# Patient Record
Sex: FEMALE | Race: BLACK OR AFRICAN AMERICAN | Employment: UNEMPLOYED | URBAN - METROPOLITAN AREA
[De-identification: names, ages, dates, MRNs, and addresses within clinical notes are randomized per-mention and may not be internally consistent; named-entity substitution may affect disease eponyms.]

---

## 2017-11-21 ENCOUNTER — HOSPITAL ENCOUNTER (OUTPATIENT)
Dept: LAB | Age: 37
Discharge: HOME | End: 2017-11-21
Payer: MEDICAID

## 2017-11-21 ENCOUNTER — HOSPITAL ENCOUNTER (OUTPATIENT)
Dept: PREADMISSION TESTING | Age: 37
Discharge: HOME | End: 2017-11-21
Payer: MEDICAID

## 2017-11-21 ENCOUNTER — HOSPITAL ENCOUNTER (OUTPATIENT)
Dept: OTHER | Age: 37
Discharge: HOME | End: 2017-11-21
Payer: MEDICAID

## 2017-11-21 DIAGNOSIS — Z01.818 PRE-OP TESTING: ICD-10-CM

## 2017-11-21 DIAGNOSIS — N28.89 RENAL MASS: ICD-10-CM

## 2017-11-21 LAB — SENTARA SPECIMEN COL,SENBCF: NORMAL

## 2017-11-21 PROCEDURE — 99001 SPECIMEN HANDLING PT-LAB: CPT | Performed by: UROLOGY

## 2017-11-21 PROCEDURE — 71020 XR CHEST PA LAT: CPT

## 2017-11-21 PROCEDURE — 93005 ELECTROCARDIOGRAM TRACING: CPT

## 2017-11-21 NOTE — PERIOP NOTES
PAT - SURGICAL PRE-ADMISSION INSTRUCTIONS    NAME:  Sherwin Mcgarry                                                          TODAY'S DATE:  11/21/2017    SURGERY DATE:  12/7/2017                                  SURGERY ARRIVAL TIME:   1100    1. Do NOT eat or drink anything, including candy or gum, after MIDNIGHT on 12/6/17 , unless you have specific instructions from your Surgeon or Anesthesia Provider to do so. 2. No smoking on the day of surgery. 3. No alcohol 24 hours prior to the day of surgery. 4. No recreational drugs for one week prior to the day of surgery. 5. Leave all valuables, including money/purse, at home. 6. Remove all jewelry, nail polish, makeup (including mascara); no lotions, powders, deodorant, or perfume/cologne/after shave. 7. Glasses/Contact lenses and Dentures may be worn to the hospital.  They will be removed prior to surgery. 8. Call your doctor if symptoms of a cold or illness develop within 24 ours prior to surgery. 9. AN ADULT MUST DRIVE YOU HOME AFTER OUTPATIENT SURGERY. 10. If you are having an OUTPATIENT procedure, please make arrangements for a responsible adult to be with you for 24 hours after your surgery. 11. If you are admitted to the hospital, you will be assigned to a bed after surgery is complete. Normally a family member will not be able to see you until you are in your assigned bed. 15. Family is encouraged to accompany you to the hospital.  We ask visitors in the treatment area to be limited to ONE person at a time to ensure patient privacy. EXCEPTIONS WILL BE MADE AS NEEDED. 15. Children under 12 are discouraged from entering the treatment area and need to be supervised by an adult when in the waiting room. Special Instructions: Take these medications the morning of surgery with a sip of water:  METOPROLOL, HOLD oral diabetic medication on the MORNING OF surgery. , HOLD metformin/glucophage dose starting the EVENING BEFORE the day of surgery. Patient Prep:    use CHG solution    These surgical instructions were reviewed with PATIENT during the PAT VISIT. A printed copy of the instructions was provided to PATIENT. Directions: On the morning of surgery, please go to the [facility]. Enter the building from the [facility] entrance, 1st floor (next to the Emergency Room entrance). Take the elevator to the 2nd floor. Sign in at the Registration Desk.     If you have any questions and/or concerns, please do not hesitate to call:  (Prior to the day of surgery)  [facility] unit:  [phone]  (Day of surgery)  [facility] unit:  [phone]

## 2017-11-22 LAB
ATRIAL RATE: 79 BPM
CALCULATED P AXIS, ECG09: 62 DEGREES
CALCULATED R AXIS, ECG10: 54 DEGREES
CALCULATED T AXIS, ECG11: 51 DEGREES
DIAGNOSIS, 93000: NORMAL
P-R INTERVAL, ECG05: 156 MS
Q-T INTERVAL, ECG07: 388 MS
QRS DURATION, ECG06: 90 MS
QTC CALCULATION (BEZET), ECG08: 444 MS
VENTRICULAR RATE, ECG03: 79 BPM

## 2017-11-28 ENCOUNTER — HOSPITAL ENCOUNTER (OUTPATIENT)
Dept: PREADMISSION TESTING | Age: 37
Discharge: HOME | End: 2017-11-28
Payer: MEDICAID

## 2017-11-28 PROCEDURE — 86900 BLOOD TYPING SEROLOGIC ABO: CPT | Performed by: UROLOGY

## 2017-11-28 PROCEDURE — 36415 COLL VENOUS BLD VENIPUNCTURE: CPT | Performed by: UROLOGY

## 2017-12-06 ENCOUNTER — ANESTHESIA EVENT (OUTPATIENT)
Dept: SURGERY | Age: 37
DRG: 658 | End: 2017-12-06
Payer: SELF-PAY

## 2017-12-06 NOTE — H&P
Kary Norton  1980               ICD-10-CM ICD-9-CM     1. Renal mass N28.89 593.9 AMB POC URINALYSIS DIP STICK AUTO W/O MICRO   2. Urinary tract infection without hematuria, site unspecified N39.0 599.0 CULTURE, URINE         Assessment and Plan:  UA today negative      1. Small 3.3 Left Anterior Upper Pole Renal Mass (Rule out Cystic RCC)     Reviewed CT scan 6/24/2017 and 10/2017suspicious for malignancy. Discussed treatment options of observation  vs. Nephron sparing Partial Nephrectomy. Patient elects Left Partial nephrectomy.               We discussed the risks and benefits of laparoscopic nephrectomy including, but not limited to, infection, bleeding, blood transfusion, possible conversion to open nephrectomy, possible injury to surrounding organs requiring immediate or delayed repair, possible benign path or positive surgical margins, chronic kidney disease with subsequent increased risk of cardiovascular morbidity and mortality, and global anesthesia risks including but not limited to CVA, MI, DVT, PE, pneumonia, and death. The patient expressed understanding and desire to proceed.                            Plan for DaVinci Lap Left Partial Nephrectomy, possible radical possible open, with Intraop US. Letter sent.                                Chief Complaint   Patient presents with    Renal Mass            HISTORY OF PRESENT ILLNESS:      Kary Norton is a 40 y.o. female who presents today in follow up for her know left renal mass. Patient presented to ED on 6/24/2017 c/o diffuse abdominal and LLQ pain x2-3 weeks. CT scan at that time revealed a 3cm left anterior upper pole renal mass with some apparent septation suggesting neoplasm possibly a small oncocytoma and a mildly enlarged spleen. CT scan 10/16/2017 revealed no interval change in right renal mass size. Last creatinine on 8/2/2017 was 0.9 ng/dL (wnl).  Over the interim, patient had a f/u with hematologist who noted normal blood work, no splenomegaly.       Today, the patient continues with left flank and LLQ pain and is now with Left LE pain. No f/c/n/v. No gross hematuria, dysuria. Asymptomatic for urinary infection. No h/o UTI's.       Patient denies any bothersome urinary symptoms at baseline. Denies gross hematuria, dysuria, frequency, urgency, incontinence, or straining to void.      Denies any known cardiac, pulmonary, blood, or coagulation issues. No anticoagulant or ASA use.      Denies FHX of renal cancer or complications.    Not a smoker.          Past Medical History:   Diagnosis Date    Hyperthyroidism      Polycystic ovary syndrome      Renal mass                 Past Surgical History:   Procedure Laterality Date    HX HERNIA REPAIR        HX OTHER SURGICAL Right       right lung collapsed as a child               Family History   Problem Relation Age of Onset    Hypertension Mother                 Allergies:  No Known Allergies     Social History            Social History    Marital status: SINGLE       Spouse name: N/A    Number of children: N/A    Years of education: N/A          Occupational History    Not on file.           Social History Main Topics    Smoking status: Never Smoker    Smokeless tobacco: Never Used    Alcohol use No    Drug use: No    Sexual activity: Not on file           Other Topics Concern    Not on file      Social History Narrative            Review of Systems  Constitutional: Fever: No  Skin: Rash: No  HEENT: Hearing difficulty: No  Eyes: Blurred vision: No  Cardiovascular: Chest pain: No  Respiratory: Shortness of breath: No  Gastrointestinal: Nausea/vomiting: No  Musculoskeletal: Back pain: Yes  Neurological: Weakness: No  Psychological: Memory loss: No  Comments/additional findings:         PHYSICAL EXAMINATION:           Visit Vitals    /70    Ht 5' 5.5\" (1.664 m)    Wt 221 lb (100.2 kg)    BMI 36.22 kg/m2    Constitutional: Well developed, well-nourished female in no acute distress. CV:  No peripheral swelling noted  Respiratory: No respiratory distress or difficulties  Abdomen:  Soft and nontender. No masses. No hepatosplenomegaly, Well healed hernia incision. General Left abdominal and CVA tenderness, no Suprapubic tenderness. Skin:  Normal color. No evidence of jaundice. Neuro/Psych:  Patient with appropriate affect. Alert and oriented. Lymphatic:   No enlargement of supraclavicular lymph nodes.        REVIEW OF LABS AND IMAGING:              Results for orders placed or performed in visit on 11/03/17   CULTURE, URINE   Result Value Ref Range     Urine Culture, Routine           Mixed urogenital kathleen  10,000-25,000 colony forming units per mL   AMB POC URINALYSIS DIP STICK AUTO W/O MICRO   Result Value Ref Range     Color (UA POC) Yellow       Clarity (UA POC) Clear       Glucose (UA POC) Negative Negative     Bilirubin (UA POC) Negative Negative     Ketones (UA POC) Negative Negative     Specific gravity (UA POC) 1.015 1.001 - 1.035     Blood (UA POC) Negative Negative     pH (UA POC) 7.5 4.6 - 8.0     Protein (UA POC) Negative Negative mg/dL     Urobilinogen (UA POC) 0.2 mg/dL 0.2 - 1     Nitrites (UA POC) Negative Negative     Leukocyte esterase (UA POC) Negative Negative      CT AP w wo Cont 10/16/2017:  KIDNEYS:  3.3 x 2.7 cm mass arising from upper pole left kidney with no interval change in size. This lesion measures fluid density precontrast, but internal enhancing septations are noted. Septations are somewhat thickened and irregular.           CT AP w Cont 6/24/2017  FINDINGS:  LOWER CHEST: Unremarkable. LIVER, BILIARY: Unremarkable liver. No biliary dilation. Gallbladder is unremarkable. PANCREAS: Normal.  SPLEEN: The spleen is mildly enlarged measuring up to 16 cm. ADRENALS: Normal.  KIDNEYS: There is a 3 cm left upper pole low density left renal mass with apparent septation.   LYMPH NODES: No enlarged lymph nodes. GASTROINTESTINAL TRACT: No bowel dilation or wall thickening. The appendix is visualized and is within normal limits. PELVIC ORGANS: Unremarkable. VASCULATURE: Unremarkable. BONES: No fracture or suspicious bone lesion.    OTHER: None. IMPRESSION:  3 cm left upper pole renal mass with some apparent septation suggesting neoplasm possibly a small oncocytoma. Renal cell carcinoma not excluded. Consider nonurgent MRI at assessment.   No acute intra-abdominal process.     A copy of today's office visit with all pertinent imaging results and labs were sent to the referring Ivanna Chiu MD

## 2017-12-07 ENCOUNTER — HOSPITAL ENCOUNTER (INPATIENT)
Age: 37
LOS: 3 days | Discharge: HOME | DRG: 658 | End: 2017-12-10
Attending: UROLOGY | Admitting: UROLOGY
Payer: SELF-PAY

## 2017-12-07 ENCOUNTER — ANESTHESIA (OUTPATIENT)
Dept: SURGERY | Age: 37
DRG: 658 | End: 2017-12-07
Payer: SELF-PAY

## 2017-12-07 DIAGNOSIS — N28.89 RENAL MASS: ICD-10-CM

## 2017-12-07 DIAGNOSIS — N28.89 RENAL MASS, LEFT: Primary | ICD-10-CM

## 2017-12-07 LAB
ABO + RH BLD: NORMAL
ABO + RH BLD: NORMAL
ANION GAP SERPL CALC-SCNC: 7 MMOL/L (ref 3–18)
BLOOD BANK CMNT PATIENT-IMP: NORMAL
BLOOD GROUP ANTIBODIES SERPL: NORMAL
BLOOD GROUP ANTIBODIES SERPL: NORMAL
BUN SERPL-MCNC: 17 MG/DL (ref 7–18)
BUN/CREAT SERPL: 15 (ref 12–20)
CALCIUM SERPL-MCNC: 8.4 MG/DL (ref 8.5–10.1)
CHLORIDE SERPL-SCNC: 109 MMOL/L (ref 100–108)
CO2 SERPL-SCNC: 24 MMOL/L (ref 21–32)
CREAT SERPL-MCNC: 1.16 MG/DL (ref 0.6–1.3)
ERYTHROCYTE [DISTWIDTH] IN BLOOD BY AUTOMATED COUNT: 15.1 % (ref 11.6–14.5)
EST. AVERAGE GLUCOSE BLD GHB EST-MCNC: ABNORMAL MG/DL
GLUCOSE BLD STRIP.AUTO-MCNC: 120 MG/DL (ref 70–110)
GLUCOSE SERPL-MCNC: 138 MG/DL (ref 74–99)
HBA1C MFR BLD: <3.5 % (ref 4.2–5.6)
HCG UR QL: NEGATIVE
HCT VFR BLD AUTO: 27 % (ref 35–45)
HGB BLD-MCNC: 8.8 G/DL (ref 12–16)
MCH RBC QN AUTO: 21.9 PG (ref 24–34)
MCHC RBC AUTO-ENTMCNC: 32.6 G/DL (ref 31–37)
MCV RBC AUTO: 67.2 FL (ref 74–97)
PLATELET # BLD AUTO: 221 K/UL (ref 135–420)
PMV BLD AUTO: 10.1 FL (ref 9.2–11.8)
POTASSIUM SERPL-SCNC: 4.3 MMOL/L (ref 3.5–5.5)
RBC # BLD AUTO: 4.02 M/UL (ref 4.2–5.3)
SODIUM SERPL-SCNC: 140 MMOL/L (ref 136–145)
SPECIMEN EXP DATE BLD: NORMAL
SPECIMEN EXP DATE BLD: NORMAL
WBC # BLD AUTO: 14.1 K/UL (ref 4.6–13.2)

## 2017-12-07 PROCEDURE — 86900 BLOOD TYPING SEROLOGIC ABO: CPT | Performed by: UROLOGY

## 2017-12-07 PROCEDURE — 77030010517 HC APPL SEAL FLOSEL BAXT -B: Performed by: UROLOGY

## 2017-12-07 PROCEDURE — 76210000020 HC REC RM PH II FIRST 0.5 HR: Performed by: UROLOGY

## 2017-12-07 PROCEDURE — 74011250636 HC RX REV CODE- 250/636

## 2017-12-07 PROCEDURE — 77030008603 HC TRCR ENDOSC EPATH J&J -C: Performed by: UROLOGY

## 2017-12-07 PROCEDURE — 74011000250 HC RX REV CODE- 250

## 2017-12-07 PROCEDURE — 77030020703 HC SEAL CANN DISP INTU -B: Performed by: UROLOGY

## 2017-12-07 PROCEDURE — 77030035277 HC OBTRTR BLDELSS DISP INTU -B: Performed by: UROLOGY

## 2017-12-07 PROCEDURE — 77030032490 HC SLV COMPR SCD KNE COVD -B: Performed by: UROLOGY

## 2017-12-07 PROCEDURE — 77030008477 HC STYL SATN SLP COVD -A: Performed by: ANESTHESIOLOGY

## 2017-12-07 PROCEDURE — 88307 TISSUE EXAM BY PATHOLOGIST: CPT | Performed by: UROLOGY

## 2017-12-07 PROCEDURE — 74011250636 HC RX REV CODE- 250/636: Performed by: NURSE ANESTHETIST, CERTIFIED REGISTERED

## 2017-12-07 PROCEDURE — 77030018390 HC SPNG HEMSTAT2 J&J -B: Performed by: UROLOGY

## 2017-12-07 PROCEDURE — 74011250636 HC RX REV CODE- 250/636: Performed by: UROLOGY

## 2017-12-07 PROCEDURE — 88342 IMHCHEM/IMCYTCHM 1ST ANTB: CPT | Performed by: UROLOGY

## 2017-12-07 PROCEDURE — 8E0W4CZ ROBOTIC ASSISTED PROCEDURE OF TRUNK REGION, PERCUTANEOUS ENDOSCOPIC APPROACH: ICD-10-PCS | Performed by: UROLOGY

## 2017-12-07 PROCEDURE — 77030016151 HC PROTCTR LNS DFOG COVD -B: Performed by: UROLOGY

## 2017-12-07 PROCEDURE — 36415 COLL VENOUS BLD VENIPUNCTURE: CPT | Performed by: UROLOGY

## 2017-12-07 PROCEDURE — 77030008771 HC TU NG SALEM SUMP -A: Performed by: ANESTHESIOLOGY

## 2017-12-07 PROCEDURE — 77030027138 HC INCENT SPIROMETER -A

## 2017-12-07 PROCEDURE — 77030008683 HC TU ET CUF COVD -A: Performed by: ANESTHESIOLOGY

## 2017-12-07 PROCEDURE — 74011000250 HC RX REV CODE- 250: Performed by: UROLOGY

## 2017-12-07 PROCEDURE — 77030013079 HC BLNKT BAIR HGGR 3M -A: Performed by: ANESTHESIOLOGY

## 2017-12-07 PROCEDURE — 77030014647 HC SEAL FBRN TISSL BAXT -D: Performed by: UROLOGY

## 2017-12-07 PROCEDURE — 77030031139 HC SUT VCRL2 J&J -A: Performed by: UROLOGY

## 2017-12-07 PROCEDURE — 76060000038 HC ANESTHESIA 3.5 TO 4 HR: Performed by: UROLOGY

## 2017-12-07 PROCEDURE — 77030010939 HC CLP LIG TELE -B: Performed by: UROLOGY

## 2017-12-07 PROCEDURE — 74011000272 HC RX REV CODE- 272: Performed by: UROLOGY

## 2017-12-07 PROCEDURE — 77030013532 HC SEAL FBRN TISSL RDYTUSE 4ML BAXT -C: Performed by: UROLOGY

## 2017-12-07 PROCEDURE — 74011250637 HC RX REV CODE- 250/637: Performed by: NURSE ANESTHETIST, CERTIFIED REGISTERED

## 2017-12-07 PROCEDURE — 85027 COMPLETE CBC AUTOMATED: CPT | Performed by: UROLOGY

## 2017-12-07 PROCEDURE — 74011000258 HC RX REV CODE- 258

## 2017-12-07 PROCEDURE — 65270000029 HC RM PRIVATE

## 2017-12-07 PROCEDURE — 77030013567 HC DRN WND RESERV BARD -A: Performed by: UROLOGY

## 2017-12-07 PROCEDURE — 80048 BASIC METABOLIC PNL TOTAL CA: CPT | Performed by: UROLOGY

## 2017-12-07 PROCEDURE — 77030010935 HC CLP LIG ABSRB TELE -B: Performed by: UROLOGY

## 2017-12-07 PROCEDURE — 77030009852 HC PCH RTVR ENDOSC COVD -B: Performed by: UROLOGY

## 2017-12-07 PROCEDURE — 77030002896 HC SUT CLP ABSRB J&J -B: Performed by: UROLOGY

## 2017-12-07 PROCEDURE — 88341 IMHCHEM/IMCYTCHM EA ADD ANTB: CPT | Performed by: UROLOGY

## 2017-12-07 PROCEDURE — 77030035045 HC TRCR ENDOSC VRSPRT BLDLSS COVD -B: Performed by: UROLOGY

## 2017-12-07 PROCEDURE — 77030009848 HC PASSR SUT SET COOP -C: Performed by: UROLOGY

## 2017-12-07 PROCEDURE — 82962 GLUCOSE BLOOD TEST: CPT

## 2017-12-07 PROCEDURE — 77030027491 HC SHR ENDOSC COVD -B: Performed by: UROLOGY

## 2017-12-07 PROCEDURE — 77030035684 HC CAP REDUC TRCR ENDOSC 1SEAL J&J -B: Performed by: UROLOGY

## 2017-12-07 PROCEDURE — 0TB14ZZ EXCISION OF LEFT KIDNEY, PERCUTANEOUS ENDOSCOPIC APPROACH: ICD-10-PCS | Performed by: UROLOGY

## 2017-12-07 PROCEDURE — 74011250637 HC RX REV CODE- 250/637: Performed by: UROLOGY

## 2017-12-07 PROCEDURE — 77030002933 HC SUT MCRYL J&J -A: Performed by: UROLOGY

## 2017-12-07 PROCEDURE — 77030012407 HC DRN WND BARD -B: Performed by: UROLOGY

## 2017-12-07 PROCEDURE — 77030010507 HC ADH SKN DERMBND J&J -B: Performed by: UROLOGY

## 2017-12-07 PROCEDURE — 76010000879 HC OR TIME 3.5 TO 4HR INTENSV - TIER 2: Performed by: UROLOGY

## 2017-12-07 PROCEDURE — 77030011640 HC PAD GRND REM COVD -A: Performed by: UROLOGY

## 2017-12-07 PROCEDURE — 76210000006 HC OR PH I REC 0.5 TO 1 HR: Performed by: UROLOGY

## 2017-12-07 PROCEDURE — 77030034850: Performed by: UROLOGY

## 2017-12-07 PROCEDURE — 81025 URINE PREGNANCY TEST: CPT

## 2017-12-07 PROCEDURE — 83036 HEMOGLOBIN GLYCOSYLATED A1C: CPT | Performed by: UROLOGY

## 2017-12-07 RX ORDER — CEFAZOLIN SODIUM 2 G/50ML
2 SOLUTION INTRAVENOUS
Status: COMPLETED | OUTPATIENT
Start: 2017-12-07 | End: 2017-12-07

## 2017-12-07 RX ORDER — MANNITOL 250 MG/ML
12.5 INJECTION, SOLUTION INTRAVENOUS ONCE
Status: COMPLETED | OUTPATIENT
Start: 2017-12-07 | End: 2017-12-07

## 2017-12-07 RX ORDER — CEFAZOLIN SODIUM 2 G/50ML
2 SOLUTION INTRAVENOUS EVERY 8 HOURS
Status: COMPLETED | OUTPATIENT
Start: 2017-12-07 | End: 2017-12-08

## 2017-12-07 RX ORDER — ONDANSETRON 2 MG/ML
4 INJECTION INTRAMUSCULAR; INTRAVENOUS
Status: DISCONTINUED | OUTPATIENT
Start: 2017-12-07 | End: 2017-12-10 | Stop reason: HOSPADM

## 2017-12-07 RX ORDER — NALOXONE HYDROCHLORIDE 0.4 MG/ML
0.4 INJECTION, SOLUTION INTRAMUSCULAR; INTRAVENOUS; SUBCUTANEOUS AS NEEDED
Status: DISCONTINUED | OUTPATIENT
Start: 2017-12-07 | End: 2017-12-10 | Stop reason: HOSPADM

## 2017-12-07 RX ORDER — INSULIN LISPRO 100 [IU]/ML
INJECTION, SOLUTION INTRAVENOUS; SUBCUTANEOUS
Status: DISCONTINUED | OUTPATIENT
Start: 2017-12-07 | End: 2017-12-09

## 2017-12-07 RX ORDER — HYDROMORPHONE HYDROCHLORIDE 1 MG/ML
INJECTION, SOLUTION INTRAMUSCULAR; INTRAVENOUS; SUBCUTANEOUS AS NEEDED
Status: DISCONTINUED | OUTPATIENT
Start: 2017-12-07 | End: 2017-12-07 | Stop reason: HOSPADM

## 2017-12-07 RX ORDER — GLYCOPYRROLATE 0.2 MG/ML
INJECTION INTRAMUSCULAR; INTRAVENOUS AS NEEDED
Status: DISCONTINUED | OUTPATIENT
Start: 2017-12-07 | End: 2017-12-07 | Stop reason: HOSPADM

## 2017-12-07 RX ORDER — METFORMIN HYDROCHLORIDE 750 MG/1
750 TABLET, EXTENDED RELEASE ORAL 2 TIMES DAILY
Status: DISCONTINUED | OUTPATIENT
Start: 2017-12-07 | End: 2017-12-08

## 2017-12-07 RX ORDER — SENNOSIDES 8.6 MG/1
2 TABLET ORAL
Status: DISCONTINUED | OUTPATIENT
Start: 2017-12-07 | End: 2017-12-10 | Stop reason: HOSPADM

## 2017-12-07 RX ORDER — DIPHENHYDRAMINE HYDROCHLORIDE 50 MG/ML
12.5 INJECTION, SOLUTION INTRAMUSCULAR; INTRAVENOUS
Status: DISCONTINUED | OUTPATIENT
Start: 2017-12-07 | End: 2017-12-08

## 2017-12-07 RX ORDER — LIDOCAINE HYDROCHLORIDE 20 MG/ML
INJECTION, SOLUTION EPIDURAL; INFILTRATION; INTRACAUDAL; PERINEURAL AS NEEDED
Status: DISCONTINUED | OUTPATIENT
Start: 2017-12-07 | End: 2017-12-07 | Stop reason: HOSPADM

## 2017-12-07 RX ORDER — DOCUSATE SODIUM 100 MG/1
100 CAPSULE, LIQUID FILLED ORAL 2 TIMES DAILY
Status: DISCONTINUED | OUTPATIENT
Start: 2017-12-07 | End: 2017-12-10 | Stop reason: HOSPADM

## 2017-12-07 RX ORDER — SODIUM CHLORIDE, SODIUM LACTATE, POTASSIUM CHLORIDE, CALCIUM CHLORIDE 600; 310; 30; 20 MG/100ML; MG/100ML; MG/100ML; MG/100ML
75 INJECTION, SOLUTION INTRAVENOUS CONTINUOUS
Status: DISCONTINUED | OUTPATIENT
Start: 2017-12-07 | End: 2017-12-07 | Stop reason: HOSPADM

## 2017-12-07 RX ORDER — MAGNESIUM SULFATE 100 %
4 CRYSTALS MISCELLANEOUS AS NEEDED
Status: DISCONTINUED | OUTPATIENT
Start: 2017-12-07 | End: 2017-12-10 | Stop reason: HOSPADM

## 2017-12-07 RX ORDER — OXYCODONE AND ACETAMINOPHEN 5; 325 MG/1; MG/1
1-2 TABLET ORAL
Qty: 30 TAB | Refills: 0 | Status: SHIPPED | OUTPATIENT
Start: 2017-12-07 | End: 2019-10-17

## 2017-12-07 RX ORDER — FAMOTIDINE 20 MG/1
20 TABLET, FILM COATED ORAL ONCE
Status: COMPLETED | OUTPATIENT
Start: 2017-12-07 | End: 2017-12-07

## 2017-12-07 RX ORDER — HYDROMORPHONE HCL IN 0.9% NACL 50 MG/50ML
1 PLASTIC BAG, INJECTION (ML) INJECTION
Status: DISCONTINUED | OUTPATIENT
Start: 2017-12-07 | End: 2017-12-10 | Stop reason: HOSPADM

## 2017-12-07 RX ORDER — HYDROMORPHONE HCL IN 0.9% NACL 50 MG/50ML
0.5 PLASTIC BAG, INJECTION (ML) INJECTION
Status: DISCONTINUED | OUTPATIENT
Start: 2017-12-07 | End: 2017-12-07 | Stop reason: HOSPADM

## 2017-12-07 RX ORDER — SODIUM CHLORIDE, SODIUM LACTATE, POTASSIUM CHLORIDE, CALCIUM CHLORIDE 600; 310; 30; 20 MG/100ML; MG/100ML; MG/100ML; MG/100ML
100 INJECTION, SOLUTION INTRAVENOUS CONTINUOUS
Status: DISCONTINUED | OUTPATIENT
Start: 2017-12-07 | End: 2017-12-10 | Stop reason: HOSPADM

## 2017-12-07 RX ORDER — FENTANYL CITRATE 50 UG/ML
INJECTION, SOLUTION INTRAMUSCULAR; INTRAVENOUS AS NEEDED
Status: DISCONTINUED | OUTPATIENT
Start: 2017-12-07 | End: 2017-12-07 | Stop reason: HOSPADM

## 2017-12-07 RX ORDER — MIDAZOLAM HYDROCHLORIDE 1 MG/ML
INJECTION, SOLUTION INTRAMUSCULAR; INTRAVENOUS AS NEEDED
Status: DISCONTINUED | OUTPATIENT
Start: 2017-12-07 | End: 2017-12-07 | Stop reason: HOSPADM

## 2017-12-07 RX ORDER — OXYCODONE AND ACETAMINOPHEN 5; 325 MG/1; MG/1
1-2 TABLET ORAL
Status: DISCONTINUED | OUTPATIENT
Start: 2017-12-07 | End: 2017-12-10 | Stop reason: HOSPADM

## 2017-12-07 RX ORDER — SODIUM CHLORIDE 0.9 % (FLUSH) 0.9 %
5-10 SYRINGE (ML) INJECTION AS NEEDED
Status: DISCONTINUED | OUTPATIENT
Start: 2017-12-07 | End: 2017-12-10 | Stop reason: HOSPADM

## 2017-12-07 RX ORDER — VECURONIUM BROMIDE FOR INJECTION 1 MG/ML
INJECTION, POWDER, LYOPHILIZED, FOR SOLUTION INTRAVENOUS AS NEEDED
Status: DISCONTINUED | OUTPATIENT
Start: 2017-12-07 | End: 2017-12-07 | Stop reason: HOSPADM

## 2017-12-07 RX ORDER — ONDANSETRON 2 MG/ML
4 INJECTION INTRAMUSCULAR; INTRAVENOUS ONCE
Status: DISCONTINUED | OUTPATIENT
Start: 2017-12-07 | End: 2017-12-07 | Stop reason: HOSPADM

## 2017-12-07 RX ORDER — PROPOFOL 10 MG/ML
INJECTION, EMULSION INTRAVENOUS AS NEEDED
Status: DISCONTINUED | OUTPATIENT
Start: 2017-12-07 | End: 2017-12-07 | Stop reason: HOSPADM

## 2017-12-07 RX ORDER — SODIUM CHLORIDE, SODIUM LACTATE, POTASSIUM CHLORIDE, CALCIUM CHLORIDE 600; 310; 30; 20 MG/100ML; MG/100ML; MG/100ML; MG/100ML
100 INJECTION, SOLUTION INTRAVENOUS CONTINUOUS
Status: DISCONTINUED | OUTPATIENT
Start: 2017-12-07 | End: 2017-12-07 | Stop reason: HOSPADM

## 2017-12-07 RX ORDER — METOPROLOL TARTRATE 50 MG/1
100 TABLET ORAL EVERY 12 HOURS
Status: DISCONTINUED | OUTPATIENT
Start: 2017-12-07 | End: 2017-12-10 | Stop reason: HOSPADM

## 2017-12-07 RX ORDER — SODIUM CHLORIDE 0.9 % (FLUSH) 0.9 %
5-10 SYRINGE (ML) INJECTION EVERY 8 HOURS
Status: DISCONTINUED | OUTPATIENT
Start: 2017-12-07 | End: 2017-12-10 | Stop reason: HOSPADM

## 2017-12-07 RX ORDER — DOCUSATE SODIUM 100 MG/1
100 CAPSULE, LIQUID FILLED ORAL 2 TIMES DAILY
Qty: 60 CAP | Refills: 0 | Status: SHIPPED | OUTPATIENT
Start: 2017-12-07 | End: 2018-01-06

## 2017-12-07 RX ORDER — SODIUM CHLORIDE, SODIUM LACTATE, POTASSIUM CHLORIDE, CALCIUM CHLORIDE 600; 310; 30; 20 MG/100ML; MG/100ML; MG/100ML; MG/100ML
INJECTION, SOLUTION INTRAVENOUS
Status: DISCONTINUED | OUTPATIENT
Start: 2017-12-07 | End: 2017-12-07 | Stop reason: HOSPADM

## 2017-12-07 RX ORDER — DEXTROSE 50 % IN WATER (D50W) INTRAVENOUS SYRINGE
25-50 AS NEEDED
Status: DISCONTINUED | OUTPATIENT
Start: 2017-12-07 | End: 2017-12-07 | Stop reason: RX

## 2017-12-07 RX ORDER — ONDANSETRON 2 MG/ML
INJECTION INTRAMUSCULAR; INTRAVENOUS AS NEEDED
Status: DISCONTINUED | OUTPATIENT
Start: 2017-12-07 | End: 2017-12-07 | Stop reason: HOSPADM

## 2017-12-07 RX ORDER — FENTANYL CITRATE 50 UG/ML
50 INJECTION, SOLUTION INTRAMUSCULAR; INTRAVENOUS AS NEEDED
Status: DISCONTINUED | OUTPATIENT
Start: 2017-12-07 | End: 2017-12-07 | Stop reason: HOSPADM

## 2017-12-07 RX ORDER — NEOSTIGMINE METHYLSULFATE 5 MG/5 ML
SYRINGE (ML) INTRAVENOUS AS NEEDED
Status: DISCONTINUED | OUTPATIENT
Start: 2017-12-07 | End: 2017-12-07 | Stop reason: HOSPADM

## 2017-12-07 RX ORDER — SUCCINYLCHOLINE CHLORIDE 20 MG/ML
INJECTION INTRAMUSCULAR; INTRAVENOUS AS NEEDED
Status: DISCONTINUED | OUTPATIENT
Start: 2017-12-07 | End: 2017-12-07 | Stop reason: HOSPADM

## 2017-12-07 RX ADMIN — ONDANSETRON 4 MG: 2 INJECTION INTRAMUSCULAR; INTRAVENOUS at 16:40

## 2017-12-07 RX ADMIN — METFORMIN HYDROCHLORIDE 750 MG: 750 TABLET, EXTENDED RELEASE ORAL at 23:17

## 2017-12-07 RX ADMIN — MIDAZOLAM HYDROCHLORIDE 2 MG: 1 INJECTION, SOLUTION INTRAMUSCULAR; INTRAVENOUS at 13:27

## 2017-12-07 RX ADMIN — ONDANSETRON 4 MG: 2 INJECTION INTRAMUSCULAR; INTRAVENOUS at 23:28

## 2017-12-07 RX ADMIN — MANNITOL 12.5 G: 12.5 INJECTION, SOLUTION INTRAVENOUS at 15:54

## 2017-12-07 RX ADMIN — HYDROMORPHONE HYDROCHLORIDE 0.2 MG: 1 INJECTION, SOLUTION INTRAMUSCULAR; INTRAVENOUS; SUBCUTANEOUS at 16:56

## 2017-12-07 RX ADMIN — FENTANYL CITRATE 50 MCG: 50 INJECTION, SOLUTION INTRAMUSCULAR; INTRAVENOUS at 16:27

## 2017-12-07 RX ADMIN — SODIUM CHLORIDE, SODIUM LACTATE, POTASSIUM CHLORIDE, CALCIUM CHLORIDE: 600; 310; 30; 20 INJECTION, SOLUTION INTRAVENOUS at 15:48

## 2017-12-07 RX ADMIN — VECURONIUM BROMIDE FOR INJECTION 4 MG: 1 INJECTION, POWDER, LYOPHILIZED, FOR SOLUTION INTRAVENOUS at 13:43

## 2017-12-07 RX ADMIN — SODIUM CHLORIDE, SODIUM LACTATE, POTASSIUM CHLORIDE, AND CALCIUM CHLORIDE 125 ML/HR: 600; 310; 30; 20 INJECTION, SOLUTION INTRAVENOUS at 20:41

## 2017-12-07 RX ADMIN — VECURONIUM BROMIDE FOR INJECTION 3 MG: 1 INJECTION, POWDER, LYOPHILIZED, FOR SOLUTION INTRAVENOUS at 14:18

## 2017-12-07 RX ADMIN — SUCCINYLCHOLINE CHLORIDE 100 MG: 20 INJECTION INTRAMUSCULAR; INTRAVENOUS at 13:33

## 2017-12-07 RX ADMIN — FENTANYL CITRATE 100 MCG: 50 INJECTION, SOLUTION INTRAMUSCULAR; INTRAVENOUS at 13:33

## 2017-12-07 RX ADMIN — CEFAZOLIN SODIUM 2 G: 2 SOLUTION INTRAVENOUS at 20:34

## 2017-12-07 RX ADMIN — SODIUM CHLORIDE, SODIUM LACTATE, POTASSIUM CHLORIDE, AND CALCIUM CHLORIDE: 600; 310; 30; 20 INJECTION, SOLUTION INTRAVENOUS at 14:44

## 2017-12-07 RX ADMIN — Medication 10 ML: at 23:11

## 2017-12-07 RX ADMIN — SODIUM CHLORIDE, SODIUM LACTATE, POTASSIUM CHLORIDE, AND CALCIUM CHLORIDE 100 ML/HR: 600; 310; 30; 20 INJECTION, SOLUTION INTRAVENOUS at 11:48

## 2017-12-07 RX ADMIN — GLYCOPYRROLATE 0.7 MG: 0.2 INJECTION INTRAMUSCULAR; INTRAVENOUS at 16:51

## 2017-12-07 RX ADMIN — DOCUSATE SODIUM 100 MG: 100 CAPSULE, LIQUID FILLED ORAL at 20:33

## 2017-12-07 RX ADMIN — CEFAZOLIN SODIUM 2 G: 2 SOLUTION INTRAVENOUS at 13:43

## 2017-12-07 RX ADMIN — SENNOSIDES 17.2 MG: 8.6 TABLET, FILM COATED ORAL at 23:17

## 2017-12-07 RX ADMIN — Medication 5 MG: at 16:51

## 2017-12-07 RX ADMIN — LIDOCAINE HYDROCHLORIDE 100 MG: 20 INJECTION, SOLUTION EPIDURAL; INFILTRATION; INTRACAUDAL; PERINEURAL at 13:33

## 2017-12-07 RX ADMIN — SODIUM CHLORIDE, SODIUM LACTATE, POTASSIUM CHLORIDE, CALCIUM CHLORIDE: 600; 310; 30; 20 INJECTION, SOLUTION INTRAVENOUS at 13:47

## 2017-12-07 RX ADMIN — GLYCOPYRROLATE 0.1 MG: 0.2 INJECTION INTRAMUSCULAR; INTRAVENOUS at 14:01

## 2017-12-07 RX ADMIN — VECURONIUM BROMIDE FOR INJECTION 1 MG: 1 INJECTION, POWDER, LYOPHILIZED, FOR SOLUTION INTRAVENOUS at 15:32

## 2017-12-07 RX ADMIN — VECURONIUM BROMIDE FOR INJECTION 1 MG: 1 INJECTION, POWDER, LYOPHILIZED, FOR SOLUTION INTRAVENOUS at 16:10

## 2017-12-07 RX ADMIN — OXYCODONE HYDROCHLORIDE AND ACETAMINOPHEN 1 TABLET: 5; 325 TABLET ORAL at 20:31

## 2017-12-07 RX ADMIN — PROPOFOL 180 MG: 10 INJECTION, EMULSION INTRAVENOUS at 13:33

## 2017-12-07 RX ADMIN — VECURONIUM BROMIDE FOR INJECTION 1 MG: 1 INJECTION, POWDER, LYOPHILIZED, FOR SOLUTION INTRAVENOUS at 16:32

## 2017-12-07 RX ADMIN — FENTANYL CITRATE 50 MCG: 50 INJECTION, SOLUTION INTRAMUSCULAR; INTRAVENOUS at 15:45

## 2017-12-07 RX ADMIN — VECURONIUM BROMIDE FOR INJECTION 3 MG: 1 INJECTION, POWDER, LYOPHILIZED, FOR SOLUTION INTRAVENOUS at 14:58

## 2017-12-07 RX ADMIN — HYDROMORPHONE HYDROCHLORIDE 1 MG: 1 INJECTION, SOLUTION INTRAMUSCULAR; INTRAVENOUS; SUBCUTANEOUS at 14:09

## 2017-12-07 RX ADMIN — FAMOTIDINE 20 MG: 20 TABLET ORAL at 11:52

## 2017-12-07 NOTE — PROGRESS NOTES
TRANSFER - IN REPORT:    Verbal report received from April Walker on Shaina Oseguera  being received from PACU for routine post - op      Report consisted of patients Situation, Background, Assessment and   Recommendations(SBAR). Information from the following report(s) SBAR, OR Summary and Intake/Output was reviewed with the receiving nurse. Opportunity for questions and clarification was provided. Assessment completed upon patients arrival to unit and care assumed. Received pt via bed awake but drowsy. Lap sites d/i to abdomen, dressing to BE drain with some ooze. Barrios draining clear yellow urine. Oriented to call bell, phone and IS with pt giving return demonstration. Family at [facility].

## 2017-12-07 NOTE — INTERVAL H&P NOTE
H&P Update:  Belita Phalen was seen and examined. History and physical has been reviewed. The patient has been examined.  There have been no significant clinical changes since the completion of the originally dated History and Physical.    Signed By: Nicholas Cardona MD     December 7, 2017 12:31 PM

## 2017-12-07 NOTE — BRIEF OP NOTE
BRIEF OPERATIVE NOTE    Date of Procedure: 12/7/2017   Preoperative Diagnosis: LEFT RENAL MASS N28.89  Postoperative Diagnosis: LEFT RENAL MASS N28.89    Procedure(s):  ROBOTIC ASSISTED LEFT PARTIAL NEPHRECTOMY, WITH INTRAOP US GUIDANCE  Surgeon(s) and Role:     * Sebastian Lizama MD - Resident - Assisting     * Kim Rutledge MD - Primary         Assistant Staff:       Surgical Staff:  Circ-1: Mellissa Lawrence RN  Circ-2: Darlene Watson RN  Circ-Relief: Ryan Gruber  Scrub Tech-1: Yady Basilio  Scrub Tech-2: Jackelin Kwong  Surg Asst-1: Myriam Farmer  Surg Asst-Relief: [facility]  Event Time In   Incision Start  2:13 PM   Incision Close      Anesthesia: General   Estimated Blood Loss: 250cc  Specimens:   ID Type Source Tests Collected by Time Destination   1 : left renal mass Preservative   Kim Rutledge MD 12/7/2017  4:47 PM Pathology      Findings: Rt ant Upper pole Mass 3.3cm    WIT 12VQVB     Complications: none    Implants: * No implants in log *   Drain: 15Fr Round BE drain  16Fr boggs catheter.

## 2017-12-07 NOTE — IP AVS SNAPSHOT
Iliana Viramontes 
 
 
 [address] [place] [address] Patient: Shayy Little MRN: [id number] About your hospitalization You were admitted on:  2017 You last received care in the:  [address] [place] [address],[address] You were discharged on:  December 10, 2017 Why you were hospitalized Your primary diagnosis was:  Not on File Your diagnoses also included:  Renal Mass, Left Things You Need To Do (next 8 weeks) Follow up with Derek Padgett MD  
  
Where:  Patient can only remember the practice name and not the physician  Any with Cheyanne Leslie MD at 11:30 AM  
Where:  [facility] ([facility]) Discharge Orders Procedure Order Date Status Priority Quantity Spec Type Associated Dx ACTIVITY AFTER DISCHARGE Patient should: Restrict lifting 12/10/17 1026 Normal Routine 1  Renal mass, left [[id number]] Schedule Instructions:  No strenuous activity No driving if taking narcotic pain medication Questions: Patient should:  Restrict lifting DIET REGULAR No added salt 12/10/17 102 Normal Routine 1  Renal mass, left [[id number]] Questions: Additional options:  No added salt A check marianna indicates which time of day the medication should be taken. My Medications STOP taking these medications   
 lisinopril 2.5 mg tablet Commonly known as:  PRINIVIL, ZESTRIL  
   
  
  
TAKE these medications as instructed Instructions Each Dose to Equal  
 Morning Noon Evening Bedtime  
 docusate sodium 100 mg capsule Commonly known as:  Armromy Obriener Your last dose was: Your next dose is: Take 1 Cap by mouth two (2) times a day for 30 days. 100 mg  
    
   
   
   
  
 metFORMIN  mg tablet Commonly known as:  GLUCOPHAGE XR Your last dose was: Your next dose is: Take 750 mg by mouth two (2) times a day. 750 mg  
    
   
   
   
  
 metoprolol tartrate 100 mg IR tablet Commonly known as:  LOPRESSOR Your last dose was: Your next dose is: Take  by mouth two (2) times a day. oxyCODONE-acetaminophen 5-325 mg per tablet Commonly known as:  PERCOCET Your last dose was: Your next dose is: Take 1-2 Tabs by mouth every four (4) hours as needed for Pain. Max Daily Amount: 12 Tabs. 1-2 Tab  
    
   
   
   
  
 trimethoprim-sulfamethoxazole 160-800 mg per tablet Commonly known as:  BACTRIM DS Your last dose was: Your next dose is: Take 1 Tab by mouth two (2) times a day. 1 Tab VITAMIN D3 1,000 unit Cap Generic drug:  cholecalciferol Your last dose was: Your next dose is: Take  by mouth daily. Where to Get Your Medications Information on where to get these meds will be given to you by the nurse or doctor. ! Ask your nurse or doctor about these medications  
  docusate sodium 100 mg capsule  
 oxyCODONE-acetaminophen 5-325 mg per tablet Discharge Instructions - Follow up in 2 weeks for pathology and wound check 
- Call office for fevers, chills or any other concerns - Showers okay, no bathing/swimming - Take prescriptions as written, no driving while on narcotics 
- Hold ace-inhibitor until follow-up (lisinopril) - No heavy lifting (< 5 lbs) DISCHARGE SUMMARY from Nurse PATIENT INSTRUCTIONS: 
 
 
F-face looks uneven A-arms unable to move or move unevenly S-speech slurred or non-existent T-time-call 911 as soon as signs and symptoms begin-DO NOT go Back to bed or wait to see if you get better-TIME IS BRAIN. Warning Signs of HEART ATTACK Call 911 if you have these symptoms: 
? Chest discomfort. Most heart attacks involve discomfort in the center of the chest that lasts more than a few minutes, or that goes away and comes back. It can feel like uncomfortable pressure, squeezing, fullness, or pain. ? Discomfort in other areas of the upper body. Symptoms can include pain or discomfort in one or both arms, the back, neck, jaw, or stomach. ? Shortness of breath with or without chest discomfort. ? Other signs may include breaking out in a cold sweat, nausea, or lightheadedness. Don't wait more than five minutes to call [address]! Fast action can save your life. Calling 911 is almost always the fastest way to get lifesaving treatment. Emergency Medical Services staff can begin treatment when they arrive  up to an hour sooner than if someone gets to the hospital by car. The discharge information has been reviewed with the patient. The patient verbalized understanding. Discharge medications reviewed with the patient and appropriate educational materials and side effects teaching were provided. ______________________________________________________________________________________________________________________ Patient armband removed and shredded. [place] Announcement We are excited to announce that we are making your provider's discharge notes available to you in [place]. You will see these notes when they are completed and signed by the physician that discharged you from your recent hospital stay.   If you have any questions or concerns about any information you see in [facility], please call the [facility] where you were seen or reach out to your Primary Care Provider for more information about your plan of care. Introducing [facility]! Aleisha Pelayo introduces [employer] patient portal. Now you can access parts of your medical record, email your doctor's office, and request medication refills online. 1. In your internet browser, go to [email or web address] 2. Click on the First Time User? Click Here link in the Sign In box. You will see the New Member Sign Up page. 3. Enter your [employer] Access Code exactly as it appears below. You will not need to use this code after youve completed the sign-up process. If you do not sign up before the expiration date, you must request a new code. · [employer] Access Code: [email or web address] Expires: 2/1/2018  3:25 PM 
 
4. Enter the last four digits of your Social Security Number (xxxx) and Date of Birth (mm/dd/yyyy) as indicated and click Submit. You will be taken to the next sign-up page. 5. Create a [employer] ID. This will be your [employer] login ID and cannot be changed, so think of one that is secure and easy to remember. 6. Create a [employer] password. You can change your password at any time. 7. Enter your Password Reset Question and Answer. This can be used at a later time if you forget your password. 8. Enter your e-mail address. You will receive e-mail notification when new information is available in [address] 9. Click Sign Up. You can now view and download portions of your medical record. 10. Click the Download Summary menu link to download a portable copy of your medical information. If you have questions, please visit the Frequently Asked Questions section of the [employer] website. Remember, [employer] is NOT to be used for urgent needs. For medical emergencies, dial 911. Now available from your iPhone and Android! Unresulted Labs-Please follow up with your PCP about these lab tests Order Current Status CULTURE, BLOOD Preliminary result CULTURE, BLOOD Preliminary result CULTURE, URINE Preliminary result Providers Seen During Your Hospitalization Provider Specialty Primary office phone Lul Soto MD Urology [phone] Your Primary Care Physician (PCP) Primary Care Physician Office Phone Office Fax OTHER, PHYS ** None ** ** None ** You are allergic to the following No active allergies Recent Documentation Height Weight BMI OB Status Smoking Status 1.664 m 101.6 kg 36.71 kg/m2 Having regular periods Never Smoker Emergency Contacts Name Discharge Info Relation Home Work Mobile Christina Berrios DISCHARGE CAREGIVER [3] Mother [14] [phone] Patient Belongings The following personal items are in your possession at time of discharge: 
  Dental Appliances: None  Visual Aid: None      Home Medications: None   Jewelry: None  Clothing: Pants, Undergarments, Shirt, Footwear, Socks, Jacket/Coat    Other Valuables: None Discharge Instructions Attachments/References NEPHRECTOMY: LAPAROSCOPIC: POST-OP (ENGLISH) Patient Handouts Laparoscopic Nephrectomy: What to Expect at [facility] Your Recovery A nephrectomy is surgery to take out part or all of the kidney. One or both kidneys may be taken out. Sometimes other tissue near the kidney is taken out at the same time. Your belly will feel sore after the surgery. This usually lasts about 1 to 2 weeks. Your doctor will give you pain medicine for this. You may also have other symptoms such as nausea, diarrhea, constipation, gas, or a headache. At first, you may have low energy and get tired quickly. It may take 3 to 6 months for your energy to fully return. Your body can work fine with one healthy kidney. If both kidneys are removed or your remaining kidney is not healthy, your doctor will talk to you about the kind of treatment you will need after surgery. This care sheet gives you a general idea about how long it will take for you to recover. But each person recovers at a different pace. Follow the steps below to get better as quickly as possible. How can you care for yourself at home? Activity ? · Rest when you feel tired. Getting enough sleep will help you recover. ? · Try to walk each day. Start by walking a little more than you did the day before. Bit by bit, increase the amount you walk. Walking boosts blood flow and helps prevent pneumonia and constipation. ? · Avoid exercises that use your belly muscles and strenuous activities such as bicycle riding, jogging, weight lifting, or aerobic exercise until your doctor says it is okay. ? · For at least 4 weeks, avoid lifting anything that would make you strain. This may include a child, heavy grocery bags and milk containers, a heavy briefcase or backpack, cat litter or dog food bags, or a vacuum . ? · Hold a pillow over the cuts the doctor made (incisions) when you cough or take deep breaths. This will support your belly and decrease your pain. ? · Do breathing exercises at home as instructed by your doctor. This will help prevent pneumonia. ? · Ask your doctor when you can drive again. ? · You will probably need to take 4 to 6 weeks off from work. It depends on the type of work you do and how you feel. ? · You may be able to take showers (unless you have a drainage tube near your incisions). If you have a drainage tube, follow your doctor's instructions to empty and care for it. Do not take a bath for the first 2 weeks, or until your doctor tells you it is okay. ? · Ask your doctor when it is okay for you to have sex. Diet ? · You can eat your normal diet. If you were on a special diet for your kidneys before surgery, follow that diet until your doctor tells you to stop. ? · If your stomach is upset, try bland, low-fat foods like plain rice, broiled chicken, toast, and yogurt. ? · Drink plenty of fluids (unless your doctor tells you not to). ? · You may notice that your bowel movements are not regular right after your surgery. This is common. Try to avoid constipation and straining with bowel movements. You may want to take a fiber supplement every day. If you have not had a bowel movement after a couple of days, ask your doctor about taking a mild laxative. Medicines ? · Your doctor will tell you if and when you can restart your medicines. He or she will also give you instructions about taking any new medicines. ? · If you take blood thinners, such as warfarin (Coumadin), clopidogrel (Plavix), or aspirin, be sure to talk to your doctor. He or she will tell you if and when to start taking those medicines again. Make sure that you understand exactly what your doctor wants you to do. ? · Take pain medicines exactly as directed. ¨ If the doctor gave you a prescription medicine for pain, take it as prescribed. ¨ If you are not taking a prescription pain medicine, take an over-the-counter medicine that your doctor recommends. Read and follow all instructions on the label. ¨ Do not take aspirin, ibuprofen (Advil, Motrin), or naproxen (Aleve), or other nonsteroidal anti-inflammatory drugs (NSAIDs) unless your doctor says it is okay. ? · If you think your pain medicine is making you sick to your stomach: 
¨ Take your medicine after meals (unless your doctor has told you not to). ¨ Ask your doctor for a different pain medicine. ? · If your doctor prescribed antibiotics, take them as directed. Do not stop taking them just because you feel better. You need to take the full course of antibiotics. Incision care ? · If you have strips of tape on the incisions, leave the tape on for a week or until it falls off.  
? · Wash the area around the incisions daily with warm, soapy water and pat it dry.  Don't use hydrogen peroxide or alcohol, which can slow healing. You may cover the incisions with gauze bandages if they weep or rub against clothing. Change the bandages every day. ? · Keep the area around the incisions clean and dry. Follow-up care is a key part of your treatment and safety. Be sure to make and go to all appointments, and call your doctor if you are having problems. It's also a good idea to know your test results and keep a list of the medicines you take. When should you call for help? Call 911 anytime you think you may need emergency care. For example, call if: 
? · You passed out (lost consciousness). ? · You have chest pain, are short of breath, or cough up blood. ?Call your doctor now or seek immediate medical care if: 
? · You have pain that does not get better after you take your pain medicine. ? · You have symptoms of a urinary tract infection. These may include: 
¨ Pain or burning when you urinate. ¨ A frequent need to urinate without being able to pass much urine. ¨ Pain in the flank, which is just below the rib cage and above the waist on either side of the back. ¨ Blood in the urine. ¨ A fever. ? · You have signs of infection, such as: 
¨ Increased pain, swelling, warmth, or redness. ¨ Red streaks leading from the incisions. ¨ Pus draining from the incisions. ¨ A fever. ? · You have loose stitches, or your incisions come open. ? · You are bleeding from the incisions. ? · You cannot urinate. ? · You are sick to your stomach or cannot drink fluids. ? · You have signs of a blood clot in your leg (called a deep vein thrombosis), such as: 
¨ Pain in the calf, back of the knee, thigh, or groin. ¨ Redness and swelling in your leg. ? Watch closely for changes in your health, and be sure to contact your doctor if you are having any problems. Where can you learn more? Go to http://larissa-steven.info/.  
Enter [phone] in the search box to learn more about \"Laparoscopic Nephrectomy: What to Expect at Home. \" Current as of: May 12, 2017 Content Version: 11.4 © 2006-2017 [employer]wise[employer]. Care instructions adapted under license by [employer] (which disclaims liability or warranty for this information). If you have questions about a medical condition or this instruction, always ask your healthcare professional. [address] any warranty or liability for your use of this information. Please provide this summary of care documentation to your next provider. Signatures-by signing, you are acknowledging that this After Visit Summary has been reviewed with you and you have received a copy. Patient Signature:  ____________________________________________________________ Date:  ____________________________________________________________  
  
Renny Bone Provider Signature:  ____________________________________________________________ Date:  ____________________________________________________________

## 2017-12-07 NOTE — ANESTHESIA PREPROCEDURE EVALUATION
Anesthetic History   No history of anesthetic complications            Review of Systems / Medical History  Patient summary reviewed and pertinent labs reviewed    Pulmonary            Asthma : well controlled       Neuro/Psych   Within defined limits           Cardiovascular  Within defined limits                     GI/Hepatic/Renal  Within defined limits              Endo/Other      Hypothyroidism: well controlled  Obesity     Other Findings   Comments:   Risk Factors for Postoperative nausea/vomiting:       History of postoperative nausea/vomiting? NO       Female? YES       Motion sickness? NO       Intended opioid administration for postoperative analgesia? YES      Smoking Abstinence  Current Smoker? NO  Elective Surgery? YES  Seen preoperatively by anesthesiologist or proxy prior to day of surgery? YES  Pt abstained from smoking 24 hours prior to anesthesia?  N/A           Physical Exam    Airway  Mallampati: II  TM Distance: 4 - 6 cm  Neck ROM: normal range of motion   Mouth opening: Normal     Cardiovascular  Regular rate and rhythm,  S1 and S2 normal,  no murmur, click, rub, or gallop             Dental  No notable dental hx       Pulmonary                 Abdominal  Abdominal exam normal       Other Findings            Anesthetic Plan    ASA: 3

## 2017-12-07 NOTE — PERIOP NOTES
[phone]:  Patient arrived to PACU Aretha Cross RN assigned as Nurse, Report received from Anesthesia & OR Nurse, (Procedure, I &O, Pain Meds & Vitals)  Pt connected to monitor, Post Op pain & nursing assessment complete, will continue to monitor. 5 trocar sites with dermabond,  BE with sanginous drainage, Barrios catheter CYU      1730:  Called waiting room no family to update      [phone]:  TRANSFER - OUT REPORT:    Verbal report given to Mendy KNOX [facility] (name) on Janean Arrow  being transferred to [address] [place] [address] @ [facility](unit) for routine post - op       Report consisted of patients Situation, Background, Assessment and   Recommendations(SBAR). Information from the following report(s) SBAR, OR Summary, Procedure Summary, Intake/Output, MAR and Cardiac Rhythm SR was reviewed with the receiving nurse. Lines:   Peripheral IV 12/07/17 Right Hand (Active)   Site Assessment Clean, dry, & intact 12/7/2017  5:06 PM   Phlebitis Assessment 0 12/7/2017  5:06 PM   Infiltration Assessment 0 12/7/2017  5:06 PM   Dressing Status Clean, dry, & intact 12/7/2017  5:06 PM   Dressing Type Tape;Transparent 12/7/2017  5:06 PM   Hub Color/Line Status Pink; Infusing 12/7/2017  5:06 PM       Peripheral IV 12/07/17 Left Hand (Active)   Site Assessment Clean, dry, & intact 12/7/2017  5:06 PM   Phlebitis Assessment 0 12/7/2017  5:06 PM   Infiltration Assessment 0 12/7/2017  5:06 PM   Dressing Status Clean, dry, & intact 12/7/2017  5:06 PM   Dressing Type Tape;Transparent 12/7/2017  5:06 PM   Hub Color/Line Status Green 12/7/2017  5:06 PM   Action Taken Open ports on tubing capped 12/7/2017  5:06 PM   Alcohol Cap Used Yes 12/7/2017  5:06 PM        Opportunity for questions and clarification was provided.       Patient transported with:   Tech     Visit Vitals    /52    Pulse 91    Temp 97.2 °F (36.2 °C)    Resp 15    Ht 5' 5.5\" (1.664 m)    Wt 100.2 kg (221 lb)    SpO2 93%    BMI 36.22 kg/m2     BE 30  Barrios 150

## 2017-12-07 NOTE — ANESTHESIA POSTPROCEDURE EVALUATION
Post-Anesthesia Evaluation and Assessment    Patient: Bharat Ramirez MRN: [id number]  SSN: [id number]    YOB: 1980  Age: 40 y.o. Sex: female      Data from PACU flowsheet    Cardiovascular Function/Vital Signs  Visit Vitals    /54    Pulse 81    Temp 36.2 °C (97.2 °F)    Resp 14    Ht 5' 5.5\" (1.664 m)    Wt 100.2 kg (221 lb)    SpO2 100%    BMI 36.22 kg/m2       Patient is status post general anesthesia for Procedure(s):  ROBOTIC ASSISTED LEFT PARTIAL NEPHRECTOMY, POSSIBLE RADICAL POSSIBLE OPEN. Nausea/Vomiting: controlled    Postoperative hydration reviewed and adequate. Pain:  Pain Scale 1: Numeric (0 - 10) (12/07/17 1721)  Pain Intensity 1: 0 (12/07/17 1721)   Managed      Mental Status and Level of Consciousness: Alert and oriented     Pulmonary Status:   O2 Device: Oxygen mask (12/07/17 1707)   Adequate oxygenation and airway patent    Complications related to anesthesia: None    Post-anesthesia assessment completed.  No concerns    Signed By: Taina Lu MD     December 7, 2017

## 2017-12-07 NOTE — IP AVS SNAPSHOT
Summary of Care Report The Summary of Care report has been created to help improve care coordination. Users with access to [employer] or [address] (Web-based application) may access additional patient information including the Discharge Summary. If you are not currently a [address] user and need more information, please call the number listed below in the [place] [address] section and ask to be connected with Medical Records. Facility Information Name Address Phone [address] [facility] [address] [facility] [address] [address] [phone] Patient Information Patient Name Sex NICOLA Riley ([id number]) Female 1980 Discharge Information Admitting Provider Service Area Unit Claudette Arbour, MD / [facility] [address] / [phone] Discharge Provider Discharge Date/Time Discharge Disposition Destination (none) 12/10/2017 Midday (Pending) AHR (none) Patient Language Language ENGLISH [13] Hospital Problems as of 12/10/2017  Reviewed: 2017 12:59 PM by Sarahy Nguyen MD  
  
  
  
 Class Noted - Resolved Last Modified POA Active Problems Renal mass, left  2017 - Present 2017 by Claudette Arbour, MD Unknown Entered by Claudette Arbour, MD  
  
Non-Hospital Problems as of 12/10/2017  Reviewed: 2017 12:59 PM by Sarahy Nguyen MD  
  
  
  
 Class Noted - Resolved Last Modified Active Problems Renal mass  Unknown - Present 2017 by Seamus Dillon Entered by Seamus Dillon Hyperthyroidism  Unknown - Present 2017 by Seamus Dillon Entered by Seamus Dillon Polycystic ovary syndrome  Unknown - Present 2017 by Seamus Dillon Entered by Seamus Dillon You are allergic to the following No active allergies Current Discharge Medication List  
  
START taking these medications Dose & Instructions Dispensing Information Comments  
 docusate sodium 100 mg capsule Commonly known as:  Bentley Waterman Dose:  100 mg Take 1 Cap by mouth two (2) times a day for 30 days. Quantity:  60 Cap Refills:  0  
   
 oxyCODONE-acetaminophen 5-325 mg per tablet Commonly known as:  PERCOCET Dose:  1-2 Tab Take 1-2 Tabs by mouth every four (4) hours as needed for Pain. Max Daily Amount: 12 Tabs. Quantity:  30 Tab Refills:  0 CONTINUE these medications which have CHANGED Dose & Instructions Dispensing Information Comments  
 trimethoprim-sulfamethoxazole 160-800 mg per tablet Commonly known as:  BACTRIM DS What changed:  Another medication with the same name was removed. Continue taking this medication, and follow the directions you see here. Dose:  1 Tab Take 1 Tab by mouth two (2) times a day. Quantity:  1 Tab Refills:  0 CONTINUE these medications which have NOT CHANGED Dose & Instructions Dispensing Information Comments  
 metFORMIN  mg tablet Commonly known as:  GLUCOPHAGE XR Dose:  750 mg Take 750 mg by mouth two (2) times a day. Refills:  0  
   
 metoprolol tartrate 100 mg IR tablet Commonly known as:  LOPRESSOR Take  by mouth two (2) times a day. Refills:  0  
   
 VITAMIN D3 1,000 unit Cap Generic drug:  cholecalciferol Take  by mouth daily. Refills:  0 STOP taking these medications Comments  
 lisinopril 2.5 mg tablet Commonly known as:  Ck Palacios Current Immunizations Name Date Influenza Vaccine 10/31/2017 Surgery Information ID Date/Time Status Primary Surgeon All Procedures Location [id number] 12/7/2017 1300 Unposted Dylan Pimentel MD ROBOTIC ASSISTED LEFT PARTIAL NEPHRECTOMY, POSSIBLE RADICAL POSSIBLE OPEN [facility] MAIN OR Follow-up Information Follow up With Details Comments Contact Info Derek Padgett MD   Patient can only remember the practice name and not the physician Discharge Instructions - Follow up in 2 weeks for pathology and wound check 
- Call office for fevers, chills or any other concerns - Showers okay, no bathing/swimming - Take prescriptions as written, no driving while on narcotics 
- Hold ace-inhibitor until follow-up (lisinopril) - No heavy lifting (< 5 lbs) DISCHARGE SUMMARY from Nurse PATIENT INSTRUCTIONS: 
 
 
F-face looks uneven A-arms unable to move or move unevenly S-speech slurred or non-existent T-time-call 911 as soon as signs and symptoms begin-DO NOT go Back to bed or wait to see if you get better-TIME IS BRAIN. Warning Signs of HEART ATTACK Call 911 if you have these symptoms: 
? Chest discomfort. Most heart attacks involve discomfort in the center of the chest that lasts more than a few minutes, or that goes away and comes back. It can feel like uncomfortable pressure, squeezing, fullness, or pain. ? Discomfort in other areas of the upper body. Symptoms can include pain or discomfort in one or both arms, the back, neck, jaw, or stomach. ? Shortness of breath with or without chest discomfort. ? Other signs may include breaking out in a cold sweat, nausea, or lightheadedness. Don't wait more than five minutes to call [address]! Fast action can save your life. Calling 911 is almost always the fastest way to get lifesaving treatment. Emergency Medical Services staff can begin treatment when they arrive  up to an hour sooner than if someone gets to the hospital by car. The discharge information has been reviewed with the patient. The patient verbalized understanding. Discharge medications reviewed with the patient and appropriate educational materials and side effects teaching were provided. ______________________________________________________________________________________________________________________ Patient armband removed and shredded. Chart Review Routing History Recipient Method Report Sent By Johnathon Case Fax: [phone] Fax Notes Report Scott Fallon [[id number]] 7/7/2017 12:50 PM 6/30/2017 Dr. Park Case Fax: [phone] Fax [facility] IP AMB RESULT REPORT IMAGING Scott Fallon [[id number]] 7/7/2017 12:50 PM 6/24/2017

## 2017-12-08 ENCOUNTER — APPOINTMENT (OUTPATIENT)
Dept: GENERAL RADIOLOGY | Age: 37
DRG: 658 | End: 2017-12-08
Attending: UROLOGY
Payer: SELF-PAY

## 2017-12-08 LAB
ANION GAP SERPL CALC-SCNC: 6 MMOL/L (ref 3–18)
ANION GAP SERPL CALC-SCNC: 9 MMOL/L (ref 3–18)
BASOPHILS # BLD: 0 K/UL (ref 0–0.06)
BASOPHILS NFR BLD: 0 % (ref 0–2)
BUN SERPL-MCNC: 11 MG/DL (ref 7–18)
BUN SERPL-MCNC: 15 MG/DL (ref 7–18)
BUN/CREAT SERPL: 12 (ref 12–20)
BUN/CREAT SERPL: 9 (ref 12–20)
CALCIUM SERPL-MCNC: 7.9 MG/DL (ref 8.5–10.1)
CALCIUM SERPL-MCNC: 8.2 MG/DL (ref 8.5–10.1)
CHLORIDE SERPL-SCNC: 105 MMOL/L (ref 100–108)
CHLORIDE SERPL-SCNC: 107 MMOL/L (ref 100–108)
CO2 SERPL-SCNC: 24 MMOL/L (ref 21–32)
CO2 SERPL-SCNC: 26 MMOL/L (ref 21–32)
CREAT SERPL-MCNC: 1.17 MG/DL (ref 0.6–1.3)
CREAT SERPL-MCNC: 1.26 MG/DL (ref 0.6–1.3)
DIFFERENTIAL METHOD BLD: ABNORMAL
EOSINOPHIL # BLD: 0.2 K/UL (ref 0–0.4)
EOSINOPHIL NFR BLD: 1 % (ref 0–5)
ERYTHROCYTE [DISTWIDTH] IN BLOOD BY AUTOMATED COUNT: 15.2 % (ref 11.6–14.5)
ERYTHROCYTE [DISTWIDTH] IN BLOOD BY AUTOMATED COUNT: 15.4 % (ref 11.6–14.5)
GLUCOSE BLD STRIP.AUTO-MCNC: 104 MG/DL (ref 70–110)
GLUCOSE BLD STRIP.AUTO-MCNC: 81 MG/DL (ref 70–110)
GLUCOSE BLD STRIP.AUTO-MCNC: 94 MG/DL (ref 70–110)
GLUCOSE BLD STRIP.AUTO-MCNC: 98 MG/DL (ref 70–110)
GLUCOSE SERPL-MCNC: 102 MG/DL (ref 74–99)
GLUCOSE SERPL-MCNC: 95 MG/DL (ref 74–99)
HCT VFR BLD AUTO: 25.2 % (ref 35–45)
HCT VFR BLD AUTO: 26.4 % (ref 35–45)
HGB BLD-MCNC: 8.5 G/DL (ref 12–16)
HGB BLD-MCNC: 8.8 G/DL (ref 12–16)
LYMPHOCYTES # BLD: 0.9 K/UL (ref 0.9–3.6)
LYMPHOCYTES NFR BLD: 8 % (ref 21–52)
MCH RBC QN AUTO: 22.4 PG (ref 24–34)
MCH RBC QN AUTO: 22.6 PG (ref 24–34)
MCHC RBC AUTO-ENTMCNC: 33.3 G/DL (ref 31–37)
MCHC RBC AUTO-ENTMCNC: 33.7 G/DL (ref 31–37)
MCV RBC AUTO: 67 FL (ref 74–97)
MCV RBC AUTO: 67.2 FL (ref 74–97)
MONOCYTES # BLD: 0.8 K/UL (ref 0.05–1.2)
MONOCYTES NFR BLD: 7 % (ref 3–10)
NEUTS SEG # BLD: 9.1 K/UL (ref 1.8–8)
NEUTS SEG NFR BLD: 84 % (ref 40–73)
PLATELET # BLD AUTO: 194 K/UL (ref 135–420)
PLATELET # BLD AUTO: 228 K/UL (ref 135–420)
PMV BLD AUTO: 10 FL (ref 9.2–11.8)
PMV BLD AUTO: 10.2 FL (ref 9.2–11.8)
POTASSIUM SERPL-SCNC: 3.7 MMOL/L (ref 3.5–5.5)
POTASSIUM SERPL-SCNC: 4.3 MMOL/L (ref 3.5–5.5)
RBC # BLD AUTO: 3.76 M/UL (ref 4.2–5.3)
RBC # BLD AUTO: 3.93 M/UL (ref 4.2–5.3)
SODIUM SERPL-SCNC: 137 MMOL/L (ref 136–145)
SODIUM SERPL-SCNC: 140 MMOL/L (ref 136–145)
WBC # BLD AUTO: 10.9 K/UL (ref 4.6–13.2)
WBC # BLD AUTO: 12.5 K/UL (ref 4.6–13.2)

## 2017-12-08 PROCEDURE — 65270000029 HC RM PRIVATE

## 2017-12-08 PROCEDURE — 85027 COMPLETE CBC AUTOMATED: CPT | Performed by: UROLOGY

## 2017-12-08 PROCEDURE — 74011250637 HC RX REV CODE- 250/637: Performed by: UROLOGY

## 2017-12-08 PROCEDURE — 80048 BASIC METABOLIC PNL TOTAL CA: CPT | Performed by: UROLOGY

## 2017-12-08 PROCEDURE — 74011250636 HC RX REV CODE- 250/636: Performed by: UROLOGY

## 2017-12-08 PROCEDURE — 77030020255 HC SOL INJ LR 1000ML BG

## 2017-12-08 PROCEDURE — 82962 GLUCOSE BLOOD TEST: CPT

## 2017-12-08 PROCEDURE — 85025 COMPLETE CBC W/AUTO DIFF WBC: CPT | Performed by: UROLOGY

## 2017-12-08 PROCEDURE — 36415 COLL VENOUS BLD VENIPUNCTURE: CPT | Performed by: UROLOGY

## 2017-12-08 PROCEDURE — 71010 XR CHEST PORT: CPT

## 2017-12-08 RX ORDER — DIPHENHYDRAMINE HCL 25 MG
25 CAPSULE ORAL
Status: DISCONTINUED | OUTPATIENT
Start: 2017-12-08 | End: 2017-12-10 | Stop reason: HOSPADM

## 2017-12-08 RX ORDER — ACETAMINOPHEN 325 MG/1
650 TABLET ORAL
Status: DISCONTINUED | OUTPATIENT
Start: 2017-12-08 | End: 2017-12-10 | Stop reason: HOSPADM

## 2017-12-08 RX ADMIN — DOCUSATE SODIUM 100 MG: 100 CAPSULE, LIQUID FILLED ORAL at 17:40

## 2017-12-08 RX ADMIN — SENNOSIDES 17.2 MG: 8.6 TABLET, FILM COATED ORAL at 23:36

## 2017-12-08 RX ADMIN — DOCUSATE SODIUM 100 MG: 100 CAPSULE, LIQUID FILLED ORAL at 08:50

## 2017-12-08 RX ADMIN — OXYCODONE HYDROCHLORIDE AND ACETAMINOPHEN 2 TABLET: 5; 325 TABLET ORAL at 00:58

## 2017-12-08 RX ADMIN — CEFAZOLIN SODIUM 2 G: 2 SOLUTION INTRAVENOUS at 13:30

## 2017-12-08 RX ADMIN — SODIUM CHLORIDE, SODIUM LACTATE, POTASSIUM CHLORIDE, AND CALCIUM CHLORIDE 125 ML/HR: 600; 310; 30; 20 INJECTION, SOLUTION INTRAVENOUS at 01:05

## 2017-12-08 RX ADMIN — OXYCODONE HYDROCHLORIDE AND ACETAMINOPHEN 1 TABLET: 5; 325 TABLET ORAL at 14:10

## 2017-12-08 RX ADMIN — DIPHENHYDRAMINE HYDROCHLORIDE 25 MG: 25 CAPSULE ORAL at 23:36

## 2017-12-08 RX ADMIN — CEFAZOLIN SODIUM 2 G: 2 SOLUTION INTRAVENOUS at 04:42

## 2017-12-08 RX ADMIN — Medication 10 ML: at 07:43

## 2017-12-08 RX ADMIN — Medication 10 ML: at 13:56

## 2017-12-08 RX ADMIN — SODIUM CHLORIDE, SODIUM LACTATE, POTASSIUM CHLORIDE, AND CALCIUM CHLORIDE 125 ML/HR: 600; 310; 30; 20 INJECTION, SOLUTION INTRAVENOUS at 01:00

## 2017-12-08 RX ADMIN — ONDANSETRON 4 MG: 2 INJECTION INTRAMUSCULAR; INTRAVENOUS at 22:40

## 2017-12-08 RX ADMIN — OXYCODONE HYDROCHLORIDE AND ACETAMINOPHEN 2 TABLET: 5; 325 TABLET ORAL at 17:40

## 2017-12-08 RX ADMIN — OXYCODONE HYDROCHLORIDE AND ACETAMINOPHEN 1 TABLET: 5; 325 TABLET ORAL at 04:50

## 2017-12-08 RX ADMIN — ONDANSETRON 4 MG: 2 INJECTION INTRAMUSCULAR; INTRAVENOUS at 14:09

## 2017-12-08 RX ADMIN — ACETAMINOPHEN 650 MG: 325 TABLET, FILM COATED ORAL at 23:36

## 2017-12-08 RX ADMIN — OXYCODONE HYDROCHLORIDE AND ACETAMINOPHEN 1 TABLET: 5; 325 TABLET ORAL at 08:59

## 2017-12-08 NOTE — PROGRESS NOTES
Problem: Falls - Risk of  Goal: *Absence of Falls  Document Patti Fall Risk and appropriate interventions in the flowsheet.    Outcome: Progressing Towards Goal  Fall Risk Interventions:            Medication Interventions: Patient to call before getting OOB

## 2017-12-08 NOTE — PROGRESS NOTES
1933 Received report from [address] [place] [address]. Patient is alert and oriented x 4.   2200 Patient refused ambulation, will suggest later. 2211 After administration PER MAR of Glucophage Patient had 1 episode of emesis in which Patient brought up all liquid she had been drinking and the Glucophage tablet, Noted 400mL Cloudy orange emesis noted. 2300 Patient noted to be in pain, suggested Log roll position change. Patient able to sleep.  0300 patient sleeping comfortably. 0500 When suggested another ambulation, Patient refused. 4777 Bedside and Verbal shift change report given to [address] [place] [address] (oncoming nurse) by Rolando Casanova RN (offgoing nurse). Report included the following information SBAR, Kardex, Procedure Summary, Intake/Output, MAR and Recent Results.

## 2017-12-08 NOTE — PROGRESS NOTES
[facility]   Discharge Planning/ Assessment    Reasons for Intervention:  Initial discharge planning interview. Face sheet data reviewed with pt. All information confirmed as correct except: pt states she has Medicaid. She states it was discontinued as of 12/1 due to missing verification, but she brought the verification to them before her surgery and is awaiting for it to go back into effect. Pt states her PCP is through [facility]- name Buck Jonathan (female) pt not positive on MD's name. Pt takes care of 2 children at home. Pt was independent with ambulation and ADL's prior to hospitalization. She states only DME is a nebulizer. Pt states she has family who will provide transportation home when she is discharged. Anticipated discharge plan is home.     High Risk Criteria  [] Yes  [x]No   Physician Referral  [] Yes  []No        Date    Nursing Referral  [] Yes  []No        Date    Patient/Family Request  [] Yes  []No        Date       Resources:    Medicare  [] Yes  []No   Medicaid  [x] Yes  []No   No Resources  [] Yes  []No   Private Insurance  [] Yes  []No    Name/Phone Number    Other  [] Yes  []No        (i.e. Workman's Comp)         Prior Services:    Prior Services  [] Yes  [x]No   Home Health  [] Yes  []No   [address]  [] Yes  []No        Number of [address]  [] Yes  []No       Meals on Wheels  [] Yes  []No   Office on Aging  [] Yes  []No   Transportation Services  [] Yes  []No   Nursing Home  [] Yes  []No        Nursing Home Name    [address]  [] Yes  []No        [address] Name    Other       Information Source:      Information obtained from  [x] Patient  [] Parent   [] [address]  [] Child  [] Spouse   [] Significant Other/Partner   [] Friend      [] EMS    [] Nursing Home Chart          [] Other:   Chart Review  [x] Yes  []No     Family/Support System:    Patient lives with  [] Alone    [] Spouse   [] Significant Other  [x] Children  [] Caretaker   [] Parent  [] Sibling     [] Other       Other Support System:    Is the patient responsible for care of others  [x] Yes  []No   Information of person caring for patient on  discharge    Managers financial affairs independently  [x] Yes  []No   If no, explain:      Status Prior to Admission:    Mental Status  [x] Awake  [x] Alert  [x] Oriented  [] Quiet/Calm [] Lethargic/Sedated   [] Disoriented  [] Restless/Anxious  [] Combative   Personal Care  [] Dependent  [x] [address]  [] Requires Assistance   Meal Preparation Ability  [x] Independent   [] Standby Assistance   [] Minimal Assistance   [] Moderate Assistance  [] Maximum Assistance     [] Total Assistance   Chores  [x] Independent with Chores   [] [address] Resident   [] Requires Assistance   Bowel/Bladder  [] Continent  [] Catheter  [] Incontinent  [] Ostomy Self-Care    [] Urine Diversion Self-Care  [] Maximum Assistance     [] Total Assistance   Number of Persons needed for assistance    DME at home  [] Roman Resendez  [] Julien Resendez   [] Commode    [] Bathroom/Grab Bars  [] Hospital Bed  [] Nebulizer  [] Oxygen           [] Raised Toilet Seat  [] Shower Chair  [] Side Rails for Bed   [] Tub Transfer Bench   [] Monica Hassan  [] Connee Simmering, Standard      [] Other:   Vendor      Treatment Presently Receiving:    Current Treatments  [] Chemotherapy  [] Dialysis  [] Insulin  [] IVAB [] IVF   [] O2  [] PCA   [] PT   [] RT   [] Tube Feedings   [] Wound Care     Psychosocial Evaluation:    Verbalized Knowledge of Disease Process  [] Patient  []Family   Coping with Disease Process  [] Patient  []Family   Requires Further Counseling Coping with Disease Process  [] Patient  []Family     Identified Projected Needs:    Home Health Aid  [] Yes  []No   Transportation  [] Yes  []No   Education  [] Yes  []No        Specific Education     Financial Counseling  [] Yes  []No   Inability to Care for Self/Will Require 24 hour care  [] Yes  []No   Pain Management  [] Yes  []No   Home Infusion Therapy  [] Yes  []No   Oxygen Therapy  [] Yes  []No   DME  [] Yes  []No   Long Term Care Placement  [] Yes  []No   Rehab  [] Yes  []No   Physical Therapy  [] Yes  []No   Needs Anticipated At This Time  [] Yes  [x]No     Intra-Hospital Referral:    [facility]  [] Yes  []No     [] Yes  []No   Patient Representative  [] Yes  []No   Staff for Teaching Needs  [] Yes  []No   Specialty Teaching Needs     Diabetic Educator  [] Yes  []No   Referral for Diabetic Educator Needed  [] Yes  []No  If Yes, place order for Nutritionist or Diabetic Consult     Tentative Discharge Plan:    Home with No Services  [x] Yes  []No   Home with Home Health Follow-up  [] Yes  []No        If Yes, specify type    Home Care Program  [] Yes  []No        If Yes, specify type    Meals on Wheels  [] Yes  []No   Office of Aging  [] Yes  []No   NHP  [] Yes  []No   Return to the [employer]  [] Yes  []No   Rehab Therapy  [] Yes  []No   Acute Rehab  [] Yes  []No   Subacute Rehab  [] Yes  []No   Private Care  [] Yes  []No   Substance Abuse Referral  [] Yes  []No   Transportation  [] Yes  []No   Chore Service  [] Yes  []No   Inpatient Hospice  [] Yes  []No   OP RT  [] Yes  [] No   OP Hemo  [] Yes  [] No   OP PT  [] Yes  []No   Support Group  [] Yes  []No   Reach to Recovery  [] Yes  []No   OP Oncology Clinic  [] Yes  []No   Clinic Appointment  [] Yes  []No   DME  [] Yes  []No   Comments    Name of D/C Planner or  Given to Patient or Family LISETTE Duran [address]  Ph: [phone]  Pager: [phone]   Phone Number         Extension    Date 12/8/17   Time    If you are discharged home, whom do you designate to participate in your discharge plan and receive any information needed?      Enter name of [address] [place] [address]  Address:  Phone number: [phone]        Phone # of designee         Address of [facility] Patient refused to designate any           individual

## 2017-12-08 NOTE — PROGRESS NOTES
Urology Progress Note        Assessment/Plan:     Patient Active Problem List   Diagnosis Code    Renal mass N28.89    Hyperthyroidism E05.90    Polycystic ovary syndrome E28.2    Renal mass, left N28.89       ASSESSMENT: Alon Zhong is a 40 y.o. female:   1. Left renal mass   2. POD #1 s/p Roshan Lap Partial Nx  3. Chronic Anemia      Plan:    1. Clears, IVF  2. D/c Barrios  3. Maintain BE drain  4. OOB, [address]  5. Labs stable. Dung Benson MD  Urologist / Female Urologist  [facility] Location:  [address], [address]  Office: [phone] / [phone]  Pager: [phone]  Sec: [phone]    2017      Daily Progress Note: 2017 7:32 AM  Admit Date: 2017   POD #: 1  HD #: 2  Subjective:     No acute  changes / events. One episode of mild emesis after nurse gave crackers. Alon Zhong is doing good. She reports pain is well controlled. . She is tolerating clear liquids and    Indwelling catheter is draining well. Objective:     Visit Vitals    /62 (BP 1 Location: Right arm, BP Patient Position: At rest)    Pulse 87    Temp 98.2 °F (36.8 °C)    Resp 17    Ht 5' 5.5\" (1.664 m)    Wt 221 lb (100.2 kg)    LMP 11/15/2017 (Exact Date)    SpO2 95%    BMI 36.22 kg/m2        Temp (24hrs), Av.3 °F (36.8 °C), Min:97.2 °F (36.2 °C), Max:99.7 °F (37.6 °C)      Intake and Output:   1901 -  0700  In: 2200 [I.V.:2200]  Out: 2645 [Urine:2350; Drains:45]       PHYSICAL EXAMINATION:   Visit Vitals    /62 (BP 1 Location: Right arm, BP Patient Position: At rest)    Pulse 87    Temp 98.2 °F (36.8 °C)    Resp 17    Ht 5' 5.5\" (1.664 m)    Wt 221 lb (100.2 kg)    LMP 11/15/2017 (Exact Date)    SpO2 95%    BMI 36.22 kg/m2     Constitutional: Well developed, well nourished. No acute distress.     HEENT: Normocephalic, Atraumatic,   CV:  RRR  Respiratory: No respiratory distress or difficulties breathing   Abdomen:  Soft, mild distension, inc c/d/i with min tenderness. BE sanguinous.  Female:  CVA tenderness: none                        Barrios: Clear, draining well. Skin: No evidence of jaundice. Normal color  Neuro/Psych:  Alert and oriented. Affect appropriate. Lymphatic:   No enlarged inguinal lymph nodes. Incision: clean, dry    Lab/Data Review:  Lab results reviewed. For significant abnormal values and values requiring intervention, see assessment and plan. Labs:     Labs: Results:   Chemistry  Recent Labs      12/08/17   0406  12/07/17   1855   GLU  95  138*   NA  140  140   K  4.3  4.3   CL  107  109*   CO2  24  24   BUN  15  17   CREA  1.26  1.16   CA  8.2*  8.4*   AGAP  9  7   BUCR  12  15      CBC w/Diff Recent Labs      12/08/17   0406  12/07/17   1855   WBC  12.5  14.1*   RBC  3.93*  4.02*   HGB  8.8*  8.8*   HCT  26.4*  27.0*   PLT  228  221      Cultures No results for input(s): CULT in the last 72 hours. All Micro Results     None            Urinalysis Potassium   Date Value Ref Range Status   12/08/2017 4.3 3.5 - 5.5 mmol/L Final     Creatinine   Date Value Ref Range Status   12/08/2017 1.26 0.6 - 1.3 MG/DL Final     BUN   Date Value Ref Range Status   12/08/2017 15 7.0 - 18 MG/DL Final      PSA No results for input(s): PSA in the last 72 hours. Coagulation Lab Results   Component Value Date/Time    Prothrombin time 9.9 11/21/2017    INR POC 0.9 11/21/2017           Micro  No results for input(s): SDES, CULT in the last 72 hours. No results for input(s): CULT in the last 72 hours. US Results: (Most Recent) No results found for this or any previous visit.      CT (Most Recent)   Results from Abstract encounter on 11/01/17   CTA ABD PELV W WO CONT   Impression   Auth Prov: Otelia Beam  CC Prov:           [address] Munira Hanson 1471 [phone]      Imaging Result  Name: Nadeen Kilgore ([id number])  Sex: Female :  1980  40year old   Patient Class: Outpatient Private Diagnosis:  Left renal mass [N28.89 (ICD-10-CM)]            Procedures Performed: Exam Date/Time: Reason for Exam:  CT ABDOMEN W/WO CONTRAST  [id number] 10/16/2017 11:10 AM  None Specified           EXAM: CT of the abdomen     INDICATION: Indeterminate left renal mass seen on prior imaging.     COMPARISON: 17     TECHNIQUE: CT of the abdomen was performed with and without IV contrast, using a protocol tailored for evaluation of renal lesions. Coronal and sagittal reformats were generated and reviewed. One or more dose reduction techniques were used on this CT: automated exposure control, adjustment of the mAs and/or kVp according to patient size, and iterative reconstruction techniques. The specific techniques used on this CT exam have been documented in the patient's electronic medical record.     _______________     FINDINGS:     INFERIOR THORAX:  Normal.     KIDNEYS:  3.3 x 2.7 cm mass arising from upper pole left kidney with no interval change in size. This lesion measures fluid density precontrast, but internal enhancing septations are noted. Septations are somewhat thickened and irregular.     LIVER AND BILIARY:  No suspicious liver lesions. No biliary dilation. Gallbladder unremarkable.     SPLEEN:  Mild splenomegaly unchanged.     PANCREAS:  Normal.     ADRENALS:  Normal.     LYMPH NODES:  No enlarged lymph nodes.     GI TRACT:  No bowel wall thickening or dilation.        VASCULATURE:  Unremarkable.       OTHER:  No aggressive osseous lesions.       _______________  IMPRESSION  IMPRESSION:     Unchanged lesion arising from upper pole left kidney, predominantly cystic but with irregular enhancing septations. 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   Dictated by: Mark White on Mon Oct 16, 2017  2:50:00 PM EDT      Signed By:Jannet Menon, MD  10/16/2017  2:55 PM                                 Current Facility-Administered Medications   Medication Dose Route Frequency    metFORMIN ER (GLUCOPHAGE XR) tablet 750 mg  750 mg Oral BID    metoprolol tartrate (LOPRESSOR) tablet 100 mg  100 mg Oral Q12H    sodium chloride (NS) flush 5-10 mL  5-10 mL IntraVENous Q8H    sodium chloride (NS) flush 5-10 mL  5-10 mL IntraVENous PRN    ceFAZolin (ANCEF) 2g IVPB in 50 mL D5W  2 g IntraVENous Q8H    oxyCODONE-acetaminophen (PERCOCET) 5-325 mg per tablet 1-2 Tab  1-2 Tab Oral Q4H PRN    HYDROmorphone in NS (DILAUDID) injection 1 mg  1 mg IntraVENous Q3H PRN    naloxone (NARCAN) injection 0.4 mg  0.4 mg IntraVENous PRN    ondansetron (ZOFRAN) injection 4 mg  4 mg IntraVENous Q4H PRN    diphenhydrAMINE (BENADRYL) injection 12.5 mg  12.5 mg IntraVENous Q4H PRN    docusate sodium (COLACE) capsule 100 mg  100 mg Oral BID    senna (SENOKOT) tablet 17.2 mg  2 Tab Oral QHS    insulin lispro (HUMALOG) injection   SubCUTAneous AC&HS    glucose chewable tablet 16 g  4 Tab Oral PRN    glucagon (GLUCAGEN) injection 1 mg  1 mg IntraMUSCular PRN    lactated Ringers infusion  125 mL/hr IntraVENous CONTINUOUS

## 2017-12-08 NOTE — PROGRESS NOTES
attempted to conduct an initial consultation and spiritual assessment for Braden Pardo, who is a 40 y. o.female. However, patient was unavailable. Her room was dark, and she appeared to be asleep. Patients primary language is: [place]. According to the patients EMR, her [employer] affiliation is: [facility].     The  provided the following interventions:  Offered silent prayer on patient's behalf. Reviewed chart. Plan:  Chaplains will continue our attempts to connect with patient and then provide pastoral care on an as-needed/requested basis.     [facility]  [facility] [address] [facility],[address] [place] [address]    [phone]

## 2017-12-08 NOTE — PROGRESS NOTES
5119 Bedside and Verbal shift change report given to Juan José Christianson (oncoming nurse) by Carmita Dalal RN (offgoing nurse). Report included the following information SBAR, Kardex and MAR.     0198 Due meds administered and tolerated. Shift assessment completed. 1253 Patient assisted to the bathroom and ambulates with steady gait voided and complained no pain or discomfort up on urination. 1938 Bedside and Verbal shift change report given to Carlos Isbell RN (oncoming nurse) by Mariusz Beck RN (offgoing nurse). Report included the following information SBAR, Kardex and MAR.

## 2017-12-08 NOTE — PROGRESS NOTES
[address] assume primary care of the patient      1700 teaching given multiple time during shift in regards to ambulating in hallway,  Patient continue to refuse to ambulate in the hallway

## 2017-12-08 NOTE — ROUTINE PROCESS
Plan:  To provide an enjoyable diversion. Implementation:  Provided live bedside harp music, Korin selections per patient request.  Evaluation: Patient was sipping on clear liquid lunch during music time, cheerful and talkative.

## 2017-12-08 NOTE — PROGRESS NOTES
Patient has designated __mother______________________ to participate in his/her discharge plan and to receive any needed information.      Name: Don Mccracken  Address:  Phone number: [phone]

## 2017-12-08 NOTE — OP NOTES
LOCATION: [address] [place] [address]    DATE OF SURGERY:  12/7/2017    PREOPERATIVE DIAGNOSIS:  Left renal mass. POSTOPERATIVE DIAGNOSIS:  Left renal mass. OPERATION PERFORMED:  1.  Robotic assisted laparoscopic left partial nephrectomy. 2.  Intraoperative ultrasound with interpretation. SURGEON:  Shaggy Alcala MD.    ASSISTANT SURGEON:  Yang Navarrete MD (Fellow)    FINDINGS:  Hilar anatomy: 1 artery. 1 vein. 3.3 cm renal mass. Warm ischemia time: 22mins. SPECIMENS:  1. Renal mass    BLOOD LOSS:  250ml    ANESTHESIA:  General.    COMPLICATIONS:  None. DRAINS:  16-Portuguese Barrios catheter. 15 Round BE    INDICATIONS FOR OPERATION:  Left renal mass, 3.3 cm, suspicious for malignancy. DESCRIPTION OF OPERATION:  Informed consent was obtained.  The patient was marked on the left side and then taken to the operating room, placed supine on the operating table.  General anesthesia was provided. SCDs and 5000 units subcutaneous heparin were provided for DVT prophylaxis and IV antibiotics for bacterial prophylaxis.  Barrios catheter was placed to drain the bladder.  OG tube was placed by anesthesia. The patient was then positioned in right lateral decubitus position with the left flank elevated about 70 degrees.  All pressure points were carefully padded.  The table was flexed slightly. The left arm was placed in a padded support. The patient was secured to the table with soft chest, hip, and lower extremity straps.  The patient was prepped and draped in usual sterile fashion. We had a time-out confirming the patient identification, the planned procedure, the surgical site, and all present were in agreement.      A Veress needle was placed just lateral to the left rectus belly at the level of the umbilicus. Aspiration, irrigation, and saline drop test confirmed good position. Opening pressure was less than 9 mmHg.  The abdomen was insufflated to 15 mmHg.  Following this, trocar sites were mapped out with two robotic trocars triangulating the expected location of the tumor, a 12 mm trocar between, at about the midclavicular line, for the camera, a 12 mm midline trocar for the assistant, and an 8mm trocar in the left lower quadrant for the 4th arm. All trocar sites were infiltrated with liposomal marcaine. I used a visual obturator to place a 5 mm trocar at one of the robotic port sites, and the peritoneal cavity was surveyed with no abnormalities or injuries.  Remaining trocars were placed under laparoscopic visualization.  I used a joblocal device to place 0 Vicryl sutures at the 12 mm trocar sites for closure at the end of the case. The robot was docked.  I placed monopolar karyn in the right arm, bipolar grasper in the left, and a long articulating grasper in the 4th arm.      The white line of Toldt was incised and the left colon was reflected.  The tail of Gerota's fascia was lifted up and the ureter and gonadal vein were identified.  The gonadal vein was left medial and the ureter was lifted up.  The psoas muscle was identified under the ureter and I followed this plane towards the renal hilum.  The hilar vessels were identified and exposed circumferentially. There was a venotomy made on the renal vein while dissecting out the hilum that was recognized and seen. This was repaired using 4-0 prolene on a pledget. Hemostasis was obtained with the repair and the renal vein was wide and patent with no sign of constriction or narrowing. The pneumoperitoneum was reduced to 7mmHg and there was great hemostasis. At this point, I incised Gerota's fascia and defatted the kidney in the region of the tumor.  Once the tumor was exposed, intraoperative ultrasound was performed. The mass was imaged and measured, and then excision markings were made. 12.5 gm of mannitol were given, and then bulldog clamp was placed on the renal artery. Vein was left unclamped.   Warm ischemia time was clocked.  The renal mass was sharply excised. Margin was grossly clear.  The deep layer of the renorrhaphy was closed with running V-Loc suture, anchored at each end with a Lapra-Ty.  The bulldog clamp was removed. Warm ischemia time was 22 minutes. Additional 12.5 gm of mannitol was given. A sliding clip renorrhaphy was performed to close the outer layer, using interrupted 1 vicryl suture, Weck clips, and Lapra-Ty's. The capsule edges were nicely approximated. Tisseel was applied, followed by Floseal and a piece of surgicel. All needles, bulldogs, and vessel loops were extracted. The specimen was placed in an Endocatch bag. Floseal was applied to the hilum. I reduced the pneumoperitoneal pressure to 7 mmHg and confirmed hemostasis throughout the surgical field. The kidney was well perfused and the renorrhaphy was hemostatic. Gerota's fascia was approximated with HemoLoc.      A 15 round BE drain was placed lateral to the kidney. Trocars were removed under direct vision.  The tumor was extracted and sent for pathology. At the 12 mm trocar sites, the 0 Vicryl sutures that had been placed at the start of the case were tied down securely.  The extraction site was closed in layers with 0 vicryl sutures on fascia.  All the wounds were copiously irrigated.  The skin edges were re approximated with 4-0 Monocryl. Dermabond was applied.      All sponge, needle, and instrument counts were reported correct.  The patient was awakened from anesthesia and transferred to recovery in stable condition.  There were no complications and the patient tolerated the procedure well.   Operative events were discussed with the patient's family.      Cris Ames MD

## 2017-12-09 ENCOUNTER — APPOINTMENT (OUTPATIENT)
Dept: GENERAL RADIOLOGY | Age: 37
DRG: 658 | End: 2017-12-09
Attending: UROLOGY
Payer: SELF-PAY

## 2017-12-09 LAB
ALBUMIN SERPL-MCNC: 2.9 G/DL (ref 3.4–5)
ALBUMIN/GLOB SERPL: 0.9 {RATIO} (ref 0.8–1.7)
ALP SERPL-CCNC: 51 U/L (ref 45–117)
ALT SERPL-CCNC: 40 U/L (ref 13–56)
ANION GAP SERPL CALC-SCNC: 8 MMOL/L (ref 3–18)
APPEARANCE UR: CLEAR
AST SERPL-CCNC: 43 U/L (ref 15–37)
BACTERIA URNS QL MICRO: ABNORMAL /HPF
BASOPHILS # BLD: 0 K/UL (ref 0–0.06)
BASOPHILS NFR BLD: 0 % (ref 0–2)
BILIRUB SERPL-MCNC: 0.9 MG/DL (ref 0.2–1)
BILIRUB UR QL: NEGATIVE
BUN SERPL-MCNC: 8 MG/DL (ref 7–18)
BUN/CREAT SERPL: 8 (ref 12–20)
CALCIUM SERPL-MCNC: 8.1 MG/DL (ref 8.5–10.1)
CHLORIDE SERPL-SCNC: 103 MMOL/L (ref 100–108)
CO2 SERPL-SCNC: 27 MMOL/L (ref 21–32)
COLOR UR: YELLOW
CREAT SERPL-MCNC: 1.03 MG/DL (ref 0.6–1.3)
DIFFERENTIAL METHOD BLD: ABNORMAL
EOSINOPHIL # BLD: 0.1 K/UL (ref 0–0.4)
EOSINOPHIL NFR BLD: 0 % (ref 0–5)
EPITH CASTS URNS QL MICRO: ABNORMAL /LPF (ref 0–5)
ERYTHROCYTE [DISTWIDTH] IN BLOOD BY AUTOMATED COUNT: 15 % (ref 11.6–14.5)
GLOBULIN SER CALC-MCNC: 3.2 G/DL (ref 2–4)
GLUCOSE BLD STRIP.AUTO-MCNC: 108 MG/DL (ref 70–110)
GLUCOSE BLD STRIP.AUTO-MCNC: 118 MG/DL (ref 70–110)
GLUCOSE SERPL-MCNC: 103 MG/DL (ref 74–99)
GLUCOSE UR STRIP.AUTO-MCNC: NEGATIVE MG/DL
HCT VFR BLD AUTO: 24 % (ref 35–45)
HGB BLD-MCNC: 7.9 G/DL (ref 12–16)
HGB UR QL STRIP: ABNORMAL
KETONES UR QL STRIP.AUTO: NEGATIVE MG/DL
LACTATE SERPL-SCNC: 1 MMOL/L (ref 0.4–2)
LEUKOCYTE ESTERASE UR QL STRIP.AUTO: ABNORMAL
LYMPHOCYTES # BLD: 0.7 K/UL (ref 0.9–3.6)
LYMPHOCYTES NFR BLD: 6 % (ref 21–52)
MCH RBC QN AUTO: 22.1 PG (ref 24–34)
MCHC RBC AUTO-ENTMCNC: 32.9 G/DL (ref 31–37)
MCV RBC AUTO: 67.2 FL (ref 74–97)
MONOCYTES # BLD: 1 K/UL (ref 0.05–1.2)
MONOCYTES NFR BLD: 8 % (ref 3–10)
NEUTS SEG # BLD: 10.7 K/UL (ref 1.8–8)
NEUTS SEG NFR BLD: 86 % (ref 40–73)
NITRITE UR QL STRIP.AUTO: NEGATIVE
PH UR STRIP: 7 [PH] (ref 5–8)
PLATELET # BLD AUTO: 206 K/UL (ref 135–420)
PMV BLD AUTO: 10.7 FL (ref 9.2–11.8)
POTASSIUM SERPL-SCNC: 3.6 MMOL/L (ref 3.5–5.5)
PROT SERPL-MCNC: 6.1 G/DL (ref 6.4–8.2)
PROT UR STRIP-MCNC: ABNORMAL MG/DL
RBC # BLD AUTO: 3.57 M/UL (ref 4.2–5.3)
RBC #/AREA URNS HPF: ABNORMAL /HPF (ref 0–5)
SODIUM SERPL-SCNC: 138 MMOL/L (ref 136–145)
SP GR UR REFRACTOMETRY: 1.01 (ref 1–1.03)
UROBILINOGEN UR QL STRIP.AUTO: 1 EU/DL (ref 0.2–1)
WBC # BLD AUTO: 12.5 K/UL (ref 4.6–13.2)
WBC URNS QL MICRO: ABNORMAL /HPF (ref 0–4)

## 2017-12-09 PROCEDURE — 80053 COMPREHEN METABOLIC PANEL: CPT | Performed by: UROLOGY

## 2017-12-09 PROCEDURE — 65270000029 HC RM PRIVATE

## 2017-12-09 PROCEDURE — 36415 COLL VENOUS BLD VENIPUNCTURE: CPT | Performed by: UROLOGY

## 2017-12-09 PROCEDURE — 94640 AIRWAY INHALATION TREATMENT: CPT

## 2017-12-09 PROCEDURE — 74011250636 HC RX REV CODE- 250/636: Performed by: UROLOGY

## 2017-12-09 PROCEDURE — 74011000250 HC RX REV CODE- 250: Performed by: UROLOGY

## 2017-12-09 PROCEDURE — 83605 ASSAY OF LACTIC ACID: CPT | Performed by: UROLOGY

## 2017-12-09 PROCEDURE — 71010 XR CHEST PORT: CPT

## 2017-12-09 PROCEDURE — 77030020255 HC SOL INJ LR 1000ML BG

## 2017-12-09 PROCEDURE — 74011250637 HC RX REV CODE- 250/637: Performed by: UROLOGY

## 2017-12-09 PROCEDURE — 85025 COMPLETE CBC W/AUTO DIFF WBC: CPT | Performed by: UROLOGY

## 2017-12-09 PROCEDURE — 87086 URINE CULTURE/COLONY COUNT: CPT | Performed by: UROLOGY

## 2017-12-09 PROCEDURE — 87040 BLOOD CULTURE FOR BACTERIA: CPT | Performed by: UROLOGY

## 2017-12-09 PROCEDURE — 81001 URINALYSIS AUTO W/SCOPE: CPT | Performed by: UROLOGY

## 2017-12-09 PROCEDURE — 82962 GLUCOSE BLOOD TEST: CPT

## 2017-12-09 RX ORDER — ALBUTEROL SULFATE 0.83 MG/ML
2.5 SOLUTION RESPIRATORY (INHALATION)
Status: DISCONTINUED | OUTPATIENT
Start: 2017-12-09 | End: 2017-12-10 | Stop reason: HOSPADM

## 2017-12-09 RX ORDER — ALBUTEROL SULFATE 0.83 MG/ML
2.5 SOLUTION RESPIRATORY (INHALATION)
Status: DISCONTINUED | OUTPATIENT
Start: 2017-12-09 | End: 2017-12-09

## 2017-12-09 RX ADMIN — ONDANSETRON 4 MG: 2 INJECTION INTRAMUSCULAR; INTRAVENOUS at 03:15

## 2017-12-09 RX ADMIN — ACETAMINOPHEN 650 MG: 325 TABLET, FILM COATED ORAL at 18:30

## 2017-12-09 RX ADMIN — OXYCODONE HYDROCHLORIDE AND ACETAMINOPHEN 2 TABLET: 5; 325 TABLET ORAL at 20:21

## 2017-12-09 RX ADMIN — METOPROLOL TARTRATE 100 MG: 50 TABLET ORAL at 20:15

## 2017-12-09 RX ADMIN — OXYCODONE HYDROCHLORIDE AND ACETAMINOPHEN 2 TABLET: 5; 325 TABLET ORAL at 03:15

## 2017-12-09 RX ADMIN — METOPROLOL TARTRATE 100 MG: 50 TABLET ORAL at 08:24

## 2017-12-09 RX ADMIN — SENNOSIDES 17.2 MG: 8.6 TABLET, FILM COATED ORAL at 21:43

## 2017-12-09 RX ADMIN — DOCUSATE SODIUM 100 MG: 100 CAPSULE, LIQUID FILLED ORAL at 08:25

## 2017-12-09 RX ADMIN — SODIUM CHLORIDE, SODIUM LACTATE, POTASSIUM CHLORIDE, AND CALCIUM CHLORIDE 125 ML/HR: 600; 310; 30; 20 INJECTION, SOLUTION INTRAVENOUS at 02:35

## 2017-12-09 RX ADMIN — ALBUTEROL SULFATE 2.5 MG: 2.5 SOLUTION RESPIRATORY (INHALATION) at 14:35

## 2017-12-09 RX ADMIN — OXYCODONE HYDROCHLORIDE AND ACETAMINOPHEN 2 TABLET: 5; 325 TABLET ORAL at 08:32

## 2017-12-09 RX ADMIN — DOCUSATE SODIUM 100 MG: 100 CAPSULE, LIQUID FILLED ORAL at 18:30

## 2017-12-09 RX ADMIN — OXYCODONE HYDROCHLORIDE AND ACETAMINOPHEN 2 TABLET: 5; 325 TABLET ORAL at 14:24

## 2017-12-09 RX ADMIN — SODIUM CHLORIDE, SODIUM LACTATE, POTASSIUM CHLORIDE, AND CALCIUM CHLORIDE 100 ML/HR: 600; 310; 30; 20 INJECTION, SOLUTION INTRAVENOUS at 14:25

## 2017-12-09 NOTE — PROGRESS NOTES
[facility]    Progress Note    POD - 2    Subjective:   Patient is doing well  Some difficulty ambulating halls overnight  Now feels better  Would like a soft diet    Current Facility-Administered Medications   Medication Dose Route Frequency Provider Last Rate Last Dose    diphenhydrAMINE (BENADRYL) capsule 25 mg  25 mg Oral Q8H PRN Kwabena Chiu MD   25 mg at 12/08/17 2336    acetaminophen (TYLENOL) tablet 650 mg  650 mg Oral Q4H PRN Trina Worley MD   650 mg at 12/08/17 2336    metoprolol tartrate (LOPRESSOR) tablet 100 mg  100 mg Oral Q12H Kwabena Chui MD   100 mg at 12/09/17 0824    sodium chloride (NS) flush 5-10 mL  5-10 mL IntraVENous Q8H Kwabena Chiu MD   Stopped at 12/08/17 2200    sodium chloride (NS) flush 5-10 mL  5-10 mL IntraVENous PRN Gm Corbin MD        oxyCODONE-acetaminophen (PERCOCET) 5-325 mg per tablet 1-2 Tab  1-2 Tab Oral Q4H PRN Gm Corbin MD   2 Tab at 12/09/17 0832    HYDROmorphone in NS (DILAUDID) injection 1 mg  1 mg IntraVENous Q3H PRN Kwabena Chiu MD        naloxone (NARCAN) injection 0.4 mg  0.4 mg IntraVENous PRN Kwabena Chiu MD        ondansetron (ZOFRAN) injection 4 mg  4 mg IntraVENous Q4H PRN Kwabena Chiu MD   4 mg at 12/09/17 0315    docusate sodium (COLACE) capsule 100 mg  100 mg Oral BID Kwabena Chiu MD   100 mg at 12/09/17 0825    senna (SENOKOT) tablet 17.2 mg  2 Tab Oral QHS Kwabena Chiu MD   17.2 mg at 12/08/17 2336    insulin lispro (HUMALOG) injection   SubCUTAneous AC&HS Kwabena Chiu MD   Stopped at 12/07/17 2200    glucose chewable tablet 16 g  4 Tab Oral PRN Gm Corbin MD        glucagon (GLUCAGEN) injection 1 mg  1 mg IntraMUSCular PRN Kwabena Chiu MD        lactated Ringers infusion  125 mL/hr IntraVENous CONTINUOUS Kwabena Chiu  mL/hr at 12/09/17 0235 125 mL/hr at 12/09/17 0235 Objective:     Admit weight: Weight: 221 lb (100.2 kg)  Last recorded weight: Weight: 221 lb (100.2 kg)    Temp (24hrs), Av.6 °F (37.6 °C), Min:98.8 °F (37.1 °C), Max:101.2 °F (38.4 °C)    Patient Vitals for the past 12 hrs:   Temp Pulse Resp BP SpO2   17 0730 99.1 °F (37.3 °C) 88 16 101/65 96 %   17 0532 98.8 °F (37.1 °C) 98 16 95/59 92 %   17 0052 99.2 °F (37.3 °C) 93 16 97/60 93 %   17 2208 (!) 101.2 °F (38.4 °C) 89 16 111/66 92 %     Drains:   BE scant ss    Physical Exam:    GEN: NAD, A&O x3  CV: no edema  PULM: nonlabored  ABD: Soft, ND, inc c/d/i  : voiding  EXT: no C/C/E    Diagnostics:     BMP:   Lab Results   Component Value Date/Time     2017 11:00 PM    K 3.7 2017 11:00 PM     2017 11:00 PM    CO2 26 2017 11:00 PM    AGAP 6 2017 11:00 PM     (H) 2017 11:00 PM    BUN 11 2017 11:00 PM    CREA 1.17 2017 11:00 PM    GFRAA >60 2017 11:00 PM    GFRNA 52 (L) 2017 11:00 PM          CBC WITH AUTOMATED DIFF    Collection Time: 17 11:00 PM   Result Value Ref Range    WBC 10.9 4.6 - 13.2 K/uL    RBC 3.76 (L) 4.20 - 5.30 M/uL    HGB 8.5 (L) 12.0 - 16.0 g/dL    HCT 25.2 (L) 35.0 - 45.0 %    MCV 67.0 (L) 74.0 - 97.0 FL    MCH 22.6 (L) 24.0 - 34.0 PG    MCHC 33.7 31.0 - 37.0 g/dL    RDW 15.2 (H) 11.6 - 14.5 %    PLATELET 435 651 - 031 K/uL    MPV 10.0 9.2 - 11.8 FL    NEUTROPHILS 84 (H) 40 - 73 %    LYMPHOCYTES 8 (L) 21 - 52 %    MONOCYTES 7 3 - 10 %    EOSINOPHILS 1 0 - 5 %    BASOPHILS 0 0 - 2 %    ABS. NEUTROPHILS 9.1 (H) 1.8 - 8.0 K/UL    ABS. LYMPHOCYTES 0.9 0.9 - 3.6 K/UL    ABS. MONOCYTES 0.8 0.05 - 1.2 K/UL    ABS. EOSINOPHILS 0.2 0.0 - 0.4 K/UL    ABS. BASOPHILS 0.0 0.0 - 0.06 K/UL    DF AUTOMATED         Labs reviewed, and normal, except as noted. Radiology:  CXR overnight   IMPRESSION:  1.  Progressively underexpanded lungs with associated basilar predominant  vascular crowding/atelectasis. No superimposed acute radiographic abnormality.   Assessment:     Bubba Jack is a 40y.o. year old female with:  Patient Active Problem List   Diagnosis Code    Renal mass N28.89    Hyperthyroidism E05.90    Polycystic ovary syndrome E28.2    Renal mass, left N28.89     POD 2    PLAN:     - ambulate today  - ICS  - soft diet  - decrease IVF  - plan for DC BE and DC home tomorrow am if doing well    Jone Lockhart MD  [facility]  [facility] [place], [place] [id number] [facility], [address] [facility]  Pager [phone]

## 2017-12-09 NOTE — PROGRESS NOTES
Patient attempted to ambulate in hallway, after walking a few doors down patient vomited bilious contents. Returned to bed. Continues to use incentive spirometry as instructed. Call bell within reach.

## 2017-12-09 NOTE — PROGRESS NOTES
2318 Received bedside report from [facility], [address] [facility], updated white board, call bell is within reach. 0830 Patient is resting in the bed at this time. She was assisted to the bathroom and back to bed. Encouraged the patient she needs to get up and walk more throughout the day. Patient explained to me that the drain was causing her lot of pain and will walk later in the day. Call bell is placed within reach. 1030 Patient is resting comfortable in the bed at this time. Assisted her to the bathroom and back to bed. Encouraged the patient to drink more fluids and get up to walk more. Call bell and phone are within reach. 1130 Patient is sitting up on the edge of the bed eating lunch at this time. Call bell is within reach. 1200 Patient is lying in the bed visiting with her kids. Call bell is within reach. [address] with Dr. Jerri Goldberg about the patient wheezing and her blood sugar checks. Verbal order was received to d/c POC Glucose and insulin cover as the patient is not a diabetic. Received an order for Albuterol treatments as needed. 1430 Took the patient's vitals at this time as she is asking for pain medication. Patient currently has a fever and low blood pressure. Paged Dr. Jerri Goldberg about this. [address] with Dr. Jerri Goldberg and received verbal orders for lab work and chest X-ray. 1450 Bedside and Verbal shift change report given to Wayne RN (oncoming nurse) by Pollo Floyd RN (offgoing nurse). Report included the following information SBAR, Kardex and MAR.

## 2017-12-09 NOTE — PROGRESS NOTES
Problem: Falls - Risk of  Goal: *Absence of Falls  Document Patti Fall Risk and appropriate interventions in the flowsheet.    Outcome: Progressing Towards Goal  Fall Risk Interventions:  Mobility Interventions: Communicate number of staff needed for ambulation/transfer, Patient to call before getting OOB         Medication Interventions: Evaluate medications/consider consulting pharmacy, Patient to call before getting OOB    Elimination Interventions: Elevated toilet seat, Patient to call for help with toileting needs

## 2017-12-09 NOTE — ROUTINE PROCESS
Bedside and Verbal shift change report given to Aidee Barbour (oncoming nurse) by Cheyanne Stover RN   (offgoing nurse). Report included the following information SBAR, Kardex, MAR and Recent Results.

## 2017-12-09 NOTE — PROGRESS NOTES
1456: Received bedside report from Nehemias Lim RN, included SBAR, OR summary, MAR, recent results, and Kardex. White board updated, ICS encouraged. Clean catch explained. 1511: SPA team in with patient, patient ambulated to bathroom with assistance, gait steady, voided 100ml clear yellow urine without difficulty. Patient returned safely to bed. ICS encouraged with patient. 1604: Patient sitting up in bed, NAD noted, reports pain 6/10, decreased from prior pain score. Patient using ICS. 1629: Temperature decreased to 100.1, patient using ICS. 1658: Paged Dr. Dakota Shaikh to update on lab results, CBC, CMP, chest XRAY, and UA, called back at 1702 received telephone orders with read back for CMP and CBC in the AM, and recommends continuation of ICS and Tylenol. 1823: Reassessed patient's vitals, 102/51, HR 99, spO2 97%, temperaure 99.3, respirations 18. Patient passing gas without difficulty, ambulated to bathroom, up to chair, ICS demonstrated properly. 1931: Bedside shift change report given to Lydia Weber RN (oncoming nurse) by Alfonzo Hussein   (offgoing nurse). Report included the following information SBAR, Kardex, OR Summary, Intake/Output, MAR and Recent Results.

## 2017-12-09 NOTE — PROGRESS NOTES
Patient attempted to ambulate in hallway, after walking a few doors down patient vomited bilious contents. Returned to bed. Continues to use incentive spirometry as instructed. Call bell within reach. 6:54 AM  No further emesis, low grade temperature, now within normal limits.   Patient Vitals for the past 12 hrs:   Temp Pulse Resp BP SpO2   12/09/17 0532 98.8 °F (37.1 °C) 98 16 95/59 92 %   12/09/17 0052 99.2 °F (37.3 °C) 93 16 97/60 93 %   12/08/17 2208 (!) 101.2 °F (38.4 °C) 89 16 111/66 92 %   12/08/17 1939 100.3 °F (37.9 °C) (!) 101 17 (!) 87/51 91 %

## 2017-12-10 VITALS
RESPIRATION RATE: 16 BRPM | HEIGHT: 66 IN | OXYGEN SATURATION: 95 % | SYSTOLIC BLOOD PRESSURE: 99 MMHG | TEMPERATURE: 98.2 F | BODY MASS INDEX: 36 KG/M2 | WEIGHT: 224 LBS | HEART RATE: 88 BPM | DIASTOLIC BLOOD PRESSURE: 63 MMHG

## 2017-12-10 LAB
ALBUMIN SERPL-MCNC: 2.6 G/DL (ref 3.4–5)
ALBUMIN/GLOB SERPL: 0.8 {RATIO} (ref 0.8–1.7)
ALP SERPL-CCNC: 53 U/L (ref 45–117)
ALT SERPL-CCNC: 31 U/L (ref 13–56)
ANION GAP SERPL CALC-SCNC: 6 MMOL/L (ref 3–18)
AST SERPL-CCNC: 29 U/L (ref 15–37)
BILIRUB SERPL-MCNC: 1 MG/DL (ref 0.2–1)
BUN SERPL-MCNC: 7 MG/DL (ref 7–18)
BUN/CREAT SERPL: 8 (ref 12–20)
CALCIUM SERPL-MCNC: 8 MG/DL (ref 8.5–10.1)
CHLORIDE SERPL-SCNC: 105 MMOL/L (ref 100–108)
CO2 SERPL-SCNC: 28 MMOL/L (ref 21–32)
CREAT FLD-MCNC: 0.85 MG/DL
CREAT SERPL-MCNC: 0.88 MG/DL (ref 0.6–1.3)
ERYTHROCYTE [DISTWIDTH] IN BLOOD BY AUTOMATED COUNT: 15.2 % (ref 11.6–14.5)
GLOBULIN SER CALC-MCNC: 3.2 G/DL (ref 2–4)
GLUCOSE SERPL-MCNC: 94 MG/DL (ref 74–99)
HCT VFR BLD AUTO: 23.6 % (ref 35–45)
HGB BLD-MCNC: 7.7 G/DL (ref 12–16)
MCH RBC QN AUTO: 22 PG (ref 24–34)
MCHC RBC AUTO-ENTMCNC: 32.6 G/DL (ref 31–37)
MCV RBC AUTO: 67.4 FL (ref 74–97)
PLATELET # BLD AUTO: 194 K/UL (ref 135–420)
PMV BLD AUTO: 10.1 FL (ref 9.2–11.8)
POTASSIUM SERPL-SCNC: 3.8 MMOL/L (ref 3.5–5.5)
PROT SERPL-MCNC: 5.8 G/DL (ref 6.4–8.2)
RBC # BLD AUTO: 3.5 M/UL (ref 4.2–5.3)
SODIUM SERPL-SCNC: 139 MMOL/L (ref 136–145)
SPECIMEN SOURCE FLD: NORMAL
WBC # BLD AUTO: 12 K/UL (ref 4.6–13.2)

## 2017-12-10 PROCEDURE — 85027 COMPLETE CBC AUTOMATED: CPT | Performed by: UROLOGY

## 2017-12-10 PROCEDURE — 80053 COMPREHEN METABOLIC PANEL: CPT | Performed by: UROLOGY

## 2017-12-10 PROCEDURE — 74011250636 HC RX REV CODE- 250/636: Performed by: UROLOGY

## 2017-12-10 PROCEDURE — 82570 ASSAY OF URINE CREATININE: CPT | Performed by: UROLOGY

## 2017-12-10 PROCEDURE — 74011250637 HC RX REV CODE- 250/637: Performed by: UROLOGY

## 2017-12-10 PROCEDURE — 36415 COLL VENOUS BLD VENIPUNCTURE: CPT | Performed by: UROLOGY

## 2017-12-10 PROCEDURE — 77030020255 HC SOL INJ LR 1000ML BG

## 2017-12-10 RX ADMIN — SODIUM CHLORIDE, SODIUM LACTATE, POTASSIUM CHLORIDE, AND CALCIUM CHLORIDE 100 ML/HR: 600; 310; 30; 20 INJECTION, SOLUTION INTRAVENOUS at 02:30

## 2017-12-10 RX ADMIN — OXYCODONE HYDROCHLORIDE AND ACETAMINOPHEN 2 TABLET: 5; 325 TABLET ORAL at 07:08

## 2017-12-10 RX ADMIN — Medication 10 ML: at 07:06

## 2017-12-10 RX ADMIN — DOCUSATE SODIUM 100 MG: 100 CAPSULE, LIQUID FILLED ORAL at 08:18

## 2017-12-10 RX ADMIN — Medication 10 ML: at 02:34

## 2017-12-10 RX ADMIN — OXYCODONE HYDROCHLORIDE AND ACETAMINOPHEN 2 TABLET: 5; 325 TABLET ORAL at 02:34

## 2017-12-10 NOTE — PROGRESS NOTES
1931 Received patient from [place] [address]. Patient is alert and oriented x 4.  2100 Patient requested to get OOB and visit the Latrine, Carefully ambulated, Steady gate noted. 2115 Patient further ambulated 700' for 6 minutes, then returned to bed. Tolerated well. 0200 Patient Again requested to get out of bed to latrine,  mL clear yellow urine. 0600 Patient repositioned for comfort, Call bell in reach. 5440 Bedside and Verbal shift change report given to Jennifer Nick RN (oncoming nurse) by Nat Gallardo RN (offgoing nurse). Report included the following information SBAR, Kardex, Procedure Summary, Intake/Output, MAR and Recent Results.

## 2017-12-10 NOTE — PROGRESS NOTES
[facility]    Progress Note    POD - 4    Subjective:   Patient is doing better  Walking more easily  Low grade temp yesterday    Current Facility-Administered Medications   Medication Dose Route Frequency Provider Last Rate Last Dose    albuterol (PROVENTIL VENTOLIN) nebulizer solution 2.5 mg  2.5 mg Nebulization Q4H PRN Germán Onofre MD        diphenhydrAMINE (BENADRYL) capsule 25 mg  25 mg Oral Q8H PRN Kwabena Chiu MD   25 mg at 12/08/17 2336    acetaminophen (TYLENOL) tablet 650 mg  650 mg Oral Q4H PRN Germán Onofre MD   650 mg at 12/09/17 1830    metoprolol tartrate (LOPRESSOR) tablet 100 mg  100 mg Oral Q12H Kwabena Chiu MD   100 mg at 12/09/17 2015    sodium chloride (NS) flush 5-10 mL  5-10 mL IntraVENous Q8H Kwabena Chiu MD   10 mL at 12/10/17 0706    sodium chloride (NS) flush 5-10 mL  5-10 mL IntraVENous PRN Madhav Ferguson MD        oxyCODONE-acetaminophen (PERCOCET) 5-325 mg per tablet 1-2 Tab  1-2 Tab Oral Q4H PRN Madhav Ferguson MD   2 Tab at 12/10/17 0708    HYDROmorphone in NS (DILAUDID) injection 1 mg  1 mg IntraVENous Q3H PRN Kwabena Chiu MD        naloxone (NARCAN) injection 0.4 mg  0.4 mg IntraVENous PRN Kwabena Chiu MD        ondansetron (ZOFRAN) injection 4 mg  4 mg IntraVENous Q4H PRN Kwabena Chiu MD   4 mg at 12/09/17 0315    docusate sodium (COLACE) capsule 100 mg  100 mg Oral BID Kwabena Chiu MD   100 mg at 12/09/17 1830    senna (SENOKOT) tablet 17.2 mg  2 Tab Oral QHS Kwabena Chiu MD   17.2 mg at 12/09/17 2143    glucose chewable tablet 16 g  4 Tab Oral PRN Madhav Ferguson MD        glucagon (GLUCAGEN) injection 1 mg  1 mg IntraMUSCular PRN Kwabena Delcidns, MD        lactated Ringers infusion  100 mL/hr IntraVENous CONTINUOUS Kwabena Chiu  mL/hr at 12/10/17 0230 100 mL/hr at 12/10/17 0230       Objective:     Admit weight: Weight: 221 lb (100.2 kg)  Last recorded weight: Weight: 224 lb (101.6 kg)    Temp (24hrs), Av.6 °F (37.6 °C), Min:97.5 °F (36.4 °C), Max:101.2 °F (38.4 °C)    Patient Vitals for the past 12 hrs:   Temp Pulse Resp BP SpO2   12/10/17 0543 97.5 °F (36.4 °C) 95 16 99/64 95 %   12/10/17 0049 98.6 °F (37 °C) 83 18 100/64 95 %   17 2028 99.1 °F (37.3 °C) 96 18 102/66 96 %     Drains:    BE ss approx 250 over last 24 hours    Physical Exam:    GEN: NAD, A&O x3  CV: RRR  PULM: nonlabored  ABD: Soft, ND, ATTP. Inc c/d/i BE ss  EXT: no C/C/E. Calves nontender bilaterally    Diagnostics:     BMP: Lab Results   Component Value Date/Time     12/10/2017 04:05 AM    K 3.8 12/10/2017 04:05 AM     12/10/2017 04:05 AM    CO2 28 12/10/2017 04:05 AM    AGAP 6 12/10/2017 04:05 AM    GLU 94 12/10/2017 04:05 AM    BUN 7 12/10/2017 04:05 AM    CREA 0.88 12/10/2017 04:05 AM    GFRAA >60 12/10/2017 04:05 AM    GFRNA >60 12/10/2017 04:05 AM            CBC W/O DIFF    Collection Time: 12/10/17  4:05 AM   Result Value Ref Range    WBC 12.0 4.6 - 13.2 K/uL    RBC 3.50 (L) 4.20 - 5.30 M/uL    HGB 7.7 (L) 12.0 - 16.0 g/dL    HCT 23.6 (L) 35.0 - 45.0 %    MCV 67.4 (L) 74.0 - 97.0 FL    MCH 22.0 (L) 24.0 - 34.0 PG    MCHC 32.6 31.0 - 37.0 g/dL    RDW 15.2 (H) 11.6 - 14.5 %    PLATELET 652 144 - 216 K/uL    MPV 10.1 9.2 - 11.8 FL       Labs reviewed, and normal, except as noted. Micro: BCx and UCx negative to date    Radiology: CXR atelectasis    Assessment:     Jeff Gill is a 40y.o. year old female with:  Patient Active Problem List   Diagnosis Code    Renal mass N28.89    Hyperthyroidism E05.90    Polycystic ovary syndrome E28.2    Renal mass, left N28.89     Postop fever    PLAN:     - fever work up negative thus far and afebrile today. O2 sats normal on RA. Suspect atelectasis.  Calves soft and ambulating, SCDs in place  - ICS  - send BE fluid for creatinine; if negative will remove and plan for DC around lunch time today if she continues to do well clinically  - f/u with Dr Brock Fleming as scheduled    Radhames العلي MD  [facility]  [facility] [place], [facility]  [address] [facility], [address] [facility]  Pager [phone]    ADDENDUM:  BE cr c/w serum and removed  Will discharge home  10:22 AM

## 2017-12-10 NOTE — PROGRESS NOTES
conducted an initial consultation and Spiritual Assessment for Delfino Murguia, who is a 40 y.o.,female. Patients Primary Language is: [place]. According to the patients EMR Sabianist Affiliation is: [facility].     The reason the Patient came to the hospital is:   Patient Active Problem List    Diagnosis Date Noted    Renal mass, left 12/07/2017    Renal mass     Hyperthyroidism     Polycystic ovary syndrome         The  provided the following Interventions:  Initiated a relationship of care and support. Explored issues of nydia, belief, spirituality and Nondenominational/ritual needs while hospitalized. Listened empathically. Provided information about Spiritual Care Services. Offered prayer and assurance of continued prayers on patient's behalf. Chart reviewed. The following outcomes were achieved:  Patient shared limited information about both their medical narrative and spiritual journey/beliefs. Patient processed feeling about current hospitalization. Patient expressed gratitude for 's visit. Assessment:  Patient does not have any Nondenominational/cultural needs that will affect patients preferences in health care. There are no further spiritual or Nondenominational issues which require intervention at this time. Plan:  Chaplains will continue to follow and will provide pastoral care on an as needed/requested basis.  recommends bedside caregivers page  on duty if patient shows signs of acute spiritual or emotional distress. Edgard Townsend M.Div.   [facility] [address]  [phone]

## 2017-12-10 NOTE — DISCHARGE INSTRUCTIONS
- Follow up in 2 weeks for pathology and wound check  - Call office for fevers, chills or any other concerns  - Showers okay, no bathing/swimming  - Take prescriptions as written, no driving while on narcotics  - Hold ace-inhibitor until follow-up (lisinopril)  - No heavy lifting (< 5 lbs)    DISCHARGE SUMMARY from Nurse    PATIENT INSTRUCTIONS:    After general anesthesia or intravenous sedation, for 24 hours or while taking prescription Narcotics:  · Limit your activities  · Do not drive and operate hazardous machinery  · Do not make important personal or business decisions  · Do  not drink alcoholic beverages  · If you have not urinated within 8 hours after discharge, please contact your surgeon on call. Report the following to your surgeon:  · Excessive pain, swelling, redness or odor of or around the surgical area  · Temperature over 100.5  · Nausea and vomiting lasting longer than 4 hours or if unable to take medications  · Any signs of decreased circulation or nerve impairment to extremity: change in color, persistent  numbness, tingling, coldness or increase pain  · Any questions    What to do at Home:  Recommended activity: Activity as tolerated and no driving for today. If you experience any of the following symptoms increase in abdominal pain or fever, please follow up with Dr. Fernanda Kincaid. *  Please give a list of your current medications to your Primary Care Provider. *  Please update this list whenever your medications are discontinued, doses are      changed, or new medications (including over-the-counter products) are added. *  Please carry medication information at all times in case of emergency situations. These are general instructions for a healthy lifestyle:    No smoking/ No tobacco products/ Avoid exposure to second hand smoke  Surgeon General's Warning:  Quitting smoking now greatly reduces serious risk to your health.     Obesity, smoking, and sedentary lifestyle greatly increases your risk for illness    A healthy diet, regular physical exercise & weight monitoring are important for maintaining a healthy lifestyle    You may be retaining fluid if you have a history of heart failure or if you experience any of the following symptoms:  Weight gain of 3 pounds or more overnight or 5 pounds in a week, increased swelling in our hands or feet or shortness of breath while lying flat in bed. Please call your doctor as soon as you notice any of these symptoms; do not wait until your next office visit. Recognize signs and symptoms of STROKE:    F-face looks uneven    A-arms unable to move or move unevenly    S-speech slurred or non-existent    T-time-call 911 as soon as signs and symptoms begin-DO NOT go       Back to bed or wait to see if you get better-TIME IS BRAIN. Warning Signs of HEART ATTACK     Call 911 if you have these symptoms:   Chest discomfort. Most heart attacks involve discomfort in the center of the chest that lasts more than a few minutes, or that goes away and comes back. It can feel like uncomfortable pressure, squeezing, fullness, or pain.  Discomfort in other areas of the upper body. Symptoms can include pain or discomfort in one or both arms, the back, neck, jaw, or stomach.  Shortness of breath with or without chest discomfort.  Other signs may include breaking out in a cold sweat, nausea, or lightheadedness. Don't wait more than five minutes to call 911 - MINUTES MATTER! Fast action can save your life. Calling 911 is almost always the fastest way to get lifesaving treatment. Emergency Medical Services staff can begin treatment when they arrive -- up to an hour sooner than if someone gets to the hospital by car. The discharge information has been reviewed with the patient. The patient verbalized understanding.   Discharge medications reviewed with the patient and appropriate educational materials and side effects teaching were provided. ______________________________________________________________________________________________________________________    Patient armband removed and shredded.

## 2017-12-10 NOTE — PROGRESS NOTES
0725 Received bedside report from Yue Machado RN, updated white board, call bell is within reach. 0830 Patient up to the bathroom and wants to sit in the chair to have breakfast. Call bell and phone are placed within reach. 1030 Patient sitting up in the chair at this time. She is doing better and using the incentive spirometry. Call bell is within reach. 1145 Patient is now back in the bed at this time. She states she isn't feeling well at this time. She thinks she needs to have a bowel movement. Call bell and phone are within reach. 1230 Patient provided discharge instructions at this time. IV removed. Patient will notified me when her ride is here. Call bell and phone are within reach. 26 Patient's family has arrived to pick her up at this time. All personal items are sent home with the patient. Patient taken downstairs via wheelchair at this time.

## 2017-12-11 LAB
BACTERIA SPEC CULT: NORMAL
SERVICE CMNT-IMP: NORMAL

## 2017-12-15 LAB
BACTERIA SPEC CULT: NORMAL
BACTERIA SPEC CULT: NORMAL
SERVICE CMNT-IMP: NORMAL
SERVICE CMNT-IMP: NORMAL

## 2020-04-08 ENCOUNTER — TELEPHONE (OUTPATIENT)
Dept: FAMILY MEDICINE CLINIC | Age: 40
End: 2020-04-08

## 2020-04-09 ENCOUNTER — TELEPHONE (OUTPATIENT)
Dept: FAMILY MEDICINE CLINIC | Age: 40
End: 2020-04-09

## 2020-04-09 ENCOUNTER — VIRTUAL VISIT (OUTPATIENT)
Dept: FAMILY MEDICINE CLINIC | Age: 40
End: 2020-04-09

## 2020-04-09 DIAGNOSIS — Z13.29 SCREENING FOR ENDOCRINE, METABOLIC AND IMMUNITY DISORDER: ICD-10-CM

## 2020-04-09 DIAGNOSIS — E66.9 OBESITY (BMI 35.0-39.9 WITHOUT COMORBIDITY): ICD-10-CM

## 2020-04-09 DIAGNOSIS — Z86.2 HISTORY OF IRON DEFICIENCY ANEMIA: ICD-10-CM

## 2020-04-09 DIAGNOSIS — E28.2 POLYCYSTIC OVARY SYNDROME: ICD-10-CM

## 2020-04-09 DIAGNOSIS — K58.9 IRRITABLE BOWEL SYNDROME, UNSPECIFIED TYPE: ICD-10-CM

## 2020-04-09 DIAGNOSIS — Z13.220 SCREENING, LIPID: ICD-10-CM

## 2020-04-09 DIAGNOSIS — E05.90 HYPERTHYROIDISM: ICD-10-CM

## 2020-04-09 DIAGNOSIS — Z13.0 SCREENING FOR ENDOCRINE, METABOLIC AND IMMUNITY DISORDER: ICD-10-CM

## 2020-04-09 DIAGNOSIS — Z13.228 SCREENING FOR ENDOCRINE, METABOLIC AND IMMUNITY DISORDER: ICD-10-CM

## 2020-04-09 DIAGNOSIS — R19.00 ABDOMINAL WALL BULGE: ICD-10-CM

## 2020-04-09 DIAGNOSIS — B86 SCABIES: Primary | ICD-10-CM

## 2020-04-09 PROBLEM — J45.40 MODERATE PERSISTENT ASTHMA WITHOUT COMPLICATION: Status: ACTIVE | Noted: 2020-04-09

## 2020-04-09 RX ORDER — BUTALBITAL, ACETAMINOPHEN AND CAFFEINE 50; 325; 40 MG/1; MG/1; MG/1
1 TABLET ORAL
COMMUNITY
Start: 2020-01-03 | End: 2021-06-19

## 2020-04-09 RX ORDER — LIRAGLUTIDE 6 MG/ML
INJECTION SUBCUTANEOUS
COMMUNITY
Start: 2020-03-24 | End: 2020-10-16

## 2020-04-09 RX ORDER — FAMOTIDINE 20 MG/1
TABLET, FILM COATED ORAL
COMMUNITY
Start: 2020-03-24 | End: 2021-06-19

## 2020-04-09 RX ORDER — DIPHENHYDRAMINE HCL 25 MG
25 CAPSULE ORAL
Qty: 30 CAP | Refills: 0 | Status: SHIPPED | OUTPATIENT
Start: 2020-04-09 | End: 2020-04-19

## 2020-04-09 RX ORDER — FLUTICASONE PROPIONATE AND SALMETEROL 100; 50 UG/1; UG/1
1 POWDER RESPIRATORY (INHALATION) EVERY 12 HOURS
COMMUNITY
End: 2020-07-10

## 2020-04-09 RX ORDER — ALBUTEROL SULFATE 90 UG/1
AEROSOL, METERED RESPIRATORY (INHALATION)
COMMUNITY
Start: 2020-01-27 | End: 2020-07-10 | Stop reason: SDUPTHER

## 2020-04-09 RX ORDER — IPRATROPIUM BROMIDE AND ALBUTEROL SULFATE 2.5; .5 MG/3ML; MG/3ML
SOLUTION RESPIRATORY (INHALATION)
COMMUNITY
Start: 2020-02-17 | End: 2020-07-10 | Stop reason: SDUPTHER

## 2020-04-09 RX ORDER — PERMETHRIN 50 MG/G
CREAM TOPICAL
Qty: 60 G | Refills: 0 | Status: SHIPPED | OUTPATIENT
Start: 2020-04-09 | End: 2020-05-18 | Stop reason: ALTCHOICE

## 2020-04-09 RX ORDER — ALBUTEROL SULFATE 0.83 MG/ML
SOLUTION RESPIRATORY (INHALATION)
COMMUNITY
Start: 2020-01-30 | End: 2022-03-17 | Stop reason: SDUPTHER

## 2020-04-09 NOTE — PROGRESS NOTES
Telemedicine Virtual Visit SOAP note    Trent Sequeira is a 46 y.o. female who was seen by synchronous (real-time) audio-video technology on 4/9/2020. Consent:  She and/or her healthcare decision maker is aware that this patient-initiated Telehealth encounter is a billable service, with coverage as determined by her insurance carrier. She is aware that she may receive a bill and has provided verbal consent to proceed: Yes    This visit was conducted via [place] was seen for [address]    She is a new patient, previously seen with PCP up in [facility] as she had no insurance and was seen at [facility] for renal cancer. Since the beginning of this year, she was in and out of local ER due to illness so they helped her get insurance and find a PCP. Presently with complaints of severe itching and skin rash-   Severe itching at night, someone she is in close relation with has been diagnosed with scabies. He previously spent the night at her house multiple times a couple weeks ago- he is isolating now. She has seen bumps under right breast, and bumps between webbing of her fingers. Itching has intensified and worse at night. No one else in her house (her children) with similar symptoms. Review of chronic conditions-  Asthma- patient reports present since childhood and still persisting. Patient describes as stable currently. Has rescue inhaler, maybe using once or twice a month. Also using advair daily. No records in [facility] of [address] [place] [address]. Hyperthyroidism- patient reports as chronic, noted some years ago and used to see endocrinology. Presently, not feeling symptomatic. Denies chest pain, palpitations, thirst, hunger, weight loss, sweating, etc.   She states endocrinology previously seen was in [place] or [address] [place] [address]. Will request records once patient in to sign release.     PCOS- also describes history of PCOS, her endocrinologist previously placed on metformin and she is continuing this. Denies problems related to. Kidney cancer- patient reports found in 2017 after presenting to ER with abdominal pain on left side. CT scan found tumor. Was seen by urology and oncology. She reports resulting scans showed growth of tumer- and had surgery dec 2017 to remove. Removed tumor and no further treatment has been needed. Following with urology Dr. Sebastian Escamilla, last seen Oct 2019. From chart review of last note with urologist-  1. Left Renal Cell Carcinoma - S/p robotic assisted laparoscopic left partial nephrectomy 12/7/2017                Pathology: [id number] Papillary Renal Cell Carcinoma Type II, Left, Hyacinth Grade 3. + Margin, - LVI              Reviewed CT abd pelv w wo cont 9/25/19: s/p left partial nephrectomy. No locally recurrent or metastatic disease within the abdomen or pelvis              Reviewed last creatinine 10/7/19: 0.8ng/dL               Plan for ROLANDO in 2 years. Ordered today.      RTO in 2 years to review ROLANDO    Arthritis- patient reports she has been seen in ER/urgent care for flare ups dating back at least 5 years. Previously has been given steroid shot which helped. She also reports seeing orhtopedist in [facility], who told her she has bone spurs and arthritis to her knees. Previously managed with mobic, but no issues reported right now. She states if she stays active/walking it prevents issues. Of note, on review of labs in care everywhere, patient NAHOMI positive seen 8/2/2017- unsure if she is aware of this right now, or if she's been seen by rheumatology. IBS & possible hernia- patient follows with GI doctor, Dr. Sheng Polk in [place]. Managed on linzess for IBS. No complaints related to today. She states she thinks she may have a hernia. She reports abdominal wall bulge to center of abdomen above umbilicus with straining. Presently not causing issues. She is seeing GI today to discuss.     Iron deficiency anemia- patient reports history of this, previously took iron. Now just with multivitamin with iron. Last CBC I see is from 1/3/2020 on care everywhere and H&H normal, but MCV, MCH low. Fluid retention- patient describes fluid retention so she takes HCTZ daily. She states BP has historically been well controlled, no issues related to. Obesity- patient reports previous PCP prescribe victoza which she is still doing. She has noted some weight loss. She continues to stay active and is trying to diet. HM-  Pap smear- Last done at [facility], last May. Report in care everywhere, NIL. PPSV23- due with indication for asthma    Current Medications-    Current Outpatient Medications:     butalbital-acetaminophen-caffeine (FIORICET, ESGIC) -40 mg per tablet, Take 1 Tab by mouth every four (4) hours as needed. , Disp: , Rfl:     permethrin (ACTICIN) 5 % topical cream, Massage cream into skin thoroughly from neck to the soles of the feet, including between toes and fingers at night before bed. Sleep with cream applied. After 8-14 hours, remove by washing or bathing in the morning., Disp: 60 g, Rfl: 0    diphenhydrAMINE (BenadryL) 25 mg capsule, Take 1 Cap by mouth every six (6) hours as needed for Itching for up to 10 days. , Disp: 30 Cap, Rfl: 0    fluticasone propion-salmeteroL (ADVAIR/WIXELA) 100-50 mcg/dose diskus inhaler, Take 1 Puff by inhalation every twelve (12) hours. , Disp: , Rfl:     albuterol (PROVENTIL VENTOLIN) 2.5 mg /3 mL (0.083 %) nebu, , Disp: , Rfl:     ProAir HFA 90 mcg/actuation inhaler, , Disp: , Rfl:     famotidine (PEPCID) 20 mg tablet, , Disp: , Rfl:     albuterol-ipratropium (DUO-NEB) 2.5 mg-0.5 mg/3 ml nebu, , Disp: , Rfl:     Victoza 3-Ashutosh 0.6 mg/0.1 mL (18 mg/3 mL) pnij, , Disp: , Rfl:     hydroCHLOROthiazide (HYDRODIURIL) 12.5 mg tablet, , Disp: , Rfl:     LINZESS 290 mcg cap capsule, , Disp: , Rfl:     metFORMIN ER (GLUCOPHAGE XR) 750 mg tablet, Take 750 mg by mouth two (2) times a day., Disp: , Rfl:     cholecalciferol (VITAMIN D3) 1,000 unit cap, Take  by mouth daily. , Disp: , Rfl:     Allergies-  Allergies   Allergen Reactions    Iodinated Contrast Media Other (comments)     NAUSEA, ANXIETY-- RECEIVED MULTIHANCE (GADOBENATE DIMEGLUTAMINE) 20ML 2/13/18 FOR MRI       Review of Systems-  Review of Systems   Constitutional: Negative for chills, fatigue and fever. Respiratory: Negative for shortness of breath and wheezing. Cardiovascular: Negative for chest pain and palpitations. Gastrointestinal: Positive for abdominal distention. Negative for abdominal pain, constipation, diarrhea and vomiting. Endocrine: Negative for cold intolerance and heat intolerance. Musculoskeletal: Negative for arthralgias and gait problem. Skin: Positive for rash. Negative for color change, pallor and wound. Neurological: Negative for facial asymmetry, speech difficulty, weakness and headaches. Psychiatric/Behavioral: Negative for dysphoric mood. The patient is not nervous/anxious. Patient Care Team-  Patient Care Team:  Kolton Corbin NP as PCP - General (Nurse Practitioner)      Objective-    PHYSICAL EXAMINATION:    Vital Signs: (As obtained by patient/caregiver at home)  There were no vitals taken for this visit.    Patient reports no access to BP monitor, HR monitor, thermometer at the moment    Constitutional: [x] Appears well-developed and well-nourished [x] No apparent distress      [] Abnormal - moderately obese    Mental status: [x] Alert and awake  [x] Oriented to person/place/time [x] Able to follow commands    [] Abnormal -     Eyes:   EOM    [x]  Normal    [] Abnormal -   Sclera  [x]  Normal    [] Abnormal -          Discharge [x]  None visible       HENT: [x] Normocephalic, atraumatic    [x] Mouth/Throat: Mucous membranes are moist    External Ears [x] Normal  [] Abnormal -    Neck: [x] No visualized mass [] Abnormal -     Pulmonary/Chest: [x] Respiratory effort normal   [x] No visualized signs of difficulty breathing or respiratory distress          Musculoskeletal:   [x] Normal gait with no signs of ataxia         [x] Normal range of motion of neck      Neurological:        [x] No Facial Asymmetry (Cranial nerve 7 motor function) (limited exam due to video visit)          [x] No gaze palsy         Skin:        [x] No significant exanthematous lesions or discoloration noted on facial skin         [x] Abnormal - DERMATITIS NOTED: on left hand, in between first and middle fingers, and under right breast, at the bra line. Excoriation noted to middle of belly where she has been scratching. Psychiatric:       [x] Normal Affect      Assessment  & Plan    1. Scabies  Patient with known exposure to a person who has slept at her house and is positive for scabies. Based on symptoms of severe puritis at night, as well as cutaneous findings of small cutaneous papules in webbing of fingers and below breasts, will treat. Educated patient on permethrin use as well as decontamination of house- will send My Chart message with education to support. - permethrin (ACTICIN) 5 % topical cream; Massage cream into skin thoroughly from neck to the soles of the feet, including between toes and fingers at night before bed. Sleep with cream applied. After 8-14 hours, remove by washing or bathing in the morning. Dispense: 60 g; Refill: 0  - diphenhydrAMINE (BenadryL) 25 mg capsule; Take 1 Cap by mouth every six (6) hours as needed for Itching for up to 10 days. Dispense: 30 Cap; Refill: 0    2. Obesity (BMI 35.0-39.9 without comorbidity)  Patient was being managed by previous PCP at [email or web address] with victoza injections, she is tolerating well, has noted some modest weight loss. 3. Irritable bowel syndrome, unspecified type/possible hernia  Patient follows with GI and has an upcoming virtual appointment with them today. She is managed on linzess for IBS.  She describes her abdominal wall bulge as intermittent, seen with straining. It was previously worse but she has been managing with trying to eat less. She is addressing this with specialist tonight. 4. History of iron deficiency anemia  Patient reports past history of MICHELLE, previously on iron tablets but now with just taking multivitamin with iron    5. Hyperthyroidism  Patient reports past history of hyperthyroidism and PCOS, was being managed by endocrinology. She is taking metformin for PCOS and tolerating well. She denies symptoms of hyperthyroidism presently. Will get labs and possible referral to endocrine with in-office visit. *Ordered labs to be completed before next visit*  CBC w diff, iron profile, ferritin  CMP  TSH  Lipid panel    We discussed the expected course, resolution and complications of the diagnosis(es) in detail. Medication risks, benefits, costs, interactions, and alternatives were discussed as indicated. I advised her to contact the office if her condition worsens, changes or fails to improve as anticipated. She expressed understanding with the diagnosis(es) and plan. Ryan Shaver is a 46 y.o. female being evaluated by a video visit encounter for concerns as above. A caregiver was present when appropriate. Due to this being a TeleHealth encounter (During WXQ-18 public health emergency), evaluation of the following organ systems was limited: Vitals/Constitutional/EENT/Resp/CV/GI//MS/Neuro/Skin/Heme-Lymph-Imm. Pursuant to the emergency declaration under the [facility], 1135 waiver authority and the [employer] and [employer] Act, this Virtual  Visit was conducted, with patient's (and/or legal guardian's) consent, to reduce the patient's risk of exposure to COVID-19 and provide necessary medical care. I was in the office while conducting this encounter.  Patient was located at home, seated comfortably in bathroom. Pursuant to the emergency declaration under the [facility], [facility] waiver authority and the [employer] and [employer] Act, this Virtual  Visit was conducted, with patient's consent, to reduce the patient's risk of exposure to COVID-19 and provide continuity of care for an established patient. Services were provided through a video synchronous discussion virtually to substitute for in-person clinic visit. I affirm this is a Patient Initiated Episode with an Established Patient who has not had a related appointment within my department in the past 7 days or scheduled within the next 24 hours.     Total Time: minutes: 21-30 minutes    Note: not billable if this call serves to triage the patient into an appointment for the relevant concern      I spent at least 30 minutes with this new patient, and >50% of the time was spent counseling and/or coordinating care regarding establish care, review chronic conditions and scabies    Nery Skelton NP  04/09/20

## 2020-04-09 NOTE — TELEPHONE ENCOUNTER
Called patient to scheduled 2 appointments-one for fasting labs and the other about 5 days after to review/chronic care.   Patient verbalized understanding

## 2020-04-09 NOTE — Clinical Note
Dev Alvarado, please help patient get scheduled in for 2 upcoming appointments when we open back up- fasting lab appointment (labs ordered today) followed by visit with me a few days later to review lab work, review chronic conditions, etc.

## 2020-04-10 DIAGNOSIS — Z86.2 HISTORY OF IRON DEFICIENCY ANEMIA: ICD-10-CM

## 2020-04-10 DIAGNOSIS — E05.90 HYPERTHYROIDISM: ICD-10-CM

## 2020-04-10 DIAGNOSIS — E66.9 OBESITY (BMI 35.0-39.9 WITHOUT COMORBIDITY): ICD-10-CM

## 2020-04-10 DIAGNOSIS — Z13.0 SCREENING FOR ENDOCRINE, METABOLIC AND IMMUNITY DISORDER: ICD-10-CM

## 2020-04-10 DIAGNOSIS — Z13.228 SCREENING FOR ENDOCRINE, METABOLIC AND IMMUNITY DISORDER: ICD-10-CM

## 2020-04-10 DIAGNOSIS — Z13.29 SCREENING FOR ENDOCRINE, METABOLIC AND IMMUNITY DISORDER: ICD-10-CM

## 2020-05-18 ENCOUNTER — VIRTUAL VISIT (OUTPATIENT)
Dept: FAMILY MEDICINE CLINIC | Age: 40
End: 2020-05-18

## 2020-05-18 DIAGNOSIS — R21 SKIN RASH: Primary | ICD-10-CM

## 2020-05-18 RX ORDER — BETAMETHASONE DIPROPIONATE 0.5 MG/G
LOTION TOPICAL
Qty: 60 ML | Refills: 0 | Status: SHIPPED | OUTPATIENT
Start: 2020-05-18 | End: 2021-01-17

## 2020-05-18 NOTE — Clinical Note
No need to call this patient- she has 6/16 appointment and we discussed keeping this. You can cancel 6/11 lab appointment. We discussed and she will get this drawn outpatient at [facility] [place].

## 2020-05-18 NOTE — PROGRESS NOTES
Jae Kauffman is a 46 y.o. female who was seen by synchronous (real-time) audio-video technology on 5/18/2020. Consent: Jae Kauffman, who was seen by synchronous (real-time) audio-video technology, and/or her healthcare decision maker, is aware that this patient-initiated, Telehealth encounter on 5/18/2020 is a billable service, with coverage as determined by her insurance carrier. She is aware that she may receive a bill and has provided verbal consent to proceed: Yes. Assessment & Plan:   Diagnoses and all orders for this visit:    1. Skin rash  Does not appear fungal/intertrigo on presentation. Advised to stop nystatin cream and to start steroid lotion. Also advised on skin protection, daily cleansing/drying of skin, application of drying powders, weight loss, etc.   -     betamethasone dipropionate (DIPROLENE) 0.05 % topical lotion; Use thin layer to affected area twice daily for one week; then once daily for one week; then every-other-day for one week. Also discussed diagnoses of abdominal wall hernia, not incarcerated, identified by patient GI doctor. Educated on s/s of incarceration/bowel strangulation, when to seek emergency care. Advised on weight loss, abdominal wall strengthening exercises. Will send "[employer] message with further education. Asked patient to have lab work scheduled 6/11 outpatient. Will keep in office visit 6/16. She agreed to plan. I spent at least 16 minutes on this visit with this established patient. [phone]  Subjective:   Jae Kauffman is a 46 y.o. female who was seen for No chief complaint on file. Patient was seen last month via virtual visit for rash and itching, worse at night, and close relation with someone diagnosed with scabies. She was treated for scabies as well with promethazine. Painful itchy rash- today with complaints of painful/itching rash under both breasts. This started about 1 week ago.  She is able to differentiate the rash from the pervious episode of scabies. This has happened before, last summer when it was hot. She was prescribed nystatin/triamcinolone cream then which worked right away. She has been using for about the last week with no relief. Denies redness, warmth. Rash itself is not moist.  Has used triamcinolone nystatin cream with no relief. Has used cornstarch powder without effect. Worse with sweating, heat, moisture. On video darker discoloration of skin seen with papular rash to undersides of both breasts. Prior to Admission medications    Medication Sig Start Date End Date Taking? Authorizing Provider   betamethasone dipropionate (DIPROLENE) 0.05 % topical lotion Use thin layer to affected area twice daily for one week; then once daily for one week; then every-other-day for one week. 5/18/20  Yes Grady Best NP   albuterol (PROVENTIL VENTOLIN) 2.5 mg /3 mL (0.083 %) nebu  1/30/20   Provider, Historical   ProAir HFA 90 mcg/actuation inhaler  1/27/20   Provider, Historical   butalbital-acetaminophen-caffeine (FIORICET, ESGIC) -40 mg per tablet Take 1 Tab by mouth every four (4) hours as needed. 1/3/20   Provider, Historical   famotidine (PEPCID) 20 mg tablet  3/24/20   Provider, Historical   albuterol-ipratropium (DUO-NEB) 2.5 mg-0.5 mg/3 ml nebu  2/17/20   Provider, Historical   Victoza 3-Ashutohs 0.6 mg/0.1 mL (18 mg/3 mL) pnij  3/24/20   Provider, Historical   fluticasone propion-salmeteroL (ADVAIR/WIXELA) 100-50 mcg/dose diskus inhaler Take 1 Puff by inhalation every twelve (12) hours. Provider, Historical   permethrin (ACTICIN) 5 % topical cream Massage cream into skin thoroughly from neck to the soles of the feet, including between toes and fingers at night before bed. Sleep with cream applied. After 8-14 hours, remove by washing or bathing in the morning.  4/9/20 5/18/20  Grady Best NP   hydroCHLOROthiazide (HYDRODIURIL) 12.5 mg tablet  10/3/19   Provider, Historical   LINZESS 290 mcg cap capsule  9/4/19   Provider, Historical   metFORMIN ER (GLUCOPHAGE XR) 750 mg tablet Take 750 mg by mouth two (2) times a day. Provider, Historical   cholecalciferol (VITAMIN D3) 1,000 unit cap Take  by mouth daily. Provider, Historical     Allergies   Allergen Reactions    Iodinated Contrast Media Other (comments)     NAUSEA, ANXIETY-- RECEIVED MULTIHANCE (GADOBENATE DIMEGLUTAMINE) 20ML 2/13/18 FOR MRI       Patient Active Problem List   Diagnosis Code    Renal mass N28.89    Hyperthyroidism E05.90    Polycystic ovary syndrome E28.2    Renal mass, left N28.89    Obesity (BMI 35.0-39.9 without comorbidity) E66.9    Moderate persistent asthma without complication W59.50     Past Medical History:   Diagnosis Date    Asthma     Hyperthyroidism     Polycystic ovary syndrome     Renal mass      Past Surgical History:   Procedure Laterality Date    HX HERNIA REPAIR      HX OTHER SURGICAL Right     right lung collapsed as a child     Family History   Problem Relation Age of Onset    Hypertension Mother      Social History     Tobacco Use    Smoking status: Never Smoker    Smokeless tobacco: Never Used   Substance Use Topics    Alcohol use: No       Review of Systems   Constitutional: Negative for chills, diaphoresis, fever, malaise/fatigue and weight loss. Skin: Positive for itching and rash. Objective: There were no vitals taken for this visit. General: alert, cooperative, no distress   Mental  status: normal mood, behavior, speech, dress, motor activity, and thought processes, able to follow commands   HENT: NCAT   Neck: no visualized mass   Resp: no respiratory distress   Neuro: no gross deficits   Skin: See below-   Psychiatric: normal affect, consistent with stated mood, no evidence of hallucinations     Additional exam findings:   Both breasts lifted and skin observed.  Dark patches and papular rash seen below each breast, not necessarily in skin fold. No redness or raw skin seen. We discussed the expected course, resolution and complications of the diagnosis(es) in detail. Medication risks, benefits, costs, interactions, and alternatives were discussed as indicated. I advised her to contact the office if her condition worsens, changes or fails to improve as anticipated. She expressed understanding with the diagnosis(es) and plan. Milagros Hampton is a 46 y.o. female who was evaluated by a video visit encounter for concerns as above. Patient identification was verified prior to start of the visit. A caregiver was present when appropriate. Due to this being a TeleHealth encounter (During AXATC-71 public health emergency), evaluation of the following organ systems was limited: Vitals/Constitutional/EENT/Resp/CV/GI//MS/Neuro/Skin/Heme-Lymph-Imm. Pursuant to the emergency declaration under the [facility], [facility] waiver authority and the [employer] and [employer] Act, this Virtual  Visit was conducted, with patient's (and/or legal guardian's) consent, to reduce the patient's risk of exposure to COVID-19 and provide necessary medical care. Services were provided through a video synchronous discussion virtually to substitute for in-person clinic visit. Patient and provider were located at their individual homes.       Mariana Diez NP

## 2020-07-10 ENCOUNTER — OFFICE VISIT (OUTPATIENT)
Dept: FAMILY MEDICINE CLINIC | Age: 40
End: 2020-07-10

## 2020-07-10 VITALS
BODY MASS INDEX: 39.32 KG/M2 | DIASTOLIC BLOOD PRESSURE: 76 MMHG | WEIGHT: 236 LBS | HEART RATE: 85 BPM | OXYGEN SATURATION: 100 % | SYSTOLIC BLOOD PRESSURE: 124 MMHG | RESPIRATION RATE: 20 BRPM | HEIGHT: 65 IN

## 2020-07-10 DIAGNOSIS — J45.41 MODERATE PERSISTENT ASTHMA WITH ACUTE EXACERBATION: Primary | ICD-10-CM

## 2020-07-10 DIAGNOSIS — E66.01 SEVERE OBESITY (HCC): ICD-10-CM

## 2020-07-10 DIAGNOSIS — E87.70 HYPERVOLEMIA, UNSPECIFIED HYPERVOLEMIA TYPE: ICD-10-CM

## 2020-07-10 RX ORDER — AZITHROMYCIN 250 MG/1
TABLET, FILM COATED ORAL
Qty: 6 TAB | Refills: 0 | Status: SHIPPED | OUTPATIENT
Start: 2020-07-10 | End: 2020-07-15

## 2020-07-10 RX ORDER — FLUTICASONE PROPIONATE 50 MCG
2 SPRAY, SUSPENSION (ML) NASAL DAILY
COMMUNITY
End: 2022-06-15 | Stop reason: SDUPTHER

## 2020-07-10 RX ORDER — BUDESONIDE AND FORMOTEROL FUMARATE DIHYDRATE 80; 4.5 UG/1; UG/1
2 AEROSOL RESPIRATORY (INHALATION) 2 TIMES DAILY
Qty: 2 INHALER | Refills: 1 | Status: SHIPPED | OUTPATIENT
Start: 2020-07-10 | End: 2020-07-31 | Stop reason: SDUPTHER

## 2020-07-10 RX ORDER — HYDROCHLOROTHIAZIDE 12.5 MG/1
12.5 TABLET ORAL DAILY
Qty: 90 TAB | Refills: 1 | Status: SHIPPED | OUTPATIENT
Start: 2020-07-10 | End: 2020-11-13 | Stop reason: SDUPTHER

## 2020-07-10 RX ORDER — IPRATROPIUM BROMIDE AND ALBUTEROL SULFATE 2.5; .5 MG/3ML; MG/3ML
3 SOLUTION RESPIRATORY (INHALATION)
Qty: 30 NEBULE | Refills: 2 | Status: SHIPPED | OUTPATIENT
Start: 2020-07-10 | End: 2020-07-14 | Stop reason: SDUPTHER

## 2020-07-10 RX ORDER — ALBUTEROL SULFATE 90 UG/1
1 AEROSOL, METERED RESPIRATORY (INHALATION)
Qty: 1 INHALER | Refills: 5 | Status: SHIPPED | OUTPATIENT
Start: 2020-07-10 | End: 2022-04-05 | Stop reason: SDUPTHER

## 2020-07-10 NOTE — PROGRESS NOTES
Rosa Maria Junior presents today for   Chief Complaint   Patient presents with    Asthma     x 5 days       Is someone accompanying this pt? no    Is the patient using any DME equipment during OV? no    Depression Screening:  3 most recent PHQ Screens 7/10/2020   Little interest or pleasure in doing things Not at all   Feeling down, depressed, irritable, or hopeless Not at all   Total Score PHQ 2 0       Learning Assessment:  No flowsheet data found. Abuse Screening:  No flowsheet data found. Fall Risk  No flowsheet data found. Health Maintenance reviewed and discussed and ordered per Provider. Health Maintenance Due   Topic Date Due    Pneumococcal 0-64 years (1 of 1 - PPSV23) [birth year]1987   . Coordination of Care:  1. Have you been to the ER, urgent care clinic since your last visit? Hospitalized since your last visit? no    2. Have you seen or consulted any other health care providers outside of the [address] [place] [address] since your last visit? Include any pap smears or colon screening.  no

## 2020-07-10 NOTE — PROGRESS NOTES
OFFICE NOTE (SOAP)      7/10/2020  5:30 PM    Chief Complaint   Patient presents with    Asthma     x 5 days       SUBJECTIVE:    HPI:   Emmy Nguyễn is a 45 y.o. female presenting today for office visit. Patient previously seen as new patient virtually, here today for in office follow up. Was due to have fasting labs drawn first however states she did not find time to go. Wanted to be seen in office today due to asthma flair    Asthma- per patient chronic condition, previously was using advair for maintenance until insurance stopped paying for it. For the past few months was okay with just albuterol PRN however this past holiday weekend she was grilling and inhaled a lot of smoke. This triggered asthma attack, and has had persistent wheezing, shortness of breath. She has been using her rescue inhaler multiple times per day. She now has a cough that isnt getting any better. She denies fever, chest pain. Chronic conditions-   Patient with history of PCOS- taking metformin daily. Obesity- taking victoza at 0.6mg dose daily (not saxenda brand), and is continuing to diet and stay active. Fluid retention- taking HCTZ daily- BP well managed in office today. MICHELLE- no bleeding or complaints related to anemia today. Hyperthyroid- no concerns related to today. *need lab work to follow up on the above. Review of Systems   Constitutional: Negative for chills, fatigue and fever. HENT: Negative for congestion, rhinorrhea, sinus pressure, sinus pain and sore throat. Respiratory: Positive for cough, shortness of breath and wheezing. Negative for choking and chest tightness. Cardiovascular: Negative for chest pain and palpitations. Endocrine: Negative for polydipsia, polyphagia and polyuria. Skin: Negative for color change and rash. Neurological: Negative for headaches. Psychiatric/Behavioral: Negative for dysphoric mood. The patient is not nervous/anxious.           PHQ Screening   3 most recent PHQ Screens 7/10/2020   Little interest or pleasure in doing things Not at all   Feeling down, depressed, irritable, or hopeless Not at all   Total Score PHQ 2 0         History  Past Medical History:   Diagnosis Date    Asthma     Hyperthyroidism     Polycystic ovary syndrome     Renal mass        Past Surgical History:   Procedure Laterality Date    HX HERNIA REPAIR      HX OTHER SURGICAL Right     right lung collapsed as a child       Social History     Socioeconomic History    Marital status: SINGLE     Spouse name: Not on file    Number of children: Not on file    Years of education: Not on file    Highest education level: Not on file   Occupational History    Not on file   Social Needs    Financial resource strain: Not on file    Food insecurity     Worry: Not on file     Inability: Not on file    Transportation needs     Medical: Not on file     Non-medical: Not on file   Tobacco Use    Smoking status: Never Smoker    Smokeless tobacco: Never Used   Substance and Sexual Activity    Alcohol use: No    Drug use: No    Sexual activity: Not on file   Lifestyle    Physical activity     Days per week: Not on file     Minutes per session: Not on file    Stress: Not on file   Relationships    Social connections     Talks on phone: Not on file     Gets together: Not on file     Attends Zoroastrian service: Not on file     Active member of club or organization: Not on file     Attends meetings of clubs or organizations: Not on file     Relationship status: Not on file    Intimate partner violence     Fear of current or ex partner: Not on file     Emotionally abused: Not on file     Physically abused: Not on file     Forced sexual activity: Not on file   Other Topics Concern    Not on file   Social History Narrative    Not on file       Family History   Problem Relation Age of Onset    Hypertension Mother        Allergies   Allergen Reactions    Iodinated Contrast Media Other (comments)     NAUSEA, ANXIETY-- RECEIVED MULTIHANCE (GADOBENATE DIMEGLUTAMINE) 20ML 2/13/18 FOR MRI       Current Outpatient Medications   Medication Sig Dispense Refill    hydroCHLOROthiazide (HYDRODIURIL) 12.5 mg tablet Take 1 Tab by mouth daily. 90 Tab 1    fluticasone propionate (FLONASE) 50 mcg/actuation nasal spray 2 Sprays by Both Nostrils route daily.  budesonide-formoteroL (SYMBICORT) 80-4.5 mcg/actuation HFAA Take 2 Puffs by inhalation two (2) times a day. 2 Inhaler 1    ProAir HFA 90 mcg/actuation inhaler Take 1 Puff by inhalation every four (4) hours as needed for Wheezing or Shortness of Breath. 1 Inhaler 5    albuterol-ipratropium (DUO-NEB) 2.5 mg-0.5 mg/3 ml nebu 3 mL by Nebulization route every six (6) hours as needed for Wheezing or Shortness of Breath. 30 Nebule 2    azithromycin (ZITHROMAX) 250 mg tablet Take 2 tablets today, then take 1 tablet daily 6 Tab 0    betamethasone dipropionate (DIPROLENE) 0.05 % topical lotion Use thin layer to affected area twice daily for one week; then once daily for one week; then every-other-day for one week. 60 mL 0    famotidine (PEPCID) 20 mg tablet       Victoza 3-Ashutosh 0.6 mg/0.1 mL (18 mg/3 mL) pnij       LINZESS 290 mcg cap capsule       metFORMIN ER (GLUCOPHAGE XR) 750 mg tablet Take 750 mg by mouth two (2) times a day.  cholecalciferol (VITAMIN D3) 1,000 unit cap Take  by mouth daily.  albuterol (PROVENTIL VENTOLIN) 2.5 mg /3 mL (0.083 %) nebu       butalbital-acetaminophen-caffeine (FIORICET, ESGIC) -40 mg per tablet Take 1 Tab by mouth every four (4) hours as needed. Patient Care Team:  Patient Care Team:  Ronald Rangel NP as PCP - General (Nurse Practitioner)  Ronald Rangel NP as PCP - [facility] Empaneled Provider      LABS:  None new to review  *encouraged to get done ASAP    RADIOLOGY:  None new to review    OBJECTIVE:      Physical Exam  Constitutional:       Appearance: She is well-developed.    HENT:      Head: Normocephalic and atraumatic. Neck:      Musculoskeletal: Normal range of motion. Cardiovascular:      Rate and Rhythm: Normal rate and regular rhythm. Pulmonary:      Effort: Pulmonary effort is normal. Tachypnea present. Breath sounds: Examination of the right-middle field reveals wheezing. Examination of the left-middle field reveals wheezing. Examination of the right-lower field reveals wheezing. Examination of the left-lower field reveals wheezing. Decreased breath sounds and wheezing present. No rhonchi. Musculoskeletal: Normal range of motion. Skin:     General: Skin is warm and dry. Coloration: Skin is not pale. Findings: No erythema or rash. Neurological:      Mental Status: She is alert and oriented to person, place, and time. Psychiatric:         Mood and Affect: Mood normal.         Behavior: Behavior normal.           Vitals:    07/10/20 1722   BP: 124/76   Pulse: 85   Resp: 20   SpO2: 100%   Weight: 236 lb (107 kg)   Height: 5' 5\" (1.651 m)   PainSc:   0 - No pain   LMP: 06/28/2020         Assessment & Plan:    1. Moderate persistent asthma with acute exacerbation  Treat excerbation presently; will step up therapy with ICS + LABA   Educated on avoiding triggers. - budesonide-formoteroL (SYMBICORT) 80-4.5 mcg/actuation HFAA; Take 2 Puffs by inhalation two (2) times a day. Dispense: 2 Inhaler; Refill: 1  - ProAir HFA 90 mcg/actuation inhaler; Take 1 Puff by inhalation every four (4) hours as needed for Wheezing or Shortness of Breath. Dispense: 1 Inhaler; Refill: 5  - albuterol-ipratropium (DUO-NEB) 2.5 mg-0.5 mg/3 ml nebu; 3 mL by Nebulization route every six (6) hours as needed for Wheezing or Shortness of Breath. Dispense: 30 Nebule; Refill: 2  - azithromycin (ZITHROMAX) 250 mg tablet; Take 2 tablets today, then take 1 tablet daily  Dispense: 6 Tab; Refill: 0    2.  Hypervolemia, unspecified hypervolemia type  Refilled per patient request  - hydroCHLOROthiazide (HYDRODIURIL) 12.5 mg tablet; Take 1 Tab by mouth daily. Dispense: 90 Tab; Refill: 1    3. Severe obesity ([address]  Will add A1c to upcoming lab work  - HEMOGLOBIN A1C WITH EAG; Future        Orders Placed This Encounter    HEMOGLOBIN A1C WITH EAG     Standing Status:   Future     Standing Expiration Date:   10/9/2020    hydroCHLOROthiazide (HYDRODIURIL) 12.5 mg tablet     Sig: Take 1 Tab by mouth daily. Dispense:  90 Tab     Refill:  1    fluticasone propionate (FLONASE) 50 mcg/actuation nasal spray     Si Sprays by Both Nostrils route daily.  budesonide-formoteroL (SYMBICORT) 80-4.5 mcg/actuation HFAA     Sig: Take 2 Puffs by inhalation two (2) times a day. Dispense:  2 Inhaler     Refill:  1    ProAir HFA 90 mcg/actuation inhaler     Sig: Take 1 Puff by inhalation every four (4) hours as needed for Wheezing or Shortness of Breath. Dispense:  1 Inhaler     Refill:  5    albuterol-ipratropium (DUO-NEB) 2.5 mg-0.5 mg/3 ml nebu     Sig: 3 mL by Nebulization route every six (6) hours as needed for Wheezing or Shortness of Breath. Dispense:  30 Nebule     Refill:  2    azithromycin (ZITHROMAX) 250 mg tablet     Sig: Take 2 tablets today, then take 1 tablet daily     Dispense:  6 Tab     Refill:  0     Follow-up and Dispositions    · Return in about 3 weeks (around 2020), or if symptoms worsen or fail to improve, for virtual asthma follow up- try to get fasting labs first at [place]! .               Plan of care reviewed with patient. Patient in agreement with plan and expresses understanding. I have discussed when to anticipate results and how results will be communicated, if applicable. Anticipatory guidance given and questions answered, patient encouraged to call or RTO if further questions or concerns.     Nilo Márquez NP  07/10/20

## 2020-07-10 NOTE — PATIENT INSTRUCTIONS
Controlling Your Asthma: Care Instructions Your Care Instructions Asthma is a long-term condition that affects your breathing. It causes the airways that lead to the lungs to swell. During an asthma attack, the airways swell and narrow. This makes it hard to breathe. You may wheeze or cough. If you have a bad attack, you may need emergency care. There are two things to do to treat asthma. · Control asthma over the long term. · Treat attacks when they occur. You and your doctor can make an asthma action plan. It tells you what medicines you need to take every day to control asthma symptoms and what to do if you have an asthma attack. Your asthma action plan can help prevent and treat attacks. When you keep your asthma under control, you can prevent severe attacks and lasting damage to your airways. You need to treat your asthma even when you are not having symptoms. Although asthma is a lifelong disease, treatment can help control it and help you stay healthy. Follow-up care is a key part of your treatment and safety. Be sure to make and go to all appointments, and call your doctor if you are having problems. It's also a good idea to know your test results and keep a list of the medicines you take. How can you care for yourself at home? To control asthma over the long term Medicines Controller medicines reduce swelling in your lungs. They also prevent asthma attacks. Take your controller medicine exactly as prescribed. Talk to your doctor if you have any problems with your medicine. · Inhaled corticosteroid is a common and effective controller medicine. Using it the right way can prevent or reduce most side effects. · Take your controller medicine every day, not just when you have symptoms. It helps prevent problems before they occur. · Your doctor may prescribe another medicine that you use along with the corticosteroid. This is often a long-acting bronchodilator.  Do not take this medicine by itself. Using a long-acting bronchodilator by itself can increase your risk of a severe or fatal asthma attack. · Do not take inhaled corticosteroids or long-acting bronchodilators to stop an asthma attack that has already started. They don't work fast enough to help. · Talk to your doctor before you use other medicines. Some medicines, such as aspirin, can cause asthma attacks in some people. Education · Learn what triggers an asthma attack. Avoid these triggers when you can. Common triggers include colds, smoke, air pollution, dust, pollen, mold, pets, cockroaches, stress, and cold air. · Check yourself for asthma symptoms to know which step to follow in your action plan. Watch for things like being short of breath, having chest tightness, coughing, and wheezing. Also notice if symptoms wake you up at night or if you get tired quickly when you exercise. · If you have a peak flow meter, use it to check how well you are breathing. It can help you know when an asthma attack is going to occur. Then you can take medicine to prevent the asthma attack or make it less severe. · Do not smoke or allow others to smoke around you. Avoid smoky places. Smoking makes asthma worse. If you need help quitting, talk to your doctor about stop-smoking programs and medicines. These can increase your chances of quitting for good. · Avoid colds and the flu. Get a pneumococcal vaccine shot. If you have had one before, ask your doctor whether you need a second dose. Get a flu vaccine every year, as soon as it's available. If you must be around people with colds or the flu, wash your hands often. To treat attacks when they occur Use your asthma action plan when you have an attack. Your quick-relief medicine will stop an asthma attack. It relaxes the muscles that get tight around the airways.  
If your doctor prescribed corticosteroid pills to use during an attack, take them as directed. They may take hours to work, but they may shorten the attack and help you breathe better. · Albuterol is an effective quick-relief inhaler. · Take your quick-relief medicine exactly as prescribed. · Always bring your asthma medicine with you when you travel. · You may need to use quick-relief medicine before you exercise. · Call your doctor if you think you are having a problem with your medicine. When should you call for help? [id number] anytime you think you may need emergency care. For example, call if: 
· You are having severe trouble breathing. Call your doctor now or seek immediate medical care if: 
· Your symptoms do not get better after you have followed your asthma action plan. · You cough up yellow, dark brown, or bloody mucus (sputum). Watch closely for changes in your health, and be sure to contact your doctor if: 
· Your coughing and wheezing get worse. · You need to use your quick-relief medicine on more than 2 days a week (unless it is just for exercise). · You need help figuring out what is triggering your asthma attacks. Where can you learn more? Go to http://larissa-steven.info/ Enter C146 in the search box to learn more about \"Controlling Your Asthma: Care Instructions. \" Current as of: February 24, 2020               Content Version: 12.5 © 2993-4647 [employer]. Care instructions adapted under license by [employer] (which disclaims liability or warranty for this information). If you have questions about a medical condition or this instruction, always ask your healthcare professional. [address] any warranty or liability for your use of this information.

## 2020-07-11 DIAGNOSIS — E66.01 SEVERE OBESITY (HCC): ICD-10-CM

## 2020-07-14 DIAGNOSIS — J45.41 MODERATE PERSISTENT ASTHMA WITH ACUTE EXACERBATION: ICD-10-CM

## 2020-07-14 RX ORDER — IPRATROPIUM BROMIDE AND ALBUTEROL SULFATE 2.5; .5 MG/3ML; MG/3ML
3 SOLUTION RESPIRATORY (INHALATION)
Qty: 120 NEBULE | Refills: 1 | Status: SHIPPED | OUTPATIENT
Start: 2020-07-14 | End: 2020-07-14

## 2020-07-14 RX ORDER — IPRATROPIUM BROMIDE AND ALBUTEROL SULFATE 2.5; .5 MG/3ML; MG/3ML
SOLUTION RESPIRATORY (INHALATION)
Qty: 120 NEBULE | Refills: 0 | Status: SHIPPED | OUTPATIENT
Start: 2020-07-14 | End: 2020-07-14

## 2020-07-22 VITALS
DIASTOLIC BLOOD PRESSURE: 73 MMHG | SYSTOLIC BLOOD PRESSURE: 108 MMHG | HEART RATE: 92 BPM | WEIGHT: 239 LBS | BODY MASS INDEX: 39.82 KG/M2 | HEIGHT: 65 IN

## 2020-07-22 PROBLEM — Z90.5 HISTORY OF PARTIAL NEPHRECTOMY: Status: ACTIVE | Noted: 2018-10-24

## 2020-07-22 PROBLEM — C64.9 MALIGNANT TUMOR OF KIDNEY (HCC): Status: ACTIVE | Noted: 2018-10-24

## 2020-07-22 RX ORDER — PERMETHRIN 50 MG/G
CREAM TOPICAL
COMMUNITY
End: 2020-07-31

## 2020-07-22 RX ORDER — ONDANSETRON 4 MG/1
4 TABLET, ORALLY DISINTEGRATING ORAL
COMMUNITY
End: 2020-07-31

## 2020-07-22 RX ORDER — CEFDINIR 300 MG/1
300 CAPSULE ORAL 2 TIMES DAILY
COMMUNITY
End: 2020-07-31

## 2020-07-22 RX ORDER — BUTALBITAL, ASPIRIN AND CAFFEINE 50; 325; 40 MG/1; MG/1; MG/1
1 TABLET ORAL
COMMUNITY
End: 2020-07-31

## 2020-07-22 RX ORDER — PREDNISONE 10 MG/1
TABLET ORAL
COMMUNITY
End: 2020-07-31

## 2020-07-22 RX ORDER — CYCLOBENZAPRINE HCL 10 MG
TABLET ORAL
COMMUNITY
End: 2020-07-31

## 2020-07-22 RX ORDER — AZITHROMYCIN 250 MG/1
250 TABLET, FILM COATED ORAL DAILY
COMMUNITY
End: 2020-07-31

## 2020-07-22 RX ORDER — DICYCLOMINE HYDROCHLORIDE 20 MG/1
20 TABLET ORAL EVERY 6 HOURS
COMMUNITY
End: 2020-07-31

## 2020-07-22 RX ORDER — IPRATROPIUM BROMIDE 0.5 MG/2.5ML
0.5 SOLUTION RESPIRATORY (INHALATION)
COMMUNITY
End: 2020-07-31

## 2020-07-22 RX ORDER — METHYLPREDNISOLONE 4 MG/1
TABLET ORAL
COMMUNITY
End: 2020-07-31

## 2020-07-22 RX ORDER — MELOXICAM 7.5 MG/1
TABLET ORAL DAILY
COMMUNITY
End: 2020-07-31

## 2020-07-22 RX ORDER — ACYCLOVIR 800 MG/1
800 TABLET ORAL 2 TIMES DAILY
COMMUNITY
End: 2020-07-31

## 2020-07-22 RX ORDER — NAPROXEN 500 MG/1
500 TABLET ORAL 2 TIMES DAILY WITH MEALS
COMMUNITY
End: 2020-10-16

## 2020-07-22 RX ORDER — AMOXICILLIN AND CLAVULANATE POTASSIUM 875; 125 MG/1; MG/1
TABLET, FILM COATED ORAL EVERY 12 HOURS
COMMUNITY
End: 2020-07-31

## 2020-07-22 RX ORDER — METOPROLOL TARTRATE 25 MG/1
TABLET, FILM COATED ORAL 2 TIMES DAILY
COMMUNITY
End: 2020-07-31

## 2020-07-22 RX ORDER — BENZONATATE 200 MG/1
200 CAPSULE ORAL
COMMUNITY
End: 2020-07-31

## 2020-07-22 RX ORDER — HYDROCODONE BITARTRATE AND ACETAMINOPHEN 5; 325 MG/1; MG/1
TABLET ORAL
COMMUNITY
End: 2020-10-16

## 2020-07-22 RX ORDER — METRONIDAZOLE 500 MG/1
TABLET ORAL 3 TIMES DAILY
COMMUNITY
End: 2020-07-31

## 2020-07-22 RX ORDER — LANOLIN ALCOHOL/MO/W.PET/CERES
CREAM (GRAM) TOPICAL
COMMUNITY
End: 2020-10-16

## 2020-07-22 RX ORDER — NYSTATIN AND TRIAMCINOLONE ACETONIDE 100000; 1 [USP'U]/G; MG/G
CREAM TOPICAL 2 TIMES DAILY
COMMUNITY
End: 2020-07-31

## 2020-07-22 RX ORDER — FLUCONAZOLE 150 MG/1
150 TABLET ORAL DAILY
COMMUNITY
End: 2020-07-31

## 2020-07-31 ENCOUNTER — VIRTUAL VISIT (OUTPATIENT)
Dept: FAMILY MEDICINE CLINIC | Age: 40
End: 2020-07-31

## 2020-07-31 DIAGNOSIS — J45.41 MODERATE PERSISTENT ASTHMA WITH ACUTE EXACERBATION: Primary | ICD-10-CM

## 2020-07-31 RX ORDER — BUDESONIDE AND FORMOTEROL FUMARATE DIHYDRATE 80; 4.5 UG/1; UG/1
2 AEROSOL RESPIRATORY (INHALATION) 2 TIMES DAILY
Qty: 2 INHALER | Refills: 3 | Status: SHIPPED | OUTPATIENT
Start: 2020-07-31 | End: 2021-06-19

## 2020-07-31 NOTE — PROGRESS NOTES
Emmy Nguyễn is a 45 y.o. female, evaluated via audio-only technology on 7/31/2020 for Follow-up      Assessment & Plan:   Diagnoses and all orders for this visit:    1. Moderate persistent asthma with acute exacerbation  -     budesonide-formoteroL (SYMBICORT) 80-4.5 mcg/actuation HFAA; Take 2 Puffs by inhalation two (2) times a day. Still has not had fasting lab work as previously ordered- will try to get this weekend. Follow-up and Dispositions    · Return if symptoms worsen or fail to improve, for fasting labs ASAP- follow up with virtual visit after. 12  Subjective:     Here today for follow up on asthma. Last visit with acute exacerbation and treated with steroids and antibiotics, stepped up to inhaled ICS+LABA. Reports doing well with Symbicort, no complaints. No further wheezing, shortness of breath. She did go to ER this week for side/lower abdominal pain and was thought to have cervicitis- treated with antibiotics and feels better now. Patient Active Problem List   Diagnosis Code    Renal mass N28.89    Hyperthyroidism E05.90    Polycystic ovary syndrome E28.2    Renal mass, left N28.89    Obesity (BMI 35.0-39.9 without comorbidity) E66.9    Moderate persistent asthma without complication M84.67    Severe obesity (HCC) E66.01    History of partial nephrectomy Z90.5    Malignant tumor of kidney (HCC) C64.9     Current Outpatient Medications   Medication Sig Dispense Refill    ferrous sulfate 325 mg (65 mg iron) tablet Take  by mouth Daily (before breakfast).  HYDROcodone-acetaminophen (NORCO) 5-325 mg per tablet Take  by mouth.  MULTIVITAMIN PO Take  by mouth.  naproxen (NAPROSYN) 500 mg tablet Take 500 mg by mouth two (2) times daily (with meals).       albuterol-ipratropium (DUO-NEB) 2.5 mg-0.5 mg/3 ml nebu USE 3 ML VIA NEBULIZER EVERY 6 HOURS AS NEEDED FOR WHEEZING OR SHORTNESS OF BREATH 1080 mL 0    hydroCHLOROthiazide (HYDRODIURIL) 12.5 mg tablet Take 1 Tab by mouth daily. 90 Tab 1    fluticasone propionate (FLONASE) 50 mcg/actuation nasal spray 2 Sprays by Both Nostrils route daily.  budesonide-formoteroL (SYMBICORT) 80-4.5 mcg/actuation HFAA Take 2 Puffs by inhalation two (2) times a day. 2 Inhaler 1    ProAir HFA 90 mcg/actuation inhaler Take 1 Puff by inhalation every four (4) hours as needed for Wheezing or Shortness of Breath. 1 Inhaler 5    betamethasone dipropionate (DIPROLENE) 0.05 % topical lotion Use thin layer to affected area twice daily for one week; then once daily for one week; then every-other-day for one week. 60 mL 0    albuterol (PROVENTIL VENTOLIN) 2.5 mg /3 mL (0.083 %) nebu       butalbital-acetaminophen-caffeine (FIORICET, ESGIC) -40 mg per tablet Take 1 Tab by mouth every four (4) hours as needed.  famotidine (PEPCID) 20 mg tablet       Victoza 3-Ashutosh 0.6 mg/0.1 mL (18 mg/3 mL) pnij       LINZESS 290 mcg cap capsule       metFORMIN ER (GLUCOPHAGE XR) 750 mg tablet Take 750 mg by mouth two (2) times a day.  cholecalciferol (VITAMIN D3) 1,000 unit cap Take  by mouth daily. Allergies   Allergen Reactions    Iodinated Contrast Media Other (comments)     NAUSEA, ANXIETY-- RECEIVED MULTIHANCE (GADOBENATE DIMEGLUTAMINE) 20ML 2/13/18 FOR MRI     Past Medical History:   Diagnosis Date    Asthma     History of partial nephrectomy 10/24/2018    Hyperthyroidism     Malignant tumor of kidney ([facility] [address] 10/24/2018    Polycystic ovary syndrome     Renal mass      Past Surgical History:   Procedure Laterality Date    HX HERNIA REPAIR      HX OTHER SURGICAL Right     right lung collapsed as a child     Family History   Problem Relation Age of Onset    Hypertension Mother      Social History     Tobacco Use    Smoking status: Never Smoker    Smokeless tobacco: Never Used   Substance Use Topics    Alcohol use: No       Review of Systems   Constitutional: Negative for chills and fever.    Respiratory: Negative for cough, sputum production, shortness of breath and wheezing. Cardiovascular: Negative for chest pain and orthopnea. Neurological: Negative for headaches. Psychiatric/Behavioral: Negative for depression. No flowsheet data found. Rex Grewal, who was evaluated through a patient-initiated, synchronous (real-time) audio only encounter, and/or her healthcare decision maker, is aware that it is a billable service, with coverage as determined by her insurance carrier. She provided verbal consent to proceed: Yes. She has not had a related appointment within my department in the past 7 days or scheduled within the next 24 hours.       Total Time: minutes: 5-10 minutes    Carleen Qiu NP

## 2020-10-16 ENCOUNTER — HOSPITAL ENCOUNTER (EMERGENCY)
Age: 40
Discharge: HOME | End: 2020-10-16
Attending: EMERGENCY MEDICINE
Payer: MEDICAID

## 2020-10-16 VITALS
RESPIRATION RATE: 16 BRPM | HEIGHT: 66 IN | WEIGHT: 237 LBS | HEART RATE: 80 BPM | DIASTOLIC BLOOD PRESSURE: 69 MMHG | OXYGEN SATURATION: 100 % | BODY MASS INDEX: 38.09 KG/M2 | SYSTOLIC BLOOD PRESSURE: 128 MMHG | TEMPERATURE: 98.5 F

## 2020-10-16 DIAGNOSIS — B96.89 BV (BACTERIAL VAGINOSIS): ICD-10-CM

## 2020-10-16 DIAGNOSIS — B37.31 CANDIDA VAGINITIS: Primary | ICD-10-CM

## 2020-10-16 DIAGNOSIS — R10.2 PELVIC PAIN: ICD-10-CM

## 2020-10-16 DIAGNOSIS — N76.0 BV (BACTERIAL VAGINOSIS): ICD-10-CM

## 2020-10-16 LAB
APPEARANCE UR: ABNORMAL
BASOPHILS # BLD: 0 K/UL (ref 0–0.1)
BASOPHILS NFR BLD: 0 % (ref 0–2)
BILIRUB UR QL: NEGATIVE
CLUE CELLS VAG QL WET PREP: NORMAL
COLOR UR: ABNORMAL
EOSINOPHIL # BLD: 0.2 K/UL (ref 0–0.4)
EOSINOPHIL NFR BLD: 2 % (ref 0–5)
ERYTHROCYTE [DISTWIDTH] IN BLOOD BY AUTOMATED COUNT: 16.7 % (ref 11.6–14.5)
GLUCOSE UR STRIP.AUTO-MCNC: NEGATIVE MG/DL
HCG UR QL: NEGATIVE
HCT VFR BLD AUTO: 34.5 % (ref 35–45)
HGB BLD-MCNC: 11.3 G/DL (ref 12–16)
HGB UR QL STRIP: NEGATIVE
IMM GRANULOCYTES # BLD AUTO: 0 K/UL
IMM GRANULOCYTES NFR BLD AUTO: 0 %
KETONES UR QL STRIP.AUTO: NEGATIVE MG/DL
LEUKOCYTE ESTERASE UR QL STRIP.AUTO: NEGATIVE
LYMPHOCYTES # BLD: 1.6 K/UL (ref 0.9–3.6)
LYMPHOCYTES NFR BLD: 18 % (ref 21–52)
MCH RBC QN AUTO: 21.6 PG (ref 24–34)
MCHC RBC AUTO-ENTMCNC: 32.8 G/DL (ref 31–37)
MCV RBC AUTO: 65.8 FL (ref 74–97)
MONOCYTES # BLD: 0.4 K/UL (ref 0.05–1.2)
MONOCYTES NFR BLD: 5 % (ref 3–10)
NEUTS SEG # BLD: 6.6 K/UL (ref 1.8–8)
NEUTS SEG NFR BLD: 75 % (ref 40–73)
NITRITE UR QL STRIP.AUTO: NEGATIVE
PH UR STRIP: 6 [PH] (ref 5–9)
PLATELET # BLD AUTO: 323 K/UL (ref 135–420)
PMV BLD AUTO: 10.4 FL (ref 9.2–11.8)
PROT UR STRIP-MCNC: NEGATIVE MG/DL
RBC # BLD AUTO: 5.24 M/UL (ref 4.2–5.3)
SP GR UR REFRACTOMETRY: 1.02 (ref 1–1.03)
T VAGINALIS VAG QL WET PREP: NEGATIVE
UROBILINOGEN UR QL STRIP.AUTO: 0.2 EU/DL (ref 0.2–1)
WBC # BLD AUTO: 8.8 K/UL (ref 4.6–13.2)

## 2020-10-16 PROCEDURE — 81003 URINALYSIS AUTO W/O SCOPE: CPT

## 2020-10-16 PROCEDURE — 74011250637 HC RX REV CODE- 250/637: Performed by: EMERGENCY MEDICINE

## 2020-10-16 PROCEDURE — 81025 URINE PREGNANCY TEST: CPT

## 2020-10-16 PROCEDURE — 99284 EMERGENCY DEPT VISIT MOD MDM: CPT

## 2020-10-16 PROCEDURE — 74011250636 HC RX REV CODE- 250/636: Performed by: EMERGENCY MEDICINE

## 2020-10-16 PROCEDURE — 85025 COMPLETE CBC W/AUTO DIFF WBC: CPT

## 2020-10-16 PROCEDURE — 87210 SMEAR WET MOUNT SALINE/INK: CPT

## 2020-10-16 PROCEDURE — 96374 THER/PROPH/DIAG INJ IV PUSH: CPT

## 2020-10-16 RX ORDER — METRONIDAZOLE 500 MG/1
500 TABLET ORAL 2 TIMES DAILY
Qty: 14 TAB | Refills: 0 | Status: SHIPPED | OUTPATIENT
Start: 2020-10-16 | End: 2020-10-23

## 2020-10-16 RX ORDER — KETOROLAC TROMETHAMINE 30 MG/ML
30 INJECTION, SOLUTION INTRAMUSCULAR; INTRAVENOUS
Status: COMPLETED | OUTPATIENT
Start: 2020-10-16 | End: 2020-10-16

## 2020-10-16 RX ORDER — NYSTATIN AND TRIAMCINOLONE ACETONIDE 100000; 1 [USP'U]/G; MG/G
OINTMENT TOPICAL 2 TIMES DAILY
Qty: 30 G | Refills: 0 | Status: SHIPPED | OUTPATIENT
Start: 2020-10-16 | End: 2021-01-17

## 2020-10-16 RX ORDER — KETOROLAC TROMETHAMINE 10 MG/1
10 TABLET, FILM COATED ORAL
Qty: 15 TAB | Refills: 0 | Status: SHIPPED | OUTPATIENT
Start: 2020-10-16 | End: 2021-01-17

## 2020-10-16 RX ORDER — FLUCONAZOLE 100 MG/1
200 TABLET ORAL
Status: COMPLETED | OUTPATIENT
Start: 2020-10-16 | End: 2020-10-16

## 2020-10-16 RX ADMIN — KETOROLAC TROMETHAMINE 30 MG: 30 INJECTION, SOLUTION INTRAMUSCULAR at 20:29

## 2020-10-16 RX ADMIN — FLUCONAZOLE 200 MG: 100 TABLET ORAL at 20:30

## 2020-10-16 NOTE — ED PROVIDER NOTES
EMERGENCY DEPARTMENT HISTORY AND PHYSICAL EXAM      Date: 10/16/2020  Patient Name: Pacheco Garsia    History of Presenting Illness     Chief Complaint   Patient presents with    Urinary Pain    Abdominal Pain       History Provided By: Patient    HPI: Pacheco Garsia, 45 y.o. female presents to the ED with complaints of burning with urination. Pt reports onset of vaginal discharge several days ago. She attributed this to a yeast infection and started taking monistat, but states sx have since worsened. She reports associated burning w/urination, urinary frequency and left lower abdominal pain, rated 6/10 in severity at this time. She denies fever, chills, radiated pain or any other sx. LMP within past two weeks. There are no other complaints, changes, or physical findings at this time. PCP: Nisha Maria NP    No current facility-administered medications on file prior to encounter. Current Outpatient Medications on File Prior to Encounter   Medication Sig Dispense Refill    budesonide-formoteroL (SYMBICORT) 80-4.5 mcg/actuation HFAA Take 2 Puffs by inhalation two (2) times a day. 2 Inhaler 3    MULTIVITAMIN PO Take  by mouth.  [DISCONTINUED] ferrous sulfate 325 mg (65 mg iron) tablet Take  by mouth Daily (before breakfast).  [DISCONTINUED] HYDROcodone-acetaminophen (NORCO) 5-325 mg per tablet Take  by mouth.  [DISCONTINUED] naproxen (NAPROSYN) 500 mg tablet Take 500 mg by mouth two (2) times daily (with meals).  albuterol-ipratropium (DUO-NEB) 2.5 mg-0.5 mg/3 ml nebu USE 3 ML VIA NEBULIZER EVERY 6 HOURS AS NEEDED FOR WHEEZING OR SHORTNESS OF BREATH 1080 mL 0    hydroCHLOROthiazide (HYDRODIURIL) 12.5 mg tablet Take 1 Tab by mouth daily. 90 Tab 1    fluticasone propionate (FLONASE) 50 mcg/actuation nasal spray 2 Sprays by Both Nostrils route daily.       ProAir HFA 90 mcg/actuation inhaler Take 1 Puff by inhalation every four (4) hours as needed for Wheezing or Shortness of Breath. 1 Inhaler 5    betamethasone dipropionate (DIPROLENE) 0.05 % topical lotion Use thin layer to affected area twice daily for one week; then once daily for one week; then every-other-day for one week. 60 mL 0    albuterol (PROVENTIL VENTOLIN) 2.5 mg /3 mL (0.083 %) nebu       butalbital-acetaminophen-caffeine (FIORICET, ESGIC) -40 mg per tablet Take 1 Tab by mouth every four (4) hours as needed.  famotidine (PEPCID) 20 mg tablet       [DISCONTINUED] Victoza 3-Ashutosh 0.6 mg/0.1 mL (18 mg/3 mL) pnij       LINZESS 290 mcg cap capsule       metFORMIN ER (GLUCOPHAGE XR) 750 mg tablet Take 750 mg by mouth two (2) times a day.  [DISCONTINUED] cholecalciferol (VITAMIN D3) 1,000 unit cap Take  by mouth daily. Past History     Past Medical History:  Past Medical History:   Diagnosis Date    Asthma     History of partial nephrectomy 10/24/2018    Hyperthyroidism     [facility]ignant [facility] kidney [facility] [address] 10/24/2018    Polycystic ovary syndrome     Renal mass        Past Surgical History:  Past Surgical History:   Procedure Laterality Date    HX HERNIA REPAIR      HX OTHER SURGICAL Right     right lung collapsed as a child       Family History:  Family History   Problem Relation Age of Onset    Hypertension Mother        Social History:  Social History     Tobacco Use    Smoking status: Never Smoker    Smokeless tobacco: Never Used   Substance Use Topics    Alcohol use: No    Drug use: No       Allergies: Allergies   Allergen Reactions    Iodinated Contrast Media Other (comments)     NAUSEA, ANXIETY-- RECEIVED MULTIHANCE (GADOBENATE DIMEGLUTAMINE) 20ML 2/13/18 FOR MRI         Review of Systems   Review of Systems   Constitutional: Negative for chills, fatigue and fever. HENT: Negative for congestion, ear pain, rhinorrhea and sore throat. Eyes: Negative for pain, redness and visual disturbance. Respiratory: Negative for cough, shortness of breath and wheezing. Cardiovascular: Negative for chest pain and palpitations. Gastrointestinal: Positive for abdominal pain (LLQ). Negative for diarrhea, nausea and vomiting. Genitourinary: Positive for dysuria, frequency and vaginal discharge. Negative for hematuria, pelvic pain and vaginal bleeding. Musculoskeletal: Negative for arthralgias, back pain, myalgias, neck pain and neck stiffness. Skin: Negative for color change, rash and wound. Neurological: Negative for dizziness, weakness, light-headedness, numbness and headaches. Psychiatric/Behavioral: Negative for confusion. Physical Exam   Physical Exam  Vitals signs and nursing note reviewed. Constitutional:       General: She is awake. She is not in acute distress. Appearance: Normal appearance. She is well-developed. She is obese. She is not ill-appearing, toxic-appearing or diaphoretic. Interventions: Face mask in place. HENT:      Head: Normocephalic and atraumatic. Eyes:      Extraocular Movements: Extraocular movements intact. Pupils: Pupils are equal, round, and reactive to light. Neck:      Musculoskeletal: Normal range of motion and neck supple. Cardiovascular:      Rate and Rhythm: Normal rate and regular rhythm. Pulses: Normal pulses. Heart sounds: Normal heart sounds. Pulmonary:      Effort: Pulmonary effort is normal.      Breath sounds: Normal breath sounds. Abdominal:      General: Abdomen is flat. Palpations: Abdomen is soft. Tenderness: There is abdominal tenderness in the left lower quadrant. Genitourinary:     Vagina: Vaginal discharge and erythema present. Cervix: No cervical motion tenderness. Adnexa:         Left: Tenderness (mild) present. Comments: Thick whitish discharge in vault. Mucosa of vaginal wall distally appeared erythematous with no swelling. Unable to palpate the uterus due to body habitus. Skin:     General: Skin is warm and dry.    Neurological:      Mental Status: She is alert and oriented to person, place, and time. GCS: GCS eye subscore is 4. GCS verbal subscore is 5. GCS motor subscore is 6. Psychiatric:         Mood and Affect: Mood and affect normal.         Behavior: Behavior normal. Behavior is cooperative. Thought Content: Thought content normal.         Diagnostic Study Results     Labs -     Recent Results (from the past 12 hour(s))   URINALYSIS W/ RFLX MICROSCOPIC    Collection Time: 10/16/20  8:00 PM   Result Value Ref Range    Color Yellow/Straw      Appearance Hazy (A) Clear      Specific gravity 1.020 1.003 - 1.035      pH (UA) 6.0 5.0 - 9.0      Protein Negative Negative mg/dL    Glucose Negative Negative mg/dL    Ketone Negative Negative mg/dL    Bilirubin Negative Negative      Blood Negative Negative      Urobilinogen 0.2 0.2 - 1.0 EU/dL    Nitrites Negative Negative      Leukocyte Esterase Negative Negative     HCG URINE, QL    Collection Time: 10/16/20  8:00 PM   Result Value Ref Range    HCG urine, QL Negative Negative     CBC WITH AUTOMATED DIFF    Collection Time: 10/16/20  8:00 PM   Result Value Ref Range    WBC 8.8 4.6 - 13.2 K/uL    RBC 5.24 4.20 - 5.30 M/uL    HGB 11.3 (L) 12.0 - 16.0 g/dL    HCT 34.5 (L) 35.0 - 45.0 %    MCV 65.8 (L) 74.0 - 97.0 FL    MCH 21.6 (L) 24.0 - 34.0 PG    MCHC 32.8 31.0 - 37.0 g/dL    RDW 16.7 (H) 11.6 - 14.5 %    PLATELET 023 859 - 995 K/uL    MPV 10.4 9.2 - 11.8 FL    NEUTROPHILS 75 (H) 40 - 73 %    LYMPHOCYTES 18 (L) 21 - 52 %    MONOCYTES 5 3 - 10 %    EOSINOPHILS 2 0 - 5 %    BASOPHILS 0 0 - 2 %    IMMATURE GRANULOCYTES 0 %    ABS. NEUTROPHILS 6.6 1.8 - 8.0 K/UL    ABS. LYMPHOCYTES 1.6 0.9 - 3.6 K/UL    ABS. MONOCYTES 0.4 0.05 - 1.2 K/UL    ABS. EOSINOPHILS 0.2 0.0 - 0.4 K/UL    ABS. BASOPHILS 0.0 0.0 - 0.1 K/UL    ABS. IMM.  GRANS. 0.0 K/UL   WET PREP    Collection Time: 10/16/20  8:00 PM    Specimen: Vagina   Result Value Ref Range    Clue cells Moderate      Wet prep Negative Radiologic Studies -   No orders to display     CT Results  (Last 48 hours)    None        CXR Results  (Last 48 hours)    None            Medical Decision Making   I am the first provider for this patient. I reviewed the vital signs, available nursing notes, past medical history, past surgical history, family history and social history. Vital Signs-Reviewed the patient's vital signs. Patient Vitals for the past 12 hrs:   Temp Pulse Resp BP SpO2   10/16/20 2056  82 16 124/61 99 %   10/16/20 1948 98.3 °F (36.8 °C) 88 18 129/74 100 %       Records Reviewed: Nursing Notes and Old Medical Records    The patient presents with burning with urination and abdominal pain with a differential diagnosis of UTI, ectopic pregnancy, ovarian disease, PCOS. ED Course:   Initial assessment performed. The patients presenting problems have been discussed, and they are in agreement with the care plan formulated and outlined with them. I have encouraged them to ask questions as they arise throughout their visit. Provider Notes (Medical Decision Making):   Patient presents with mostly dysuria and frequency. Also vaginal discharge for the last 2 days, applied Monistat with increased irritation and of the vaginal area and dysuria. Also has left lower quadrant pain and has a history of PCO S.  Exam reveals a irritated distal vaginal wall and also the vaginal is. Suspect most likely allergy to the Monistat or vaginitis from Candida. Exam reveals tenderness in the left adnexa but patient has no cervical motion tenderness so doubt PID. Suspect most likely small cysts from her hip POS. Pain improved with Toradol. She was given 1 dose Diflucan in the ED. And discharged on Mycolog. She also has bacterial vaginosis and was discharged on Flagyl for that. She does have a history of renal cell carcinoma with partial nephrectomy. Last CT scan was unremarkable a few months ago. PLAN:  1.    Current Discharge Medication List      START taking these medications    Details   nystatin-triamcinolone (MYCOLOG) 100,000-0.1 unit/gram-% ointment Apply  to affected area two (2) times a day. Qty: 30 g, Refills: 0      metroNIDAZOLE (FlagyL) 500 mg tablet Take 1 Tab by mouth two (2) times a day for 7 days. Qty: 14 Tab, Refills: 0      ketorolac (TORADOL) 10 mg tablet Take 1 Tab by mouth three (3) times daily as needed for Pain. Qty: 15 Tab, Refills: 0           2. Follow-up Information    None       Return to ED if worse     Diagnosis     Clinical Impression:   1. Candida vaginitis    2. BV (bacterial vaginosis)    3. Pelvic pain        By signing my name below, Sam Meneses, attest that this documentation has been prepared under the direction and in presence of Dr. Maxwell Watson on 10/16/20.  Electronically signed: Feliciano Louis, 10/16/20, 7:50 PM

## 2020-10-16 NOTE — ED TRIAGE NOTES
Patient states she started having vaginal discharge a couple days ago and used monistat for a couple days but now has burning when she voids and left lower abdominal pain.

## 2020-10-17 NOTE — ED NOTES
I have reviewed discharge instructions with the patient. The patient verbalized understanding. Patient escorted to waiting room with steady gait.

## 2020-10-30 DIAGNOSIS — E87.70 HYPERVOLEMIA, UNSPECIFIED HYPERVOLEMIA TYPE: ICD-10-CM

## 2020-10-30 NOTE — TELEPHONE ENCOUNTER
Requested Prescriptions     Pending Prescriptions Disp Refills    metFORMIN ER (GLUCOPHAGE XR) 750 mg tablet        Sig: Take 1 Tab by mouth two (2) times a day.  hydroCHLOROthiazide (HYDRODIURIL) 12.5 mg tablet 90 Tab 1     Sig: Take 1 Tab by mouth daily.

## 2020-11-04 NOTE — TELEPHONE ENCOUNTER
Please see refill request-    Patient was last seen (virtual)  7-    Last filled Hydrochlorothiazide on 7-   #90 X 1    Not seeing where we have filled the Metformin before    Thank you

## 2020-11-09 DIAGNOSIS — E87.70 HYPERVOLEMIA, UNSPECIFIED HYPERVOLEMIA TYPE: ICD-10-CM

## 2020-11-09 RX ORDER — HYDROCHLOROTHIAZIDE 12.5 MG/1
12.5 TABLET ORAL DAILY
Qty: 90 TAB | Refills: 1 | OUTPATIENT
Start: 2020-11-09

## 2020-11-09 RX ORDER — METFORMIN HYDROCHLORIDE 750 MG/1
750 TABLET, EXTENDED RELEASE ORAL 2 TIMES DAILY
OUTPATIENT
Start: 2020-11-09

## 2020-11-09 NOTE — TELEPHONE ENCOUNTER
Requested Prescriptions     Pending Prescriptions Disp Refills    hydroCHLOROthiazide (HYDRODIURIL) 12.5 mg tablet 90 Tab 1     Sig: Take 1 Tab by mouth daily.      METFORMIN - requested 10/30/20

## 2020-11-11 RX ORDER — HYDROCHLOROTHIAZIDE 12.5 MG/1
12.5 TABLET ORAL DAILY
Qty: 90 TAB | Refills: 1 | OUTPATIENT
Start: 2020-11-11

## 2020-11-12 ENCOUNTER — TELEPHONE (OUTPATIENT)
Dept: FAMILY MEDICINE CLINIC | Age: 40
End: 2020-11-12

## 2020-11-12 NOTE — TELEPHONE ENCOUNTER
Pt stated she called in a refill request 11/09 but hasn't heard anything about it yet and she's going out of town.  Please follow up with this request and inform pt if this medication will be sent to the pharmacy when available

## 2020-11-13 DIAGNOSIS — E87.70 HYPERVOLEMIA, UNSPECIFIED HYPERVOLEMIA TYPE: ICD-10-CM

## 2020-11-13 DIAGNOSIS — E28.2 POLYCYSTIC OVARY SYNDROME: Primary | ICD-10-CM

## 2020-11-13 RX ORDER — HYDROCHLOROTHIAZIDE 12.5 MG/1
12.5 TABLET ORAL DAILY
Qty: 90 TAB | Refills: 1 | Status: SHIPPED | OUTPATIENT
Start: 2020-11-13 | End: 2022-03-07 | Stop reason: SDUPTHER

## 2020-11-13 RX ORDER — METFORMIN HYDROCHLORIDE 750 MG/1
750 TABLET, EXTENDED RELEASE ORAL 2 TIMES DAILY
Qty: 60 TAB | Refills: 0 | Status: SHIPPED | OUTPATIENT
Start: 2020-11-13 | End: 2022-04-14 | Stop reason: SDUPTHER

## 2020-11-13 NOTE — PROGRESS NOTES
Refilled metformin and HCTZ. Patient is using metformin for PCOS and previously have been getting it written by another provider. Patient called states she was out of medication and going out of town. I did write metformin for 30 days and advised she follow up with the provider that have been treating her PCOS. Patient agreed to the plan.

## 2021-01-17 ENCOUNTER — APPOINTMENT (OUTPATIENT)
Dept: GENERAL RADIOLOGY | Age: 41
End: 2021-01-17
Attending: EMERGENCY MEDICINE
Payer: MEDICAID

## 2021-01-17 ENCOUNTER — HOSPITAL ENCOUNTER (EMERGENCY)
Age: 41
Discharge: HOME | End: 2021-01-17
Attending: EMERGENCY MEDICINE
Payer: MEDICAID

## 2021-01-17 VITALS
WEIGHT: 235 LBS | BODY MASS INDEX: 39.15 KG/M2 | OXYGEN SATURATION: 98 % | DIASTOLIC BLOOD PRESSURE: 79 MMHG | HEIGHT: 65 IN | HEART RATE: 83 BPM | RESPIRATION RATE: 19 BRPM | TEMPERATURE: 98.1 F | SYSTOLIC BLOOD PRESSURE: 130 MMHG

## 2021-01-17 DIAGNOSIS — J45.21 MILD INTERMITTENT ASTHMA WITH ACUTE EXACERBATION: ICD-10-CM

## 2021-01-17 DIAGNOSIS — R05.9 COUGH: Primary | ICD-10-CM

## 2021-01-17 LAB — SARS-COV-2, COV2: NORMAL

## 2021-01-17 PROCEDURE — U0003 INFECTIOUS AGENT DETECTION BY NUCLEIC ACID (DNA OR RNA); SEVERE ACUTE RESPIRATORY SYNDROME CORONAVIRUS 2 (SARS-COV-2) (CORONAVIRUS DISEASE [COVID-19]), AMPLIFIED PROBE TECHNIQUE, MAKING USE OF HIGH THROUGHPUT TECHNOLOGIES AS DESCRIBED BY CMS-2020-01-R: HCPCS

## 2021-01-17 PROCEDURE — 71045 X-RAY EXAM CHEST 1 VIEW: CPT

## 2021-01-17 PROCEDURE — 74011250637 HC RX REV CODE- 250/637: Performed by: EMERGENCY MEDICINE

## 2021-01-17 PROCEDURE — 99283 EMERGENCY DEPT VISIT LOW MDM: CPT

## 2021-01-17 RX ORDER — METHYLPREDNISOLONE 4 MG/1
TABLET ORAL
Qty: 1 DOSE PACK | Refills: 0 | Status: SHIPPED | OUTPATIENT
Start: 2021-01-17 | End: 2021-06-19

## 2021-01-17 RX ORDER — BENZONATATE 100 MG/1
200 CAPSULE ORAL
Qty: 20 CAP | Refills: 0 | Status: SHIPPED | OUTPATIENT
Start: 2021-01-17 | End: 2021-01-20

## 2021-01-17 RX ORDER — BENZONATATE 100 MG/1
200 CAPSULE ORAL
Status: DISCONTINUED | OUTPATIENT
Start: 2021-01-17 | End: 2021-01-17 | Stop reason: HOSPADM

## 2021-01-17 RX ADMIN — BENZONATATE 200 MG: 100 CAPSULE ORAL at 09:13

## 2021-01-17 NOTE — ED PROVIDER NOTES
EMERGENCY DEPARTMENT HISTORY AND PHYSICAL EXAM      Date: 1/17/2021  Patient Name: Miguel Harvey    History of Presenting Illness     Chief Complaint   Patient presents with    Cough       History Provided By: Patient    HPI: Miguel Harvey, 45 y.o. female with PMHx of asthma presents to the ED with complaints of cough. Pt states she has had cold-like sxs of cough, congestion, rhinorrhea, and mild wheezing x 2 days. Pt notes she used her nebulizer yesterday that alleviated her wheezing but did not alleviate her cough, prompting her visit to the ED. Pt in the ED is requesting an x-ray and antibiotics. 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 There are no other complaints, changes, or physical findings at this time. PCP: Juan Baeza NP    Current Facility-Administered Medications   Medication Dose Route Frequency Provider Last Rate Last Admin    benzonatate (TESSALON) capsule 200 mg  200 mg Oral TID PRN Florencio Clark MD   200 mg at 01/17/21 0913     Current Outpatient Medications   Medication Sig Dispense Refill    methylPREDNISolone (Medrol, Ashutosh,) 4 mg tablet Use as directed on pack 1 Dose Pack 0    benzonatate (Tessalon Perles) 100 mg capsule Take 2 Caps by mouth three (3) times daily as needed for Cough for up to 3 days. 20 Cap 0    metFORMIN ER (GLUCOPHAGE XR) 750 mg tablet Take 1 Tab by mouth two (2) times a day. 60 Tab 0    hydroCHLOROthiazide (HYDRODIURIL) 12.5 mg tablet Take 1 Tab by mouth daily. 90 Tab 1    budesonide-formoteroL (SYMBICORT) 80-4.5 mcg/actuation HFAA Take 2 Puffs by inhalation two (2) times a day. 2 Inhaler 3    MULTIVITAMIN PO Take  by mouth.  albuterol-ipratropium (DUO-NEB) 2.5 mg-0.5 mg/3 ml nebu USE 3 ML VIA NEBULIZER EVERY 6 HOURS AS NEEDED FOR WHEEZING OR SHORTNESS OF BREATH 1080 mL 0    fluticasone propionate (FLONASE) 50 mcg/actuation nasal spray 2 Sprays by Both Nostrils route daily.       ProAir HFA 90 mcg/actuation inhaler Take 1 Puff by inhalation every four (4) hours as needed for Wheezing or Shortness of Breath. 1 Inhaler 5    albuterol (PROVENTIL VENTOLIN) 2.5 mg /3 mL (0.083 %) nebu       butalbital-acetaminophen-caffeine (FIORICET, ESGIC) -40 mg per tablet Take 1 Tab by mouth every four (4) hours as needed.  famotidine (PEPCID) 20 mg tablet       LINZESS 290 mcg cap capsule          Past History     Past Medical History:  Past Medical History:   Diagnosis Date    Asthma     History of partial nephrectomy 10/24/2018    Hyperthyroidism     Malignant tumor of kidney ([facility] [address] 10/24/2018    Polycystic ovary syndrome     Renal mass        Past Surgical History:  Past Surgical History:   Procedure Laterality Date    HX HERNIA REPAIR      HX OTHER SURGICAL Right     right lung collapsed as a child       Family History:  Family History   Problem Relation Age of Onset    Hypertension Mother        Social History:  Social History     Tobacco Use    Smoking status: Never Smoker    Smokeless tobacco: Never Used   Substance Use Topics    Alcohol use: No    Drug use: No       Allergies: Allergies   Allergen Reactions    Iodinated Contrast Media Other (comments)     NAUSEA, ANXIETY-- RECEIVED MULTIHANCE (GADOBENATE DIMEGLUTAMINE) 20ML 2/13/18 FOR MRI         Review of Systems   Review of Systems   HENT: Positive for congestion and rhinorrhea. Respiratory: Positive for cough and wheezing. Physical Exam   Physical Exam  Vitals signs and nursing note reviewed. Constitutional:       General: She is awake. Appearance: Normal appearance. She is well-developed and well-groomed. She is obese. HENT:      Head: Normocephalic and atraumatic. Eyes:      Extraocular Movements: Extraocular movements intact. Pupils: Pupils are equal, round, and reactive to light. Neck:      Musculoskeletal: Full passive range of motion without pain, normal range of motion and neck supple.    Cardiovascular:      Rate and Rhythm: Normal rate and regular rhythm. Heart sounds: Normal heart sounds. Pulmonary:      Effort: Pulmonary effort is normal.      Breath sounds: Normal breath sounds and air entry. Comments: Pt's SP O2 is 100% on RA. Abdominal:      Palpations: Abdomen is soft. Tenderness: There is no abdominal tenderness. Comments: Obese abdomen. Skin:     General: Skin is warm and dry. Neurological:      General: No focal deficit present. Mental Status: She is alert and oriented to person, place, and time. Psychiatric:         Attention and Perception: Attention and perception normal.         Mood and Affect: Mood and affect normal.         Speech: Speech normal.         Behavior: Behavior normal. Behavior is cooperative. Thought Content: Thought content normal.         Cognition and Memory: Cognition and memory normal.         Judgment: Judgment normal.         Diagnostic Study Results     Labs -   No results found for this or any previous visit (from the past 12 hour(s)). Radiologic Studies -   XR CHEST PORT   Final Result   IMPRESSION:      No active cardiopulmonary disease. CT Results  (Last 48 hours)    None        CXR Results  (Last 48 hours)               01/17/21 0859  XR CHEST PORT Final result    Impression:  IMPRESSION:       No active cardiopulmonary disease. Narrative:  EXAM: CHEST RADIOGRAPH       CLINICAL INDICATION/HISTORY: Cough     > Additional: None       COMPARISON: 12/9/2017. TECHNIQUE: Portable frontal view of the chest       _______________       FINDINGS:       SUPPORT DEVICES: None. HEART AND MEDIASTINUM: No appreciable cardiomegaly. Remaining mediastinal   contours within normal limits. LUNGS AND PLEURAL SPACES: Clear. No consolidation, mass or effusion. BONY THORAX AND SOFT TISSUES: Unremarkable.       _______________                 Medical Decision Making and ED Course   I am the first provider for this patient.     I reviewed the vital signs, available nursing notes, past medical history, past surgical history, family history and social history. Vital Signs-Reviewed the patient's vital signs. Patient Vitals for the past 12 hrs:   Temp Pulse Resp BP SpO2   01/17/21 0817 98.1 °F (36.7 °C) 83 19 130/79 98 %         Records Reviewed: Nursing Notes      ED Course:   Initial assessment performed. The patients presenting problems have been discussed, and they are in agreement with the care plan formulated and outlined with them. I have encouraged them to ask questions as they arise throughout their visit. Provider Notes (Medical Decision Making):   44 y/o female with PMHx of asthma presents to the ED with cold-like symptoms x 2 days. Pt had used her nebulizer treatment yesterday that alleviated her wheezing, but continued to have a cough. I prescribed her Deryl Harness in the ED for her cough and performed a chest x-ray. Pt requested antibiotics in the ED but her symptoms and diagnostic results did not indicate need for antibiotics. I discharged patient home, gave her COVID-19 work note and precautions, prescribed Medrol dose pack for her asthma, and instructed her to follow up with her PCP for any worsening of sxs. Disposition     Discharged    DISCHARGE PLAN:  1. Current Discharge Medication List      START taking these medications    Details   methylPREDNISolone (Medrol, Ashutosh,) 4 mg tablet Use as directed on pack  Qty: 1 Dose Pack, Refills: 0      benzonatate (Tessalon Perles) 100 mg capsule Take 2 Caps by mouth three (3) times daily as needed for Cough for up to 3 days. Qty: 20 Cap, Refills: 0         CONTINUE these medications which have NOT CHANGED    Details   metFORMIN ER (GLUCOPHAGE XR) 750 mg tablet Take 1 Tab by mouth two (2) times a day. Qty: 60 Tab, Refills: 0    Associated Diagnoses: Polycystic ovary syndrome      hydroCHLOROthiazide (HYDRODIURIL) 12.5 mg tablet Take 1 Tab by mouth daily.   Qty: 90 Tab, Refills: 1 Associated Diagnoses: Hypervolemia, unspecified hypervolemia type      budesonide-formoteroL (SYMBICORT) 80-4.5 mcg/actuation HFAA Take 2 Puffs by inhalation two (2) times a day. Qty: 2 Inhaler, Refills: 3    Associated Diagnoses: Moderate persistent asthma with acute exacerbation      MULTIVITAMIN PO Take  by mouth. albuterol-ipratropium (DUO-NEB) 2.5 mg-0.5 mg/3 ml nebu USE 3 ML VIA NEBULIZER EVERY 6 HOURS AS NEEDED FOR WHEEZING OR SHORTNESS OF BREATH  Qty: 1080 mL, Refills: 0    Comments: **Patient requests 90 days supply**  Associated Diagnoses: Moderate persistent asthma with acute exacerbation      fluticasone propionate (FLONASE) 50 mcg/actuation nasal spray 2 Sprays by Both Nostrils route daily. ProAir HFA 90 mcg/actuation inhaler Take 1 Puff by inhalation every four (4) hours as needed for Wheezing or Shortness of Breath. Qty: 1 Inhaler, Refills: 5    Associated Diagnoses: Moderate persistent asthma with acute exacerbation      albuterol (PROVENTIL VENTOLIN) 2.5 mg /3 mL (0.083 %) nebu       butalbital-acetaminophen-caffeine (FIORICET, ESGIC) -40 mg per tablet Take 1 Tab by mouth every four (4) hours as needed. famotidine (PEPCID) 20 mg tablet       LINZESS 290 mcg cap capsule            2.   Follow-up Information     Follow up With Specialties Details Why Contact Info    Debbi Godinez NP Nurse Practitioner In 1 day  [id number] [phone]          3. Return to ED if worse     Diagnosis     Clinical Impression:   1. Cough    2. Mild intermittent asthma with acute exacerbation        Attestations:    By signing my name below, I, Gillian Viveros, attest that this documentation has been prepared under the direction and in presence of Dr. Alfonso Elam on 01/17/21. Electronically signed: Gillian Viveros, 01/17/21, 9:57 AM        Please note that this dictation was completed with [employer]ero, the [employer] [facility] voice recognition software.   Quite often unanticipated grammatical, syntax, homophones, and other interpretive errors are inadvertently transcribed by the computer software. Please disregard these errors. Please excuse any errors that have escaped final proofreading. Thank you.

## 2021-01-17 NOTE — Clinical Note
[facility] EMERGENCY DEPT 
[facility] [address] 
[phone] Work/School Note Date: 1/17/2021 To Whom It May concern: 
 
Mendy Shaikh was evaulated by the following provider(s): 
Attending Provider: Savanna Ann [address] [place] [address] virus is suspected. Per the CDC guidelines we recommend home isolation until the following conditions are all met: 1. At least 10 days have passed since symptoms first appeared and 2. At least 24 hours have passed since last fever without the use of fever-reducing medications and 
3. Symptoms (e.g., cough, shortness of breath) have improved Sincerely, Gabi Dias MD

## 2021-01-17 NOTE — DISCHARGE INSTRUCTIONS

## 2021-01-18 LAB — SARS-COV-2, COV2NT: NOT DETECTED

## 2021-01-20 NOTE — CALL BACK NOTE
1/20/21 @ 8941 called pt on cell ([phone]) to notify pt of Negative COVID results. Spoke with pt.

## 2021-02-14 NOTE — ED NOTES
3 attempts to reach patient unsuccessful  Phone automatically startes to ring busy as soon as number dialed letter sent for negative COVID results

## 2021-06-19 ENCOUNTER — HOSPITAL ENCOUNTER (EMERGENCY)
Age: 41
Discharge: HOME | End: 2021-06-19
Attending: EMERGENCY MEDICINE
Payer: MEDICAID

## 2021-06-19 VITALS
HEIGHT: 65 IN | TEMPERATURE: 98.5 F | BODY MASS INDEX: 37.82 KG/M2 | SYSTOLIC BLOOD PRESSURE: 118 MMHG | WEIGHT: 227 LBS | DIASTOLIC BLOOD PRESSURE: 75 MMHG | OXYGEN SATURATION: 99 % | HEART RATE: 88 BPM | RESPIRATION RATE: 18 BRPM

## 2021-06-19 DIAGNOSIS — B37.31 YEAST VAGINITIS: Primary | ICD-10-CM

## 2021-06-19 LAB
ANION GAP SERPL CALC-SCNC: 7 MMOL/L
APPEARANCE UR: CLEAR
BACTERIA URNS QL MICRO: ABNORMAL /HPF
BASOPHILS # BLD: 0 K/UL (ref 0–0.1)
BASOPHILS NFR BLD: 0 % (ref 0–2)
BILIRUB UR QL: NEGATIVE
BUN SERPL-MCNC: 10 MG/DL (ref 9–21)
BUN/CREAT SERPL: 13
CA-I BLD-MCNC: 8.9 MG/DL (ref 8.5–10.5)
CHLORIDE SERPL-SCNC: 102 MMOL/L (ref 94–111)
CLUE CELLS VAG QL WET PREP: NORMAL
CO2 SERPL-SCNC: 27 MMOL/L (ref 21–33)
COLOR UR: ABNORMAL
CREAT SERPL-MCNC: 0.8 MG/DL (ref 0.7–1.2)
EOSINOPHIL # BLD: 0.3 K/UL (ref 0–0.4)
EOSINOPHIL NFR BLD: 3 % (ref 0–5)
EPITH CASTS URNS QL MICRO: ABNORMAL /LPF (ref 0–20)
ERYTHROCYTE [DISTWIDTH] IN BLOOD BY AUTOMATED COUNT: 18.5 % (ref 11.6–14.5)
GLUCOSE SERPL-MCNC: 88 MG/DL (ref 70–110)
GLUCOSE UR STRIP.AUTO-MCNC: NEGATIVE MG/DL
HCT VFR BLD AUTO: 38.8 % (ref 35–45)
HGB BLD-MCNC: 12.6 G/DL (ref 12–16)
HGB UR QL STRIP: NEGATIVE
IMM GRANULOCYTES # BLD AUTO: 0.1 K/UL
IMM GRANULOCYTES NFR BLD AUTO: 1 %
KETONES UR QL STRIP.AUTO: NEGATIVE MG/DL
LEUKOCYTE ESTERASE UR QL STRIP.AUTO: ABNORMAL
LYMPHOCYTES # BLD: 1.9 K/UL (ref 0.9–3.6)
LYMPHOCYTES NFR BLD: 19 % (ref 21–52)
MCH RBC QN AUTO: 21.8 PG (ref 24–34)
MCHC RBC AUTO-ENTMCNC: 32.5 G/DL (ref 31–37)
MCV RBC AUTO: 67 FL (ref 74–97)
MONOCYTES # BLD: 0.5 K/UL (ref 0.05–1.2)
MONOCYTES NFR BLD: 5 % (ref 3–10)
NEUTS SEG # BLD: 7.4 K/UL (ref 1.8–8)
NEUTS SEG NFR BLD: 72 % (ref 40–73)
NITRITE UR QL STRIP.AUTO: NEGATIVE
PH UR STRIP: 7 [PH] (ref 5–9)
PLATELET # BLD AUTO: 430 K/UL (ref 135–420)
PMV BLD AUTO: 10.4 FL (ref 9.2–11.8)
POTASSIUM SERPL-SCNC: 3.5 MMOL/L (ref 3.2–5.1)
PROT UR STRIP-MCNC: NEGATIVE MG/DL
RBC # BLD AUTO: 5.79 M/UL (ref 4.2–5.3)
RBC #/AREA URNS HPF: ABNORMAL /HPF (ref 0–2)
RBC MORPH BLD: ABNORMAL
RBC MORPH BLD: ABNORMAL
SODIUM SERPL-SCNC: 136 MMOL/L (ref 135–145)
SP GR UR REFRACTOMETRY: 1.01 (ref 1–1.03)
T VAGINALIS VAG QL WET PREP: NORMAL
UROBILINOGEN UR QL STRIP.AUTO: 0.2 EU/DL (ref 0.2–1)
WBC # BLD AUTO: 10.2 K/UL (ref 4.6–13.2)
WBC URNS QL MICRO: ABNORMAL /HPF (ref 0–4)

## 2021-06-19 PROCEDURE — 80048 BASIC METABOLIC PNL TOTAL CA: CPT

## 2021-06-19 PROCEDURE — 99284 EMERGENCY DEPT VISIT MOD MDM: CPT

## 2021-06-19 PROCEDURE — 85025 COMPLETE CBC W/AUTO DIFF WBC: CPT

## 2021-06-19 PROCEDURE — 87210 SMEAR WET MOUNT SALINE/INK: CPT

## 2021-06-19 PROCEDURE — 81001 URINALYSIS AUTO W/SCOPE: CPT

## 2021-06-19 PROCEDURE — 87255 GENET VIRUS ISOLATE HSV: CPT

## 2021-06-19 PROCEDURE — 87491 CHLMYD TRACH DNA AMP PROBE: CPT

## 2021-06-19 RX ORDER — LIRAGLUTIDE 6 MG/ML
INJECTION SUBCUTANEOUS
COMMUNITY
Start: 2021-05-27 | End: 2022-04-05 | Stop reason: SDUPTHER

## 2021-06-19 RX ORDER — ASPIRIN 325 MG
1 TABLET, DELAYED RELEASE (ENTERIC COATED) ORAL
Qty: 20 G | Refills: 0 | Status: SHIPPED | OUTPATIENT
Start: 2021-06-19 | End: 2021-07-26

## 2021-06-19 RX ORDER — HYDROCODONE BITARTRATE AND ACETAMINOPHEN 5; 325 MG/1; MG/1
TABLET ORAL
COMMUNITY
Start: 2021-06-11 | End: 2021-07-26

## 2021-06-19 RX ORDER — FAMOTIDINE 40 MG/1
TABLET, FILM COATED ORAL
COMMUNITY
Start: 2021-06-12 | End: 2022-02-24

## 2021-06-19 RX ORDER — KETOROLAC TROMETHAMINE 10 MG/1
10 TABLET, FILM COATED ORAL
Qty: 12 TABLET | Refills: 0 | Status: SHIPPED | OUTPATIENT
Start: 2021-06-19 | End: 2021-07-26

## 2021-06-19 RX ORDER — FLUCONAZOLE 150 MG/1
150 TABLET ORAL
Qty: 1 TABLET | Refills: 0 | Status: SHIPPED | OUTPATIENT
Start: 2021-06-19 | End: 2021-06-19

## 2021-06-19 RX ORDER — CEPHALEXIN 250 MG/1
CAPSULE ORAL
COMMUNITY
Start: 2021-06-16 | End: 2022-09-17

## 2021-06-22 LAB
C TRACH RRNA SPEC QL NAA+PROBE: NEGATIVE
HSV SPEC CULT: NEGATIVE
N GONORRHOEA RRNA SPEC QL NAA+PROBE: NEGATIVE
PLEASE NOTE:, 188601: NORMAL
SPECIMEN SOURCE: NORMAL
SPECIMEN SOURCE: NORMAL

## 2021-06-22 NOTE — ED PROVIDER NOTES
EMERGENCY DEPARTMENT HISTORY AND PHYSICAL EXAM      Date: 6/19/2021  Patient Name: Macho Burciaga    History of Presenting Illness     Chief Complaint   Patient presents with    Urinary Pain    Vaginal Itching       History Provided By: Patient    HPI: Macho Burciaga, 37 y.o. female with a past medical history significant Asthma, hypothyroidism, PCOS, cancerous tumor of left kidney for which patient had partial nephrectomy at [facility] presents to the ED with cc of dysuria and vaginal itching. Patient states that she was seen at [facility] about a week ago with some left flank pain and diagnosed with UTI and discharged on antibiotics. She states she developed vaginal yeast infection and was called in some Diflucan. She is here because with she thinks Diflucan has not worked and she still has the left flank pain. She states that prior to that she had been on antibiotics for some bronchitis or sinus infection. She is requesting pelvic exam to find out what is going on in her vaginal area. She states however she is followed at [facility] for her urological problems. She however has not been there in quite some time. There are no other complaints, changes, or physical findings at this time. PCP: Ronald Rangel NP    No current facility-administered medications on file prior to encounter. Current Outpatient Medications on File Prior to Encounter   Medication Sig Dispense Refill    Advair Diskus 100-50 mcg/dose diskus inhaler       HYDROcodone-acetaminophen (NORCO) 5-325 mg per tablet       Victoza 2-Ashutosh 0.6 mg/0.1 mL (18 mg/3 mL) pnij       famotidine (PEPCID) 40 mg tablet       metFORMIN ER (GLUCOPHAGE XR) 750 mg tablet Take 1 Tab by mouth two (2) times a day. 60 Tab 0    hydroCHLOROthiazide (HYDRODIURIL) 12.5 mg tablet Take 1 Tab by mouth daily. 90 Tab 1    MULTIVITAMIN PO Take  by mouth.  fluticasone propionate (FLONASE) 50 mcg/actuation nasal spray 2 Sprays by Both Nostrils route daily.  ProAir HFA 90 mcg/actuation inhaler Take 1 Puff by inhalation every four (4) hours as needed for Wheezing or Shortness of Breath. 1 Inhaler 5    albuterol (PROVENTIL VENTOLIN) 2.5 mg /3 mL (0.083 %) nebu          Past History     Past Medical History:  Past Medical History:   Diagnosis Date    Asthma     History of partial nephrectomy 10/24/2018    Hyperthyroidism     Malignant tumor of kidney ([facility] [address] 10/24/2018    Polycystic ovary syndrome     Renal mass        Past Surgical History:  Past Surgical History:   Procedure Laterality Date    HX HERNIA REPAIR      HX OTHER SURGICAL Right     right lung collapsed as a child       Family History:  Family History   Problem Relation Age of Onset    Hypertension Mother        Social History:  Social History     Tobacco Use    Smoking status: Never Smoker    Smokeless tobacco: Never Used   Vaping Use    Vaping Use: Never used   Substance Use Topics    Alcohol use: No    Drug use: No       Allergies: Allergies   Allergen Reactions    Iodinated Contrast Media Other (comments)     NAUSEA, ANXIETY-- RECEIVED MULTIHANCE (GADOBENATE DIMEGLUTAMINE) 20ML 2/13/18 FOR MRI         Review of Systems     Review of Systems   Constitutional: Negative for chills and fever. HENT: Negative for congestion and sore throat. Respiratory: Negative for cough and shortness of breath. Cardiovascular: Negative for chest pain. Gastrointestinal: Negative for abdominal distention, nausea and vomiting. Genitourinary: Positive for dysuria and flank pain. Negative for difficulty urinating, frequency, hematuria, urgency, vaginal bleeding and vaginal discharge. Musculoskeletal: Negative for arthralgias and joint swelling. Skin: Negative for rash and wound. Neurological: Negative for dizziness, weakness, light-headedness and headaches. Hematological: Negative for adenopathy. Physical Exam     Physical Exam  Vitals and nursing note reviewed.    Constitutional: General: She is not in acute distress. Appearance: She is well-developed. She is not diaphoretic. HENT:      Head: Normocephalic and atraumatic. Jaw: No trismus. Right Ear: External ear normal. No swelling or tenderness. Tympanic membrane is not perforated, erythematous or bulging. Left Ear: External ear normal. No swelling or tenderness. Tympanic membrane is not perforated, erythematous or bulging. Nose: Nose normal. No mucosal edema or rhinorrhea. Right Sinus: No maxillary sinus tenderness or frontal sinus tenderness. Left Sinus: No maxillary sinus tenderness or frontal sinus tenderness. Mouth/Throat:      Mouth: No oral lesions. Dentition: No dental abscesses. Pharynx: Uvula midline. No oropharyngeal exudate, posterior oropharyngeal erythema or uvula swelling. Tonsils: No tonsillar abscesses. Eyes:      General: No scleral icterus. Right eye: No discharge. Left eye: No discharge. Conjunctiva/sclera: Conjunctivae normal.   Cardiovascular:      Rate and Rhythm: Normal rate and regular rhythm. Heart sounds: Normal heart sounds. No murmur heard. No friction rub. No gallop. Pulmonary:      Effort: Pulmonary effort is normal. No tachypnea, accessory muscle usage or respiratory distress. Breath sounds: Normal breath sounds. No decreased breath sounds, wheezing, rhonchi or rales. Abdominal:      Palpations: Abdomen is soft. Tenderness: There is abdominal tenderness. Comments: There is some tenderness to palpation in the left flank area and towards the left CVA. There is no guarding and there is no rebound. Musculoskeletal:         General: No tenderness. Normal range of motion. Cervical back: Normal range of motion and neck supple. Lymphadenopathy:      Cervical: No cervical adenopathy. Skin:     General: Skin is warm and dry. Findings: No erythema or rash.    Neurological:      Mental Status: She is alert and oriented to person, place, and time. Psychiatric:         Judgment: Judgment normal.         Lab and Diagnostic Study Results     Labs -   No results found for this or any previous visit (from the past 12 hour(s)). Radiologic Studies -   @lastxrresult@  CT Results  (Last 48 hours)    None        CXR Results  (Last 48 hours)    None            Medical Decision Making   - I am the first provider for this patient. - I reviewed the vital signs, available nursing notes, past medical history, past surgical history, family history and social history. - Initial assessment performed. The patients presenting problems have been discussed, and they are in agreement with the care plan formulated and outlined with them. I have encouraged them to ask questions as they arise throughout their visit. Vital Signs-Reviewed the patient's vital signs. No data found. Records Reviewed: Nursing Notes, Old Medical Records, Previous Radiology Studies and Previous Laboratory Studies    The patient presents with vaginal itching and left flank pain with a differential diagnosis of yeast vaginitis, PID, kidney stone, urethritis, UTI, ectopic, pyelo-, malignancy      ED Course:   Somewhat anxious female, with persistent left flank pain. She states that the pain is similar to the pain when she had partial nephrectomy at [address] [place] [address] for a cancerous tumor. I reviewed her labs from obesity and CT scan, most of which were reassuring. Pelvic exam reveals hyperemic vagina with some mild cervical motion tenderness and also some left flank pain. Cultures were obtained including herpes. Suspect symptoms most likely yeast vaginitis. Patient reluctant repeating Diflucan so gave prescription for topical antifungal.  I also gave her some pain medications. Suggested patient follows up with her urologist as she seems to be suspecting problems with the left kidney. CT however shows only scarring and nothing cancerous.       Provider Notes (Medical Decision Making):           Procedures   Medical Decision Makingedical Decision Making  Performed by: Enrico Friend MD  PROCEDURES:  Pelvic Exam    Date/Time: 6/19/2021 12:55 PM  Exam assisted by:  RN. Type of exam performed: bimanual and speculum. External genitalia appearance: normal.    Vaginal exam:  inflammation and discharge (Vaginal mucosa is hyperemic slightly edematous, irritated with some light whitish discharge). Cervical exam:  abnormal, discharge from cervix, moderate cervical motion tenderness and os closed (The cervix is also quite hyperemic and edematous, it is closed, there is some easy bruising of the cervix, also some motion cervical motion tenderness present. ). Specimen(s) collected:  chlamydia and GC (Herpes). Bimanual exam:  left adenexal tenderness. Patient tolerance: patient tolerated the procedure well with no immediate complications             Disposition   Disposition: Condition stable    Diagnosis:   1. Yeast vaginitis          Disposition:     Follow-up Information    None         Discharge Medication List as of 6/19/2021  1:36 PM      START taking these medications    Details   clotrimazole (MYCELEX) 1 % vaginal cream Insert 1 Applicator into vagina nightly., Normal, Disp-20 g, R-0      fluconazole (Diflucan) 150 mg tablet Take 1 Tablet by mouth now for 1 dose. FDA advises cautious prescribing of oral fluconazole in pregnancy. , Normal, Disp-1 Tablet, R-0      ketorolac (TORADOL) 10 mg tablet Take 1 Tablet by mouth every eight (8) hours as needed for Pain., Normal, Disp-12 Tablet, R-0         CONTINUE these medications which have NOT CHANGED    Details   Advair Diskus 100-50 mcg/dose diskus inhaler Historical Med, THOM      HYDROcodone-acetaminophen (NORCO) 5-325 mg per tablet Historical Med      Victoza 2-Ashutosh 0.6 mg/0.1 mL (18 mg/3 mL) pnij Historical Med, THOM      famotidine (PEPCID) 40 mg tablet Historical Med      metFORMIN ER (GLUCOPHAGE XR) 750 mg tablet Take 1 Tab by mouth two (2) times a day., Normal, Disp-60 Tab,R-0      hydroCHLOROthiazide (HYDRODIURIL) 12.5 mg tablet Take 1 Tab by mouth daily. , Normal, Disp-90 Tab,R-1      MULTIVITAMIN PO Take  by mouth., Historical Med      fluticasone propionate (FLONASE) 50 mcg/actuation nasal spray 2 Sprays by Both Nostrils route daily. , Historical Med      ProAir HFA 90 mcg/actuation inhaler Take 1 Puff by inhalation every four (4) hours as needed for Wheezing or Shortness of Breath., Normal, Disp-1 Inhaler,R-5,THOM      albuterol (PROVENTIL VENTOLIN) 2.5 mg /3 mL (0.083 %) nebu Historical Med             DISCHARGE PLAN:  1. Cannot display discharge medications since this patient is not currently admitted. 2.   Follow-up Information    None       3. Return to ED if worse   4. Discharge Medication List as of 6/19/2021  1:36 PM      START taking these medications    Details   clotrimazole (MYCELEX) 1 % vaginal cream Insert 1 Applicator into vagina nightly., Normal, Disp-20 g, R-0      fluconazole (Diflucan) 150 mg tablet Take 1 Tablet by mouth now for 1 dose. FDA advises cautious prescribing of oral fluconazole in pregnancy. , Normal, Disp-1 Tablet, R-0      ketorolac (TORADOL) 10 mg tablet Take 1 Tablet by mouth every eight (8) hours as needed for Pain., Normal, Disp-12 Tablet, R-0         CONTINUE these medications which have NOT CHANGED    Details   Advair Diskus 100-50 mcg/dose diskus inhaler Historical Med, THOM      HYDROcodone-acetaminophen (NORCO) 5-325 mg per tablet Historical Med      Victoza 2-Ashutosh 0.6 mg/0.1 mL (18 mg/3 mL) pnij Historical Med, THOM      famotidine (PEPCID) 40 mg tablet Historical Med      metFORMIN ER (GLUCOPHAGE XR) 750 mg tablet Take 1 Tab by mouth two (2) times a day., Normal, Disp-60 Tab,R-0      hydroCHLOROthiazide (HYDRODIURIL) 12.5 mg tablet Take 1 Tab by mouth daily. , Normal, Disp-90 Tab,R-1      MULTIVITAMIN PO Take  by mouth., Historical Med      fluticasone propionate (FLONASE) 50 mcg/actuation nasal spray 2 Sprays by Both Nostrils route daily. , Historical Med      ProAir HFA 90 mcg/actuation inhaler Take 1 Puff by inhalation every four (4) hours as needed for Wheezing or Shortness of Breath., Normal, Disp-1 Inhaler,R-5,THOM      albuterol (PROVENTIL VENTOLIN) 2.5 mg /3 mL (0.083 %) nebu Historical Med               Diagnosis     Clinical Impression:   1. Yeast vaginitis        Attestations:    Mechelle Stanton MD    Please note that this dictation was completed with [employer], the [employer] voice recognition software. Quite often unanticipated grammatical, syntax, homophones, and other interpretive errors are inadvertently transcribed by the computer software. Please disregard these errors. Please excuse any errors that have escaped final proofreading. Thank you.

## 2021-06-28 RX ORDER — LINACLOTIDE 290 UG/1
CAPSULE, GELATIN COATED ORAL
Qty: 90 CAPSULE | Refills: 3 | Status: SHIPPED | OUTPATIENT
Start: 2021-06-28

## 2021-06-28 RX ORDER — LINACLOTIDE 290 UG/1
CAPSULE, GELATIN COATED ORAL
Qty: 90 CAPSULE | Refills: 3 | Status: SHIPPED | OUTPATIENT
Start: 2021-06-28 | End: 2021-07-26

## 2021-07-26 ENCOUNTER — HOSPITAL ENCOUNTER (EMERGENCY)
Age: 41
Discharge: HOME | End: 2021-07-26
Attending: EMERGENCY MEDICINE
Payer: MEDICAID

## 2021-07-26 DIAGNOSIS — M77.8 TENDINITIS OF LEFT WRIST: Primary | ICD-10-CM

## 2021-07-26 PROCEDURE — 99283 EMERGENCY DEPT VISIT LOW MDM: CPT

## 2021-07-26 PROCEDURE — 74011250637 HC RX REV CODE- 250/637: Performed by: EMERGENCY MEDICINE

## 2021-07-26 RX ORDER — ACETAMINOPHEN 500 MG
1000 TABLET ORAL
Status: COMPLETED | OUTPATIENT
Start: 2021-07-26 | End: 2021-07-26

## 2021-07-26 RX ORDER — BUDESONIDE AND FORMOTEROL FUMARATE DIHYDRATE 80; 4.5 UG/1; UG/1
1 AEROSOL RESPIRATORY (INHALATION) DAILY
COMMUNITY
End: 2022-09-17

## 2021-07-26 RX ORDER — ACETAMINOPHEN 500 MG
500 TABLET ORAL
Status: DISCONTINUED | OUTPATIENT
Start: 2021-07-26 | End: 2021-07-26 | Stop reason: CLARIF

## 2021-07-26 RX ADMIN — ACETAMINOPHEN 1000 MG: 500 TABLET ORAL at 21:26

## 2021-07-26 NOTE — LETTER
[facility] EMERGENCY DEPT  [address] [address]  [phone]    Work Note    Date: 7/26/2021    To Whom It May concern:    Verónica Palma was seen and treated today in the emergency room by the following on 7/26/2021. Verónica Palma may return to work on 7/28/2021.     Sincerely,          Dr. Brock Hamper

## 2021-07-27 VITALS
WEIGHT: 234 LBS | HEART RATE: 97 BPM | RESPIRATION RATE: 20 BRPM | BODY MASS INDEX: 38.99 KG/M2 | DIASTOLIC BLOOD PRESSURE: 79 MMHG | HEIGHT: 65 IN | SYSTOLIC BLOOD PRESSURE: 118 MMHG | TEMPERATURE: 98.8 F | OXYGEN SATURATION: 100 %

## 2021-07-27 NOTE — ED TRIAGE NOTES
Patient comes with complaints of left hand, wrist and forearm pain since yesterday feels she has carpal tunnel from computer use. Principal Discharge DX:	Shortness of breath

## 2021-07-27 NOTE — ED PROVIDER NOTES
EMERGENCY DEPARTMENT HISTORY AND PHYSICAL EXAM      Date: 7/26/2021  Patient Name: Monika Lemon    History of Presenting Illness     Chief Complaint   Patient presents with    Wrist Pain       History Provided By: Patient    HPI: Monika Lemon, 37 y.o. female with a past medical history significant Kidney ca presents to the ED with cc of Lef wrist pain. Worsened by repetitive use. No knoan trauma. Started yesterday. There are no other complaints, changes, or physical findings at this time. PCP: Bennie Barillas NP    No current facility-administered medications on file prior to encounter. Current Outpatient Medications on File Prior to Encounter   Medication Sig Dispense Refill    budesonide-formoteroL (SYMBICORT) 80-4.5 mcg/actuation HFAA Take 1 Puff by inhalation daily.  Linzess 290 mcg cap capsule TAKE 1 CAPSULE BY MOUTH EVERY DAY 90 Capsule 3    [DISCONTINUED] Linzess 290 mcg cap capsule TAKE ONE CAPSULE BY MOUTH EVERY DAY 90 Capsule 3    Advair Diskus 100-50 mcg/dose diskus inhaler       Victoza 2-Ashutosh 0.6 mg/0.1 mL (18 mg/3 mL) pnij       famotidine (PEPCID) 40 mg tablet       [DISCONTINUED] HYDROcodone-acetaminophen (NORCO) 5-325 mg per tablet       [DISCONTINUED] clotrimazole (MYCELEX) 1 % vaginal cream Insert 1 Applicator into vagina nightly. 20 g 0    [DISCONTINUED] ketorolac (TORADOL) 10 mg tablet Take 1 Tablet by mouth every eight (8) hours as needed for Pain. 12 Tablet 0    metFORMIN ER (GLUCOPHAGE XR) 750 mg tablet Take 1 Tab by mouth two (2) times a day. 60 Tab 0    hydroCHLOROthiazide (HYDRODIURIL) 12.5 mg tablet Take 1 Tab by mouth daily. 90 Tab 1    MULTIVITAMIN PO Take  by mouth.  fluticasone propionate (FLONASE) 50 mcg/actuation nasal spray 2 Sprays by Both Nostrils route daily.  ProAir HFA 90 mcg/actuation inhaler Take 1 Puff by inhalation every four (4) hours as needed for Wheezing or Shortness of Breath.  1 Inhaler 5    albuterol (PROVENTIL VENTOLIN) 2.5 mg /3 mL (0.083 %) nebu          Past History     Past Medical History:  Past Medical History:   Diagnosis Date    Asthma     History of partial nephrectomy 10/24/2018    Hyperthyroidism     Malignant tumor of kidney ([address] 10/24/2018    Polycystic ovary syndrome     Renal mass        Past Surgical History:  Past Surgical History:   Procedure Laterality Date    HX HERNIA REPAIR      HX OTHER SURGICAL Right     right lung collapsed as a child       Family History:  Family History   Problem Relation Age of Onset    Hypertension Mother        Social History:  Social History     Tobacco Use    Smoking status: Never Smoker    Smokeless tobacco: Never Used   Vaping Use    Vaping Use: Never used   Substance Use Topics    Alcohol use: No    Drug use: No       Allergies: Allergies   Allergen Reactions    Iodinated Contrast Media Other (comments)     NAUSEA, ANXIETY-- RECEIVED MULTIHANCE (GADOBENATE DIMEGLUTAMINE) 20ML 2/13/18 FOR MRI         Review of Systems     Review of Systems   Constitutional: Negative. HENT: Negative. Respiratory: Negative. Cardiovascular: Negative. Gastrointestinal: Negative. Genitourinary: Negative. Musculoskeletal:        Left wrist pain   Neurological: Negative. All other systems reviewed and are negative. Physical Exam     Physical Exam  Vitals and nursing note reviewed. Constitutional:       Appearance: Normal appearance. She is obese. HENT:      Head: Normocephalic and atraumatic. Eyes:      Extraocular Movements: Extraocular movements intact. Pupils: Pupils are equal, round, and reactive to light. Cardiovascular:      Rate and Rhythm: Normal rate and regular rhythm. Pulses: Normal pulses. Heart sounds: Normal heart sounds. Pulmonary:      Effort: Pulmonary effort is normal.      Breath sounds: Normal breath sounds. Musculoskeletal:         General: Normal range of motion.       Cervical back: Normal range of motion. Comments: Tender left wrist carpal surface with  Painful ROM. NV intact. Neurological:      General: No focal deficit present. Mental Status: She is alert and oriented to person, place, and time. Lab and Diagnostic Study Results     Labs -   No results found for this or any previous visit (from the past 12 hour(s)). Radiologic Studies -   @lastxrresult@  CT Results  (Last 48 hours)    None        CXR Results  (Last 48 hours)    None            Medical Decision Making   - I am the first provider for this patient. - I reviewed the vital signs, available nursing notes, past medical history, past surgical history, family history and social history. - Initial assessment performed. The patients presenting problems have been discussed, and they are in agreement with the care plan formulated and outlined with them. I have encouraged them to ask questions as they arise throughout their visit. Vital Signs-Reviewed the patient's vital signs. Patient Vitals for the past 12 hrs:   Temp Pulse Resp BP SpO2   07/26/21 2020 -- -- -- -- 100 %   07/26/21 2014 98.8 °F (37.1 °C) 95 20 (!) 109/41 100 %       Records Reviewed: Nursing Notes    The patient presents with       ED Course:          Provider Notes (Medical Decision Making): MDM       Procedures   Medical Decision Makingedical Decision Making  Performed by: Domitila Rodriguez MD  PROCEDURES:  Procedures       Disposition   Disposition: DC- Adult Discharges: All of the diagnostic tests were reviewed and questions answered. Diagnosis, care plan and treatment options were discussed. The patient understands the instructions and will follow up as directed. The patients results have been reviewed with them. They have been counseled regarding their diagnosis.   The patient verbally convey understanding and agreement of the signs, symptoms, diagnosis, treatment and prognosis and additionally agrees to follow up as recommended with their PCP in 24 - 48 hours. They also agree with the care-plan and convey that all of their questions have been answered. I have also put together some discharge instructions for them that include: 1) educational information regarding their diagnosis, 2) how to care for their diagnosis at home, as well a 3) list of reasons why they would want to return to the ED prior to their follow-up appointment, should their condition change. Discharged    DISCHARGE PLAN:  1. Current Discharge Medication List      CONTINUE these medications which have NOT CHANGED    Details   budesonide-formoteroL (SYMBICORT) 80-4.5 mcg/actuation HFAA Take 1 Puff by inhalation daily. Linzess 290 mcg cap capsule TAKE 1 CAPSULE BY MOUTH EVERY DAY  Qty: 90 Capsule, Refills: 3      Advair Diskus 100-50 mcg/dose diskus inhaler       Victoza 2-Ashutosh 0.6 mg/0.1 mL (18 mg/3 mL) pnij       famotidine (PEPCID) 40 mg tablet       metFORMIN ER (GLUCOPHAGE XR) 750 mg tablet Take 1 Tab by mouth two (2) times a day. Qty: 60 Tab, Refills: 0    Associated Diagnoses: Polycystic ovary syndrome      hydroCHLOROthiazide (HYDRODIURIL) 12.5 mg tablet Take 1 Tab by mouth daily. Qty: 90 Tab, Refills: 1    Associated Diagnoses: Hypervolemia, unspecified hypervolemia type      MULTIVITAMIN PO Take  by mouth. fluticasone propionate (FLONASE) 50 mcg/actuation nasal spray 2 Sprays by Both Nostrils route daily. ProAir HFA 90 mcg/actuation inhaler Take 1 Puff by inhalation every four (4) hours as needed for Wheezing or Shortness of Breath. Qty: 1 Inhaler, Refills: 5    Associated Diagnoses: Moderate persistent asthma with acute exacerbation      albuterol (PROVENTIL VENTOLIN) 2.5 mg /3 mL (0.083 %) nebu            2.   Follow-up Information     Follow up With Specialties Details Why Contact Info    Archana Varela NP Nurse Practitioner In 1 week  [address]  [phone]          3. Return to ED if worse   4.    Current Discharge Medication List            Diagnosis     Clinical Impression:   1. Tendinitis of left wrist        Attestations:    Shine Markham MD    Please note that this dictation was completed with [employer], the computer voice recognition software. Quite often unanticipated grammatical, syntax, homophones, and other interpretive errors are inadvertently transcribed by the computer software. Please disregard these errors. Please excuse any errors that have escaped final proofreading. Thank you.

## 2021-08-13 ENCOUNTER — OFFICE VISIT (OUTPATIENT)
Dept: ORTHOPEDIC SURGERY | Age: 41
End: 2021-08-13
Payer: MEDICAID

## 2021-08-13 VITALS — TEMPERATURE: 97 F

## 2021-08-13 DIAGNOSIS — M25.532 BILATERAL WRIST PAIN: ICD-10-CM

## 2021-08-13 DIAGNOSIS — M25.531 BILATERAL WRIST PAIN: ICD-10-CM

## 2021-08-13 DIAGNOSIS — G56.03 BILATERAL CARPAL TUNNEL SYNDROME: Primary | ICD-10-CM

## 2021-08-13 PROCEDURE — 99203 OFFICE O/P NEW LOW 30 MIN: CPT | Performed by: ORTHOPAEDIC SURGERY

## 2021-08-13 PROCEDURE — 73110 X-RAY EXAM OF WRIST: CPT | Performed by: ORTHOPAEDIC SURGERY

## 2021-08-13 PROCEDURE — 20526 THER INJECTION CARP TUNNEL: CPT | Performed by: ORTHOPAEDIC SURGERY

## 2021-08-13 NOTE — PROGRESS NOTES
Ok Gardner is a 37 y.o. female right handed unspecified employment. Worker's Compensation and legal considerations: none filed. Vitals:    08/13/21 0846   Temp: 97 °F (36.1 °C)   PainSc:   6   PainLoc: Hand           Chief Complaint   Patient presents with    Wrist Pain     bilat    Hand Pain     bilat         HPI: Patient presents today with complaints of bilateral wrist and hand pain. She reports to be worse at night. Date of onset: Early August 2021    Injury: No    Prior Treatment:  Yes: Comment: left brace    Numbness/ Tingling: No      ROS: Review of Systems - General ROS: negative  Psychological ROS: negative  ENT ROS: negative  Allergy and Immunology ROS: negative  Hematological and Lymphatic ROS: negative  Respiratory ROS: no cough, shortness of breath, or wheezing  Cardiovascular ROS: no chest pain or dyspnea on exertion  Gastrointestinal ROS: no abdominal pain, change in bowel habits, or black or bloody stools  Musculoskeletal ROS: positive for - pain in hand - bilateral  Neurological ROS: positive for - numbness/tingling  Dermatological ROS: negative    Past Medical History:   Diagnosis Date    Asthma     History of partial nephrectomy 10/24/2018    Hyperthyroidism     Malignant tumor of kidney [address] [address] 10/24/2018    Polycystic ovary syndrome     Renal mass        Past Surgical History:   Procedure Laterality Date    HX HERNIA REPAIR      HX OTHER SURGICAL Right     right lung collapsed as a child       Current Outpatient Medications   Medication Sig Dispense Refill    budesonide-formoteroL (SYMBICORT) 80-4.5 mcg/actuation HFAA Take 1 Puff by inhalation daily.  Linzess 290 mcg cap capsule TAKE 1 CAPSULE BY MOUTH EVERY DAY 90 Capsule 3    Advair Diskus 100-50 mcg/dose diskus inhaler       Victoza 2-Ashutosh 0.6 mg/0.1 mL (18 mg/3 mL) pnij       famotidine (PEPCID) 40 mg tablet       metFORMIN ER (GLUCOPHAGE XR) 750 mg tablet Take 1 Tab by mouth two (2) times a day.  60 Tab 0    hydroCHLOROthiazide (HYDRODIURIL) 12.5 mg tablet Take 1 Tab by mouth daily. 90 Tab 1    MULTIVITAMIN PO Take  by mouth.  fluticasone propionate (FLONASE) 50 mcg/actuation nasal spray 2 Sprays by Both Nostrils route daily.  ProAir HFA 90 mcg/actuation inhaler Take 1 Puff by inhalation every four (4) hours as needed for Wheezing or Shortness of Breath. 1 Inhaler 5    albuterol (PROVENTIL VENTOLIN) 2.5 mg /3 mL (0.083 %) nebu        Current Facility-Administered Medications   Medication Dose Route Frequency Provider Last Rate Last Admin    triamcinolone acetonide (KENALOG) 10 mg/mL injection 10 mg  10 mg Intra artICUlar ONCE Orville Loyd, DO           Allergies   Allergen Reactions    Iodinated Contrast Media Other (comments)     NAUSEA, ANXIETY-- RECEIVED MULTIHANCE (GADOBENATE DIMEGLUTAMINE) 20ML 2/13/18 FOR MRI           PE:     Physical Exam  Vitals and nursing note reviewed. Constitutional:       General: She is not in acute distress. Appearance: Normal appearance. She is not ill-appearing. Cardiovascular:      Pulses: Normal pulses. Pulmonary:      Effort: Pulmonary effort is normal. No respiratory distress. Musculoskeletal:         General: No swelling, tenderness, deformity or signs of injury. Normal range of motion. Cervical back: Normal range of motion and neck supple. Right lower leg: No edema. Left lower leg: No edema. Skin:     General: Skin is warm and dry. Capillary Refill: Capillary refill takes less than 2 seconds. Findings: No bruising or erythema. Neurological:      General: No focal deficit present. Mental Status: She is alert and oriented to person, place, and time. Psychiatric:         Mood and Affect: Mood normal.         Behavior: Behavior normal.            NEUROVASCULAR    Examination L R Examination L R   Carpal Comp. + + Pronator Comp. - -   Carpal Tinel + + Pronator Tinel - -   Phalen's - - Pronator Stress - -   Cubital Comp. - - Guyon Comp. - -   Cubital Tinel - - Guyon Tinel - -   Elbow Hyperflexion - - Adson's - -   Spurling's - - SC Comp. - -   PCB Median abn - - SC Tinel - -   Radial Tinel - - IC Comp. - -   Digital Tinel - - IC Tinel - -   Radial 2-Pt WNL WNL Ulnar 2-Pt WNL WNL     Radial Pulse: 2+  Capillary Refill: < 2 sec  Ramiro: Not Performed  Digital Ramiro: Not Performed        Imagin-08-13 3 views of bilateral wrist does not show any moderate or severe degenerative changes. There is no evidence of acute injury. ICD-10-CM ICD-9-CM    1. Bilateral carpal tunnel syndrome  G56.03 354.0 INJECT CARPAL TUNNEL      triamcinolone acetonide (KENALOG) 10 mg/mL injection 10 mg      AMB SUPPLY ORDER      EMG TWO EXTREMITIES UPPER      NCV/LAT SENSORY PER NERVE UP/LT      NCV/LAT SENSORY PER NERVE UP/RT   2. Bilateral wrist pain  M25.531 719.43 AMB POC XRAY, WRIST; COMPLETE, 3+ VIE    M25.532  AMB POC XRAY, WRIST; COMPLETE, 3+ VIE         Plan:     Bilateral carpal tunnel injections, right carpal tunnel brace to be worn with the left. Lateral upper extremity EMGs. Follow-up and Dispositions    · Return for EMG review.           Plan was reviewed with patient, who verbalized agreement and understanding of the plan    [address] [place] [address] NOTE        Chart reviewed for the following:   IOrville DO, have reviewed the History, Physical and updated the Allergic reactions for [address] performed immediately prior to start of procedure:   Orville MICHELLE DO, have performed the following reviews on Flori Ghotra prior to the start of the procedure:            * Patient was identified by name and date of birth   * Agreement on procedure being performed was verified  * Risks and Benefits explained to the patient  * Procedure site verified and marked as necessary  * Patient was positioned for comfort  * Consent was signed and verified     Time: 09:22 AM      Date of procedure: 8/13/2021    Procedure performed by: Julianna Mendez DO    Provider assisted by: Hailee Reich LPN    Patient assisted by: self    How tolerated by patient: tolerated the procedure well with no complications    Post Procedural Pain Scale: 0 - No Hurt    Comments: none    Procedure:  After consent was obtained, using sterile technique the bilateral carpal tunnels was prepped. Local anesthetic used: 1% lidocaine. Kenalog 5 mg X2 and was then injected and the needle withdrawn. The procedure was well tolerated. The patient is asked to continue to rest the area for a few more days before resuming regular activities. It may be more painful for the first 1-2 days. Watch for fever, or increased swelling or persistent pain in the joint. Call or return to clinic prn if such symptoms occur or there is failure to improve as anticipated.

## 2021-08-18 ENCOUNTER — HOSPITAL ENCOUNTER (EMERGENCY)
Age: 41
Discharge: HOME | End: 2021-08-18
Attending: EMERGENCY MEDICINE
Payer: MEDICAID

## 2021-08-18 ENCOUNTER — APPOINTMENT (OUTPATIENT)
Dept: GENERAL RADIOLOGY | Age: 41
End: 2021-08-18
Attending: EMERGENCY MEDICINE
Payer: MEDICAID

## 2021-08-18 VITALS
BODY MASS INDEX: 39.15 KG/M2 | TEMPERATURE: 98.6 F | OXYGEN SATURATION: 100 % | RESPIRATION RATE: 18 BRPM | HEART RATE: 84 BPM | DIASTOLIC BLOOD PRESSURE: 70 MMHG | SYSTOLIC BLOOD PRESSURE: 111 MMHG | WEIGHT: 235 LBS | HEIGHT: 65 IN

## 2021-08-18 DIAGNOSIS — S46.912A STRAIN OF LEFT SHOULDER, INITIAL ENCOUNTER: Primary | ICD-10-CM

## 2021-08-18 PROCEDURE — 99283 EMERGENCY DEPT VISIT LOW MDM: CPT

## 2021-08-18 PROCEDURE — 73030 X-RAY EXAM OF SHOULDER: CPT

## 2021-08-18 PROCEDURE — 74011250637 HC RX REV CODE- 250/637: Performed by: EMERGENCY MEDICINE

## 2021-08-18 RX ORDER — CYCLOBENZAPRINE HCL 10 MG
10 TABLET ORAL
Qty: 14 TABLET | Refills: 0 | Status: SHIPPED | OUTPATIENT
Start: 2021-08-18 | End: 2022-02-04

## 2021-08-18 RX ORDER — IBUPROFEN 800 MG/1
800 TABLET ORAL
Qty: 20 TABLET | Refills: 0 | Status: SHIPPED | OUTPATIENT
Start: 2021-08-18 | End: 2021-08-25

## 2021-08-18 RX ORDER — IBUPROFEN 400 MG/1
800 TABLET ORAL
Status: COMPLETED | OUTPATIENT
Start: 2021-08-18 | End: 2021-08-18

## 2021-08-18 RX ADMIN — IBUPROFEN 800 MG: 400 TABLET, FILM COATED ORAL at 01:46

## 2021-08-18 NOTE — DISCHARGE INSTRUCTIONS
Return for any new or worsening pain, fever not resolving with motrin or tylenol, shortness of breath, vomiting, decreased fluid intake, weakness, numbness, dizziness, or any change or concerns.

## 2021-08-18 NOTE — Clinical Note
[facility] EMERGENCY DEPT  [address] [address]  [phone]    Work/School Note    Date: 8/18/2021    To Whom It May concern:    Zena Amador was seen and treated today in the emergency room by the following provider(s):  Attending Provider: Prerna iSerra MD.      Zena Amador is excused from work/school on 8/18/2021 through 8/21/2021. She is medically clear to return to work/school on 8/22/2021.         Sincerely,          Mona Blevins MD

## 2021-08-18 NOTE — ED TRIAGE NOTES
Patient reports left shoulder pain that she woke up with Tuesday morning. Patient states she put a lidocaine patch on and was using Norco every 4 hours, but the pain got increasingly worse while she was at work. Patient denies any known injury.

## 2021-08-18 NOTE — ED PROVIDER NOTES
Pt c/o left shoulder pain, woke up w it yest.  Mild pain at rest, severe w movement left shoulder. No weakness or numbness. No fever. No cp or back/neck pain. No iinjury. No rash or swelling. No leg pain. H/o b/l wrist pain x one month, finally resolved last few days, no curr wrist ptain. Took advil and norco she had from wrist pains, last doses 12 hours pta. Better but pain returned. Past Medical History:   Diagnosis Date    Asthma     History of partial nephrectomy 10/24/2018    Hyperthyroidism     Malignant tumor of kidney ([facility] [address] 10/24/2018    Polycystic ovary syndrome     Renal mass        Past Surgical History:   Procedure Laterality Date    HX HERNIA REPAIR      HX OTHER SURGICAL Right     right lung collapsed as a child         Family History:   Problem Relation Age of Onset    Hypertension Mother        Social History     Socioeconomic History    Marital status: SINGLE     Spouse name: Not on file    Number of children: Not on file    Years of education: Not on file    Highest education level: Not on file   Occupational History    Not on file   Tobacco Use    Smoking status: Never Smoker    Smokeless tobacco: Never Used   Vaping Use    Vaping Use: Never used   Substance and Sexual Activity    Alcohol use: No    Drug use: No    Sexual activity: Yes     Birth control/protection: None   Other Topics Concern    Not on file   Social History Narrative    Not on file     Social Determinants of Health     Financial Resource Strain:     Difficulty of Paying Living Expenses:    Food Insecurity:     Worried About Running Out of Food in the Last Year:     Ran Out of Food in the Last Year:    Transportation Needs:     Lack of Transportation (Medical):      Lack of Transportation (Non-Medical):    Physical Activity:     Days of Exercise per Week:     Minutes of Exercise per Session:    Stress:     Feeling of Stress :    Social Connections:     Frequency of Communication with Friends and Family:     Frequency of Social Gatherings with Friends and Family:     Attends [employer] Services:     Active Member of Clubs or Organizations:     Attends Club or Organization Meetings:     Marital Status:    Intimate Partner Violence:     Fear of Current or Ex-Partner:     Emotionally Abused:     Physically Abused:     Sexually Abused: ALLERGIES: Iodinated contrast media    Review of Systems   Constitutional: Negative for fever. HENT: Negative for congestion. Respiratory: Negative for cough and shortness of breath. Cardiovascular: Negative for chest pain. Gastrointestinal: Negative for abdominal pain and vomiting. Musculoskeletal: Positive for arthralgias. Negative for back pain. Skin: Negative for rash. Neurological: Negative for light-headedness. All other systems reviewed and are negative. Vitals:    08/18/21 0117   BP: 121/78   Pulse: 98   Resp: 17   Temp: 98.6 °F (37 °C)   SpO2: 99%   Weight: 106.6 kg (235 lb)   Height: 5' 5\" (1.651 m)            Physical Exam  Vitals and nursing note reviewed. Constitutional:       Appearance: She is well-developed. She is not diaphoretic. HENT:      Head: Normocephalic and atraumatic. Eyes:      Pupils: Pupils are equal, round, and reactive to light. Cardiovascular:      Rate and Rhythm: Normal rate and regular rhythm. Heart sounds: No murmur heard. Pulmonary:      Effort: Pulmonary effort is normal.      Breath sounds: No wheezing. Abdominal:      Palpations: Abdomen is soft. Tenderness: There is no abdominal tenderness. Musculoskeletal:         General: Tenderness (+ left shoulder diffuse ttp, no swelilng. pain w abduction. no other arm ttp. no neck or back ttp. ) present. Cervical back: Normal range of motion. Skin:     General: Skin is dry. Capillary Refill: Capillary refill takes less than 2 seconds. Findings: No rash.    Neurological:      Mental Status: She is alert and oriented to person, place, and time. MDM       Procedures    Vitals:  Patient Vitals for the past 12 hrs:   Temp Pulse Resp BP SpO2   08/18/21 0117 98.6 °F (37 °C) 98 17 121/78 99 %         Medications ordered:   Medications   ibuprofen (MOTRIN) tablet 800 mg (800 mg Oral Given 8/18/21 0146)         Lab findings:  No results found for this or any previous visit (from the past 12 hour(s)). X-Ray, CT or other radiology findings or impressions:  XR SHOULDER LT AP/LAT MIN 2 V    (Results Pending)       Progress notes, Consult notes or additional Procedure notes:   2:28 AM pt nvi. Feels much better after motrin. No emc. Stable for dc and close f/u. Not c/w dvt/soft tissue infectinon/cad/pesepsis/fx. Pt agrees w dc plan and verb und of detailed ret inst given. Diagnosis:   1. Strain of left shoulder, initial encounter        Disposition: home    Follow-up Information     Follow up With Specialties Details Why Contact [facility] EMERGENCY DEPT Emergency Medicine Go to  As needed, If symptoms worsen [address] Paulette Matthews MD Orthopedic Surgery Schedule an appointment as soon as possible for a visit in 1 week  [address] [place] [address] [address] [facility]      Sharon Jose NP Nurse Practitioner, Nurse Practitioner   [address] [place] [id number] [phone]             Patient's Medications   Start Taking    CYCLOBENZAPRINE (FLEXERIL) 10 MG TABLET    Take 1 Tablet by mouth three (3) times daily as needed for Muscle Spasm(s). IBUPROFEN (MOTRIN) 800 MG TABLET    Take 1 Tablet by mouth every eight (8) hours as needed for Pain for up to 7 days. Continue Taking    ADVAIR DISKUS 100-50 MCG/DOSE DISKUS INHALER        ALBUTEROL (PROVENTIL VENTOLIN) 2.5 MG /3 ML (0.083 %) NEBU        BUDESONIDE-FORMOTEROL (SYMBICORT) 80-4.5 MCG/ACTUATION HFAA    Take 1 Puff by inhalation daily.     FAMOTIDINE (PEPCID) 40 MG TABLET FLUTICASONE PROPIONATE (FLONASE) 50 MCG/ACTUATION NASAL SPRAY    2 Sprays by Both Nostrils route daily. HYDROCHLOROTHIAZIDE (HYDRODIURIL) 12.5 MG TABLET    Take 1 Tab by mouth daily. LINZESS 290 MCG CAP CAPSULE    TAKE 1 CAPSULE BY MOUTH EVERY DAY    METFORMIN ER (GLUCOPHAGE XR) 750 MG TABLET    Take 1 Tab by mouth two (2) times a day. MULTIVITAMIN PO    Take  by mouth. PROAIR HFA 90 MCG/ACTUATION INHALER    Take 1 Puff by inhalation every four (4) hours as needed for Wheezing or Shortness of Breath.     VICTOZA 2-JONA 0.6 MG/0.1 ML (18 MG/3 ML) PNIJ       These Medications have changed    No medications on file   Stop Taking    No medications on file

## 2021-08-27 DIAGNOSIS — G56.03 BILATERAL CARPAL TUNNEL SYNDROME: ICD-10-CM

## 2021-12-03 ENCOUNTER — OFFICE VISIT (OUTPATIENT)
Dept: ORTHOPEDIC SURGERY | Age: 41
End: 2021-12-03
Payer: MEDICAID

## 2021-12-03 VITALS
HEART RATE: 85 BPM | SYSTOLIC BLOOD PRESSURE: 114 MMHG | HEIGHT: 66 IN | WEIGHT: 223 LBS | DIASTOLIC BLOOD PRESSURE: 75 MMHG | TEMPERATURE: 96.8 F | BODY MASS INDEX: 35.84 KG/M2 | OXYGEN SATURATION: 100 %

## 2021-12-03 DIAGNOSIS — R20.2 NUMBNESS AND TINGLING IN BOTH HANDS: ICD-10-CM

## 2021-12-03 DIAGNOSIS — R20.0 NUMBNESS AND TINGLING IN BOTH HANDS: Primary | ICD-10-CM

## 2021-12-03 DIAGNOSIS — R20.2 NUMBNESS AND TINGLING IN BOTH HANDS: Primary | ICD-10-CM

## 2021-12-03 DIAGNOSIS — R20.0 NUMBNESS AND TINGLING IN BOTH HANDS: ICD-10-CM

## 2021-12-03 PROCEDURE — 95912 NRV CNDJ TEST 11-12 STUDIES: CPT | Performed by: PHYSICAL MEDICINE & REHABILITATION

## 2021-12-03 PROCEDURE — 95886 MUSC TEST DONE W/N TEST COMP: CPT | Performed by: PHYSICAL MEDICINE & REHABILITATION

## 2021-12-03 NOTE — PROGRESS NOTES
[facility] [address].  [address], [address]  [address]  Phone: [phone]  Fax: [phone]        Saqib Rowan  : 1980  PCP: Katina Meigs, NP  12/3/2021    ELECTROMYOGRAPHY AND NERVE CONDUCTION STUDIES    Yandy Alfred was referred by Dr. Kaylah Juarez for electrodiagnostic evaluation of bilateral hand numbness and tingling. NCV & EMG Findings:  All nerve conduction studies (as indicated in the following tables) were within normal limits. All left vs. right side differences were within normal limits. All examined muscles (as indicated in the following table) showed no evidence of electrical instability. INTERPRETATION    This was a normal nerve conduction and EMG study showing there to be no signs of neuropathy, myopathy, or radiculopathy in the nerves and muscles tested. CLINICAL INTERPRETATION    Her electrodiagnostic findings do not appear to explain her bilateral hand symptoms. HISTORY OF PRESENT ILLNESS  Yandy Alfred is a 37 y.o. female. Pt presents today for BUE EMG evaluation of bilateral hand numbness and tingling. She notes that moving her neck does not reproduce her symptoms. She tends to sleep on her right arm and hand at night, and she often wakes up with numbness in the hand. Her left hand is not very symptomatic compared to the right. She is RHD. She also has intermittent right shoulder pain. She has been using a topical roll-on lidocaine. She works as an , and she is often typing. She has h/o kidney cancer that did not require chemotherapy treatment.     PAST MEDICAL HISTORY   Past Medical History:   Diagnosis Date    Asthma     History of partial nephrectomy 10/24/2018    Hyperthyroidism     [facility]    Polycystic ovary syndrome     Renal mass        Past Surgical History:   Procedure Laterality Date    HX HERNIA REPAIR      HX OTHER SURGICAL Right     right lung collapsed as a child   . MEDICATIONS    Current Outpatient Medications   Medication Sig Dispense Refill    cyclobenzaprine (FLEXERIL) 10 mg tablet Take 1 Tablet by mouth three (3) times daily as needed for Muscle Spasm(s). (Patient not taking: Reported on 11/5/2021) 14 Tablet 0    budesonide-formoteroL (SYMBICORT) 80-4.5 mcg/actuation HFAA Take 1 Puff by inhalation daily.  Linzess 290 mcg cap capsule TAKE 1 CAPSULE BY MOUTH EVERY DAY 90 Capsule 3    Advair Diskus 100-50 mcg/dose diskus inhaler       Victoza 2-Ashutosh 0.6 mg/0.1 mL (18 mg/3 mL) pnij       famotidine (PEPCID) 40 mg tablet       metFORMIN ER (GLUCOPHAGE XR) 750 mg tablet Take 1 Tab by mouth two (2) times a day. 60 Tab 0    hydroCHLOROthiazide (HYDRODIURIL) 12.5 mg tablet Take 1 Tab by mouth daily. 90 Tab 1    MULTIVITAMIN PO Take  by mouth.  fluticasone propionate (FLONASE) 50 mcg/actuation nasal spray 2 Sprays by Both Nostrils route daily.  ProAir HFA 90 mcg/actuation inhaler Take 1 Puff by inhalation every four (4) hours as needed for Wheezing or Shortness of Breath.  1 Inhaler 5    albuterol (PROVENTIL VENTOLIN) 2.5 mg /3 mL (0.083 %) nebu           ALLERGIES  Allergies   Allergen Reactions    Iodinated Contrast Media Other (comments)     NAUSEA, ANXIETY-- RECEIVED MULTIHANCE (GADOBENATE DIMEGLUTAMINE) 20ML 2/13/18 FOR MRI          SOCIAL HISTORY    Social History     Socioeconomic History    Marital status: SINGLE   Tobacco Use    Smoking status: Never Smoker    Smokeless tobacco: Never Used   Vaping Use    Vaping Use: Never used   Substance and Sexual Activity    Alcohol use: No    Drug use: No    Sexual activity: Yes     Birth control/protection: None       FAMILY HISTORY  Family History   Problem Relation Age of Onset    Hypertension Mother          PHYSICAL EXAMINATION  Visit Vitals  /75 (BP 1 Location: Left upper arm, BP Patient Position: Sitting, BP Cuff Size: Adult) Pulse 85   Temp 96.8 °F (36 °C) (Temporal)   Ht 5' 5.5\" (1.664 m)   Wt 223 lb (101.2 kg)   SpO2 100% Comment: RA   BMI 36.54 kg/m²       Pain Assessment  12/3/2021   Location of Pain Hand   Location Modifiers Right;Left   Severity of Pain 5   Quality of Pain Other (Comment);Dull; Sharp   Quality of Pain Comment n/t to right hand   Duration of Pain Persistent   Frequency of Pain Constant   Aggravating Factors Other (Comment)   Aggravating Factors Comment typing at work, over using the hands   Limiting Behavior Yes   Relieving Factors Other (Comment);NSAID   Relieving Factors Comment topicals-icy hot and aspercreme   Result of Injury No           Constitutional:  Well developed, well nourished, in no acute distress. Psychiatric: Affect and mood are appropriate. Integumentary: No rashes or abrasions noted on exposed areas. SPINE/MUSCULOSKELETAL EXAM    On brief examination: None.       NCV & EMG Findings:  Nerve Conduction Studies  Anti Sensory Summary Table     Stim Site NR Peak (ms) Norm Peak (ms) O-P Amp (µV) Norm O-P Amp Site1 Site2 Delta-P (ms) Dist (cm) Denis (m/s) Norm Denis (m/s)   Left Median Anti Sensory (2nd Digit)   Wrist    3.0 <4 56.8 >13 Wrist 2nd Digit 3.0 14.0 47 >39   Right Median Anti Sensory (2nd Digit)   Wrist    2.9 <4 40.8 >13 Wrist 2nd Digit 2.9 14.0 48 >39   Left Radial Anti Sensory (Base 1st Digit)   Wrist    2.0 2.8 71.5 11 Wrist Base 1st Digit 2.0 10.0 50    Right Radial Anti Sensory (Base 1st Digit)   Wrist    1.9 2.8 59.5 11 Wrist Base 1st Digit 1.9 10.0 53    Left Ulnar Anti Sensory (5th Digit)   Wrist    2.9 <4.0 50.4 >9 Wrist 5th Digit 2.9 14.0 48 >38   Right Ulnar Anti Sensory (5th Digit)   Wrist    2.9 <4.0 34.2 >9 Wrist 5th Digit 2.9 14.0 48 >38     Motor Summary Table     Stim Site NR Onset (ms) Norm Onset (ms) O-P Amp (mV) Norm O-P Amp Site1 Site2 Delta-0 (ms) Dist (cm) Denis (m/s) Norm Denis (m/s)   Left Median Motor (Abd Poll Brev)   Wrist    3.1 <4.5 10.4 >4.1 Elbow Wrist 4.0 22.5 56 >49   Elbow    7.1  9.5          Right Median Motor (Abd Poll Brev)   Wrist    3.4 <4.5 13.1 >4.1 Elbow Wrist 4.1 23.0 56 >49   Elbow    7.5  12.8          Left Ulnar Motor (Abd Dig Min)   Wrist    2.7 <3.7 10.1 >7.9 B Elbow Wrist 3.2 19.5 61 >52   B Elbow    5.9  9.0  A Elbow B Elbow 1.8 10.0 56 >43   A Elbow    7.7  9.0          Right Ulnar Motor (Abd Dig Min)   Wrist    2.5 <3.7 10.0 >7.9 B Elbow Wrist 3.2 20.0 63 >52   B Elbow    5.7  9.8  A Elbow B Elbow 1.5 10.0 67 >43   A Elbow    7.2  9.6            Comparison Summary Table     Stim Site NR Peak (ms) Norm Peak (ms) O-P Amp (µV) Site1 Site2 Delta-P (ms) Norm Delta (ms)   Right Median/Ulnar Dig IV Comparison (Digit 4 - 14cm)   Median Wr    2.9 <3.3 37.5 Median Wr Ulnar Wr 0.2 <0.4   Ulnar Wr    2.7 <3.3 36.3         EMG     Side Muscle Nerve Root Ins Act Fibs Psw Amp Dur Poly Recrt Int Milind Cliff Comment   Right Biceps Musculocut C5-6 Nml Nml Nml Nml Nml 0 Nml Nml    Right Triceps Radial C6-7-8 Nml Nml Nml Nml Nml 0 Nml Nml    Right PronatorTeres Median C6-7 Nml Nml Nml Nml Nml 0 Nml Nml    Right Abd Poll Brev Median C8-T1 Nml Nml Nml Nml Nml 0 Nml Nml    Right 1stDorInt Ulnar C8-T1 Nml Nml Nml Nml Nml 0 Nml Nml    Left Biceps Musculocut C5-6 Nml Nml Nml Nml Nml 0 Nml Nml    Left Triceps Radial C6-7-8 Nml Nml Nml Nml Nml 0 Nml Nml    Left PronatorTeres Median C6-7 Nml Nml Nml Nml Nml 0 Nml Nml    Left Abd Poll Brev Median C8-T1 Nml Nml Nml Nml Nml 0 Nml Nml    Left 1stDorInt Ulnar C8-T1 Nml Nml Nml Nml Nml 0 Nml Nml        Nerve Conduction Studies  Anti Sensory Left/Right Comparison     Stim Site L Lat (ms) R Lat (ms) L-R Lat (ms) L Amp (µV) R Amp (µV) L-R Amp (%) Site1 Site2 L Denis (m/s) R Denis (m/s) L-R Denis (m/s)   Median Anti Sensory (2nd Digit)   Wrist 3.0 2.9 0.1 56.8 40.8 28.2 Wrist 2nd Digit 47 48 1   Radial Anti Sensory (Base 1st Digit)   Wrist 2.0 1.9 0.1 71.5 59.5 16.8 Wrist Base 1st Digit 50 53 3   Ulnar Anti Sensory (5th Digit)   Wrist 2.9 2.9 0.0 50.4 34.2 32.1 Wrist 5th Digit 48 48 0     Motor Left/Right Comparison     Stim Site L Lat (ms) R Lat (ms) L-R Lat (ms) L Amp (mV) R Amp (mV) L-R Amp (%) Site1 Site2 L Denis (m/s) R Denis (m/s) L-R Denis (m/s)   Median Motor (Abd Poll Brev)   Wrist 3.1 3.4 0.3 10.4 13.1 20.6 Elbow Wrist 56 56 0   Elbow 7.1 7.5 0.4 9.5 12.8 25.8        Ulnar Motor (Abd Dig Min)   Wrist 2.7 2.5 0.2 10.1 10.0 1.0 B Elbow Wrist 61 63 2   B Elbow 5.9 5.7 0.2 9.0 9.8 8.2 A Elbow B Elbow 56 67 11   A Elbow 7.7 7.2 0.5 9.0 9.6 6.2          Comparison Left/Right Comparison     Stim Site L Lat (ms) R Lat (ms) L-R Lat (ms) L Amp (µV) R Amp (µV) L-R Amp (%)   Median/Ulnar Dig IV Comparison (Digit 4 - 14cm)   Median Wr  2.9   37.5    Ulnar Wr  2.7   36.3          Waveforms:                                   [facility] - [facility] PROCEDURE PROGRESS NOTE        Chart reviewed for the following:   I, Mai Councilman, have reviewed the History, Physical and updated the Allergic reactions for [address] performed immediately prior to start of procedure:   I, Mai Councilman, have performed the following reviews on Raphael Robison prior to the start of the procedure:            * Patient was identified by name and date of birth   * Agreement on procedure being performed was verified  * Risks and Benefits explained to the patient  * Procedure site verified and marked as necessary  * Patient was positioned for comfort  * Consent was signed and verified     Time: 9:36 AM    Date of procedure: 12/3/2021    Procedure performed by:  Sandee Santoyo MD    Provider accompanied by: Lizett. Patient accompanied by: Self.     How tolerated by patient: tolerated the procedure well with no complications    Post Procedural Pain Scale: 0 - No Hurt    Comments: none    Written by [facility] as dictated by Mai Councilman, MD

## 2021-12-03 NOTE — PROGRESS NOTES
Madelyn Guzmanravensunny presents today for   Chief Complaint   Patient presents with    Procedure     EMG BUE       Is someone accompanying this pt? no    Is the patient using any DME equipment during OV? no    Depression Screening:  3 most recent PHQ Screens 7/31/2020   Little interest or pleasure in doing things Not at all   Feeling down, depressed, irritable, or hopeless Not at all   Total Score PHQ 2 0       Coordination of Care:  1. Have you been to the ER, urgent care clinic since your last visit? Yes, pt went to the ER at [facility] for pain in her right arm. Notes in care everywhere. Hospitalized since your last visit? no    2. Have you seen or consulted any other health care providers outside of the [address] [place] [address] since your last visit? Yes, ortho Include any pap smears or colon screening.  no

## 2021-12-03 NOTE — LETTER
12/3/2021    Patient: Paula Mireles   YOB: 1980   Date of Visit: 12/3/2021     ABIGAIL Edwards [address] [place] [id number]  Via Fax: [phone]     Misha Pollock [facility] [address]  [address]  [address] [place] [address]  Via In [facility] [address]    Dear ABIGAIL Edwards DO,      Thank you for referring Ms. Paula Mireles to [facility] for evaluation. My notes for this consultation are attached. If you have questions, please do not hesitate to call me. I look forward to following your patient along with you.       Sincerely,    Lisa Gonzales MD

## 2021-12-05 ENCOUNTER — HOSPITAL ENCOUNTER (EMERGENCY)
Age: 41
Discharge: HOME | End: 2021-12-05
Payer: MEDICAID

## 2021-12-05 VITALS
OXYGEN SATURATION: 98 % | SYSTOLIC BLOOD PRESSURE: 138 MMHG | DIASTOLIC BLOOD PRESSURE: 78 MMHG | HEART RATE: 88 BPM | TEMPERATURE: 98.4 F | HEIGHT: 65 IN | BODY MASS INDEX: 36.65 KG/M2 | WEIGHT: 220 LBS | RESPIRATION RATE: 20 BRPM

## 2021-12-05 PROCEDURE — 75810000275 HC EMERGENCY DEPT VISIT NO LEVEL OF CARE

## 2021-12-05 NOTE — ED TRIAGE NOTES
Patient arrives ambulatory to the triage room with c/o multiple joint pain that has been chronic for a \"few weeks to months. \" She reports having had knee pain in the past with relief after using Voltaren gel. She now has c/o cervical neck pain, bilateral hand pain with the right being worse than the left, and right shoulder pain. She has an upcoming appointment in a month with an \"bone doctor. \" She is here for pain relief.

## 2021-12-05 NOTE — ED NOTES
Patient not in triage room. Stated earlier that she would Trenton Butler wait for a little bit because I didn't even want to come. \" MD aware.

## 2021-12-17 ENCOUNTER — OFFICE VISIT (OUTPATIENT)
Dept: ORTHOPEDIC SURGERY | Age: 41
End: 2021-12-17
Payer: MEDICAID

## 2021-12-17 VITALS — HEIGHT: 65 IN | BODY MASS INDEX: 36.65 KG/M2 | WEIGHT: 220 LBS | RESPIRATION RATE: 16 BRPM

## 2021-12-17 DIAGNOSIS — M79.641 RIGHT HAND PAIN: Primary | ICD-10-CM

## 2021-12-17 DIAGNOSIS — M25.511 RIGHT SHOULDER PAIN, UNSPECIFIED CHRONICITY: ICD-10-CM

## 2021-12-17 PROCEDURE — 99214 OFFICE O/P EST MOD 30 MIN: CPT | Performed by: ORTHOPAEDIC SURGERY

## 2021-12-17 NOTE — PROGRESS NOTES
Srinivas Dillard is a 37 y.o. female right handed unspecified employment. Worker's Compensation and legal considerations: none filed. Vitals:    12/17/21 0831   Resp: 16   Weight: 220 lb (99.8 kg)   Height: 5' 5\" (1.651 m)   PainSc:   4   PainLoc: Hand           Chief Complaint   Patient presents with    Follow-up     EMG results    Hand Pain     bilateral       HPI: Patient presents today for bilateral upper extremity EMG follow-up. She previously had carpal tunnel injections that she did have some relief from. She reports continued pain on the right hand noticeably at the end of the workday. Initial HPI: Patient presents today with complaints of bilateral wrist and hand pain. She reports to be worse at night. Date of onset: Early August 2021    Injury: No    Prior Treatment:  Yes: Comment: left brace    Numbness/ Tingling: No      ROS: Review of Systems - General ROS: negative  Psychological ROS: negative  ENT ROS: negative  Allergy and Immunology ROS: negative  Hematological and Lymphatic ROS: negative  Respiratory ROS: no cough, shortness of breath, or wheezing  Cardiovascular ROS: no chest pain or dyspnea on exertion  Gastrointestinal ROS: no abdominal pain, change in bowel habits, or black or bloody stools  Musculoskeletal ROS: positive for - pain in hand - bilateral  Neurological ROS: positive for - numbness/tingling  Dermatological ROS: negative    Past Medical History:   Diagnosis Date    Asthma     History of partial nephrectomy 10/24/2018    Hyperthyroidism     Malignant tumor of kidney ([facility] 10/24/2018    Polycystic ovary syndrome     Renal mass        Past Surgical History:   Procedure Laterality Date    HX HERNIA REPAIR      HX OTHER SURGICAL Right     right lung collapsed as a child       Current Outpatient Medications   Medication Sig Dispense Refill    cyclobenzaprine (FLEXERIL) 10 mg tablet Take 1 Tablet by mouth three (3) times daily as needed for Muscle Spasm(s).  (Patient not taking: Reported on 11/5/2021) 14 Tablet 0    budesonide-formoteroL (SYMBICORT) 80-4.5 mcg/actuation HFAA Take 1 Puff by inhalation daily.  Linzess 290 mcg cap capsule TAKE 1 CAPSULE BY MOUTH EVERY DAY 90 Capsule 3    Advair Diskus 100-50 mcg/dose diskus inhaler       Victoza 2-Ashutosh 0.6 mg/0.1 mL (18 mg/3 mL) pnij       famotidine (PEPCID) 40 mg tablet       metFORMIN ER (GLUCOPHAGE XR) 750 mg tablet Take 1 Tab by mouth two (2) times a day. 60 Tab 0    hydroCHLOROthiazide (HYDRODIURIL) 12.5 mg tablet Take 1 Tab by mouth daily. 90 Tab 1    MULTIVITAMIN PO Take  by mouth.  fluticasone propionate (FLONASE) 50 mcg/actuation nasal spray 2 Sprays by Both Nostrils route daily.  ProAir HFA 90 mcg/actuation inhaler Take 1 Puff by inhalation every four (4) hours as needed for Wheezing or Shortness of Breath. 1 Inhaler 5    albuterol (PROVENTIL VENTOLIN) 2.5 mg /3 mL (0.083 %) nebu          Allergies   Allergen Reactions    Iodinated Contrast Media Other (comments)     NAUSEA, ANXIETY-- RECEIVED MULTIHANCE (GADOBENATE DIMEGLUTAMINE) 20ML 2/13/18 FOR MRI           PE:     Physical Exam  Vitals and nursing note reviewed. Constitutional:       General: She is not in acute distress. Appearance: Normal appearance. She is not ill-appearing. Cardiovascular:      Pulses: Normal pulses. Pulmonary:      Effort: Pulmonary effort is normal. No respiratory distress. Musculoskeletal:         General: No swelling, tenderness, deformity or signs of injury. Normal range of motion. Cervical back: Normal range of motion and neck supple. Right lower leg: No edema. Left lower leg: No edema. Skin:     General: Skin is warm and dry. Capillary Refill: Capillary refill takes less than 2 seconds. Findings: No bruising or erythema. Neurological:      General: No focal deficit present. Mental Status: She is alert and oriented to person, place, and time.    Psychiatric:         Mood and Affect: Mood normal.         Behavior: Behavior normal.            Right hand: There is no tenderness to palpation about the digits. Range of motion is full. There is no synovitis palpated. Neurovascularly intact. NCV & EMG Findings:  All nerve conduction studies (as indicated in the following tables) were within normal limits. All left vs. right side differences were within normal limits.       All examined muscles (as indicated in the following table) showed no evidence of electrical instability.       INTERPRETATION     This was a normal nerve conduction and EMG study showing there to be no signs of neuropathy, myopathy, or radiculopathy in the nerves and muscles tested.      CLINICAL INTERPRETATION     Her electrodiagnostic findings do not appear to explain her bilateral hand symptoms. Imagin-08-13 3 views of bilateral wrist does not show any moderate or severe degenerative changes. There is no evidence of acute injury. ICD-10-CM ICD-9-CM    1. Right hand pain  M79.641 729.5 REFERRAL TO OCCUPATIONAL THERAPY   2. Right shoulder pain, unspecified chronicity  M25.511 719.41 REFERRAL TO ORTHOPEDIC SURGERY         Plan:     Referral to occupational therapy for activity modification and occupational strategies. It appears most of her hand is activity related from work. It does not appear that she has a neuropathy though she did have some improvement with her steroid injections this is likely just from general anti-inflammatory relief. Referral to shoulder specialist for right shoulder pain. Follow-up and Dispositions    · Return if symptoms worsen or fail to improve.           Plan was reviewed with patient, who verbalized agreement and understanding of the plan

## 2021-12-30 ENCOUNTER — HOSPITAL ENCOUNTER (EMERGENCY)
Age: 41
Discharge: LEFT BEFORE DISCHARGE | End: 2021-12-30
Payer: MEDICAID

## 2021-12-30 PROCEDURE — 75810000275 HC EMERGENCY DEPT VISIT NO LEVEL OF CARE

## 2022-01-02 PROCEDURE — 99283 EMERGENCY DEPT VISIT LOW MDM: CPT

## 2022-01-03 ENCOUNTER — HOSPITAL ENCOUNTER (EMERGENCY)
Age: 42
Discharge: HOME | End: 2022-01-03
Attending: EMERGENCY MEDICINE
Payer: MEDICAID

## 2022-01-03 ENCOUNTER — APPOINTMENT (OUTPATIENT)
Dept: GENERAL RADIOLOGY | Age: 42
End: 2022-01-03
Attending: EMERGENCY MEDICINE
Payer: MEDICAID

## 2022-01-03 VITALS
SYSTOLIC BLOOD PRESSURE: 151 MMHG | HEIGHT: 65 IN | RESPIRATION RATE: 20 BRPM | HEART RATE: 80 BPM | WEIGHT: 220 LBS | TEMPERATURE: 98.3 F | OXYGEN SATURATION: 98 % | BODY MASS INDEX: 36.65 KG/M2 | DIASTOLIC BLOOD PRESSURE: 79 MMHG

## 2022-01-03 DIAGNOSIS — U07.1 COVID-19: Primary | ICD-10-CM

## 2022-01-03 DIAGNOSIS — J10.1 INFLUENZA A: ICD-10-CM

## 2022-01-03 PROCEDURE — 71045 X-RAY EXAM CHEST 1 VIEW: CPT

## 2022-01-03 RX ORDER — BENZONATATE 100 MG/1
100 CAPSULE ORAL
Qty: 30 CAPSULE | Refills: 0 | Status: SHIPPED | OUTPATIENT
Start: 2022-01-03 | End: 2022-09-17

## 2022-01-03 RX ORDER — NAPROXEN 500 MG/1
500 TABLET ORAL 2 TIMES DAILY WITH MEALS
Qty: 20 TABLET | Refills: 0 | Status: SHIPPED | OUTPATIENT
Start: 2022-01-03 | End: 2022-01-13

## 2022-01-03 NOTE — ED NOTES
HPI: Dylan Camacho is a 37 y.o. female who presents to the ED with complaint of cough, congestion, and myalgias. She is been sick for the past 3 to 4 days. She says she was seen in urgent care on Friday and had a positive Covid and flu result at that time. She has received her Covid vaccine, but was not vaccinated against the flu. She presents tonight because she says she is not really feeling any better. She is using Mucinex, over-the-counter cough syrup, and not really having much relief. She denies any fevers. No shortness of breath today. ROS:  Constitutional: Negative for fever and chills. HENT: Negative for congestion and sore throat. Eyes: Negative for eye discharge and eye redness. Respiratory: Negative for cough and sputum production. Gastrointestinal: Negative for nausea, vomiting, abdominal pain and diarrhea. Genitourinary: Negative for dysuria, urgency and frequency. Neurological: Negative for dizziness, focal weakness, numbness. Musculoskeletal: Negative for joint swelling and arthralgias. Hematological: Negative for adenopathy. Does not bruise/bleed easily. Psychiatric/Behavioral: Negative for agitation.        Past Medical and Surgical History  Past Medical History:   Diagnosis Date    Asthma     History of partial nephrectomy 10/24/2018    Hyperthyroidism     Malignant tumor [facility] ([facility] 10/24/2018    Polycystic ovary syndrome     Renal mass      Past Surgical History:   Procedure Laterality Date    HX HERNIA REPAIR      HX OTHER SURGICAL Right     right lung collapsed as a child         Social   Social History     Tobacco Use    Smoking status: Never Smoker    Smokeless tobacco: Never Used   Vaping Use    Vaping Use: Never used   Substance Use Topics    Alcohol use: No    Drug use: No       Family History  Family History:   Problem Relation Age of Onset    Hypertension Mother          Medications  No current facility-administered medications on file prior to encounter. Current Outpatient Medications on File Prior to Encounter   Medication Sig Dispense Refill    Linzess 290 mcg cap capsule TAKE 1 CAPSULE BY MOUTH EVERY DAY 90 Capsule 3    Advair Diskus 100-50 mcg/dose diskus inhaler       Victoza 2-Ashutosh 0.6 mg/0.1 mL (18 mg/3 mL) pnij       famotidine (PEPCID) 40 mg tablet       metFORMIN ER (GLUCOPHAGE XR) 750 mg tablet Take 1 Tab by mouth two (2) times a day. 60 Tab 0    hydroCHLOROthiazide (HYDRODIURIL) 12.5 mg tablet Take 1 Tab by mouth daily. 90 Tab 1    MULTIVITAMIN PO Take  by mouth.  fluticasone propionate (FLONASE) 50 mcg/actuation nasal spray 2 Sprays by Both Nostrils route daily.  ProAir HFA 90 mcg/actuation inhaler Take 1 Puff by inhalation every four (4) hours as needed for Wheezing or Shortness of Breath. 1 Inhaler 5    albuterol (PROVENTIL VENTOLIN) 2.5 mg /3 mL (0.083 %) nebu       cyclobenzaprine (FLEXERIL) 10 mg tablet Take 1 Tablet by mouth three (3) times daily as needed for Muscle Spasm(s). (Patient not taking: Reported on 11/5/2021) 14 Tablet 0    budesonide-formoteroL (SYMBICORT) 80-4.5 mcg/actuation HFAA Take 1 Puff by inhalation daily. (Patient not taking: Reported on 1/3/2022)         Allergies  Allergies   Allergen Reactions    Iodinated Contrast Media Other (comments)     NAUSEA, ANXIETY-- RECEIVED MULTIHANCE (GADOBENATE DIMEGLUTAMINE) 20ML 2/13/18 FOR MRI       PHYSICAL EXAM:    Patient Vitals for the past 8 hrs:   Temp Pulse Resp BP SpO2   01/03/22 0108 -- -- -- -- 98 %   01/03/22 0057 98.3 °F (36.8 °C) 80 20 (!) 151/79 98 %   01/03/22 0054 -- 82 20 130/63 98 %         CONSTITUTIONAL:   Well developed, well nourished. Awake & alert. No acute respiratory distress, non toxic in appearance, not diaphoretic. HEAD:  Normocephalic and atraumatic. EENT:  Pupils are round and reactive, extra-ocular muscles are intact. Sclera are anicteric.  Mucous membranes are moist and intact and posterior pharynx is unremarkable. Patient is speaking clearly, not stridorous or muffled and airway is intact. NECK:   Trachea is midline. No lymphadenopathy. Neck is supple without meningismus. CARDIOVASCULAR:  Regular rate and rhythm, no murmurs, gallps or rubs heard. PULMONARY:   Clear to ausculation bilaterally. No wheezing, rales or rhonchi. Patient is speaking in full sentences without accessory muscle usage. ABDOMINAL:  No tenderness. Soft and non distended. Without hepatosplenomegaly. No pulsatile mass. No rebound, guarding, or rigidity. Overall benign exam.    MUSCULOSKELETAL :  Normal  range of motion. No pedal edema. No tenderness. No cellulitis or evidence of trauma. SKIN:  Skin is warm and dry. No diaphoresis, pathological rashes, or lesions. NEUROLOGIC: Alert and awake and appropriately oriented. '      Labs:  No results found for this or any previous visit (from the past 12 hour(s)). No results found for this or any previous visit (from the past 24 hour(s)). Radiology:     XR CHEST PORT    (Results Pending)     No acute infiltrate, read by me. Documentation Review:    I have examined the available pertinent past medical records from previous ED visits, hospital admissions, or clinic visits; the information obtained has contributed to PMSHx. The nursing notes for this patient's current visit have been reviewed, and any pertinent information has been incorporated into this note, particularly the vital signs, PMSFSHx, and initial presenting complaint. MEDICAL DECISION MAKING / ED COURSE    The patient comes in with a prior diagnosis of Covid and flu a couple days ago. She is still not feeling well despite symptomatic care. Her x-ray shows no evidence of pneumonia or other more serious pathology. She has no respiratory distress and her oxygen saturations are 98% on room air. Indication for admission.   We will plan to treat symptomatically with close outpatient follow-up. Meds given in ED:  Medications - No data to display    FINAL DIAGNOSIS:    ICD-10-CM ICD-9-CM    1. COVID-19  U07.1 079.89    2. Influenza A  J10.1 487.1          DISPOSITION     Destination: Home    Discharge Instructions:  Please return to the ED for new, different, or worsening symptoms, as well as for any persisting complaints. Read your discharge paperwork carefully and take any prescription medication as directed. Follow up with the healthcare referral provided in the time period specified. In the meantime please contact or return to the ED for any questions or concerns.

## 2022-01-04 ENCOUNTER — PATIENT OUTREACH (OUTPATIENT)
Dept: CASE MANAGEMENT | Age: 42
End: 2022-01-04

## 2022-01-04 NOTE — PROGRESS NOTES
Patient contacted regarding COVID-19 diagnosis. Discussed COVID-19 related testing which was available at this time. Test results were positive. Patient informed of results, if available? yes. Ambulatory Care Manager contacted the patient by telephone to perform post discharge assessment. Call within 2 business days of discharge: Yes Verified name and  with patient as identifiers. Provided introduction to self, and explanation of the CTN/ACM role, and reason for call due to risk factors for infection and/or exposure to COVID-19. Symptoms reviewed with patient who verbalized the following symptoms: cough, shortness of breath and congestion, ear pain      Due to no new or worsening symptoms encounter was not routed to provider for escalation. Discussed follow-up appointments. If no appointment was previously scheduled, appointment scheduling offered:  [facility] provided patient with [facility] PCP list. Patient does not have a PCP at this time. [address] follow up appointment(s): No future appointments. Non-[facility] follow up appointment(s): n/a    Interventions to address risk factors: Obtained and reviewed discharge summary and/or continuity of care documents and Education of patient/family/caregiver/guardian to support self-management-. Advance Care Planning:   Does patient have an Advance Directive: not on file. Educated patient about risk for severe COVID-19 due to risk factors according to CDC guidelines. ACM reviewed discharge instructions, medical action plan and red flag symptoms with the patient who verbalized understanding. Discussed COVID vaccination status: yes. Education provided on COVID-19 vaccination as appropriate. Discussed exposure protocols and quarantine with CDC Guidelines. Patient was given an opportunity to verbalize any questions and concerns and agrees to contact ACM or health care provider for questions related to their healthcare.     Reviewed and educated patient on any new and changed medications related to discharge diagnosis     Was patient discharged with a pulse oximeter? no Discussed and confirmed pulse oximeter discharge instructions and when to notify provider or seek emergency care. ACM provided contact information. Plan for follow-up call in 5-7 days based on severity of symptoms and risk factors.

## 2022-01-06 ENCOUNTER — HOSPITAL ENCOUNTER (EMERGENCY)
Age: 42
Discharge: HOME | End: 2022-01-06
Attending: EMERGENCY MEDICINE
Payer: MEDICAID

## 2022-01-06 VITALS
TEMPERATURE: 98.1 F | HEART RATE: 96 BPM | BODY MASS INDEX: 36.65 KG/M2 | OXYGEN SATURATION: 100 % | WEIGHT: 220 LBS | SYSTOLIC BLOOD PRESSURE: 124 MMHG | RESPIRATION RATE: 20 BRPM | HEIGHT: 65 IN | DIASTOLIC BLOOD PRESSURE: 82 MMHG

## 2022-01-06 DIAGNOSIS — U07.1 COVID: Primary | ICD-10-CM

## 2022-01-06 PROCEDURE — 99282 EMERGENCY DEPT VISIT SF MDM: CPT

## 2022-01-06 RX ORDER — ONDANSETRON 4 MG/1
4 TABLET, ORALLY DISINTEGRATING ORAL
Status: DISCONTINUED | OUTPATIENT
Start: 2022-01-06 | End: 2022-01-07 | Stop reason: HOSPADM

## 2022-01-06 RX ORDER — MULTIVITAMIN
1 CAPSULE ORAL DAILY
Qty: 30 CAPSULE | Refills: 0 | Status: SHIPPED | OUTPATIENT
Start: 2022-01-06

## 2022-01-06 RX ORDER — ONDANSETRON 4 MG/1
4 TABLET, FILM COATED ORAL
Qty: 10 TABLET | Refills: 0 | Status: SHIPPED | OUTPATIENT
Start: 2022-01-06 | End: 2022-09-17

## 2022-01-06 RX ORDER — IBUPROFEN 600 MG/1
600 TABLET ORAL
Status: DISCONTINUED | OUTPATIENT
Start: 2022-01-06 | End: 2022-01-07 | Stop reason: HOSPADM

## 2022-01-07 ENCOUNTER — PATIENT OUTREACH (OUTPATIENT)
Dept: CASE MANAGEMENT | Age: 42
End: 2022-01-07

## 2022-01-07 NOTE — ED PROVIDER NOTES
EMERGENCY DEPARTMENT HISTORY AND PHYSICAL EXAM      Date: 1/6/2022  Patient Name: Riddhi Bazan    History of Presenting Illness     Chief Complaint   Patient presents with    Generalized Body Aches       History Provided By: Patient    HPI: Riddhi Bazan, 37 y.o. female with a past medical history significant No significant past medical history presents to the ED with cc of Body aches, loss of appetite and feels she may be dehydrated. She was recently diagnosed with CoVID but feels no better. Taking Naprosyn without help. There are no other complaints, changes, or physical findings at this time. PCP: Inga Telles NP    No current facility-administered medications on file prior to encounter. Current Outpatient Medications on File Prior to Encounter   Medication Sig Dispense Refill    benzonatate (Tessalon Perles) 100 mg capsule Take 1 Capsule by mouth three (3) times daily as needed for Cough. 30 Capsule 0    naproxen (Naprosyn) 500 mg tablet Take 1 Tablet by mouth two (2) times daily (with meals) for 10 days. 20 Tablet 0    cyclobenzaprine (FLEXERIL) 10 mg tablet Take 1 Tablet by mouth three (3) times daily as needed for Muscle Spasm(s). (Patient not taking: Reported on 11/5/2021) 14 Tablet 0    budesonide-formoteroL (SYMBICORT) 80-4.5 mcg/actuation HFAA Take 1 Puff by inhalation daily. (Patient not taking: Reported on 1/3/2022)      Linzess 290 mcg cap capsule TAKE 1 CAPSULE BY MOUTH EVERY DAY 90 Capsule 3    Advair Diskus 100-50 mcg/dose diskus inhaler       Victoza 2-Ashutosh 0.6 mg/0.1 mL (18 mg/3 mL) pnij       famotidine (PEPCID) 40 mg tablet       metFORMIN ER (GLUCOPHAGE XR) 750 mg tablet Take 1 Tab by mouth two (2) times a day. 60 Tab 0    hydroCHLOROthiazide (HYDRODIURIL) 12.5 mg tablet Take 1 Tab by mouth daily. 90 Tab 1    MULTIVITAMIN PO Take  by mouth.       fluticasone propionate (FLONASE) 50 mcg/actuation nasal spray 2 Sprays by Both Nostrils route daily.      ProAir HFA 90 mcg/actuation inhaler Take 1 Puff by inhalation every four (4) hours as needed for Wheezing or Shortness of Breath. 1 Inhaler 5    albuterol (PROVENTIL VENTOLIN) 2.5 mg /3 mL (0.083 %) nebu          Past History     Past Medical History:  Past Medical History:   Diagnosis Date    Asthma     History of partial nephrectomy 10/24/2018    Hyperthyroidism     Malignant tumor of kidney ([address] 10/24/2018    Polycystic ovary syndrome     Renal mass        Past Surgical History:  Past Surgical History:   Procedure Laterality Date    HX HERNIA REPAIR      HX OTHER SURGICAL Right     right lung collapsed as a child       Family History:  Family History   Problem Relation Age of Onset    Hypertension Mother        Social History:  Social History     Tobacco Use    Smoking status: Never Smoker    Smokeless tobacco: Never Used   Vaping Use    Vaping Use: Never used   Substance Use Topics    Alcohol use: No    Drug use: No       Allergies: Allergies   Allergen Reactions    Iodinated Contrast Media Other (comments)     NAUSEA, ANXIETY-- RECEIVED MULTIHANCE (GADOBENATE DIMEGLUTAMINE) 20ML 2/13/18 FOR MRI         Review of Systems     Review of Systems   Constitutional: Negative. HENT: Negative. Respiratory: Negative. Cardiovascular: Negative. Gastrointestinal: Negative. Genitourinary: Negative. Musculoskeletal: Positive for myalgias. Neurological: Negative. All other systems reviewed and are negative. Physical Exam     Physical Exam  Vitals and nursing note reviewed. Constitutional:       Appearance: Normal appearance. HENT:      Head: Normocephalic. Eyes:      Extraocular Movements: Extraocular movements intact. Pupils: Pupils are equal, round, and reactive to light. Cardiovascular:      Rate and Rhythm: Normal rate and regular rhythm. Pulses: Normal pulses. Heart sounds: Normal heart sounds.    Pulmonary:      Effort: Pulmonary effort is normal.      Breath sounds: Normal breath sounds. Abdominal:      General: Abdomen is flat. Palpations: Abdomen is soft. Musculoskeletal:         General: Normal range of motion. Neurological:      General: No focal deficit present. Mental Status: She is alert and oriented to person, place, and time. Lab and Diagnostic Study Results     Labs -   No results found for this or any previous visit (from the past 12 hour(s)). Radiologic Studies -   @lastxrresult@  CT Results  (Last 48 hours)    None        CXR Results  (Last 48 hours)    None            Medical Decision Making   - I am the first provider for this patient. - I reviewed the vital signs, available nursing notes, past medical history, past surgical history, family history and social history. - Initial assessment performed. The patients presenting problems have been discussed, and they are in agreement with the care plan formulated and outlined with them. I have encouraged them to ask questions as they arise throughout their visit. Vital Signs-Reviewed the patient's vital signs. Patient Vitals for the past 12 hrs:   Temp Pulse Resp BP SpO2   01/06/22 2313 -- 96 -- 124/82 --   01/06/22 2310 -- 93 -- (!) 107/58 --   01/06/22 2307 -- 93 -- (!) 110/57 --   01/06/22 2233 -- -- -- -- 100 %   01/06/22 2225 98.1 °F (36.7 °C) 94 20 118/74 100 %       Records Reviewed: Nursing Notes    The patient presents with       ED Course:          Provider Notes (Medical Decision Making): MDM       Procedures   Medical Decision Makingedical Decision Making  Performed by: Loida Guerra MD  PROCEDURES:  Procedures       Disposition   Disposition: DC- Adult Discharges: All of the diagnostic tests were reviewed and questions answered. Diagnosis, care plan and treatment options were discussed. The patient understands the instructions and will follow up as directed. The patients results have been reviewed with them.   They have been counseled regarding their diagnosis. The patient verbally convey understanding and agreement of the signs, symptoms, diagnosis, treatment and prognosis and additionally agrees to follow up as recommended with their PCP in 24 - 48 hours. They also agree with the care-plan and convey that all of their questions have been answered. I have also put together some discharge instructions for them that include: 1) educational information regarding their diagnosis, 2) how to care for their diagnosis at home, as well a 3) list of reasons why they would want to return to the ED prior to their follow-up appointment, should their condition change. Discharged    DISCHARGE PLAN:  1. Current Discharge Medication List      CONTINUE these medications which have NOT CHANGED    Details   benzonatate (Tessalon Perles) 100 mg capsule Take 1 Capsule by mouth three (3) times daily as needed for Cough. Qty: 30 Capsule, Refills: 0      naproxen (Naprosyn) 500 mg tablet Take 1 Tablet by mouth two (2) times daily (with meals) for 10 days. Qty: 20 Tablet, Refills: 0      cyclobenzaprine (FLEXERIL) 10 mg tablet Take 1 Tablet by mouth three (3) times daily as needed for Muscle Spasm(s). Qty: 14 Tablet, Refills: 0      budesonide-formoteroL (SYMBICORT) 80-4.5 mcg/actuation HFAA Take 1 Puff by inhalation daily. Linzess 290 mcg cap capsule TAKE 1 CAPSULE BY MOUTH EVERY DAY  Qty: 90 Capsule, Refills: 3      Advair Diskus 100-50 mcg/dose diskus inhaler       Victoza 2-Ashutosh 0.6 mg/0.1 mL (18 mg/3 mL) pnij       famotidine (PEPCID) 40 mg tablet       metFORMIN ER (GLUCOPHAGE XR) 750 mg tablet Take 1 Tab by mouth two (2) times a day. Qty: 60 Tab, Refills: 0    Associated Diagnoses: Polycystic ovary syndrome      hydroCHLOROthiazide (HYDRODIURIL) 12.5 mg tablet Take 1 Tab by mouth daily. Qty: 90 Tab, Refills: 1    Associated Diagnoses: Hypervolemia, unspecified hypervolemia type      MULTIVITAMIN PO Take  by mouth.       fluticasone propionate (FLONASE) 50 mcg/actuation nasal spray 2 Sprays by Both Nostrils route daily. ProAir HFA 90 mcg/actuation inhaler Take 1 Puff by inhalation every four (4) hours as needed for Wheezing or Shortness of Breath. Qty: 1 Inhaler, Refills: 5    Associated Diagnoses: Moderate persistent asthma with acute exacerbation      albuterol (PROVENTIL VENTOLIN) 2.5 mg /3 mL (0.083 %) nebu            2.   Follow-up Information     Follow up With Specialties Details Why Contact Info    Nigel Caldwell NP Nurse Practitioner In 1 week  [address] [place], [place] [phone]          3. Return to ED if worse   4. Current Discharge Medication List      START taking these medications    Details   zinc 50 mg tab tablet Take 1 Tablet by mouth daily for 14 days. Qty: 14 Tablet, Refills: 0  Start date: 1/6/2022, End date: 1/20/2022      ondansetron hcl (ZOFRAN) 4 mg tablet Take 1 Tablet by mouth every eight (8) hours as needed for Nausea, Vomiting or Nausea or Vomiting. Qty: 10 Tablet, Refills: 0  Start date: 1/6/2022      !! multivitamin capsule Take 1 Capsule by mouth daily. Qty: 30 Capsule, Refills: 0  Start date: 1/6/2022       !! - Potential duplicate medications found. Please discuss with provider. CONTINUE these medications which have NOT CHANGED    Details   !! MULTIVITAMIN PO Take  by mouth. !! - Potential duplicate medications found. Please discuss with provider. Diagnosis     Clinical Impression:   1. COVID        Attestations:    Yeny Munoz MD    Please note that this dictation was completed with [employer], the [employer] voice recognition software. Quite often unanticipated grammatical, syntax, homophones, and other interpretive errors are inadvertently transcribed by the computer software. Please disregard these errors. Please excuse any errors that have escaped final proofreading. Thank you.

## 2022-01-07 NOTE — PROGRESS NOTES
Patient contacted regarding COVID-19 diagnosis. Discussed COVID-19 related testing which was available at this time. Test results were positive. Patient informed of results, if available? yes. Ambulatory Care Manager contacted the patient by telephone to perform post discharge assessment. Call within 2 business days of discharge: Yes Verified name and  with patient as identifiers. Provided introduction to self, and explanation of the CTN/ACM role, and reason for call due to risk factors for infection and/or exposure to COVID-19. Symptoms reviewed with patient who verbalized the following symptoms: pain or aching joints      Due to no new or worsening symptoms encounter was not routed to provider for escalation. Discussed follow-up appointments. If no appointment was previously scheduled, appointment scheduling offered:  n/a. [facility] follow up appointment(s): No future appointments. Interventions to address risk factors: Obtained and reviewed discharge summary and/or continuity of care documents and Assessment and support for treatment adherence and medication management-. P  Does patient have an Advance Directive: not on file. Educated patient about risk for severe COVID-19 due to risk factors according to CDC guidelines. ACM reviewed discharge instructions, medical action plan and red flag symptoms with the patient who verbalized understanding. Discussed COVID vaccination status: yes. Education provided on COVID-19 vaccination as appropriate. Discussed exposure protocols and quarantine with CDC Guidelines. Patient was given an opportunity to verbalize any questions and concerns and agrees to contact ACM or health care provider for questions related to their healthcare. Reviewed and educated patient on any new and changed medications related to discharge diagnosis     Was patient discharged with a pulse oximeter?  no Discussed and confirmed pulse oximeter discharge instructions and when to notify provider or seek emergency care. ACM provided contact information. Plan for follow-up call in 3-5 days based on severity of symptoms and risk factors.

## 2022-01-07 NOTE — ED TRIAGE NOTES
Patient with known flu and covid x 1 week. Patient states she has no desire to drink anything and thinks she is probably dehydrated. Patient also complains of generalized body aches which is not relieved by naproxen and she just wants to sleep and rest by can't with body aches.

## 2022-01-12 ENCOUNTER — PATIENT OUTREACH (OUTPATIENT)
Dept: CASE MANAGEMENT | Age: 42
End: 2022-01-12

## 2022-01-12 NOTE — PROGRESS NOTES
Patient resolved from [address] Transitions episode on 1/12/22. Discussed COVID-19 related testing which was available at this time. Test results were positive. Patient informed of results, if available? yes     Patient/family has been provided the following resources and education related to COVID-19:                         Signs, symptoms and red flags related to COVID-19            [facility] exposure and quarantine guidelines            [employer] exposure contact - [phone]            Contact for their local Department of Health                 Patient currently reports that the following symptoms have improved:  no new symptoms and no worsening symptoms. No further outreach scheduled with this ACM. Episode of Care resolved. Patient has this ACM contact information if future needs arise.

## 2022-01-25 ENCOUNTER — HOSPITAL ENCOUNTER (OUTPATIENT)
Dept: PHYSICAL THERAPY | Age: 42
Discharge: HOME | End: 2022-01-25
Attending: ORTHOPAEDIC SURGERY
Payer: MEDICAID

## 2022-01-25 PROCEDURE — 97165 OT EVAL LOW COMPLEX 30 MIN: CPT

## 2022-01-25 NOTE — PROGRESS NOTES
Hand Therapy Evaluation and Daily Note    Patient Name: Sandra Rick  Date:2022  : 1980  Age: 37 y.o.y/o  [x]  Patient  Verified  Payor: Ortega Prescott / Plan: Kavitha Doherty / Product Type: Managed Care Medicaid /    Referring Provider: DO Miroslava House MD Visit:  As needed  Onset Date:  Early 2021  Surgical Date: na  Surgical Procedure: na    In time:12:45 PM  Out time:1:30 PM  Total Treatment Time (min): 45  Total Timed Codes (min): 35  1:1 Treatment Time ( [address] only): 45   Visit #: 1 of 8    Treatment Area: Hand pain, right [M79.641]    Precautions:    Hand Dominance: right handed   Hand Involved: right    Total Evaluation Time:  10    History of Present Condition:  Patient is a right hand dominant 37 y.o. female with a chief complaint of guy hand pain (R>L) beginning summer 2021. Previous treatment includes wearing a brace on bilateral wrists and steroid injections in guy wrists which did provide her relief. She is wearing the braces at night. She is being referred to skilled OT for activity modifications to improve functional use of the hands without increase in pain and discomfort. Pain Rating:   Current: (0-no pain 10-debilitating pain) moderate 4/10  At best: (0-no pain 10-debilitating pain) mild 2/10  At worst: (0-no pain 10-debilitating pain) severe 10/10  Location: right hand and right wrist  Type:  constant   Better with: lidocaine, icy hot, muscle relaxers  Worse with:     Medications/Allergies/Past Medical History:  See chart; reviewed with patient.  Thyroid problems, asthma, h/o ca     Diagnostic Tests: NCV & EMG Findings:  All nerve conduction studies (as indicated in the following tables) were within normal limits.  All left vs. right side differences were within normal limits.       All examined muscles (as indicated in the following table) showed no evidence of electrical instability.       INTERPRETATION     This was a normal nerve conduction and EMG study showing there to be no signs of neuropathy, myopathy, or radiculopathy in the nerves and muscles tested.      CLINICAL INTERPRETATION     Her electrodiagnostic findings do not appear to explain her bilateral hand symptoms.         Imagin-08-13 3 views of bilateral wrist does not show any moderate or severe degenerative changes. There is no evidence of acute injury. Prior Level of Function: Independent with ADL/IADL tasks without pain and functional limitations using guy UEs    Current Level of Function:  Independent with pain    Social History: Pt lives with family members in home    Occupation/Job Requirements: Typing    Observation: full BUE AROM  Scar/incision:   na  Location:  guy     Palpation:  Mild diffuse tenderness with palpation to volar left wrist.     Range of Motion:   full    Strength:  Difficulty weight bearing through BUE to get up from tub     Measurements: Taken with Milad Dynamometer, in Lbs   Level 2 2022    Date Date Date Date Date Date   Right 19         Left 22         Deficit          Change               Sensation:    intact      Edema: GIRTH CHART measured in cm  Date: 2022    Side Right/Left    DPC circum.  20.4/19.7    Wrist Crease 17.5/16.7    FA      Elbow           Special Tests:   Nine-Hole Peg Test:  Left= __27___seconds  Right=___24__seconds    ADLs  Feeding:        []MaxA   []ModA   []Christiane   [] CGA   []SBA   []Mallorie   [x]Independent  UE Dressing:       []MaxA   []ModA   []Christiane   [] CGA   []SBA   []Mallorie   [x]Independent  LE Dressing:       []MaxA   []ModA   []Christiane   [] CGA   []SBA   []Mallorie   [x]Independent  Grooming:       []MaxA   []ModA   []Christiane   [] CGA   []SBA   []Mallorie   [x]Independent  Toileting:       []MaxA   []ModA   []Christiane   [] CGA   []SBA   []Mallorie   [x]Independent  Bathing:       []MaxA   []ModA   []Christiane   [] CGA   []SBA   []Mallorie   [x]Independent  Light Meal Prep:    []MaxA   []ModA   []Christiane   [] CGA   []SBA   []Mallorie [x]Independent  Household/Other: []MaxA   []ModA   []Christiane   [] CGA   []SBA   []Mallorie   [x]Independent  Adaptive Equip:     []MaxA   []ModA   []Christiane   [] CGA   []SBA   []Mallorie   []Independent  Driving:       []MaxA   []ModA   []Christiane   [] CGA   []SBA   []Mallorie   [x]Independent      Todays Treatment:  Patient received an initial evaluation today followed by education as to diagnosis, precautions and treatment plan. Patient was provided and instructed in joint protection strategies, sleep positioning strategies, and work station modifications. OBJECTIVE    35 min Self Care/Home Management: prognosis, diagnosis, treatment plan, joint protection, sleep positioning, work station modifications   Rationale: education  to improve the patients ability to improve functional use of guy UEs without increased pain. With   [] TE   [] TA   [] neuro   [] other: Patient Education: [x] Review HEP    [] Progressed/Changed HEP based on:   [] positioning   [] body mechanics   [] transfers   [] heat/ice application   [] Splint wear/care   [] Sensory re-education   [] scar management      [] other:      Pain Level (0-10 scale) post treatment: 4/10    Patient will continue to benefit from skilled OT services to modify and progress therapeutic interventions, address strength deficits, analyze and address soft tissue restrictions, analyze and cue movement patterns, analyze and modify body mechanics/ergonomics and instruct in home and community integration to attain goals. Assessment: Pt presents to skilled OT today rating her pain level 4/10 that is constant and is worsened with use of the guy UEs at the hands and wrists. Her BUE AROM is WFL. Her guy hand  strength is limited and she reports severe difficulty weight bearing through both hands. 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   She reports the pain sometimes wakes her at night and she takes ibuprofen and naproxen for relief. She is wearing wrist braces however she reports sleeping on her hands. Imaging was negative for degenerative wrist changes and no positive findings were revealed with the EMG. Evaluation Complexity: History LOW Complexity : Brief history review  Examination LOW Complexity : 1-3 performance deficits relating to physical, cognitive , or psychosocial skils that result in activity limitations and / or participation restrictions  Clinical Decision Making LOW Complexity : No comorbidities that affect functional and no verbal or physical assistance needed to complete eval tasks   Overall Complexity Rating: LOW     Patient would benefit from OT/Hand therapy services for the following problems:  Problem List: Pain effecting function, Decreased strength, Edema effecting function, Decreased coordination/prehension, Decreased ADL/functional abilities , Decreased activity tolerance and Decreased flexibility/joint mobility     Treatment Plan may include any combination of the following: Therapeutic exercise, Therapeutic activities, Neuromuscular re-education, Physical agent/modality, Manual therapy, Patient education and ADLs/IADLs    Patient / Family readiness to learn indicated by: asking questions, trying to perform skills and interest    Persons(s) to be included in education:   patient (P)    Barriers to Learning/Limitations: None    Patient Goal (s): learn how to control pain    Patient Self Reported Health Status: fair    Rehabilitation Potential: good    Short Term Goals: To be accomplished in 2  weeks:  Goal:* Patient will be compliant with initial home exercise program to take an active role in their rehabilitation process. Status at [facility]: not yet provided    Goal:* Patient will demonstrate a good understanding of their condition and strategies for self-management.   Status at [facility]: pt educated on prognosis, diagnosis, treatment plan, joint protection, sleep positioning, work station modifications    Long Term Goals: To be accomplished in 4 weeks:   Goal:* Pt will be independent with sleep positioning strategies in order to avoid hand pain from waking her up at night. Status at eval: handout on proper sleep positioning strategies    Goal:* Pt will verbalize proper work station setup in order to take an active role in the rehabilitation process. Status at eval: provided handout on proper work station setup, reviewed strategies     Goal:* Patient will be able to state 4 joint protection techniques to reduce pain in bilateral hands. Status at Eval: provided handout    Goal:* Patient will report a decrease in pain in bilateral hands after 25 minutes of constant activities. Status at Eval: NT    Goal:*Patient will improve functional  strength in bilateral hands to 25# to increase ability to open containers, drinks and sealed bags.   Status at Eval: right 19#, left 22#    Frequency / Duration: Patient to be seen 2 times per week for 4 weeks:    Patient/ Caregiver education and instruction: Diagnosis, prognosis, self care, activity modification and exercises    G[place] Robles OT, 1/25/2022 12:30 PM

## 2022-01-25 NOTE — PROGRESS NOTES
[facility]  [address]  [place], [address]  [phone] fax    Plan of Care/Statement of Necessity for Occupational Therapy Services    Patient name: Noemi Anderson Start of Care: 2022   Referral source: Rasta Burger DO : 1980    Medical Diagnosis: Hand pain, right [M79.641]  Payor: Edgardo Santiago / Plan: [id number] [employer] / Product Type: Managed Care Medicaid /  Onset Date:Summer 2021    Treatment Diagnosis: right hand pain   Prior Hospitalization: see medical history Provider#: [id number]   Medications: Verified on Patient summary List    Comorbidities: Thyroid problems, asthma, h/o ca    Prior Level of Function: Independent with ADL/IADL tasks without pain and functional limitations using guy UEs        The Plan of Care and following information is based on the information from the initial evaluation. Assessment/ key information: Patient is a right hand dominant 37 y.o. female with a chief complaint of guy hand pain (R>L) beginning summer 2021. Previous treatment includes wearing a brace on bilateral wrists and steroid injections in guy wrists which did provide her relief. She is wearing the braces at night. She is being referred to skilled OT for activity modifications to improve functional use of the hands without increase in pain and discomfort. Pt presents to skilled OT today rating her pain level 4/10 that is constant and is worsened with use of the guy UEs at the hands and wrists. Her BUE AROM is WFL. Her guy hand  strength is limited and she reports severe difficulty weight bearing through both hands. 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 She reports the pain sometimes wakes her at night and she takes ibuprofen and naproxen for relief. She is wearing wrist braces however she reports sleeping on her hands.   Imaging was negative for degenerative wrist changes and no positive findings were revealed with the EMG. Patient received an initial evaluation today followed by education as to diagnosis, precautions and treatment plan. Patient was provided and instructed in joint protection strategies, sleep positioning strategies, and work station modifications.   Patient will continue to benefit from skilled OT services to modify and progress therapeutic interventions, address strength deficits, analyze and address soft tissue restrictions, analyze and cue movement patterns, analyze and modify body mechanics/ergonomics and instruct in home and community integration to attain goals.       Evaluation Complexity: History LOW Complexity : Brief history review  Examination LOW Complexity : 1-3 performance deficits relating to physical, cognitive , or psychosocial skils that result in activity limitations and / or participation restrictions  Clinical Decision Making LOW Complexity : No comorbidities that affect functional and no verbal or physical assistance needed to complete eval tasks   Overall Complexity Rating: LOW     Patient would benefit from OT/Hand therapy services for the following problems:  Problem List: Pain effecting function, Decreased strength, Edema effecting function, Decreased coordination/prehension, Decreased ADL/functional abilities , Decreased activity tolerance and Decreased flexibility/joint mobility     Treatment Plan may include any combination of the following: Therapeutic exercise, Therapeutic activities, Neuromuscular re-education, Physical agent/modality, Manual therapy, Patient education and ADLs/IADLs    Patient / Family readiness to learn indicated by: asking questions, trying to perform skills and interest    Persons(s) to be included in education:   patient (P)    Barriers to Learning/Limitations: None    Patient Goal (s): Johanna Hurtado how to control pain    Patient Self Reported Health Status: fair    Rehabilitation Potential: good    Short Term Goals: To be accomplished in 2  weeks:  Goal:* Patient will be compliant with initial home exercise program to take an active role in their rehabilitation process. Status at Eval: not yet provided    Goal:* Patient will demonstrate a good understanding of their condition and strategies for self-management. Status at Eval: pt educated on prognosis, diagnosis, treatment plan, joint protection, sleep positioning, work station modifications    Long Term Goals: To be accomplished in 4 weeks:   Goal:* Pt will be independent with sleep positioning strategies in order to avoid hand pain from waking her up at night. Status at eval: handout on proper sleep positioning strategies    Goal:* Pt will verbalize proper work station setup in order to take an active role in the rehabilitation process. Status at eval: provided handout on proper work station setup, reviewed strategies     Goal:* Patient will be able to state 4 joint protection techniques to reduce pain in bilateral hands. Status at Eval: provided handout    Goal:* Patient will report a decrease in pain in bilateral hands after 25 minutes of constant activities. Status at Eval: NT    Goal:*Patient will improve functional  strength in bilateral hands to 25# to increase ability to open containers, drinks and sealed bags. Status at Eval: right 19#, left 22#    Frequency / Duration: Patient to be seen 2 times per week for 4 weeks:  Patient/ Caregiver education and instruction: Diagnosis, prognosis, self care, activity modification and exercises  [x]  Plan of care has been reviewed with Helene Osgood, OT 1/25/2022 12:31 PM  ________________________________________________________________________    I certify that the above Therapy Services are being furnished while the patient is under my care. I agree with the treatment plan and certify that this therapy is necessary.     [facility] Signature:____________Date:_________TIME:________     Eduar Res, DO  ** Signature, Date and Time must be completed for valid certification **    Please sign and return to [facility] [address] [place] [address] & [address]  [address]  [place], [facility] [address]  [phone] fax

## 2022-02-04 ENCOUNTER — OFFICE VISIT (OUTPATIENT)
Dept: FAMILY MEDICINE CLINIC | Age: 42
End: 2022-02-04
Payer: MEDICAID

## 2022-02-04 ENCOUNTER — HOSPITAL ENCOUNTER (OUTPATIENT)
Dept: PHYSICAL THERAPY | Age: 42
Discharge: HOME | End: 2022-02-04
Attending: ORTHOPAEDIC SURGERY
Payer: MEDICAID

## 2022-02-04 VITALS
TEMPERATURE: 98.2 F | OXYGEN SATURATION: 100 % | HEIGHT: 65 IN | WEIGHT: 226 LBS | RESPIRATION RATE: 21 BRPM | SYSTOLIC BLOOD PRESSURE: 122 MMHG | HEART RATE: 99 BPM | DIASTOLIC BLOOD PRESSURE: 82 MMHG | BODY MASS INDEX: 37.65 KG/M2

## 2022-02-04 DIAGNOSIS — R05.9 COUGH: ICD-10-CM

## 2022-02-04 DIAGNOSIS — Z13.6 ENCOUNTER FOR LIPID SCREENING FOR CARDIOVASCULAR DISEASE: ICD-10-CM

## 2022-02-04 DIAGNOSIS — Z13.220 ENCOUNTER FOR LIPID SCREENING FOR CARDIOVASCULAR DISEASE: ICD-10-CM

## 2022-02-04 DIAGNOSIS — J01.01 ACUTE RECURRENT MAXILLARY SINUSITIS: ICD-10-CM

## 2022-02-04 DIAGNOSIS — Z13.0 SCREENING FOR ENDOCRINE, METABOLIC AND IMMUNITY DISORDER: ICD-10-CM

## 2022-02-04 DIAGNOSIS — Z13.228 SCREENING FOR ENDOCRINE, METABOLIC AND IMMUNITY DISORDER: ICD-10-CM

## 2022-02-04 DIAGNOSIS — Z12.31 ENCOUNTER FOR SCREENING MAMMOGRAM FOR MALIGNANT NEOPLASM OF BREAST: ICD-10-CM

## 2022-02-04 DIAGNOSIS — E05.90 HYPERTHYROIDISM: ICD-10-CM

## 2022-02-04 DIAGNOSIS — H65.191 ACUTE OTITIS MEDIA WITH EFFUSION OF RIGHT EAR: ICD-10-CM

## 2022-02-04 DIAGNOSIS — Z13.29 SCREENING FOR ENDOCRINE, METABOLIC AND IMMUNITY DISORDER: ICD-10-CM

## 2022-02-04 PROCEDURE — 99204 OFFICE O/P NEW MOD 45 MIN: CPT | Performed by: FAMILY MEDICINE

## 2022-02-04 PROCEDURE — 97530 THERAPEUTIC ACTIVITIES: CPT

## 2022-02-04 PROCEDURE — 97018 PARAFFIN BATH THERAPY: CPT

## 2022-02-04 PROCEDURE — 97110 THERAPEUTIC EXERCISES: CPT

## 2022-02-04 PROCEDURE — 97535 SELF CARE MNGMENT TRAINING: CPT

## 2022-02-04 RX ORDER — PROMETHAZINE HYDROCHLORIDE AND CODEINE PHOSPHATE 6.25; 1 MG/5ML; MG/5ML
1 SOLUTION ORAL
Qty: 105 ML | Refills: 0 | Status: SHIPPED | OUTPATIENT
Start: 2022-02-04 | End: 2022-02-11

## 2022-02-04 RX ORDER — AMOXICILLIN AND CLAVULANATE POTASSIUM 875; 125 MG/1; MG/1
1 TABLET, FILM COATED ORAL EVERY 12 HOURS
Qty: 20 TABLET | Refills: 0 | Status: SHIPPED | OUTPATIENT
Start: 2022-02-04 | End: 2022-02-14

## 2022-02-04 NOTE — PROGRESS NOTES
Chief Complaint   Patient presents with    Establish Care    Cold Symptoms     1. \"Have you been to the ER, urgent care clinic since your last visit? Hospitalized since your last visit? \" No    2. \"Have you seen or consulted any other health care providers outside of the [address] [place] [address] since your last visit? \" No     3. For patients aged 39-70: Has the patient had a colonoscopy / FIT/ Cologuard? NA - based on age      If the patient is female:    4. For patients aged 41-77: Has the patient had a mammogram within the past 2 years? No      5. For patients aged 21-65: Has the patient had a pap smear?  No

## 2022-02-04 NOTE — PROGRESS NOTES
OT DAILY TREATMENT NOTE     Patient Name: Carole Amato  Date:2022  : 1980  [x]  Patient  Verified  Payor: Ela Vilchis / Plan: [id number] / Product Type: Managed Care Medicaid /    In time:11:05 AM  Out time:11:50 AM  Total Treatment Time (min): 45  Visit #: 2 of 8    Treatment Area: Hand pain, right [M79.641]    SUBJECTIVE  Pain Level (0-10 scale): 4/10  Any medication changes, allergies to medications, adverse drug reactions, diagnosis change, or new procedure performed?: [x] No    [] Yes (see summary sheet for update)  Subjective functional status/changes:   [] No changes reported  \"It hurts in the left wrist.\"    OBJECTIVE    Modality rationale: decrease pain and increase tissue extensibility to improve the patients ability to functionally use guy hands.     Min Type Additional Details    [] Estim:  []Unatt       []IFC  []Premod                        []Other:  []w/ice   []w/heat  Position:  Location:    [] Estim: []Att    []TENS instruct  []NMES                    []Other:  []w/US   []w/ice   []w/heat  Position:  Location:    []  Traction: [] Cervical       []Lumbar                       [] Prone          []Supine                       []Intermittent   []Continuous Lbs:  [] before manual  [] after manual    []  Ultrasound: []Continuous   [] Pulsed                           []1MHz   []3MHz W/cm2:  Location:    []  Iontophoresis with dexamethasone         Location: [] Take home patch   [] In clinic   10 []  Ice     []  heat  []  Ice massage  []  Laser   [x]  Paraffin Position: resting  Location: guy hands/wrists    []  Laser with stim  []  Other:  Position:  Location:    []  Vasopneumatic Device    []  Right     []  Left  Pre-treatment girth:  Post-treatment girth:  Measured at (location):  Pressure:       [] lo [] med [] hi   Temperature: [] lo [] med [] hi       [x] Skin assessment post-treatment:  [x]intact [x]redness- no adverse reaction    []redness - adverse reaction:     10 min Therapeutic Exercise:  [x] See flow sheet :   Rationale: increase ROM and improve coordination to improve the patients ability to grasp, move right wrist    10 min Therapeutic Activity:  [x]  See flow sheet :   Rationale: IASTM  to improve the patients ability to reduce edema in right hand and right middle digit     15 min Self Care/Home Management: joint protection, activity modifications, compression garment wear   Rationale: education  to improve the patients ability to reduce pain with functional use of guy UEs. With   [] TE   [] TA   [] neuro   [] other: Patient Education: [x] Review HEP    [] Progressed/Changed HEP based on:   [] positioning   [] body mechanics   [] transfers   [] heat/ice application   [] Splint wear/care   [] Sensory re-education   [] scar management      [] other:            Other Objective/Functional Measures:   Right MF PIP jt - 6.5 cm, Left MF PIP jt 5.6 cm     Pain Level (0-10 scale) post treatment: 3/10    ASSESSMENT/Changes in Function: Pt has not been seen for skilled OT since 1/25/22 stating she was waiting to get her work schedule prior to scheduling. Pt reports implementation of regularly using heat, wearing guy wrists braces at night, and exercising the fingers. Increased pain noted with tendon glides. Pt noted to have edema in right hand and dorsum right middle finger at the proximal phalanx. Initiated IASTM to reduce edema in the dorsum right hand and right middle finger. Pt report reduced pain after this. Pt provided and instructed in wear of compression garments for the wrist and hand and middle finger. Pt scheduled out remaining appts after this treatment session and will begin attending regularly.      Patient will continue to benefit from skilled OT services to modify and progress therapeutic interventions, address ROM deficits, address strength deficits, analyze and address soft tissue restrictions and instruct in home and community integration to attain remaining goals. []  See Plan of Care  []  See progress note/recertification  []  See Discharge Summary         Progress towards goals / Updated goals:  Short Term Goals: To be accomplished in 2  weeks:  Goal:* Patient will be compliant with initial home exercise program to take an active role in their rehabilitation process. Status at Eval: not yet provided  2/4/2022 - initiated tendon glides with pain reported in right middle finger.      Goal:* Patient will demonstrate a good understanding of their condition and strategies for self-management. Status at Eval: pt educated on prognosis, diagnosis, treatment plan, joint protection, sleep positioning, work station modifications  2/4/2022 - pt reports regularly using heat, wearing guy wrists braces at night, and exercising the fingers     Long Term Goals: To be accomplished in 4 weeks:          Goal:* Pt will be independent with sleep positioning strategies in order to avoid hand pain from waking her up at night. Status at [facility]: handout on proper sleep positioning strategies  2/4/2022 - pt reports sleeping in guy wrist immobilization braces and avoiding laying on hands during sleep     Goal:* Pt will verbalize proper work station setup in order to take an active role in the rehabilitation process. Status at eval: provided handout on proper work station setup, reviewed strategies                 Goal:* Patient will be able to state 4 joint protection techniques to reduce pain in bilateral hands. Status at Eval: provided handout     Goal:* Patient will report a decrease in pain in bilateral hands after 25 minutes of constant activities. Status at Eval: NT     Goal:*Patient will improve functional  strength in bilateral hands to 25# to increase ability to open containers, drinks and sealed bags.   Status at [facility]: right 19#, left 22#    PLAN  []  Upgrade activities as tolerated     [x]  Continue plan of care  []  Update interventions per flow sheet       []  Discharge due to:_  []  Other:_      Gloria Dunham OT 2/4/2022  11:06 AM    Future Appointments   Date Time Provider Shanon Mcgregor   2/4/2022  3:30 PM Carlos Hassan MD [facility]

## 2022-02-04 NOTE — PATIENT INSTRUCTIONS
Sinusitis: Care Instructions  Your Care Instructions     Sinusitis is an infection of the lining of the sinus cavities in your head. Sinusitis often follows a cold. It causes pain and pressure in your head and face. In most cases, sinusitis gets better on its own in 1 to 2 weeks. But some mild symptoms may last for several weeks. Sometimes antibiotics are needed. Follow-up care is a key part of your treatment and safety. Be sure to make and go to all appointments, and call your doctor if you are having problems. It's also a good idea to know your test results and keep a list of the medicines you take. How can you care for yourself at home? · Take an over-the-counter pain medicine, such as acetaminophen (Tylenol), ibuprofen (Advil, Motrin), or naproxen (Aleve). Read and follow all instructions on the label. · If the doctor prescribed antibiotics, take them as directed. Do not stop taking them just because you feel better. You need to take the full course of antibiotics. · Be careful when taking over-the-counter cold or flu medicines and Tylenol at the same time. Many of these medicines have acetaminophen, which is Tylenol. Read the labels to make sure that you are not taking more than the recommended dose. Too much acetaminophen (Tylenol) can be harmful. · Breathe warm, moist air from a steamy shower, a hot bath, or a sink filled with hot water. Avoid cold, dry air. Using a humidifier in your home may help. Follow the directions for cleaning the machine. · Use saline (saltwater) nasal washes. This can help keep your nasal passages open and wash out mucus and bacteria. You can buy saline nose drops at a grocery store or drugstore. Or you can make your own at home by adding 1 teaspoon of salt and 1 teaspoon of baking soda to 2 cups of distilled water. If you make your own, fill a bulb syringe with the solution, insert the tip into your nostril, and squeeze gently. [place] your nose.   · Put a hot, wet towel or a warm gel pack on your face 3 or 4 times a day for 5 to 10 minutes each time. · Try a decongestant nasal spray like oxymetazoline (Afrin). Do not use it for more than 3 days in a row. Using it for more than 3 days can make your congestion worse. When should you call for help? Call your doctor now or seek immediate medical care if:    · You have new or worse swelling or redness in your face or around your eyes.     · You have a new or higher fever. Watch closely for changes in your health, and be sure to contact your doctor if:    · You have new or worse facial pain.     · The mucus from your nose becomes thicker (like pus) or has new blood in it.     · You are not getting better as expected. Where can you learn more? Go to http://www.gray.com/  Enter Z262 in the search box to learn more about \"Sinusitis: Care Instructions. \"  Current as of: December 2, 2020               Content Version: 13.0  © 2006-2021 [email or web address]. Care instructions adapted under license by [employer] (which disclaims liability or warranty for this information). If you have questions about a medical condition or this instruction, always ask your healthcare professional. [place] [address] any warranty or liability for your use of this information. Nosebleeds: Care Instructions  Your Care Instructions     Nosebleeds are common, especially if you have colds or allergies. Many things can cause a nosebleed. Some nosebleeds stop on their own with pressure. Others need packing. Some get cauterized (sealed). If you have gauze or other packing materials in your nose, you will need to follow up with your doctor to have the packing removed. You may need more treatment if you get nosebleeds a lot. The doctor has checked you carefully, but problems can develop later. If you notice any problems or new symptoms, get medical treatment right away.   Follow-up care is a key part of your treatment and safety. Be sure to make and go to all appointments, and call your doctor if you are having problems. It's also a good idea to know your test results and keep a list of the medicines you take. How can you care for yourself at home? · If you get another nosebleed:  ? Sit up and tilt your head slightly forward. This keeps blood from going down your throat. ? Use your thumb and index finger to pinch your nose shut for 10 minutes. Use a clock. Do not check to see if the bleeding has stopped before the 10 minutes are up. If the bleeding has not stopped, pinch your nose shut for another 10 minutes. ? When the bleeding has stopped, try not to pick, rub, or blow your nose for 12 hours. Avoiding these things helps keep your nose from bleeding again. · If your doctor prescribed antibiotics, take them as directed. Do not stop taking them just because you feel better. You need to take the full course of antibiotics. To prevent nosebleeds  · Do not blow your nose too hard. · Try not to lift or strain after a nosebleed. · Raise your head on a pillow while you sleep. · Put a thin layer of a saline- or water-based nasal gel, such as NasoGel, inside your nose. Put it on the septum, which divides your nostrils. This will prevent dryness that can cause nosebleeds. · Use a vaporizer or humidifier to add moisture to your bedroom. Follow the directions for cleaning the machine. · Do not use aspirin, ibuprofen (Advil, Motrin), or naproxen (Aleve) for 36 to 48 hours after a nosebleed unless your doctor tells you to. You can use acetaminophen (Tylenol) for pain relief. · Talk to your doctor about stopping any other medicines you are taking. Some medicines may make you more likely to get a nosebleed. · Do not use cold medicines or nasal sprays without first talking to your doctor. They can make your nose dry. When should you call for help? Call 911 anytime you think you may need emergency care.  For example, call if:    · You passed out (lost consciousness). Call your doctor now or seek immediate medical care if:    · You get another nosebleed and your nose is still bleeding after you have applied pressure 3 times for 10 minutes each time (30 minutes total).     · There is a lot of blood running down the back of your throat even after you pinch your nose and tilt your head forward.     · You have a fever.     · You have sinus pain. Watch closely for changes in your health, and be sure to contact your doctor if:    · You get nosebleeds often, even if they stop.     · You do not get better as expected. Where can you learn more? Go to http://www.salmeron.com/  Enter S156 in the search box to learn more about \"Nosebleeds: Care Instructions. \"  Current as of: July 1, 2021               Content Version: 13.0  © 0718-1989 [employer]. Care instructions adapted under license by [employer] (which disclaims liability or warranty for this information). If you have questions about a medical condition or this instruction, always ask your healthcare professional. [address] any warranty or liability for your use of this information.

## 2022-02-05 NOTE — PROGRESS NOTES
HPI  This is a 41-year-old -American female with past medical history of kidney tumor status post partial nephrectomy, asthma, seasonal allergies, hypothyroidism, polycystic ovarian syndrome, recurrent sinusitis who is here to reestablish care and for cough/congestion, right ear pain    Cough  Patient states that this is dry in nature. It has lingered from when she had COVID more than a month ago. She states that people at work think that she is untouchable. She has tried everything including Flonase to the point where she started having nosebleeds. She has tried Robitussin, NyQuil, Delsym but nothing has helped. She does notice that when she tries to stay hydrated and use honey she is able to not cough for a little bit. Of note patient has asthma but stopped using her maintenance inhaler and only uses her albuterol once a day. Congestion  Patient states that she has a history of recurrent sinus infections. She has pain in the maxillary sinuses. Denies any fevers, chills. States that she has greenish discharge from the nose along with some blood. Right ear pain  Patient states that she has been having throbbing ear pain for the past couple weeks. Denies any drainage from the ear. She occasionally feels like it is hard to hear out of the right ear. And that there is a lot of pressure in the ear. Asthma  Patient is not taking maintenance inhaler. She has a cough that is dry. She states that she uses her albuterol at least once a night. Kidney tumor s/p resection   Patient follows up with nephrology and had US done for maintenance which was normal.     PCOS  Patient is going to follow up with OBGYN and she is on metformin at this time. HTN  Patient is on HCTZ 12.5 mg daily. No issues with BP.       Past Medical History  Past Medical History:   Diagnosis Date    Asthma     History of partial nephrectomy 10/24/2018    Hyperthyroidism     Malignant tumor of kidney ([facility] [address] 10/24/2018    Polycystic ovary syndrome     Renal mass        Surgical History  Past Surgical History:   Procedure Laterality Date    HX HERNIA REPAIR      HX OTHER SURGICAL Right     right lung collapsed as a child        Medications  Current Outpatient Medications   Medication Sig Dispense Refill    amoxicillin-clavulanate (AUGMENTIN) 875-125 mg per tablet Take 1 Tablet by mouth every twelve (12) hours for 10 days. Indications: a bacterial infection of the middle ear, acute bacterial infection of the sinuses 20 Tablet 0    promethazine-codeine (PHENERGAN with CODEINE) 6.25-10 mg/5 mL syrup Take 5 mL by mouth every eight to twelve (8-12) hours as needed for Cough for up to 7 days. Max Daily Amount: 15 mL. 105 mL 0    Linzess 290 mcg cap capsule TAKE 1 CAPSULE BY MOUTH EVERY DAY 90 Capsule 3    Victoza 2-Ashutosh 0.6 mg/0.1 mL (18 mg/3 mL) pnij       famotidine (PEPCID) 40 mg tablet       metFORMIN ER (GLUCOPHAGE XR) 750 mg tablet Take 1 Tab by mouth two (2) times a day. 60 Tab 0    hydroCHLOROthiazide (HYDRODIURIL) 12.5 mg tablet Take 1 Tab by mouth daily. 90 Tab 1    fluticasone propionate (FLONASE) 50 mcg/actuation nasal spray 2 Sprays by Both Nostrils route daily.  ProAir HFA 90 mcg/actuation inhaler Take 1 Puff by inhalation every four (4) hours as needed for Wheezing or Shortness of Breath. 1 Inhaler 5    albuterol (PROVENTIL VENTOLIN) 2.5 mg /3 mL (0.083 %) nebu       ondansetron hcl (ZOFRAN) 4 mg tablet Take 1 Tablet by mouth every eight (8) hours as needed for Nausea, Vomiting or Nausea or Vomiting. (Patient not taking: Reported on 2/4/2022) 10 Tablet 0    multivitamin capsule Take 1 Capsule by mouth daily. 30 Capsule 0    benzonatate (Tessalon Perles) 100 mg capsule Take 1 Capsule by mouth three (3) times daily as needed for Cough. (Patient not taking: Reported on 2/4/2022) 30 Capsule 0    budesonide-formoteroL (SYMBICORT) 80-4.5 mcg/actuation HFAA Take 1 Puff by inhalation daily.  (Patient not taking: Reported on 1/3/2022)      Advair Diskus 100-50 mcg/dose diskus inhaler  (Patient not taking: Reported on 2/4/2022)      MULTIVITAMIN PO Take  by mouth. (Patient not taking: Reported on 2/4/2022)         Allergies  Allergies   Allergen Reactions    Iodinated Contrast Media Other (comments)     NAUSEA, ANXIETY-- RECEIVED MULTIHANCE (GADOBENATE DIMEGLUTAMINE) 20ML 2/13/18 FOR MRI       Family History  Family History   Problem Relation Age of Onset    Hypertension Mother        Social History  Social History     Socioeconomic History    Marital status: SINGLE     Spouse name: Not on file    Number of children: Not on file    Years of education: Not on file    Highest education level: Not on file   Occupational History    Not on file   Tobacco Use    Smoking status: Never Smoker    Smokeless tobacco: Never Used   Vaping Use    Vaping Use: Never used   Substance and Sexual Activity    Alcohol use: No    Drug use: No    Sexual activity: Yes     Birth control/protection: None   Other Topics Concern    Not on file   Social History Narrative    Not on file     Social Determinants of Health     Financial Resource Strain:     Difficulty of Paying Living Expenses: Not on file   Food Insecurity:     Worried About Running Out of Food in the Last Year: Not on file    Apple of Food in the Last Year: Not on file   Transportation Needs:     Lack of Transportation (Medical): Not on file    Lack of Transportation (Non-Medical):  Not on file   Physical Activity:     Days of Exercise per Week: Not on file    Minutes of Exercise per Session: Not on file   Stress:     Feeling of Stress : Not on file   Social Connections:     Frequency of Communication with Friends and Family: Not on file    Frequency of Social Gatherings with Friends and Family: Not on file    Attends Taoism Services: Not on file    Active Member of Clubs or Organizations: Not on file    Attends Club or Organization Meetings: Not on file  Marital Status: Not on file   Intimate Partner Violence:     Fear of Current or Ex-Partner: Not on file    Emotionally Abused: Not on file    Physically Abused: Not on file    Sexually Abused: Not on file   Housing Stability:     Unable to Pay for Housing in the Last Year: Not on file    Number of Places Lived in the Last Year: Not on file    Unstable Housing in the Last Year: Not on file       Review of Systems  Review of Systems   Constitutional: Negative for chills and fever. HENT: Positive for congestion, ear pain, nosebleeds and sinus pain. Respiratory: Positive for cough and shortness of breath. Negative for hemoptysis and sputum production. Cardiovascular: Negative for chest pain and leg swelling. Gastrointestinal: Negative for abdominal pain, nausea and vomiting. Skin: Negative for rash. Neurological: Positive for headaches. Negative for dizziness. Vital Signs  Visit Vitals  /82 (BP 1 Location: Right upper arm, BP Patient Position: Sitting, BP Cuff Size: Adult long)   Pulse 99   Temp 98.2 °F (36.8 °C) (Temporal)   Resp 21   Ht 5' 5\" (1.651 m)   Wt 226 lb (102.5 kg)   LMP  (LMP Unknown)   SpO2 100%   BMI 37.61 kg/m²         Physical Exam  Physical Exam  Vitals reviewed. Constitutional:       General: She is not in acute distress. Appearance: Normal appearance. HENT:      Head: Normocephalic and atraumatic. Right Ear: External ear normal. There is no impacted cerumen. Left Ear: External ear normal. There is no impacted cerumen. Ears:      Comments: Redness in the right ear     Nose: Congestion and rhinorrhea present. Mouth/Throat:      Mouth: Mucous membranes are moist.   Eyes:      Extraocular Movements: Extraocular movements intact. Conjunctiva/sclera: Conjunctivae normal.      Pupils: Pupils are equal, round, and reactive to light. Cardiovascular:      Rate and Rhythm: Normal rate and regular rhythm. Pulses: Normal pulses. Heart sounds: Normal heart sounds. No murmur heard. No gallop. Pulmonary:      Effort: Pulmonary effort is normal. No respiratory distress. Breath sounds: Normal breath sounds. No wheezing. Abdominal:      General: Bowel sounds are normal. There is no distension. Palpations: Abdomen is soft. Tenderness: There is no abdominal tenderness. Musculoskeletal:         General: Normal range of motion. Cervical back: Normal range of motion. Right lower leg: No edema. Left lower leg: No edema. Skin:     General: Skin is warm. Neurological:      General: No focal deficit present. Mental Status: She is alert and oriented to person, place, and time. Cranial Nerves: No cranial nerve deficit. Motor: No weakness.       Gait: Gait normal.   Psychiatric:         Mood and Affect: Mood normal.         Behavior: Behavior normal.                Results  Results for orders placed or performed during the hospital encounter of 06/19/21   WET PREP    Specimen: Cervix   Result Value Ref Range    Clue cells No yeast,trichomonas or clue cells noted      Wet prep FEW WBC'S    HSV CULTURE WITHOUT TYPING    Specimen: Miscellaneous sample   Result Value Ref Range    Source Swab      HSV culture w/o typing Negative     URINALYSIS W/ RFLX MICROSCOPIC   Result Value Ref Range    Color Yellow/Straw      Appearance Clear Clear      Specific gravity 1.015 1.003 - 1.035      pH (UA) 7.0 5.0 - 9.0      Protein Negative Negative mg/dL    Glucose Negative Negative mg/dL    Ketone Negative Negative mg/dL    Bilirubin Negative Negative      Blood Negative Negative      Urobilinogen 0.2 0.2 - 1.0 EU/dL    Nitrites Negative Negative      Leukocyte Esterase Moderate (A) Negative     CBC WITH AUTOMATED DIFF   Result Value Ref Range    WBC 10.2 4.6 - 13.2 K/uL    RBC 5.79 (H) 4.20 - 5.30 M/uL    HGB 12.6 12.0 - 16.0 g/dL    HCT 38.8 35.0 - 45.0 %    MCV 67.0 (L) 74.0 - 97.0 FL    MCH 21.8 (L) 24.0 - 34.0 PG MCHC 32.5 31.0 - 37.0 g/dL    RDW 18.5 (H) 11.6 - 14.5 %    PLATELET 109 (H) 960 - 420 K/uL    MPV 10.4 9.2 - 11.8 FL    NEUTROPHILS 72 40 - 73 %    LYMPHOCYTES 19 (L) 21 - 52 %    MONOCYTES 5 3 - 10 %    EOSINOPHILS 3 0 - 5 %    BASOPHILS 0 0 - 2 %    IMMATURE GRANULOCYTES 1 %    ABS. NEUTROPHILS 7.4 1.8 - 8.0 K/UL    ABS. LYMPHOCYTES 1.9 0.9 - 3.6 K/UL    ABS. MONOCYTES 0.5 0.05 - 1.2 K/UL    ABS. EOSINOPHILS 0.3 0.0 - 0.4 K/UL    ABS. BASOPHILS 0.0 0.0 - 0.1 K/UL    ABS. IMM. GRANS. 0.1 K/UL    RBC COMMENTS Microcytosis  3+        RBC COMMENTS Target Cells  1+       METABOLIC PANEL, BASIC   Result Value Ref Range    Sodium 136 135 - 145 mmol/L    Potassium 3.5 3.2 - 5.1 mmol/L    Chloride 102 94 - 111 mmol/L    CO2 27 21 - 33 mmol/L    Anion gap 7 mmol/L    Glucose 88 70 - 110 mg/dL    BUN 10 9 - 21 mg/dL    Creatinine 0.80 0.70 - 1.20 mg/dL    BUN/Creatinine ratio 13      GFR est AA >60 ml/min/1.73m2    GFR est non-AA >60 ml/min/1.73m2    Calcium 8.9 8.5 - 10.5 mg/dL   URINE MICROSCOPIC   Result Value Ref Range    WBC 0-4 0 - 4 /hpf    RBC 0-5 0 - 2 /hpf    Epithelial cells Few 0 - 20 /lpf    Bacteria 1+ (A) None /hpf   CHLAMYDIA / GC-AMPLIFIED   Result Value Ref Range    Source CERVICAL SWA     Chlamydia trachomatis, RITA Negative Negative      Neisseria gonorrhoeae, RITA Negative Negative      Please note <<DO NOT REPORT>>         ASSESSMENT and PLAN  1. Acute recurrent maxillary sinusitis  - amoxicillin-clavulanate (AUGMENTIN) 875-125 mg per tablet; Take 1 Tablet by mouth every twelve (12) hours for 10 days. Indications: a bacterial infection of the middle ear, acute bacterial infection of the sinuses  Dispense: 20 Tablet; Refill: 0  -counseled to stop Flonase as her nostrils were very dry bilateral and she was complaining of bleeding from the nose  -Advised to use nasal saline spray and naso gel for dry nose and nose bleeds  -Advised to use Claritin daily     2.  Hyperthyroidism  - TSH 3RD GENERATION; Future    3. Screening for endocrine, metabolic and immunity disorder  - CBC WITH AUTOMATED DIFF; Future  - METABOLIC PANEL, COMPREHENSIVE; Future  - HEMOGLOBIN A1C WITH EAG; Future    4. Encounter for lipid screening for cardiovascular disease  - LIPID PANEL; Future    5. Encounter for screening mammogram for malignant neoplasm of breast  - PIERRE MAMMO BI SCREENING INCL CAD; Future    6. Acute otitis media with effusion of right ear  - amoxicillin-clavulanate (AUGMENTIN) 875-125 mg per tablet; Take 1 Tablet by mouth every twelve (12) hours for 10 days. Indications: a bacterial infection of the middle ear, acute bacterial infection of the sinuses  Dispense: 20 Tablet; Refill: 0    7. Cough  -dry cough likely due to dry nose vs asthma that is uncontrolled or GERD  - promethazine-codeine (PHENERGAN with CODEINE) 6.25-10 mg/5 mL syrup; Take 5 mL by mouth every eight to twelve (8-12) hours as needed for Cough for up to 7 days. Max Daily Amount: 15 mL. Dispense: 105 mL; Refill: 0  -PDMP reviewed and consistent with medical drug administration record and prescriber           Follow-up and Dispositions    · Return in about 6 weeks (around 3/18/2022), or if symptoms worsen or fail to improve, for follow up with labs. I have discussed the diagnosis with the patient and the intended plan of care as seen in the above orders. The patient has received an after-visit summary and questions were answered concerning future plans. I have discussed medication, side effects, and warnings with the patient in detail. The patient verbalized understanding and is in agreement with the plan of care. The patient will contact the office with any additional concerns. I spent at least 30 minutes on this visit with this new patient. Lisa Sosa MD    PLEASE NOTE:   This document has been produced using voice recognition software.  Unrecognized errors in transcription may be present

## 2022-02-07 ENCOUNTER — APPOINTMENT (OUTPATIENT)
Dept: PHYSICAL THERAPY | Age: 42
End: 2022-02-07
Attending: ORTHOPAEDIC SURGERY
Payer: MEDICAID

## 2022-02-09 ENCOUNTER — TELEPHONE (OUTPATIENT)
Dept: PHYSICAL THERAPY | Age: 42
End: 2022-02-09

## 2022-02-12 ENCOUNTER — HOSPITAL ENCOUNTER (EMERGENCY)
Age: 42
Discharge: HOME | End: 2022-02-13
Attending: EMERGENCY MEDICINE
Payer: MEDICAID

## 2022-02-12 DIAGNOSIS — B96.89 BV (BACTERIAL VAGINOSIS): ICD-10-CM

## 2022-02-12 DIAGNOSIS — N76.0 BV (BACTERIAL VAGINOSIS): ICD-10-CM

## 2022-02-12 DIAGNOSIS — R10.2 PELVIC PAIN: Primary | ICD-10-CM

## 2022-02-12 PROCEDURE — 81001 URINALYSIS AUTO W/SCOPE: CPT

## 2022-02-12 PROCEDURE — 87491 CHLMYD TRACH DNA AMP PROBE: CPT

## 2022-02-12 PROCEDURE — 96374 THER/PROPH/DIAG INJ IV PUSH: CPT

## 2022-02-12 PROCEDURE — 99284 EMERGENCY DEPT VISIT MOD MDM: CPT

## 2022-02-12 PROCEDURE — 87210 SMEAR WET MOUNT SALINE/INK: CPT

## 2022-02-12 PROCEDURE — 81025 URINE PREGNANCY TEST: CPT

## 2022-02-12 RX ORDER — KETOROLAC TROMETHAMINE 30 MG/ML
30 INJECTION, SOLUTION INTRAMUSCULAR; INTRAVENOUS
Status: COMPLETED | OUTPATIENT
Start: 2022-02-12 | End: 2022-02-13

## 2022-02-13 ENCOUNTER — APPOINTMENT (OUTPATIENT)
Dept: CT IMAGING | Age: 42
End: 2022-02-13
Attending: EMERGENCY MEDICINE
Payer: MEDICAID

## 2022-02-13 VITALS
DIASTOLIC BLOOD PRESSURE: 60 MMHG | RESPIRATION RATE: 16 BRPM | OXYGEN SATURATION: 100 % | TEMPERATURE: 98.3 F | HEIGHT: 65 IN | HEART RATE: 93 BPM | SYSTOLIC BLOOD PRESSURE: 106 MMHG | WEIGHT: 220 LBS | BODY MASS INDEX: 36.65 KG/M2

## 2022-02-13 LAB
APPEARANCE UR: ABNORMAL
BACTERIA URNS QL MICRO: NEGATIVE /HPF
BILIRUB UR QL: NEGATIVE
CLUE CELLS VAG QL WET PREP: NORMAL
COLOR UR: YELLOW
EPITH CASTS URNS QL MICRO: ABNORMAL /LPF (ref 0–20)
GLUCOSE UR STRIP.AUTO-MCNC: NEGATIVE MG/DL
HCG UR QL: NEGATIVE
HGB UR QL STRIP: NEGATIVE
KETONES UR QL STRIP.AUTO: NEGATIVE MG/DL
LEUKOCYTE ESTERASE UR QL STRIP.AUTO: ABNORMAL
NITRITE UR QL STRIP.AUTO: NEGATIVE
PH UR STRIP: 5.5 [PH] (ref 5–8)
PROT UR STRIP-MCNC: NEGATIVE MG/DL
RBC #/AREA URNS HPF: ABNORMAL /HPF (ref 0–2)
SP GR UR REFRACTOMETRY: 1.01 (ref 1–1.03)
T VAGINALIS VAG QL WET PREP: NORMAL
UROBILINOGEN UR QL STRIP.AUTO: 1 EU/DL (ref 0.2–1)
WBC URNS QL MICRO: ABNORMAL /HPF (ref 0–4)

## 2022-02-13 PROCEDURE — 74011250636 HC RX REV CODE- 250/636: Performed by: EMERGENCY MEDICINE

## 2022-02-13 PROCEDURE — 74176 CT ABD & PELVIS W/O CONTRAST: CPT

## 2022-02-13 RX ORDER — KETOROLAC TROMETHAMINE 10 MG/1
10 TABLET, FILM COATED ORAL
Qty: 12 TABLET | Refills: 0 | Status: SHIPPED | OUTPATIENT
Start: 2022-02-13 | End: 2022-05-05

## 2022-02-13 RX ORDER — FLUCONAZOLE 150 MG/1
150 TABLET ORAL
Qty: 1 TABLET | Refills: 0 | Status: SHIPPED | OUTPATIENT
Start: 2022-02-13 | End: 2022-02-13

## 2022-02-13 RX ORDER — METRONIDAZOLE 500 MG/1
500 TABLET ORAL 2 TIMES DAILY
Qty: 14 TABLET | Refills: 0 | Status: SHIPPED | OUTPATIENT
Start: 2022-02-13 | End: 2022-02-20

## 2022-02-13 RX ADMIN — KETOROLAC TROMETHAMINE 30 MG: 30 INJECTION, SOLUTION INTRAMUSCULAR at 00:03

## 2022-02-13 NOTE — ED TRIAGE NOTES
Patient reports she was recently in the ER in [place] for abdominal pain and left leg issues. Patient states that they did a CT scan and couldn't find anything and they were going to do a vaginal exam but the patient refused. Since yesterday the patient has been having \"vaginal pressure\" as well as the abdominal pain and then today when she peed she noticed blood when she wiped.

## 2022-02-13 NOTE — ED PROVIDER NOTES
EMERGENCY DEPARTMENT HISTORY AND PHYSICAL EXAM      Date: 2/12/2022  Patient Name: Jaqueline Bailey    History of Presenting Illness     Chief Complaint   Patient presents with    Urinary Pain    Abdominal Pain       History Provided By: Patient    HPI: Jaqueline Bailey, 37 y.o. female with a past medical history significant asthma and Malignant tumor of the left kidney and is s/p partial nephrectomy, hypothyroidism, polycystic ovarian syndrome, presents to the ED with cc of left pelvic pain. Patient states was seen at [facility] a week ago with left lower quadrant pain associated with the left leg pain. She states evaluation was unremarkable. She however has continued having pain in the left lower quadrant. He has also been on Augmentin for sinusitis. She thinks she may have yeast but this evening. Pain with urination and noted some blood after wiping. She thinks the blood may be coming from her vagina. She rates the pain a 6 out of 10. She still have the left lower quadrant pain and left leg pain. She denies any other vaginal discharges. Last menstrual.  Was about 2 weeks ago. There are no other complaints, changes, or physical findings at this time. PCP: Alan Ness MD    No current facility-administered medications on file prior to encounter. Current Outpatient Medications on File Prior to Encounter   Medication Sig Dispense Refill    docusate sodium (Colace) 100 mg capsule Take 1 Capsule by mouth two (2) times a day for 90 days. 60 Capsule 2    amoxicillin-clavulanate (AUGMENTIN) 875-125 mg per tablet Take 1 Tablet by mouth every twelve (12) hours for 10 days. Indications: a bacterial infection of the middle ear, acute bacterial infection of the sinuses 20 Tablet 0    ondansetron hcl (ZOFRAN) 4 mg tablet Take 1 Tablet by mouth every eight (8) hours as needed for Nausea, Vomiting or Nausea or Vomiting.  (Patient not taking: Reported on 2/4/2022) 10 Tablet 0  multivitamin capsule Take 1 Capsule by mouth daily. 30 Capsule 0    benzonatate (Tessalon Perles) 100 mg capsule Take 1 Capsule by mouth three (3) times daily as needed for Cough. (Patient not taking: Reported on 2/4/2022) 30 Capsule 0    budesonide-formoteroL (SYMBICORT) 80-4.5 mcg/actuation HFAA Take 1 Puff by inhalation daily. (Patient not taking: Reported on 1/3/2022)      Linzess 290 mcg cap capsule TAKE 1 CAPSULE BY MOUTH EVERY DAY 90 Capsule 3    Advair Diskus 100-50 mcg/dose diskus inhaler  (Patient not taking: Reported on 2/4/2022)      Victoza 2-Ashutosh 0.6 mg/0.1 mL (18 mg/3 mL) pnij       famotidine (PEPCID) 40 mg tablet       metFORMIN ER (GLUCOPHAGE XR) 750 mg tablet Take 1 Tab by mouth two (2) times a day. 60 Tab 0    hydroCHLOROthiazide (HYDRODIURIL) 12.5 mg tablet Take 1 Tab by mouth daily. 90 Tab 1    MULTIVITAMIN PO Take  by mouth. (Patient not taking: Reported on 2/4/2022)      fluticasone propionate (FLONASE) 50 mcg/actuation nasal spray 2 Sprays by Both Nostrils route daily.  ProAir HFA 90 mcg/actuation inhaler Take 1 Puff by inhalation every four (4) hours as needed for Wheezing or Shortness of Breath.  1 Inhaler 5    albuterol (PROVENTIL VENTOLIN) 2.5 mg /3 mL (0.083 %) nebu          Past History     Past Medical History:  Past Medical History:   Diagnosis Date    Asthma     History of partial nephrectomy 10/24/2018    Hyperthyroidism     Malignant tumor of kidney ([facility] [address] 10/24/2018    Polycystic ovary syndrome     Renal mass        Past Surgical History:  Past Surgical History:   Procedure Laterality Date    HX HERNIA REPAIR      HX NEPHRECTOMY      HX OTHER SURGICAL Right     right lung collapsed as a child       Family History:  Family History   Problem Relation Age of Onset    Hypertension Mother        Social History:  Social History     Tobacco Use    Smoking status: Never Smoker    Smokeless tobacco: Never Used   Vaping Use    Vaping Use: Never used Substance Use Topics    Alcohol use: No    Drug use: No       Allergies: Allergies   Allergen Reactions    Iodinated Contrast Media Other (comments)     NAUSEA, ANXIETY-- RECEIVED MULTIHANCE (GADOBENATE DIMEGLUTAMINE) 20ML 2/13/18 FOR MRI         Review of Systems     Review of Systems   Constitutional: Negative for chills and fever. HENT: Negative for congestion and sore throat. Respiratory: Negative for cough and shortness of breath. Cardiovascular: Negative for chest pain. Gastrointestinal: Positive for abdominal pain. Negative for abdominal distention, nausea and vomiting. Genitourinary: Positive for hematuria and pelvic pain. Negative for difficulty urinating, dysuria, flank pain, frequency, urgency, vaginal bleeding and vaginal discharge. Musculoskeletal: Positive for arthralgias. Negative for joint swelling. Skin: Negative for rash and wound. Neurological: Negative for dizziness, weakness, light-headedness and headaches. Hematological: Negative for adenopathy. Physical Exam     Physical Exam  Vitals and nursing note reviewed. Constitutional:       General: She is not in acute distress. Appearance: She is well-developed. She is not diaphoretic. Comments: Appears in no acute distress. HENT:      Head: Normocephalic and atraumatic. Jaw: No trismus. Right Ear: External ear normal. No swelling or tenderness. Tympanic membrane is not perforated, erythematous or bulging. Left Ear: External ear normal. No swelling or tenderness. Tympanic membrane is not perforated, erythematous or bulging. Nose: Nose normal. No mucosal edema or rhinorrhea. Right Sinus: No maxillary sinus tenderness or frontal sinus tenderness. Left Sinus: No maxillary sinus tenderness or frontal sinus tenderness. Mouth/Throat:      Mouth: No oral lesions. Dentition: No dental abscesses. Pharynx: Uvula midline.  No oropharyngeal exudate, posterior oropharyngeal erythema or uvula swelling. Tonsils: No tonsillar abscesses. Eyes:      General: No scleral icterus. Right eye: No discharge. Left eye: No discharge. Conjunctiva/sclera: Conjunctivae normal.   Cardiovascular:      Rate and Rhythm: Normal rate and regular rhythm. Heart sounds: Normal heart sounds. No murmur heard. No friction rub. No gallop. Pulmonary:      Effort: Pulmonary effort is normal. No tachypnea, accessory muscle usage or respiratory distress. Breath sounds: Normal breath sounds. No decreased breath sounds, wheezing, rhonchi or rales. Abdominal:      Palpations: Abdomen is soft. Tenderness: There is abdominal tenderness in the left lower quadrant. Musculoskeletal:         General: No tenderness. Normal range of motion. Cervical back: Normal range of motion and neck supple. Lymphadenopathy:      Cervical: No cervical adenopathy. Skin:     General: Skin is warm and dry. Findings: No erythema or rash. Neurological:      Mental Status: She is alert and oriented to person, place, and time.    Psychiatric:         Judgment: Judgment normal.         Lab and Diagnostic Study Results     Labs -     Recent Results (from the past 12 hour(s))   HCG URINE, QL    Collection Time: 02/12/22 11:38 PM   Result Value Ref Range    HCG urine, QL Negative Negative     WET PREP    Collection Time: 02/12/22 11:38 PM    Specimen: Vagina   Result Value Ref Range    Clue cells Clue Cells present      Wet prep No trichomonas seen     URINALYSIS W/ RFLX MICROSCOPIC    Collection Time: 02/12/22 11:38 PM   Result Value Ref Range    Color Yellow      Appearance Cloudy      Specific gravity 1.013 1.005 - 1.030      pH (UA) 5.5 5.0 - 8.0      Protein Negative Negative mg/dL    Glucose Negative Negative mg/dL    Ketone Negative Negative mg/dL    Bilirubin Negative Negative      Blood Negative Negative      Urobilinogen 1.0 0.2 - 1.0 EU/dL    Nitrites Negative Negative Leukocyte Esterase Trace (A) Negative     URINE MICROSCOPIC    Collection Time: 02/12/22 11:38 PM   Result Value Ref Range    WBC 0-4 0 - 4 /hpf    RBC 0-5 0 - 2 /hpf    Epithelial cells Moderate 0 - 20 /lpf    Bacteria Negative (A) None /hpf       Radiologic Studies -   @lastxrresult@  CT Results  (Last 48 hours)               02/13/22 0115  CT ABD PELV WO CONT Final result    Impression:      1. No evidence of nephrolithiasis, hydronephrosis, or other signs of obstructive   nephropathy. 2. No evidence of bowel obstruction or acute inflammatory process in the abdomen   or pelvis. Narrative:  EXAM: CT of the abdomen and pelvis       CLINICAL INDICATION/HISTORY:  Left lower quadrant abdominal pain. COMPARISON: 07/27/2020. TECHNIQUE: Axial CT imaging of the abdomen and pelvis was performed without   contrast. Multiplanar reformats were generated. One or more dose reduction techniques were used on this CT: automated exposure   control, adjustment of the mAs and/or kVp according to patient size, and   iterative reconstruction techniques. The specific techniques used on this CT   exam have been documented in the patient's electronic medical record. Digital   Imaging and Communications in Medicine (DICOM) format image data are available   to nonaffiliated external healthcare facilities or entities on a secure, media   free, reciprocally searchable basis with patient authorization for at least a   12-month period after this study. _______________       FINDINGS:       LOWER CHEST: The visualized lung bases are clear. Heart size is normal. There is   no pericardial or pleural effusion. LIVER, BILIARY: Liver is normal with no focal parenchymal lesion identified. There is no intra-or extrahepatic biliary ductal dilatation. Gallbladder is   unremarkable.        PANCREAS: Normal.       SPLEEN: Normal.       ADRENALS: Normal.       KIDNEYS: Postsurgical changes of partial left nephrectomy. There is no   nephrolithiasis. There is no hydronephrosis, hydroureter, or other signs of   obstructive nephropathy. LYMPH NODES: No enlarged lymph nodes. GASTROINTESTINAL TRACT: There is no evidence of bowel obstruction. While limited   without contrast, there is no definitive wall thickening.] The appendix is   visualized and unremarkable. PELVIC ORGANS: Unremarkable. VASCULATURE: Unremarkable. BONES: No acute or aggressive osseous abnormalities identified. OTHER: There is no free intraperitoneal fluid or free air. SUPERFICIAL SOFT TISSUES: There is a fat-containing umbilical hernia. Please note that evaluation of the intra-abdominal structures is somewhat   limited due to lack of oral or IV contrast.       _______________               CXR Results  (Last 48 hours)    None            Medical Decision Making   - I am the first provider for this patient. - I reviewed the vital signs, available nursing notes, past medical history, past surgical history, family history and social history. - Initial assessment performed. The patients presenting problems have been discussed, and they are in agreement with the care plan formulated and outlined with them. I have encouraged them to ask questions as they arise throughout their visit. Vital Signs-Reviewed the patient's vital signs.   Patient Vitals for the past 12 hrs:   Temp Pulse Resp BP SpO2   02/13/22 0318 -- 93 16 106/60 100 %   02/12/22 2254 98.3 °F (36.8 °C) (!) 103 17 112/83 99 %       Records Reviewed: Nursing Notes and Old Medical Records    The patient presents with abdominal pain with a differential diagnosis of ectopic pregnancy, ovarian cyst, torsion, pancreatitis, PID, pregnancy, renal Colic, UTI and vaginal bleeding      ED Course:       Reviewed old medical records including last visit to [facility].  Repeated CT scan of abdomen without contrast, to rule out kidney stone and also repeated urine analysis for possible UTI or Pyelo. Pelvic exam with hyperemic vaginal wall and cervix, cervix is very low in the vagina almost 2 the vaginal os. It has easy bruisability. There is some mild cervical motion tenderness. Tenderness is mild left adnexa. Patient also has history of polycystic ovarian syndrome. Differential includes possible ruptured ovarian cyst.  Or hemorrhagic ovarian cyst.  And possibly PID. She does have BV. Her pain improved with Toradol. Patient will need pelvic ultrasound or GYN referral if pain persists. Provider Notes (Medical Decision Making):           Procedures   Medical Decision Makingedical Decision Making  Performed by: Gabriel Hernandez MD  PROCEDURES:  Pelvic Exam    Date/Time: 2/12/2022 11:48 PM  Performed by: attending  External genitalia appearance: normal.    Vaginal exam:  inflammation (Vaginal vault including cervix hyperemic, small whitish discharge). Cervical exam:  os closed and abnormal (Cervical motion tenderness present). Specimen(s) collected:  chlamydia and GC. Bimanual exam:  left adenexal tenderness (Uterus is retroverted. ). Patient tolerance: patient tolerated the procedure well with no immediate complications             Disposition   Disposition: Condition stable  DC- Adult Discharges: All of the diagnostic tests were reviewed and questions answered. Diagnosis, care plan and treatment options were discussed. The patient understands the instructions and will follow up as directed. The patients results have been reviewed with them. They have been counseled regarding their diagnosis. The patient verbally convey understanding and agreement of the signs, symptoms, diagnosis, treatment and prognosis and additionally agrees to follow up as recommended with their PCP in 24 - 48 hours. They also agree with the care-plan and convey that all of their questions have been answered.   I have also put together some discharge instructions for them that include: 1) educational information regarding their diagnosis, 2) how to care for their diagnosis at home, as well a 3) list of reasons why they would want to return to the ED prior to their follow-up appointment, should their condition change. Diagnosis:   1. Pelvic pain    2. BV (bacterial vaginosis)          Disposition:     Follow-up Information     Follow up With Specialties Details Why Contact Info    Florencia Manzo MD Family Medicine In 2 days  [address]  [phone]      [facility] EMERGENCY DEPT Emergency Medicine  If symptoms worsen [place] [id number]  [phone]          Discharge Medication List as of 2/13/2022  3:07 AM      START taking these medications    Details   fluconazole (Diflucan) 150 mg tablet Take 1 Tablet by mouth now for 1 dose. FDA advises cautious prescribing of oral fluconazole in pregnancy. , Normal, Disp-1 Tablet, R-0      ketorolac (TORADOL) 10 mg tablet Take 1 Tablet by mouth every eight (8) hours as needed for Pain., Normal, Disp-12 Tablet, R-0      metroNIDAZOLE (FlagyL) 500 mg tablet Take 1 Tablet by mouth two (2) times a day for 7 days. , Normal, Disp-14 Tablet, R-0         CONTINUE these medications which have NOT CHANGED    Details   docusate sodium (Colace) 100 mg capsule Take 1 Capsule by mouth two (2) times a day for 90 days. , Normal, Disp-60 Capsule, R-2      amoxicillin-clavulanate (AUGMENTIN) 875-125 mg per tablet Take 1 Tablet by mouth every twelve (12) hours for 10 days. Indications: a bacterial infection of the middle ear, acute bacterial infection of the sinuses, Normal, Disp-20 Tablet, R-0      ondansetron hcl (ZOFRAN) 4 mg tablet Take 1 Tablet by mouth every eight (8) hours as needed for Nausea, Vomiting or Nausea or Vomiting., Normal, Disp-10 Tablet, R-0      !! multivitamin capsule Take 1 Capsule by mouth daily. , Normal, Disp-30 Capsule, R-0      benzonatate (Tessalon Perles) 100 mg capsule Take 1 Capsule by mouth three (3) times daily as needed for Cough., Normal, Disp-30 Capsule, R-0      budesonide-formoteroL (SYMBICORT) 80-4.5 mcg/actuation HFAA Take 1 Puff by inhalation daily. , Historical Med      Linzess 290 mcg cap capsule TAKE 1 CAPSULE BY MOUTH EVERY DAY, Normal, Disp-90 Capsule, R-3      Advair Diskus 100-50 mcg/dose diskus inhaler Historical Med, THOM      Victoza 2-Ashutosh 0.6 mg/0.1 mL (18 mg/3 mL) pnij Historical Med, THOM      famotidine (PEPCID) 40 mg tablet Historical Med      metFORMIN ER (GLUCOPHAGE XR) 750 mg tablet Take 1 Tab by mouth two (2) times a day., Normal, Disp-60 Tab,R-0      hydroCHLOROthiazide (HYDRODIURIL) 12.5 mg tablet Take 1 Tab by mouth daily. , Normal, Disp-90 Tab,R-1      !! MULTIVITAMIN PO Take  by mouth., Historical Med      fluticasone propionate (FLONASE) 50 mcg/actuation nasal spray 2 Sprays by Both Nostrils route daily. , Historical Med      ProAir HFA 90 mcg/actuation inhaler Take 1 Puff by inhalation every four (4) hours as needed for Wheezing or Shortness of Breath., Normal, Disp-1 Inhaler,R-5,THOM      albuterol (PROVENTIL VENTOLIN) 2.5 mg /3 mL (0.083 %) nebu Historical Med       !! - Potential duplicate medications found. Please discuss with provider. STOP taking these medications       promethazine-codeine (PHENERGAN with CODEINE) 6.25-10 mg/5 mL syrup Comments:   Reason for Stopping:               DISCHARGE PLAN:  1. Current Discharge Medication List      CONTINUE these medications which have NOT CHANGED    Details   docusate sodium (Colace) 100 mg capsule Take 1 Capsule by mouth two (2) times a day for 90 days. Qty: 60 Capsule, Refills: 2      amoxicillin-clavulanate (AUGMENTIN) 875-125 mg per tablet Take 1 Tablet by mouth every twelve (12) hours for 10 days.  Indications: a bacterial infection of the middle ear, acute bacterial infection of the sinuses  Qty: 20 Tablet, Refills: 0    Associated Diagnoses: Acute recurrent maxillary sinusitis; Acute otitis media with effusion of right ear      ondansetron hcl (ZOFRAN) 4 mg tablet Take 1 Tablet by mouth every eight (8) hours as needed for Nausea, Vomiting or Nausea or Vomiting. Qty: 10 Tablet, Refills: 0      !! multivitamin capsule Take 1 Capsule by mouth daily. Qty: 30 Capsule, Refills: 0      benzonatate (Tessalon Perles) 100 mg capsule Take 1 Capsule by mouth three (3) times daily as needed for Cough. Qty: 30 Capsule, Refills: 0      budesonide-formoteroL (SYMBICORT) 80-4.5 mcg/actuation HFAA Take 1 Puff by inhalation daily. Linzess 290 mcg cap capsule TAKE 1 CAPSULE BY MOUTH EVERY DAY  Qty: 90 Capsule, Refills: 3      Advair Diskus 100-50 mcg/dose diskus inhaler       Victoza 2-Ashutosh 0.6 mg/0.1 mL (18 mg/3 mL) pnij       famotidine (PEPCID) 40 mg tablet       metFORMIN ER (GLUCOPHAGE XR) 750 mg tablet Take 1 Tab by mouth two (2) times a day. Qty: 60 Tab, Refills: 0    Associated Diagnoses: Polycystic ovary syndrome      hydroCHLOROthiazide (HYDRODIURIL) 12.5 mg tablet Take 1 Tab by mouth daily. Qty: 90 Tab, Refills: 1    Associated Diagnoses: Hypervolemia, unspecified hypervolemia type      !! MULTIVITAMIN PO Take  by mouth. fluticasone propionate (FLONASE) 50 mcg/actuation nasal spray 2 Sprays by Both Nostrils route daily. ProAir HFA 90 mcg/actuation inhaler Take 1 Puff by inhalation every four (4) hours as needed for Wheezing or Shortness of Breath. Qty: 1 Inhaler, Refills: 5    Associated Diagnoses: Moderate persistent asthma with acute exacerbation      albuterol (PROVENTIL VENTOLIN) 2.5 mg /3 mL (0.083 %) nebu        !! - Potential duplicate medications found. Please discuss with provider.       STOP taking these medications       promethazine-codeine (PHENERGAN with CODEINE) 6.25-10 mg/5 mL syrup Comments:   Reason for Stoppin.   Follow-up Information     Follow up With Specialties Details Why Contact Info    Jaky Parker MD Family Medicine In 2 days  [facility] [address]  [phone]      [facility] EMERGENCY DEPT Emergency Medicine  If symptoms worsen [place] [id number]  [phone]        3. Return to ED if worse   4. Discharge Medication List as of 2/13/2022  3:07 AM      START taking these medications    Details   fluconazole (Diflucan) 150 mg tablet Take 1 Tablet by mouth now for 1 dose. FDA advises cautious prescribing of oral fluconazole in pregnancy. , Normal, Disp-1 Tablet, R-0      ketorolac (TORADOL) 10 mg tablet Take 1 Tablet by mouth every eight (8) hours as needed for Pain., Normal, Disp-12 Tablet, R-0      metroNIDAZOLE (FlagyL) 500 mg tablet Take 1 Tablet by mouth two (2) times a day for 7 days. , Normal, Disp-14 Tablet, R-0         CONTINUE these medications which have NOT CHANGED    Details   docusate sodium (Colace) 100 mg capsule Take 1 Capsule by mouth two (2) times a day for 90 days. , Normal, Disp-60 Capsule, R-2      amoxicillin-clavulanate (AUGMENTIN) 875-125 mg per tablet Take 1 Tablet by mouth every twelve (12) hours for 10 days. Indications: a bacterial infection of the middle ear, acute bacterial infection of the sinuses, Normal, Disp-20 Tablet, R-0      ondansetron hcl (ZOFRAN) 4 mg tablet Take 1 Tablet by mouth every eight (8) hours as needed for Nausea, Vomiting or Nausea or Vomiting., Normal, Disp-10 Tablet, R-0      !! multivitamin capsule Take 1 Capsule by mouth daily. , Normal, Disp-30 Capsule, R-0      benzonatate (Tessalon Perles) 100 mg capsule Take 1 Capsule by mouth three (3) times daily as needed for Cough., Normal, Disp-30 Capsule, R-0      budesonide-formoteroL (SYMBICORT) 80-4.5 mcg/actuation HFAA Take 1 Puff by inhalation daily. , Historical Med      Linzess 290 mcg cap capsule TAKE 1 CAPSULE BY MOUTH EVERY DAY, Normal, Disp-90 Capsule, R-3      Advair Diskus 100-50 mcg/dose diskus inhaler Historical MedTHOM      Victoza 2-Ashutosh 0.6 mg/0.1 mL (18 mg/3 mL) pnij Historical Med, THOM      famotidine (PEPCID) 40 mg tablet Historical Med      metFORMIN ER (GLUCOPHAGE XR) 750 mg tablet Take 1 Tab by mouth two (2) times a day., Normal, Disp-60 Tab,R-0      hydroCHLOROthiazide (HYDRODIURIL) 12.5 mg tablet Take 1 Tab by mouth daily. , Normal, Disp-90 Tab,R-1      !! MULTIVITAMIN PO Take  by mouth., Historical Med      fluticasone propionate (FLONASE) 50 mcg/actuation nasal spray 2 Sprays by Both Nostrils route daily. , Historical Med      ProAir HFA 90 mcg/actuation inhaler Take 1 Puff by inhalation every four (4) hours as needed for Wheezing or Shortness of Breath., Normal, Disp-1 Inhaler,R-5,THOM      albuterol (PROVENTIL VENTOLIN) 2.5 mg /3 mL (0.083 %) nebu Historical Med       !! - Potential duplicate medications found. Please discuss with provider. STOP taking these medications       promethazine-codeine (PHENERGAN with CODEINE) 6.25-10 mg/5 mL syrup Comments:   Reason for Stopping:                 Diagnosis     Clinical Impression:   1. Pelvic pain    2. BV (bacterial vaginosis)        Attestations:    Pierre Arevalo MD    Please note that this dictation was completed with [employer], the computer voice recognition software. Quite often unanticipated grammatical, syntax, homophones, and other interpretive errors are inadvertently transcribed by the computer software. Please disregard these errors. Please excuse any errors that have escaped final proofreading. Thank you.

## 2022-02-14 ENCOUNTER — TELEPHONE (OUTPATIENT)
Dept: PHYSICAL THERAPY | Age: 42
End: 2022-02-14

## 2022-02-14 LAB
C TRACH RRNA SPEC QL NAA+PROBE: NEGATIVE
N GONORRHOEA RRNA SPEC QL NAA+PROBE: NEGATIVE
PLEASE NOTE:, 188601: NORMAL
SPECIMEN SOURCE: NORMAL

## 2022-02-18 ENCOUNTER — APPOINTMENT (OUTPATIENT)
Dept: PHYSICAL THERAPY | Age: 42
End: 2022-02-18
Attending: ORTHOPAEDIC SURGERY
Payer: MEDICAID

## 2022-02-21 ENCOUNTER — APPOINTMENT (OUTPATIENT)
Dept: PHYSICAL THERAPY | Age: 42
End: 2022-02-21
Attending: ORTHOPAEDIC SURGERY
Payer: MEDICAID

## 2022-02-24 ENCOUNTER — VIRTUAL VISIT (OUTPATIENT)
Dept: FAMILY MEDICINE CLINIC | Age: 42
End: 2022-02-24
Payer: MEDICAID

## 2022-02-24 DIAGNOSIS — J45.41 MODERATE PERSISTENT ASTHMA WITH ACUTE EXACERBATION: ICD-10-CM

## 2022-02-24 DIAGNOSIS — R05.9 COUGH: ICD-10-CM

## 2022-02-24 DIAGNOSIS — K21.9 GASTROESOPHAGEAL REFLUX DISEASE, UNSPECIFIED WHETHER ESOPHAGITIS PRESENT: ICD-10-CM

## 2022-02-24 PROCEDURE — 99213 OFFICE O/P EST LOW 20 MIN: CPT | Performed by: FAMILY MEDICINE

## 2022-02-24 RX ORDER — OMEPRAZOLE 20 MG/1
20 CAPSULE, DELAYED RELEASE ORAL 2 TIMES DAILY
Qty: 60 CAPSULE | Refills: 1 | Status: SHIPPED | OUTPATIENT
Start: 2022-02-24 | End: 2022-04-25

## 2022-02-24 RX ORDER — AZITHROMYCIN 250 MG/1
TABLET, FILM COATED ORAL
Qty: 6 TABLET | Refills: 0 | Status: SHIPPED | OUTPATIENT
Start: 2022-02-24 | End: 2022-03-01

## 2022-02-24 RX ORDER — MONTELUKAST SODIUM 10 MG/1
10 TABLET ORAL DAILY
Qty: 30 TABLET | Refills: 0 | Status: SHIPPED | OUTPATIENT
Start: 2022-02-24 | End: 2022-02-24

## 2022-02-24 RX ORDER — MONTELUKAST SODIUM 10 MG/1
10 TABLET ORAL DAILY
Qty: 90 TABLET | Refills: 1 | Status: SHIPPED | OUTPATIENT
Start: 2022-02-24 | End: 2022-08-23

## 2022-02-24 NOTE — PROGRESS NOTES
Dayana Giraldo (: 1980) is a 37 y.o. female, established patient, here for evaluation of the following chief complaint(s):   Follow-up and Medication Evaluation       ASSESSMENT/PLAN:  Below is the assessment and plan developed based on review of pertinent history, labs, studies, and medications. 1. Moderate persistent asthma with acute exacerbation  -     montelukast (SINGULAIR) 10 mg tablet; Take 1 Tablet by mouth daily for 30 days. , Normal, Disp-30 Tablet, R-0  -     azithromycin (ZITHROMAX) 250 mg tablet; Take 2 tablets today, then take 1 tablet daily, Normal, Disp-6 Tablet, R-0  -continue albuterol and Symbicort. If still not helping consider referral to pulmonologist  2. Cough  -     azithromycin (ZITHROMAX) 250 mg tablet; Take 2 tablets today, then take 1 tablet daily, Normal, Disp-6 Tablet, R-0  -possible viral in nature   3. Gastroesophageal reflux disease, unspecified whether esophagitis present  -     omeprazole (PRILOSEC) 20 mg capsule; Take 1 Capsule by mouth two (2) times a day for 60 days. , Normal, Disp-60 Capsule, R-1      Return in about 6 weeks (around 2022), or if symptoms worsen or fail to improve, for chronic conditions follow up . SUBJECTIVE/OBJECTIVE:  HPI     ED follow up  Patient was seen in the ER on  for left pelvic pain CT of the abdomen and pelvis was completed and was unremarkable. She was discharged with Flagyl, Diflucan and Toradol. She states that she likely had a yeast infection from antibiotics used for sinus infection. His infection is now cleared up. She does not have any pelvic pain. Cough and shortness of breath  Patient has a past medical history significant for moderate persistent asthma for which she takes Symbicort, albuterol, has a nebulizer. She states she has been using her albuterol little bit more. We will start patient on Singulair and see how she does.   We will also consider starting patient on omeprazole for possible acid reflux that is not well controlled. Counseled patient that weight loss might also help with this persistent cough that might be due to acid reflux. We will also give her azithromycin for possible flareup of asthma. Patient also says that she has a lot of congestion, runny nose, cough productive of yellow sputum. Occasionally has chest tightness for which she uses her albuterol which improves it. Denies any fevers, chills, abdominal pain, nausea, vomiting, diarrhea, constipation. GERD  Patient is on famotidine. We will switch her to omeprazole to see if this is the cause of her persistent cough. Review of Systems   Constitutional: Negative for activity change, appetite change, chills and fever. HENT: Positive for congestion, nosebleeds and postnasal drip. Respiratory: Positive for cough, chest tightness and shortness of breath. Cardiovascular: Negative for chest pain and palpitations. Gastrointestinal: Negative for abdominal distention, abdominal pain, blood in stool, diarrhea, nausea and vomiting. Genitourinary: Negative for frequency, pelvic pain and vaginal pain. Musculoskeletal: Negative for back pain. Skin: Negative for color change. Allergic/Immunologic: Positive for environmental allergies. Neurological: Negative for dizziness, light-headedness and headaches. Psychiatric/Behavioral: Negative for agitation.         No data recorded     Physical Exam    [INSTRUCTIONS:  \"[x]\" Indicates a positive item  \"[]\" Indicates a negative item  -- DELETE ALL ITEMS NOT EXAMINED]    Constitutional: [x] Appears well-developed and well-nourished [x] No apparent distress      [] Abnormal -     Mental status: [x] Alert and awake  [x] Oriented to person/place/time [x] Able to follow commands    [] Abnormal -     Eyes:   EOM    [x]  Normal    [] Abnormal -   Sclera  [x]  Normal    [] Abnormal -          Discharge [x]  None visible   [] Abnormal -     HENT: [x] Normocephalic, atraumatic  [] Abnormal -   [x] Mouth/Throat: Mucous membranes are moist    External Ears [x] Normal  [] Abnormal -    Neck: [x] No visualized mass [] Abnormal -     Pulmonary/Chest: [x] Respiratory effort normal   [x] No visualized signs of difficulty breathing or respiratory distress        [] Abnormal -      Musculoskeletal:   [x] Normal gait with no signs of ataxia         [x] Normal range of motion of neck        [] Abnormal -     Neurological:        [x] No Facial Asymmetry (Cranial nerve 7 motor function) (limited exam due to video visit)          [x] No gaze palsy        [] Abnormal -          Skin:        [x] No significant exanthematous lesions or discoloration noted on facial skin         [] Abnormal -            Psychiatric:       [x] Normal Affect [] Abnormal -        [x] No Hallucinations    Other pertinent observable physical exam findings:-        On this date 02/24/2022 I have spent 25 minutes reviewing previous notes, test results and face to face (virtual) with the patient discussing the diagnosis and importance of compliance with the treatment plan as well as documenting on the day of the visitClayton Dwyer, was evaluated through a synchronous (real-time) audio-video encounter. The patient (or guardian if applicable) is aware that this is a billable service, which includes applicable co-pays. Verbal consent to proceed has been obtained. The visit was conducted pursuant to the emergency declaration under the [address] [place] [address] authority and the [employer] General Act. Patient identification was verified, and a caregiver was present when appropriate. The patient was located at home in a state where the provider was licensed to provide care. An electronic signature was used to authenticate this note.   -- Noel Graf MD

## 2022-02-24 NOTE — PROGRESS NOTES
Chief Complaint   Patient presents with    Follow-up    Medication Evaluation     1. \"Have you been to the ER, urgent care clinic since your last visit? Hospitalized since your last visit? \" No    2. \"Have you seen or consulted any other health care providers outside of the [address] [place] [address] since your last visit? \" No     3. For patients aged 39-70: Has the patient had a colonoscopy / FIT/ Cologuard? NA - based on age      If the patient is female:    4. For patients aged 41-77: Has the patient had a mammogram within the past 2 years? No      5. For patients aged 21-65: Has the patient had a pap smear?  No

## 2022-02-25 ENCOUNTER — APPOINTMENT (OUTPATIENT)
Dept: PHYSICAL THERAPY | Age: 42
End: 2022-02-25
Attending: ORTHOPAEDIC SURGERY
Payer: MEDICAID

## 2022-03-03 ENCOUNTER — TELEPHONE (OUTPATIENT)
Dept: PHYSICAL THERAPY | Age: 42
End: 2022-03-03

## 2022-03-07 DIAGNOSIS — E87.70 HYPERVOLEMIA, UNSPECIFIED HYPERVOLEMIA TYPE: ICD-10-CM

## 2022-03-07 RX ORDER — HYDROCHLOROTHIAZIDE 12.5 MG/1
12.5 TABLET ORAL DAILY
Qty: 90 TABLET | Refills: 1 | Status: SHIPPED | OUTPATIENT
Start: 2022-03-07 | End: 2022-06-10

## 2022-03-07 NOTE — TELEPHONE ENCOUNTER
Please see patient refill request    Patient was last seen on 2-    Last prescribed on 11-  #90 X 1    Thank you

## 2022-03-07 NOTE — TELEPHONE ENCOUNTER
This patient contacted office for the following prescriptions to be filled:    Last office visit: 2/24/22    Medication requested :   Requested Prescriptions     Pending Prescriptions Disp Refills    hydroCHLOROthiazide (HYDRODIURIL) 12.5 mg tablet 90 Tablet 1     Sig: Take 1 Tablet by mouth daily.        PCP: Dr. Melchor Singh  Mail order or Local pharmacy name: [facility]

## 2022-03-17 ENCOUNTER — TELEPHONE (OUTPATIENT)
Dept: FAMILY MEDICINE CLINIC | Age: 42
End: 2022-03-17

## 2022-03-17 RX ORDER — AZITHROMYCIN 250 MG/1
TABLET, FILM COATED ORAL
Qty: 6 TABLET | Refills: 0 | Status: SHIPPED | OUTPATIENT
Start: 2022-03-17 | End: 2022-03-22

## 2022-03-17 NOTE — TELEPHONE ENCOUNTER
This patient contacted office for the following prescriptions to be filled:    Medication requested :   Requested Prescriptions     Pending Prescriptions Disp Refills    albuterol (PROVENTIL VENTOLIN) 2.5 mg /3 mL (0.083 %) nebu 30 Nebule        PCP: Dr. Erum Vásquez  Mail order or Local pharmacy name: [facility]

## 2022-03-17 NOTE — TELEPHONE ENCOUNTER
Pt stated she lost her zpack and she wants to know can Dr. Fazal Pearce prescribe her another one. Please advise.  Thank you!!!

## 2022-03-17 NOTE — TELEPHONE ENCOUNTER
Please see refill request    Patient was last seen on 2-    Last prescribed on 7-  (By Noelle Boast)    Thank you

## 2022-03-18 PROBLEM — N28.89 RENAL MASS, LEFT: Status: ACTIVE | Noted: 2017-12-07

## 2022-03-18 PROBLEM — C64.9 MALIGNANT TUMOR OF KIDNEY (HCC): Status: ACTIVE | Noted: 2018-10-24

## 2022-03-18 PROBLEM — Z90.5 HISTORY OF PARTIAL NEPHRECTOMY: Status: ACTIVE | Noted: 2018-10-24

## 2022-03-18 PROBLEM — J45.40 MODERATE PERSISTENT ASTHMA WITHOUT COMPLICATION: Status: ACTIVE | Noted: 2020-04-09

## 2022-03-18 NOTE — PROGRESS NOTES
[facility]  [address]  [place], [address]  [phone] fax    Occupational Therapy Discharge Summary    Patient name: Sasha Nair Start of Care: 2022   Referral source: Oral Ghotra DO : 1980   Medical/Treatment Diagnosis: Hand pain, right [Y39.399]  Payor: [employer] / Plan: [employer] / Product Type: Managed Care Medicaid /  Onset Date:Summer 2021     Prior Hospitalization: see medical history Provider#: [id number]   Medications: Verified on Patient Summary List     Comorbidities: Thyroid problems, asthma, h/o ca    Prior Level of Function: Independent with ADL/IADL tasks without pain and functional limitations using guy UEs  Visits from Start of Care: 2    Missed Visits: 2  Reporting Period : 2022 to 2022    Summary of Care:  Short Term Goals: To be accomplished in 2  weeks:  Goal:* Patient will be compliant with initial home exercise program to take an active role in their rehabilitation process. Status at Eval: not yet provided  2022 - initiated tendon glides with pain reported in right middle finger.      Goal:* Patient will demonstrate a good understanding of their condition and strategies for self-management. Status at Eval: pt educated on prognosis, diagnosis, treatment plan, joint protection, sleep positioning, work station modifications  2022 - pt reports regularly using heat, wearing guy wrists braces at night, and exercising the fingers     Long Term Goals: To be accomplished in 4 weeks:          Goal:* Pt will be independent with sleep positioning strategies in order to avoid hand pain from waking her up at night.   Status at eval: handout on proper sleep positioning strategies  2022 - pt reports sleeping in guy wrist immobilization braces and avoiding laying on hands during sleep     Goal:* Pt will verbalize proper work station setup in order to take an active role in the rehabilitation process. Status at eval: provided handout on proper work station setup, reviewed strategies                 Goal:* Patient will be able to state 4 joint protection techniques to reduce pain in bilateral hands. Status at Eval: provided handout     Goal:* Patient will report a decrease in pain in bilateral hands after 25 minutes of constant activities. Status at Eval: NT     Goal:*Patient will improve functional  strength in bilateral hands to 25# to increase ability to open containers, drinks and sealed bags. Status at Eval: right 19#, left 22#    ASSESSMENT/RECOMMENDATIONS: Unable to further assess due to pt abdicated therapy. Attended one follow up apt after initial evaluation. Thank you for this referral.    [x]Discontinue therapy: [x]Patient has reached or is progressing toward set goals      []Patient is non-compliant or has abdicated      []Due to lack of appreciable progress towards set goals    Betsy Montilla OT 3/18/2022 12:56 PM    NOTE TO PHYSICIAN:  Please complete the following and fax to: [facility] at [phone]  . Retain this original for your records. If you are unable to process this request in   24 hours, please contact our office.      [] I have read the above report and request that my patient continue therapy with the following changes/special instructions:  [x] I have read the above report and request that my patient be discharged from therapy    Physician's Signature:____________Date:_________TIME:________     Laura Stout,   ** Signature, Date and Time must be completed for valid certification **

## 2022-03-19 PROBLEM — E66.9 OBESITY (BMI 35.0-39.9 WITHOUT COMORBIDITY): Status: ACTIVE | Noted: 2020-04-09

## 2022-03-19 PROBLEM — E66.01 SEVERE OBESITY (HCC): Status: ACTIVE | Noted: 2020-07-10

## 2022-03-21 RX ORDER — ALBUTEROL SULFATE 0.83 MG/ML
2.5 SOLUTION RESPIRATORY (INHALATION)
Qty: 30 NEBULE | Refills: 2 | Status: SHIPPED | OUTPATIENT
Start: 2022-03-21 | End: 2022-04-05 | Stop reason: SDUPTHER

## 2022-04-05 ENCOUNTER — TELEPHONE (OUTPATIENT)
Dept: FAMILY MEDICINE CLINIC | Age: 42
End: 2022-04-05

## 2022-04-05 DIAGNOSIS — E66.9 OBESITY (BMI 35.0-39.9 WITHOUT COMORBIDITY): ICD-10-CM

## 2022-04-05 DIAGNOSIS — J45.40 MODERATE PERSISTENT ASTHMA WITHOUT COMPLICATION: Primary | ICD-10-CM

## 2022-04-05 DIAGNOSIS — J45.41 MODERATE PERSISTENT ASTHMA WITH ACUTE EXACERBATION: ICD-10-CM

## 2022-04-05 RX ORDER — ALBUTEROL SULFATE 90 UG/1
1 AEROSOL, METERED RESPIRATORY (INHALATION)
Qty: 18 G | Refills: 2 | Status: SHIPPED | OUTPATIENT
Start: 2022-04-05 | End: 2022-07-04

## 2022-04-05 RX ORDER — LIRAGLUTIDE 6 MG/ML
INJECTION SUBCUTANEOUS
Status: CANCELLED | OUTPATIENT
Start: 2022-04-05

## 2022-04-05 RX ORDER — LIRAGLUTIDE 6 MG/ML
1.2 INJECTION SUBCUTANEOUS DAILY
Qty: 36 MG | Refills: 2 | Status: SHIPPED | OUTPATIENT
Start: 2022-04-05 | End: 2022-07-04

## 2022-04-05 RX ORDER — ALBUTEROL SULFATE 0.83 MG/ML
2.5 SOLUTION RESPIRATORY (INHALATION)
Qty: 30 NEBULE | Refills: 4 | Status: SHIPPED | OUTPATIENT
Start: 2022-04-05 | End: 2022-07-04

## 2022-04-05 NOTE — TELEPHONE ENCOUNTER
Pt calling because she needs a rescue inhaler not medication for the nebulizer. For more information Ms. Isadora Salguero can be reached at [phone]. Please advise.  Thank you!!!

## 2022-04-05 NOTE — TELEPHONE ENCOUNTER
----- Message from [facility] sent at 4/5/2022 12:30 PM EDT -----  Subject: Refill Request    QUESTIONS  Name of Medication? Other - Pro Air  Patient-reported dosage and instructions? prn rescue inhaler  How many days do you have left? 0  Preferred Pharmacy? [facility]  Pharmacy phone number (if available)? [phone]  Additional Information for Provider? Pt needs a rescue inhaler  ---------------------------------------------------------------------------  --------------  CALL BACK INFO  What is the best way for the office to contact you? OK to leave message on   voicemail  Preferred Call Back Phone Number? [phone]  ---------------------------------------------------------------------------  --------------  SCRIPT ANSWERS  Relationship to Patient?  Self

## 2022-04-14 DIAGNOSIS — E28.2 POLYCYSTIC OVARY SYNDROME: ICD-10-CM

## 2022-04-14 RX ORDER — METFORMIN HYDROCHLORIDE 750 MG/1
750 TABLET, EXTENDED RELEASE ORAL 2 TIMES DAILY
Qty: 180 TABLET | Refills: 3 | Status: SHIPPED | OUTPATIENT
Start: 2022-04-14 | End: 2022-07-13

## 2022-04-14 NOTE — TELEPHONE ENCOUNTER
Requested Prescriptions     Pending Prescriptions Disp Refills    metFORMIN ER (GLUCOPHAGE XR) 750 mg tablet 60 Tablet 0     Sig: Take 1 Tablet by mouth two (2) times a day.

## 2022-05-04 ENCOUNTER — APPOINTMENT (OUTPATIENT)
Dept: GENERAL RADIOLOGY | Age: 42
End: 2022-05-04
Attending: EMERGENCY MEDICINE
Payer: MEDICAID

## 2022-05-04 ENCOUNTER — HOSPITAL ENCOUNTER (EMERGENCY)
Age: 42
Discharge: HOME | End: 2022-05-04
Attending: EMERGENCY MEDICINE
Payer: MEDICAID

## 2022-05-04 VITALS
OXYGEN SATURATION: 100 % | DIASTOLIC BLOOD PRESSURE: 77 MMHG | TEMPERATURE: 98.5 F | HEART RATE: 89 BPM | BODY MASS INDEX: 36.65 KG/M2 | HEIGHT: 65 IN | RESPIRATION RATE: 16 BRPM | SYSTOLIC BLOOD PRESSURE: 139 MMHG | WEIGHT: 220 LBS

## 2022-05-04 DIAGNOSIS — M25.512 PAIN IN JOINT OF LEFT SHOULDER: Primary | ICD-10-CM

## 2022-05-04 DIAGNOSIS — M25.532 LEFT WRIST PAIN: ICD-10-CM

## 2022-05-04 PROCEDURE — 73030 X-RAY EXAM OF SHOULDER: CPT

## 2022-05-04 PROCEDURE — 73100 X-RAY EXAM OF WRIST: CPT

## 2022-05-04 PROCEDURE — 99283 EMERGENCY DEPT VISIT LOW MDM: CPT

## 2022-05-04 PROCEDURE — 74011250637 HC RX REV CODE- 250/637: Performed by: EMERGENCY MEDICINE

## 2022-05-04 RX ORDER — IBUPROFEN 600 MG/1
600 TABLET ORAL
Qty: 18 TABLET | Refills: 0 | Status: SHIPPED | OUTPATIENT
Start: 2022-05-04

## 2022-05-04 RX ORDER — IBUPROFEN 400 MG/1
400 TABLET ORAL
Status: COMPLETED | OUTPATIENT
Start: 2022-05-04 | End: 2022-05-04

## 2022-05-04 RX ORDER — IBUPROFEN 400 MG/1
800 TABLET ORAL
Status: DISCONTINUED | OUTPATIENT
Start: 2022-05-04 | End: 2022-05-04

## 2022-05-04 RX ORDER — CYCLOBENZAPRINE HCL 10 MG
10 TABLET ORAL
Qty: 14 TABLET | Refills: 0 | Status: SHIPPED | OUTPATIENT
Start: 2022-05-04 | End: 2022-09-17

## 2022-05-04 RX ADMIN — IBUPROFEN 400 MG: 400 TABLET, FILM COATED ORAL at 22:31

## 2022-05-05 ENCOUNTER — VIRTUAL VISIT (OUTPATIENT)
Dept: FAMILY MEDICINE CLINIC | Age: 42
End: 2022-05-05
Payer: MEDICAID

## 2022-05-05 DIAGNOSIS — M25.50 MULTIPLE JOINT PAIN: ICD-10-CM

## 2022-05-05 DIAGNOSIS — M15.2 DEGENERATIVE ARTHRITIS OF PROXIMAL INTERPHALANGEAL JOINT OF MIDDLE FINGER OF RIGHT HAND: ICD-10-CM

## 2022-05-05 DIAGNOSIS — M15.2 DEGENERATIVE ARTHRITIS OF PROXIMAL INTERPHALANGEAL JOINT OF MIDDLE FINGER OF LEFT HAND: ICD-10-CM

## 2022-05-05 DIAGNOSIS — M25.512 ACUTE PAIN OF LEFT SHOULDER: ICD-10-CM

## 2022-05-05 PROCEDURE — 99213 OFFICE O/P EST LOW 20 MIN: CPT | Performed by: FAMILY MEDICINE

## 2022-05-05 RX ORDER — MELOXICAM 15 MG/1
15 TABLET ORAL DAILY
Qty: 90 TABLET | Refills: 1 | Status: SHIPPED | OUTPATIENT
Start: 2022-05-05 | End: 2022-08-03

## 2022-05-05 NOTE — PROGRESS NOTES
Aurora Krause (: 1980) is a 37 y.o. female, established patient, here for evaluation of the following chief complaint(s):   No chief complaint on file. ASSESSMENT/PLAN:  Below is the assessment and plan developed based on review of pertinent history, labs, studies, and medications. 1. Multiple joint pain  -     NAHOMI COMPREHENSIVE PANEL; Future  -     REFERRAL TO RHEUMATOLOGY  -     RHEUMATOID FACTOR, IGM; Future  2. Acute pain of left shoulder  -     meloxicam (MOBIC) 15 mg tablet; Take 1 Tablet by mouth daily for 90 days. , Normal, Disp-90 Tablet, R-1  -     REFERRAL TO RHEUMATOLOGY  3. 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 , Normal, Disp-90 Tablet, R-1  -     REFERRAL TO RHEUMATOLOGY  4. Degenerative arthritis of proximal interphalangeal joint of middle finger of right hand  -     REFERRAL TO RHEUMATOLOGY      No follow-ups on file. SUBJECTIVE/OBJECTIVE:  HPI     This is a 60-year-old -American female with past medical history of kidney tumor status post partial nephrectomy, asthma, seasonal allergies, hypothyroidism, polycystic ovarian syndrome, recurrent sinusitis who is here to follow-up on ER visit for multiple joint pains    Left shoulder pain  Patient states that it started Saturday with right shoulder pain which subsided and now she has severe left shoulder pain. She has difficulty using that left shoulder. Patient has been having pain with movement of the left shoulder. X-ray was done in the ER yesterday as her pain was so severe she went to the ER. X-ray was unremarkable. Patient continues have difficulty with lifting the arm more than 30 degrees without pain. Denies any tingling numbness pain is severe enough to affect her work and sleep.   Patient has already been referred to orthopedic surgeon and was advised to follow up with them and that I will also check for Autoimmune condition through blood work. We will check NAHOMI panel and rheumatoid factor levels. We will also refer to rheumatology. Bilateral middle finger interphalangeal joint swelling and pain  Patient states that she has been having swelling in her middle finger joint for a couple weeks now and its painful to the use. She has difficulty opening objects now. We can  strength. She states that she has family history of arthritis of unknown type in her mother and states that her hands are starting to look like her hands. We will check NAHOMI panel and rheumatoid factor levels. We will also refer to rheumatology. Patient went to ER yesterday and X ray was done and showed no abnormalities on the X ray of the left hand. Will start meloxicam for pain control and discontinue diclofenac which was given in the ER. She was also given a muscle relaxer at that time. Advised patient to use 1000 mg of Tylenol 3 times a day along with the meloxicam and make sure she takes it on full stomach. About risks of meloxicam including kidney injury, high blood pressure. Patient understands but is in a lot of pain. Counseled patient that I will be leaving and she should establish care with new PCP by July . List will be given to the patient when she comes in to get lab orders so she can [facility] the street. Review of Systems   Constitutional: Negative for activity change, chills and fatigue. HENT: Negative for congestion, facial swelling, postnasal drip and rhinorrhea. Eyes: Negative for photophobia and redness. Respiratory: Negative for apnea, cough and shortness of breath. Cardiovascular: Negative for chest pain, palpitations and leg swelling. Gastrointestinal: Negative for abdominal distention, abdominal pain, constipation, diarrhea and nausea. Genitourinary: Negative for frequency and urgency. Musculoskeletal: Positive for arthralgias and joint swelling. Negative for back pain and gait problem. Pain in the right shoulder. Severe pain with limited movement in the left shoulder. Pain and swelling in the left and right middle finger joint. Skin: Negative for rash. Allergic/Immunologic: Negative for environmental allergies. Neurological: Negative for dizziness, light-headedness and headaches. Psychiatric/Behavioral: Negative for agitation, confusion and decreased concentration. No data recorded     Physical Exam   Visible swelling in middle joint of the middle finger of both hands Restricted ROM of the left shoulder due to pain.  Patient unable to lift more than 30 degrees    [INSTRUCTIONS:  \"[x]\" Indicates a positive item  \"[]\" Indicates a negative item  -- DELETE ALL ITEMS NOT EXAMINED]    Constitutional: [x] Appears well-developed and well-nourished [x] No apparent distress      [] Abnormal -     Mental status: [x] Alert and awake  [x] Oriented to person/place/time [x] Able to follow commands    [] Abnormal -     Eyes:   EOM    [x]  Normal    [] Abnormal -   Sclera  [x]  Normal    [] Abnormal -          Discharge [x]  None visible   [] Abnormal -     HENT: [x] Normocephalic, atraumatic  [] Abnormal -   [x] Mouth/Throat: Mucous membranes are moist    External Ears [x] Normal  [] Abnormal -    Neck: [x] No visualized mass [] Abnormal -     Pulmonary/Chest: [x] Respiratory effort normal   [x] No visualized signs of difficulty breathing or respiratory distress        [] Abnormal -      Musculoskeletal:   [x] Normal gait with no signs of ataxia         [x] Normal range of motion of neck        [] Abnormal -     Neurological:        [x] No Facial Asymmetry (Cranial nerve 7 motor function) (limited exam due to video visit)          [x] No gaze palsy        [] Abnormal -          Skin:        [x] No significant exanthematous lesions or discoloration noted on facial skin         [] Abnormal -            Psychiatric:       [x] Normal Affect [] Abnormal - [x] No Hallucinations    Other pertinent observable physical exam findings:-        On this date 05/05/2022 I have spent 30 minutes reviewing previous notes, test results and face to face (virtual) with the patient discussing the diagnosis and importance of compliance with the treatment plan as well as documenting on the day of the visit. Jessica Monet, was evaluated through a synchronous (real-time) audio-video encounter. The patient (or guardian if applicable) is aware that this is a billable service, which includes applicable co-pays. Verbal consent to proceed has been obtained. The visit was conducted pursuant to the emergency declaration under the [facility], [address] [place] [address] authority and the [employer] and [employer] General Act. Patient identification was verified, and a caregiver was present when appropriate. The patient was located at home in a state where the provider was licensed to provide care. An electronic signature was used to authenticate this note.   -- Perry Gallardo MD

## 2022-05-05 NOTE — ED TRIAGE NOTES
Patient states she started having pain to her left shoulder radiating down her arm. Reports it hurts to move and she has been taking Ibuprofen which helped over the weekend but no longer helping.

## 2022-05-05 NOTE — ED PROVIDER NOTES
Pt c/o joint pain. Says rt shoulder on waking up 3 days ago, resolved after switching carrying her purse in left arm, but now left shoulder and wrist pain since. Wrist moderate, shoulder severe, only w movement, no pain at wrist.  No weakness/numbness. No rash. No fever. No cp or sob no back or neck pain. Tried lidocaine and alleve but didn't help much. No chance of curr preg per pt. Past Medical History:   Diagnosis Date    Asthma     History of partial nephrectomy 10/24/2018    Hyperthyroidism     Malignant tumor of kidney ([facility] [address] 10/24/2018    Polycystic ovary syndrome     Renal mass        Past Surgical History:   Procedure Laterality Date    HX HERNIA REPAIR      HX NEPHRECTOMY      HX OTHER SURGICAL Right     right lung collapsed as a child         Family History:   Problem Relation Age of Onset    Hypertension Mother        Social History     Socioeconomic History    Marital status: SINGLE     Spouse name: Not on file    Number of children: Not on file    Years of education: Not on file    Highest education level: Not on file   Occupational History    Not on file   Tobacco Use    Smoking status: Never Smoker    Smokeless tobacco: Never Used   Vaping Use    Vaping Use: Never used   Substance and Sexual Activity    Alcohol use: No    Drug use: No    Sexual activity: Yes     Birth control/protection: None   Other Topics Concern    Not on file   Social History Narrative    Not on file     Social Determinants of Health     Financial Resource Strain:     Difficulty of Paying Living Expenses: Not on file   Food Insecurity:     Worried About Running Out of Food in the Last Year: Not on file    Apple of Food in the Last Year: Not on file   Transportation Needs:     Lack of Transportation (Medical): Not on file    Lack of Transportation (Non-Medical):  Not on file   Physical Activity:     Days of Exercise per Week: Not on file    Minutes of Exercise per Session: Not on file Stress:     Feeling of Stress : Not on file   Social Connections:     Frequency of Communication with Friends and Family: Not on file    Frequency of Social Gatherings with Friends and Family: Not on file    Attends Christian Services: Not on file    Active Member of Clubs or Organizations: Not on file    Attends Club or Organization Meetings: Not on file    Marital Status: Not on file   Intimate Partner Violence:     Fear of Current or Ex-Partner: Not on file    Emotionally Abused: Not on file    Physically Abused: Not on file    Sexually Abused: Not on file   Housing Stability:     Unable to Pay for Housing in the Last Year: Not on file    Number of [place] in the Last Year: Not on file    Unstable Housing in the Last Year: Not on file         ALLERGIES: Iodinated contrast media    Review of Systems   Constitutional: Negative for fever. HENT: Negative for congestion. Respiratory: Negative for cough and shortness of breath. Cardiovascular: Negative for chest pain. Gastrointestinal: Negative for abdominal pain and vomiting. Musculoskeletal: Positive for arthralgias. Negative for back pain. Skin: Negative for rash. Neurological: Negative for weakness, light-headedness and numbness. All other systems reviewed and are negative. Vitals:    05/04/22 2203   BP: (!) 145/83   Pulse: (!) 103   Resp: 20   Temp: 98.5 °F (36.9 °C)   SpO2: 98%   Weight: 99.8 kg (220 lb)   Height: 5' 5\" (1.651 m)            Physical Exam  Vitals and nursing note reviewed. Constitutional:       Appearance: She is well-developed. She is not diaphoretic. HENT:      Head: Normocephalic and atraumatic. Eyes:      Conjunctiva/sclera: Conjunctivae normal.   Cardiovascular:      Rate and Rhythm: Normal rate and regular rhythm. Heart sounds: No murmur heard. Pulmonary:      Effort: Pulmonary effort is normal.      Breath sounds: No wheezing. Chest:      Chest wall: No tenderness. Musculoskeletal:         General: Tenderness present. Cervical back: Normal range of motion. No tenderness. Comments: ttp left shoulder and wrist, no swelling/erythema. From. Nvi. No other ttp     Skin:     General: Skin is dry. Capillary Refill: Capillary refill takes less than 2 seconds. Findings: No rash. Neurological:      Mental Status: She is alert and oriented to person, place, and time. Sensory: No sensory deficit. Motor: No weakness. Psychiatric:         Mood and Affect: Mood normal.          MDM       Procedures    Vitals:  Patient Vitals for the past 12 hrs:   Temp Pulse Resp BP SpO2   05/04/22 2203 98.5 °F (36.9 °C) (!) 103 20 (!) 145/83 98 %         Medications ordered:   Medications   ibuprofen (MOTRIN) tablet 400 mg (400 mg Oral Given 5/4/22 2231)         Lab findings:  No results found for this or any previous visit (from the past 12 hour(s)). X-Ray, CT or other radiology findings or impressions:  XR SHOULDER LT AP/LAT MIN 2 V    (Results Pending)   XR WRIST LT AP/LAT    (Results Pending)             Progress notes, Consult notes or additional Procedure notes:   11:04 PM no emc, nvi. Not c/w dvt/pe/acs/fx/dislocation/infection/cord compression. Stable for dc andclose f/u. Det ret inst given. Diagnosis:   1. Pain in joint of left shoulder    2.  Left wrist pain        Disposition: home    Follow-up Information     Follow up With Specialties Details Why Contact [facility] EMERGENCY DEPT Emergency Medicine Go to  As needed, If symptoms worsen [address]  [phone]    Neli Ellison MD Family Medicine   [facility] [address]  [address]      Jon Cardenas MD Family Medicine Schedule an appointment as soon as possible for a visit in 2 days As needed [facility] [id number] [facility]      Rocky Moran MD Orthopedic Surgery Schedule an appointment as soon as possible for a visit in 1 week As needed [address] [facility]             Patient's Medications   Start Taking    CYCLOBENZAPRINE (FLEXERIL) 10 MG TABLET    Take 1 Tablet by mouth three (3) times daily as needed for Muscle Spasm(s). IBUPROFEN (MOTRIN) 600 MG TABLET    Take 1 Tablet by mouth every six (6) hours as needed for Pain. Continue Taking    ADVAIR DISKUS 100-50 MCG/DOSE DISKUS INHALER        ALBUTEROL (PROVENTIL VENTOLIN) 2.5 MG /3 ML (0.083 %) NEBU    Take 3 mL by inhalation every six (6) hours as needed for Wheezing for up to 90 days. BENZONATATE (TESSALON PERLES) 100 MG CAPSULE    Take 1 Capsule by mouth three (3) times daily as needed for Cough. BUDESONIDE-FORMOTEROL (SYMBICORT) 80-4.5 MCG/ACTUATION HFAA    Take 1 Puff by inhalation daily. DOCUSATE SODIUM (COLACE) 100 MG CAPSULE    Take 1 Capsule by mouth two (2) times a day for 90 days. FLUTICASONE PROPIONATE (FLONASE) 50 MCG/ACTUATION NASAL SPRAY    2 Sprays by Both Nostrils route daily. HYDROCHLOROTHIAZIDE (HYDRODIURIL) 12.5 MG TABLET    Take 1 Tablet by mouth daily. KETOROLAC (TORADOL) 10 MG TABLET    Take 1 Tablet by mouth every eight (8) hours as needed for Pain. LINZESS 290 MCG CAP CAPSULE    TAKE 1 CAPSULE BY MOUTH EVERY DAY    METFORMIN ER (GLUCOPHAGE XR) 750 MG TABLET    Take 1 Tablet by mouth two (2) times a day for 90 days. MONTELUKAST (SINGULAIR) 10 MG TABLET    Take 1 Tablet by mouth daily for 180 days. MULTIVITAMIN CAPSULE    Take 1 Capsule by mouth daily. MULTIVITAMIN PO    Take  by mouth. OMEPRAZOLE (PRILOSEC) 20 MG CAPSULE    TAKE 1 CAPSULE BY MOUTH TWICE DAILY    ONDANSETRON HCL (ZOFRAN) 4 MG TABLET    Take 1 Tablet by mouth every eight (8) hours as needed for Nausea, Vomiting or Nausea or Vomiting. PROAIR HFA 90 MCG/ACTUATION INHALER    Take 1 Puff by inhalation every four (4) hours as needed for Wheezing or Shortness of Breath for up to 90 days.     VICTOZA 2-JONA 0.6 MG/0.1 ML (18 MG/3 ML) PNIJ    1.2 mg by SubCUTAneous route daily for 90 days.    These Medications have changed    No medications on file   Stop Taking    No medications on file

## 2022-05-11 ENCOUNTER — TELEPHONE (OUTPATIENT)
Dept: FAMILY MEDICINE CLINIC | Age: 42
End: 2022-05-11

## 2022-05-11 DIAGNOSIS — M15.2 DEGENERATIVE ARTHRITIS OF PROXIMAL INTERPHALANGEAL JOINT OF MIDDLE FINGER OF LEFT HAND: Primary | ICD-10-CM

## 2022-05-11 NOTE — TELEPHONE ENCOUNTER
Pt calling to see if Dr. Kenton Lund can call her in a steroid pack. For more information Ms. Vik Cardona can be reached at [phone]. Please advise.  Thank you!!!

## 2022-05-12 NOTE — TELEPHONE ENCOUNTER
Patient was seen at [facility] urgent care and states that provider thought that she might have rheumatoid arthritis. Orders were placed at the last virtual visit I had with patient for rheumatoid arthritis and NAHOMI panel testing patient states that she will come by the office to get lab results and go to [facility] to get the labs done. Patient was also prescribed a prednisone Dosepak at the urgent care and is going to pick it up as she is in a lot of pain.

## 2022-05-20 ENCOUNTER — TELEPHONE (OUTPATIENT)
Dept: FAMILY MEDICINE CLINIC | Age: 42
End: 2022-05-20

## 2022-06-01 NOTE — TELEPHONE ENCOUNTER
Please see patient message    Labs, and X-rays were both re-faxed to Rheumatology    Patient requesting some sent to the pharmacy for hand pain    Thank you
Pt called to inform hellen the Rheumatology specialist stated they need the patient's office notes, labs, x-ray to support the referral. Please fax info to [phone] when available also contact the patient to inform that it has been faxed. Ms Bennie Lockhart is also requesting something to be sent to the pharmacy for hand pain if possible. Advised the request would be submitted to Dr Octavio Sicard as Dr Te Haines is not in the office this week.  Please follow up when available to discuss this matter
Pt calling again because she needs her lab results faxed to the rheumatology office. Pt didn't know the fax number. Please advise.  Thank you!!!
Pt calling to speak to a nurse about her lab results. MsClayton Bella Azevedo can be reached at [phone]. Please advise.  Thank you!!!
Pt is requesting lab results, please contact when available to confirm
Pt is requesting to be contacted when available
Spoke with patient and gave her referral information for Rheumatology. Patient will callback to schedule follow up appt due to a lot of things going on. Did not see that there was a result note for the labs in question.
Spoke with patient and she advised she was able to go to urgent care and get some more prednisone for her handpain, also patient has scheduled appt on 6-7-2022 with rheumatology
Spoke with patient and she was requesting something for her hand pain-advised her that the message had been sent to Dr Pablo Disla however it takes 24-48 hours for response. Patient was also advised that the referral to Rheumatology along with all notes, labs and x-rays were re-faxed to [facility] Rheumatology. Recommended taking OTC Alieve for pain or to return to urgent care-patient was looking for a refill on her prednisone. Patient verbalized understanding.
Self

## 2022-06-15 NOTE — TELEPHONE ENCOUNTER
This patient contacted office for the following prescriptions to be filled:    Last office visit: 22  Medication requested :   Requested Prescriptions     Pending Prescriptions Disp Refills    fluticasone propionate (FLONASE) 50 mcg/actuation nasal spray 1 Each      Si Sprays by Both Nostrils route daily.        PCP: Dr. Keara Elliott  Mail order or Local pharmacy name: [facility]

## 2022-06-16 ENCOUNTER — TELEPHONE (OUTPATIENT)
Dept: FAMILY MEDICINE CLINIC | Age: 42
End: 2022-06-16

## 2022-06-16 RX ORDER — FLUTICASONE PROPIONATE 50 MCG
2 SPRAY, SUSPENSION (ML) NASAL DAILY
Qty: 1 EACH | Refills: 11 | Status: SHIPPED | OUTPATIENT
Start: 2022-06-16

## 2022-06-16 NOTE — TELEPHONE ENCOUNTER
Please see refill request    Patient was last seen on 5-5-2022    Last prescribed 1 year ago by historic provider    Thank you

## 2022-08-01 DIAGNOSIS — M25.50 MULTIPLE JOINT PAIN: ICD-10-CM

## 2022-08-09 ENCOUNTER — TELEPHONE (OUTPATIENT)
Dept: FAMILY MEDICINE CLINIC | Age: 42
End: 2022-08-09

## 2022-08-09 NOTE — TELEPHONE ENCOUNTER
----- Message from Annalee Curling sent at 8/9/2022  1:52 PM EDT -----  Subject: Refill Request    QUESTIONS  Name of Medication? Victoza 2-Ashutosh 0.6 mg/0.1 mL (18 mg/3 mL) pnij  Patient-reported dosage and instructions? Once a day   How many days do you have left? 0  Preferred Pharmacy? [facility]  Pharmacy phone number (if available)? [phone]  ---------------------------------------------------------------------------  --------------,  Name of Medication? omeprazole (PRILOSEC) 20 mg capsule  Patient-reported dosage and instructions? Two tablets, Once a day   How many days do you have left? 0  Preferred Pharmacy? [facility]  Pharmacy phone number (if available)? [phone]  Additional Information for Provider? PT is aware that her former PCP,   Dedrick Nunez is no longer with the practice but she is wanting to know   what she can do about her prescription medication that she needs refilled. Please reach out to the PT to discuss her options.   ---------------------------------------------------------------------------  --------------  CALL BACK INFO  What is the best way for the office to contact you? OK to leave message on   voicemail  Preferred Call Back Phone Number? [phone]  ---------------------------------------------------------------------------  --------------  SCRIPT ANSWERS  Relationship to Patient?  Self

## 2022-08-11 DIAGNOSIS — K21.9 GASTROESOPHAGEAL REFLUX DISEASE, UNSPECIFIED WHETHER ESOPHAGITIS PRESENT: ICD-10-CM

## 2022-08-11 RX ORDER — OMEPRAZOLE 20 MG/1
20 CAPSULE, DELAYED RELEASE ORAL 2 TIMES DAILY
Qty: 90 CAPSULE | Refills: 1 | OUTPATIENT
Start: 2022-08-11 | End: 2022-11-09

## 2022-08-11 NOTE — TELEPHONE ENCOUNTER
Please see refill request    Patient was last seen (Dr Avani Chaudhary patient) on 5-5-2022    Last prescribed on 4-  #90 X 1    Thank you

## 2022-08-11 NOTE — TELEPHONE ENCOUNTER
This pharmacy faxed over request for the following prescriptions to be filled:    Medication requested :   Requested Prescriptions     Pending Prescriptions Disp Refills    omeprazole (PRILOSEC) 20 mg capsule 90 Capsule 1     Sig: Take 1 Capsule by mouth two (2) times a day for 90 days. PCP: Dr. Leonel Bryant or Print: [facility]  Mail order or Local pharmacy: [facility]    Scheduled appointment if not seen by current providers in office: Pt has to find new PCP.

## 2022-08-17 DIAGNOSIS — K21.9 GASTROESOPHAGEAL REFLUX DISEASE, UNSPECIFIED WHETHER ESOPHAGITIS PRESENT: ICD-10-CM

## 2022-08-17 DIAGNOSIS — E66.9 OBESITY (BMI 35.0-39.9 WITHOUT COMORBIDITY): ICD-10-CM

## 2022-08-17 NOTE — TELEPHONE ENCOUNTER
Please see refill requests    Patient was last seen by Dr Khalida Rondon on 5-5-2022    Last prescribed Victoza on 4-5-2022  #36 x 2    Omeprazole  #90 X 1    Thank you

## 2022-08-17 NOTE — TELEPHONE ENCOUNTER
This patient contacted office for the following prescriptions to be filled:    Last office visit: 22  Follow up appointment: 22 Dr. Alba Jim    Medication requested :   Requested Prescriptions     Pending Prescriptions Disp Refills    omeprazole (PRILOSEC) 20 mg capsule 90 Capsule 1     Sig: Take 1 Capsule by mouth two (2) times a day for 90 days. Victoza 2-Ashutosh 0.6 mg/0.1 mL (18 mg/3 mL) pnij 36 mg 2     Si.2 mg by SubCUTAneous route daily for 90 days. PCP: Dr. Armendariz Officer  Mail order or Local pharmacy name: [facility] Reyes is scheduled to see new pcp in September. She needs her medication refilled until then.

## 2022-08-18 RX ORDER — OMEPRAZOLE 20 MG/1
20 CAPSULE, DELAYED RELEASE ORAL 2 TIMES DAILY
Qty: 90 CAPSULE | Refills: 1 | OUTPATIENT
Start: 2022-08-18 | End: 2022-11-16

## 2022-08-18 RX ORDER — LIRAGLUTIDE 6 MG/ML
1.2 INJECTION SUBCUTANEOUS DAILY
Qty: 36 MG | Refills: 2 | OUTPATIENT
Start: 2022-08-18 | End: 2022-11-16

## 2022-09-17 ENCOUNTER — APPOINTMENT (OUTPATIENT)
Dept: GENERAL RADIOLOGY | Age: 42
End: 2022-09-17
Attending: EMERGENCY MEDICINE
Payer: MEDICAID

## 2022-09-17 ENCOUNTER — HOSPITAL ENCOUNTER (EMERGENCY)
Age: 42
Discharge: HOME | End: 2022-09-18
Attending: EMERGENCY MEDICINE | Admitting: EMERGENCY MEDICINE
Payer: MEDICAID

## 2022-09-17 DIAGNOSIS — J45.901 PERSISTENT ASTHMA WITH ACUTE EXACERBATION, UNSPECIFIED ASTHMA SEVERITY: Primary | ICD-10-CM

## 2022-09-17 DIAGNOSIS — J20.9 ACUTE BRONCHITIS, UNSPECIFIED ORGANISM: ICD-10-CM

## 2022-09-17 PROCEDURE — 74011000250 HC RX REV CODE- 250: Performed by: EMERGENCY MEDICINE

## 2022-09-17 PROCEDURE — 71045 X-RAY EXAM CHEST 1 VIEW: CPT

## 2022-09-17 PROCEDURE — 99283 EMERGENCY DEPT VISIT LOW MDM: CPT | Performed by: EMERGENCY MEDICINE

## 2022-09-17 PROCEDURE — 94640 AIRWAY INHALATION TREATMENT: CPT

## 2022-09-17 PROCEDURE — 74011636637 HC RX REV CODE- 636/637: Performed by: EMERGENCY MEDICINE

## 2022-09-17 RX ORDER — IPRATROPIUM BROMIDE AND ALBUTEROL SULFATE 2.5; .5 MG/3ML; MG/3ML
3 SOLUTION RESPIRATORY (INHALATION)
Status: COMPLETED | OUTPATIENT
Start: 2022-09-17 | End: 2022-09-17

## 2022-09-17 RX ORDER — METHOTREXATE 2.5 MG/1
TABLET ORAL
COMMUNITY

## 2022-09-17 RX ORDER — PREDNISONE 20 MG/1
60 TABLET ORAL
Status: COMPLETED | OUTPATIENT
Start: 2022-09-17 | End: 2022-09-17

## 2022-09-17 RX ORDER — AZITHROMYCIN 250 MG/1
500 TABLET, FILM COATED ORAL
Status: COMPLETED | OUTPATIENT
Start: 2022-09-17 | End: 2022-09-18

## 2022-09-17 RX ORDER — OMEPRAZOLE 10 MG/1
CAPSULE, DELAYED RELEASE ORAL
COMMUNITY

## 2022-09-17 RX ORDER — IPRATROPIUM BROMIDE AND ALBUTEROL SULFATE 2.5; .5 MG/3ML; MG/3ML
3 SOLUTION RESPIRATORY (INHALATION)
Status: COMPLETED | OUTPATIENT
Start: 2022-09-17 | End: 2022-09-18

## 2022-09-17 RX ORDER — METFORMIN HYDROCHLORIDE 500 MG/1
TABLET ORAL
COMMUNITY

## 2022-09-17 RX ADMIN — PREDNISONE 60 MG: 20 TABLET ORAL at 22:55

## 2022-09-17 RX ADMIN — IPRATROPIUM BROMIDE AND ALBUTEROL SULFATE 3 ML: .5; 2.5 SOLUTION RESPIRATORY (INHALATION) at 23:29

## 2022-09-18 VITALS
RESPIRATION RATE: 20 BRPM | BODY MASS INDEX: 42.43 KG/M2 | OXYGEN SATURATION: 100 % | DIASTOLIC BLOOD PRESSURE: 63 MMHG | SYSTOLIC BLOOD PRESSURE: 114 MMHG | HEART RATE: 84 BPM | TEMPERATURE: 97.5 F | WEIGHT: 255 LBS

## 2022-09-18 PROCEDURE — 74011250637 HC RX REV CODE- 250/637: Performed by: EMERGENCY MEDICINE

## 2022-09-18 PROCEDURE — 94640 AIRWAY INHALATION TREATMENT: CPT

## 2022-09-18 PROCEDURE — 74011000250 HC RX REV CODE- 250: Performed by: EMERGENCY MEDICINE

## 2022-09-18 RX ORDER — PREDNISONE 20 MG/1
60 TABLET ORAL DAILY
Qty: 12 TABLET | Refills: 0 | Status: SHIPPED | OUTPATIENT
Start: 2022-09-18 | End: 2022-09-22

## 2022-09-18 RX ORDER — AZITHROMYCIN 250 MG/1
250 TABLET, FILM COATED ORAL DAILY
Qty: 4 TABLET | Refills: 0 | Status: SHIPPED | OUTPATIENT
Start: 2022-09-18 | End: 2022-09-22

## 2022-09-18 RX ADMIN — AZITHROMYCIN MONOHYDRATE 500 MG: 250 TABLET ORAL at 00:11

## 2022-09-18 RX ADMIN — IPRATROPIUM BROMIDE AND ALBUTEROL SULFATE 3 ML: .5; 2.5 SOLUTION RESPIRATORY (INHALATION) at 00:18

## 2022-09-18 NOTE — ED PROVIDER NOTES
Pt c/o cough/wheezing h/o asthma, c/o exacerbation. No fever. Cough non prod. No leg pain. No abd pain. No weakness. On pred 10 mg daily for ra. Last asthma attack few mnths ago. Sx's moderate, worsening. No treatment pta. Past Medical History:   Diagnosis Date    Asthma     History of partial nephrectomy 10/24/2018    Hyperthyroidism     Malignant tumor of kidney ([address] 10/24/2018    Polycystic ovary syndrome     Renal mass        Past Surgical History:   Procedure Laterality Date    HX HERNIA REPAIR      HX NEPHRECTOMY      HX OTHER SURGICAL Right     right lung collapsed as a child         Family History:   Problem Relation Age of Onset    Hypertension Mother        Social History     Socioeconomic History    Marital status: SINGLE     Spouse name: Not on file    Number of children: Not on file    Years of education: Not on file    Highest education level: Not on file   Occupational History    Not on file   Tobacco Use    Smoking status: Never    Smokeless tobacco: Never   Vaping Use    Vaping Use: Never used   Substance and Sexual Activity    Alcohol use: No    Drug use: No    Sexual activity: Yes     Birth control/protection: None   Other Topics Concern    Not on file   Social History Narrative    Not on file     Social Determinants of Health     Financial Resource Strain: Not on file   Food Insecurity: Not on file   Transportation Needs: Not on file   Physical Activity: Not on file   Stress: Not on file   Social Connections: Not on file   Intimate Partner Violence: Not on file   Housing Stability: Not on file         ALLERGIES: Iodinated contrast media    Review of Systems   Constitutional:  Negative for fever. HENT:  Negative for congestion. Respiratory:  Positive for cough and wheezing. Cardiovascular:  Negative for chest pain. Gastrointestinal:  Negative for abdominal pain and vomiting. Musculoskeletal:  Negative for back pain. Skin:  Negative for rash.    Neurological:  Negative for light-headedness. All other systems reviewed and are negative. Vitals:    09/17/22 2244 09/17/22 2329 09/18/22 0021   BP: 114/63     Pulse: 84     Resp: 20     Temp: 97.5 °F (36.4 °C)     SpO2: 100% 100% 100%   Weight: 115.7 kg (255 lb)              Physical Exam  Vitals and nursing note reviewed. Constitutional:       Appearance: She is well-developed. She is not diaphoretic. HENT:      Head: Normocephalic and atraumatic. Eyes:      Pupils: Pupils are equal, round, and reactive to light. Cardiovascular:      Rate and Rhythm: Normal rate and regular rhythm. Heart sounds: No murmur heard. Pulmonary:      Effort: Pulmonary effort is normal. No respiratory distress. Breath sounds: Wheezing present. No rales. Abdominal:      Palpations: Abdomen is soft. Tenderness: There is no abdominal tenderness. Musculoskeletal:         General: No tenderness. Cervical back: Normal range of motion. Skin:     General: Skin is dry. Capillary Refill: Capillary refill takes less than 2 seconds. Findings: No rash. Neurological:      Mental Status: She is alert and oriented to person, place, and time. MDM         Procedures      Vitals:  Patient Vitals for the past 12 hrs:   Temp Pulse Resp BP SpO2   09/18/22 0021 -- -- -- -- 100 %   09/17/22 2329 -- -- -- -- 100 %   09/17/22 2244 97.5 °F (36.4 °C) 84 20 114/63 100 %         Medications ordered:   Medications   albuterol-ipratropium (DUO-NEB) 2.5 MG-0.5 MG/3 ML (3 mL Nebulization Given 9/17/22 2329)   predniSONE (DELTASONE) tablet 60 mg (60 mg Oral Given 9/17/22 2255)   azithromycin (ZITHROMAX) tablet 500 mg (500 mg Oral Given 9/18/22 0011)   albuterol-ipratropium (DUO-NEB) 2.5 MG-0.5 MG/3 ML (3 mL Nebulization Given 9/18/22 0018)         Lab findings:  No results found for this or any previous visit (from the past 12 hour(s)).         X-Ray, CT or other radiology findings or impressions:  XR CHEST PORT   Final Result      No active cardiopulmonary disease. Progress notes, Consult notes or additional Procedure notes:   12:25 AM markedly improved, lungs ctab. No complaints  pt wants dc, declines further tx or eval.  To dc per pt. Det ret inst given. Not c/w ptx/pna/sepsis/acs/pe. Diagnosis:   1. Persistent asthma with acute exacerbation, unspecified asthma severity    2. Acute bronchitis, unspecified organism        Disposition: home    Follow-up Information       Follow up With Specialties Details Why Contact Info    [facility] EMERGENCY DEPT Emergency Medicine Go to  As needed, If symptoms worsen [facility] [address] [facility], [address] [facility], 1000 [facility] 23.  [address] [address]  [phone]               Discharge Medication List as of 9/18/2022 12:27 AM        START taking these medications    Details   azithromycin (Zithromax Z-Ashutosh) 250 mg tablet Take 1 Tablet by mouth daily for 4 days. , Normal, Disp-4 Tablet, R-0      predniSONE (DELTASONE) 20 mg tablet Take 3 Tablets by mouth daily for 4 days. , Normal, Disp-12 Tablet, R-0           CONTINUE these medications which have NOT CHANGED    Details   metFORMIN (GLUCOPHAGE) 500 mg tablet Take  by mouth., Historical Med      methotrexate (RHEUMATREX) 2.5 mg tablet Take  by mouth., Historical Med      omeprazole (PRILOSEC) 10 mg capsule Take  by mouth., Historical Med      fluticasone propionate (FLONASE) 50 mcg/actuation nasal spray 2 Sprays by Both Nostrils route daily. , Normal, Disp-1 Each, R-11      hydroCHLOROthiazide (HYDRODIURIL) 12.5 mg tablet TAKE 1 TABLET BY MOUTH DAILY, Normal, Disp-90 Tablet, R-2      ibuprofen (MOTRIN) 600 mg tablet Take 1 Tablet by mouth every six (6) hours as needed for Pain., Normal, Disp-18 Tablet, R-0      multivitamin capsule Take 1 Capsule by mouth daily. , Normal, Disp-30 Capsule, R-0      Linzess 290 mcg cap capsule TAKE 1 CAPSULE BY MOUTH EVERY DAY, Normal, Disp-90 Capsule, R-3

## 2022-09-18 NOTE — ED TRIAGE NOTES
Pt states she has had a cough since Thursday that has progressively gotten worse. PT states she had a L sided lumpectomy on Thursday.

## 2022-09-19 ENCOUNTER — APPOINTMENT (OUTPATIENT)
Dept: GENERAL RADIOLOGY | Age: 42
End: 2022-09-19
Attending: EMERGENCY MEDICINE
Payer: MEDICAID

## 2022-09-19 ENCOUNTER — HOSPITAL ENCOUNTER (EMERGENCY)
Age: 42
Discharge: HOME | End: 2022-09-19
Attending: EMERGENCY MEDICINE
Payer: MEDICAID

## 2022-09-19 VITALS
BODY MASS INDEX: 42.49 KG/M2 | HEART RATE: 77 BPM | WEIGHT: 255 LBS | TEMPERATURE: 98.8 F | SYSTOLIC BLOOD PRESSURE: 120 MMHG | HEIGHT: 65 IN | OXYGEN SATURATION: 98 % | DIASTOLIC BLOOD PRESSURE: 63 MMHG | RESPIRATION RATE: 16 BRPM

## 2022-09-19 DIAGNOSIS — M25.561 ACUTE PAIN OF RIGHT KNEE: Primary | ICD-10-CM

## 2022-09-19 DIAGNOSIS — M79.604 RIGHT LEG PAIN: ICD-10-CM

## 2022-09-19 LAB
ANION GAP SERPL CALC-SCNC: 6 MMOL/L (ref 3–18)
BASOPHILS # BLD: 0 K/UL (ref 0–0.1)
BASOPHILS NFR BLD: 0 % (ref 0–2)
BUN SERPL-MCNC: 13 MG/DL (ref 7–18)
BUN/CREAT SERPL: 15 (ref 12–20)
CA-I BLD-MCNC: 9.3 MG/DL (ref 8.5–10.1)
CHLORIDE SERPL-SCNC: 106 MMOL/L (ref 100–111)
CO2 SERPL-SCNC: 27 MMOL/L (ref 21–32)
CREAT SERPL-MCNC: 0.89 MG/DL (ref 0.6–1.3)
D DIMER PPP FEU-MCNC: 0.48 UG/ML(FEU)
DIFFERENTIAL METHOD BLD: ABNORMAL
EOSINOPHIL # BLD: 0 K/UL (ref 0–0.4)
EOSINOPHIL NFR BLD: 0 % (ref 0–5)
ERYTHROCYTE [DISTWIDTH] IN BLOOD BY AUTOMATED COUNT: 17.1 % (ref 11.6–14.5)
GLUCOSE SERPL-MCNC: 164 MG/DL (ref 74–99)
HCT VFR BLD AUTO: 32.5 % (ref 35–45)
HGB BLD-MCNC: 10.7 G/DL (ref 12–16)
IMM GRANULOCYTES # BLD AUTO: 0 K/UL
IMM GRANULOCYTES NFR BLD AUTO: 0 %
LYMPHOCYTES # BLD: 0.9 K/UL (ref 0.9–3.6)
LYMPHOCYTES NFR BLD: 4 % (ref 21–52)
MCH RBC QN AUTO: 22.8 PG (ref 24–34)
MCHC RBC AUTO-ENTMCNC: 32.9 G/DL (ref 31–37)
MCV RBC AUTO: 69.1 FL (ref 78–100)
METAMYELOCYTES NFR BLD MANUAL: 2 %
MONOCYTES # BLD: 0.7 K/UL (ref 0.05–1.2)
MONOCYTES NFR BLD: 3 % (ref 3–10)
MYELOCYTES NFR BLD MANUAL: 1 %
NEUTS BAND NFR BLD MANUAL: 2 % (ref 0–5)
NEUTS SEG # BLD: 20.3 K/UL (ref 1.8–8)
NEUTS SEG NFR BLD: 88 % (ref 40–73)
NRBC # BLD: 0 K/UL (ref 0–0.01)
NRBC BLD-RTO: 0 PER 100 WBC
PLATELET # BLD AUTO: 327 K/UL (ref 135–420)
PMV BLD AUTO: 10.5 FL (ref 9.2–11.8)
POTASSIUM SERPL-SCNC: 4.2 MMOL/L (ref 3.5–5.5)
RBC # BLD AUTO: 4.7 M/UL (ref 4.2–5.3)
RBC MORPH BLD: ABNORMAL
SODIUM SERPL-SCNC: 139 MMOL/L (ref 136–145)
WBC # BLD AUTO: 22.5 K/UL (ref 4.6–13.2)

## 2022-09-19 PROCEDURE — 85379 FIBRIN DEGRADATION QUANT: CPT

## 2022-09-19 PROCEDURE — 99284 EMERGENCY DEPT VISIT MOD MDM: CPT

## 2022-09-19 PROCEDURE — 85025 COMPLETE CBC W/AUTO DIFF WBC: CPT

## 2022-09-19 PROCEDURE — 96372 THER/PROPH/DIAG INJ SC/IM: CPT

## 2022-09-19 PROCEDURE — 74011250637 HC RX REV CODE- 250/637: Performed by: EMERGENCY MEDICINE

## 2022-09-19 PROCEDURE — 74011250636 HC RX REV CODE- 250/636: Performed by: EMERGENCY MEDICINE

## 2022-09-19 PROCEDURE — 73560 X-RAY EXAM OF KNEE 1 OR 2: CPT

## 2022-09-19 PROCEDURE — 80048 BASIC METABOLIC PNL TOTAL CA: CPT

## 2022-09-19 RX ORDER — HYDROMORPHONE HYDROCHLORIDE 1 MG/ML
1 INJECTION, SOLUTION INTRAMUSCULAR; INTRAVENOUS; SUBCUTANEOUS
Status: COMPLETED | OUTPATIENT
Start: 2022-09-19 | End: 2022-09-19

## 2022-09-19 RX ORDER — ONDANSETRON 4 MG/1
4 TABLET, ORALLY DISINTEGRATING ORAL
Status: COMPLETED | OUTPATIENT
Start: 2022-09-19 | End: 2022-09-19

## 2022-09-19 RX ORDER — ONDANSETRON 4 MG/1
4 TABLET, FILM COATED ORAL
Status: DISCONTINUED | OUTPATIENT
Start: 2022-09-19 | End: 2022-09-19 | Stop reason: SDUPTHER

## 2022-09-19 RX ORDER — OXYCODONE AND ACETAMINOPHEN 5; 325 MG/1; MG/1
1 TABLET ORAL
Status: COMPLETED | OUTPATIENT
Start: 2022-09-19 | End: 2022-09-19

## 2022-09-19 RX ADMIN — ONDANSETRON 4 MG: 4 TABLET, ORALLY DISINTEGRATING ORAL at 02:33

## 2022-09-19 RX ADMIN — HYDROMORPHONE HYDROCHLORIDE 1 MG: 1 INJECTION, SOLUTION INTRAMUSCULAR; INTRAVENOUS; SUBCUTANEOUS at 02:34

## 2022-09-19 RX ADMIN — OXYCODONE AND ACETAMINOPHEN 1 TABLET: 5; 325 TABLET ORAL at 01:23

## 2022-09-19 NOTE — DISCHARGE INSTRUCTIONS
Return for pain, redness, any fever/chills, shortness of breath, vomiting, decreased fluid intake, weakness, numbness, dizziness, or any change or concerns.

## 2022-09-19 NOTE — ED PROVIDER NOTES
Pt c/o rt knee pain, radiating to rt calf, also some rt thigh pain. H/o same in past due to rheum arthritis but worse now. Satarte abruptly few hours ago. No injury. No weakness or numbness. No cp or sob. N n/v. Took norco pta, no sig improvement. C/o rt knee swelling. No redness or rash. No leg leg pain. Past Medical History:   Diagnosis Date    Asthma     History of partial nephrectomy 10/24/2018    Hyperthyroidism     Malignant tumor of kidney ([facility] [address] 10/24/2018    Polycystic ovary syndrome     Renal mass        Past Surgical History:   Procedure Laterality Date    HX HERNIA REPAIR      HX NEPHRECTOMY      HX OTHER SURGICAL Right     right lung collapsed as a child         Family History:   Problem Relation Age of Onset    Hypertension Mother        Social History     Socioeconomic History    Marital status: SINGLE     Spouse name: Not on file    Number of children: Not on file    Years of education: Not on file    Highest education level: Not on file   Occupational History    Not on file   Tobacco Use    Smoking status: Never    Smokeless tobacco: Never   Vaping Use    Vaping Use: Never used   Substance and Sexual Activity    Alcohol use: No    Drug use: No    Sexual activity: Yes     Birth control/protection: None   Other Topics Concern    Not on file   Social History Narrative    Not on file     Social Determinants of Health     Financial Resource Strain: Not on file   Food Insecurity: Not on file   Transportation Needs: Not on file   Physical Activity: Not on file   Stress: Not on file   Social Connections: Not on file   Intimate Partner Violence: Not on file   Housing Stability: Not on file         ALLERGIES: Iodinated contrast media    Review of Systems   Constitutional:  Negative for fever. HENT:  Negative for congestion. Respiratory:  Negative for cough and shortness of breath. Cardiovascular:  Negative for chest pain. Gastrointestinal:  Negative for abdominal pain and vomiting. Musculoskeletal:  Positive for arthralgias and myalgias. Negative for back pain. Skin:  Negative for rash. Neurological:  Negative for light-headedness. All other systems reviewed and are negative. Vitals:    09/19/22 0100 09/19/22 0236 09/19/22 0326   BP: (!) 140/99 122/82 123/83   Pulse: 83 81 88   Resp: 20 20 18   Temp: 98.8 °F (37.1 °C)     SpO2: 99% 99% 98%   Weight: 115.7 kg (255 lb)     Height: 5' 5\" (1.651 m)              Physical Exam  Vitals and nursing note reviewed. Constitutional:       Appearance: She is well-developed. She is not diaphoretic. HENT:      Head: Normocephalic and atraumatic. Eyes:      Pupils: Pupils are equal, round, and reactive to light. Cardiovascular:      Rate and Rhythm: Normal rate and regular rhythm. Heart sounds: No murmur heard. Pulmonary:      Effort: Pulmonary effort is normal.      Breath sounds: No wheezing. Abdominal:      Palpations: Abdomen is soft. Tenderness: There is no abdominal tenderness. Musculoskeletal:         General: Tenderness present. Cervical back: Normal range of motion. Comments: + rom rt knee but pain w flexin. Stable w stress testing. N erythema. Mild ant rt knee swelling. Nvi. Mild rt calf/thigh diffuse ttp. No warmth. 2+ rt dp pulse. Skin:     General: Skin is dry. Capillary Refill: Capillary refill takes less than 2 seconds. Findings: No rash. Neurological:      Mental Status: She is alert and oriented to person, place, and time.    Psychiatric:         Mood and Affect: Mood normal.        MDM         Procedures    Vitals:  Patient Vitals for the past 12 hrs:   Temp Pulse Resp BP SpO2   09/19/22 0326 -- 88 18 123/83 98 %   09/19/22 0236 -- 81 20 122/82 99 %   09/19/22 0100 98.8 °F (37.1 °C) 83 20 (!) 140/99 99 %         Medications ordered:   Medications   oxyCODONE-acetaminophen (PERCOCET) 5-325 mg per tablet 1 Tablet (1 Tablet Oral Given 9/19/22 0123)   HYDROmorphone (DILAUDID) injection 1 mg (1 mg IntraMUSCular Given 9/19/22 0234)   ondansetron (ZOFRAN ODT) tablet 4 mg (4 mg Oral Given 9/19/22 0233)         Lab findings:  Recent Results (from the past 12 hour(s))   CBC WITH AUTOMATED DIFF    Collection Time: 09/19/22  1:30 AM   Result Value Ref Range    WBC 22.5 (H) 4.6 - 13.2 K/uL    RBC 4.70 4.20 - 5.30 M/uL    HGB 10.7 (L) 12.0 - 16.0 g/dL    HCT 32.5 (L) 35.0 - 45.0 %    MCV 69.1 (L) 78.0 - 100.0 FL    MCH 22.8 (L) 24.0 - 34.0 PG    MCHC 32.9 31.0 - 37.0 g/dL    RDW 17.1 (H) 11.6 - 14.5 %    PLATELET 218 705 - 991 K/uL    MPV 10.5 9.2 - 11.8 FL    NRBC 0.0 0.0  WBC    ABSOLUTE NRBC 0.00 0.00 - 0.01 K/uL    NEUTROPHILS 88 (H) 40 - 73 %    BAND NEUTROPHILS 2 0 - 5 %    LYMPHOCYTES 4 (L) 21 - 52 %    MONOCYTES 3 3 - 10 %    EOSINOPHILS 0 0 - 5 %    BASOPHILS 0 0 - 2 %    METAMYELOCYTES 2 (H) 0 %    MYELOCYTES 1 (H) 0 %    IMMATURE GRANULOCYTES 0 %    ABS. NEUTROPHILS 20.3 (H) 1.8 - 8.0 K/UL    ABS. LYMPHOCYTES 0.9 0.9 - 3.6 K/UL    ABS. MONOCYTES 0.7 0.05 - 1.2 K/UL    ABS. EOSINOPHILS 0.0 0.0 - 0.4 K/UL    ABS. BASOPHILS 0.0 0.0 - 0.1 K/UL    ABS. IMM. GRANS. 0.0 K/UL    DF Manual      RBC COMMENTS Anisocytosis  1+        RBC COMMENTS Hypochromia  2+        RBC COMMENTS Microcytosis  2+       METABOLIC PANEL, BASIC    Collection Time: 09/19/22  1:30 AM   Result Value Ref Range    Sodium 139 136 - 145 mmol/L    Potassium 4.2 3.5 - 5.5 mmol/L    Chloride 106 100 - 111 mmol/L    CO2 27 21 - 32 mmol/L    Anion gap 6 3.0 - 18.0 mmol/L    Glucose 164 (H) 74 - 99 mg/dL    BUN 13 7 - 18 mg/dL    Creatinine 0.89 0.60 - 1.30 mg/dL    BUN/Creatinine ratio 15 12 - 20      GFR est AA >60 >60 ml/min/1.73m2    GFR est non-AA >60 >60 ml/min/1.73m2    Calcium 9.3 8.5 - 10.1 mg/dL   D DIMER    Collection Time: 09/19/22  1:30 AM   Result Value Ref Range    D DIMER 0.48 (H) <0.46 ug/ml(FEU)           X-Ray, CT or other radiology findings or impressions:  XR KNEE RT MAX 2 VWS   Final Result      1. Tricompartmental osteoarthritis with most severe degenerative changes at the   patellofemoral compartment. Small knee joint effusion. Progress notes, Consult notes or additional Procedure notes:   3:05 AM pt feels much better, no erythema/warmth, nvi, not c/w infection, leukocytosis but pt on prednisone 60 for asthma, afebrile. Eval not c/w pad/dvt/hypoxia/sepsis/septic jt/abscess. 3:36 AM pt a and ox 3, stable for dc. Declines crutches as offered. Diagnosis:   1. Acute pain of right knee    2. Right leg pain        Disposition: home    Follow-up Information       Follow up With Specialties Details Why Contact Info    [facility] EMERGENCY DEPT Emergency Medicine Go to  As needed, If symptoms worsen [address] [facility], [address]   [facility]  [phone]      Ralph Collins MD Orthopedic Surgery Schedule an appointment as soon as possible for a visit in 1 week  [address]  [phone]               Patient's Medications   Start Taking    No medications on file   Continue Taking    AZITHROMYCIN (ZITHROMAX Z-JONA) 250 MG TABLET    Take 1 Tablet by mouth daily for 4 days. FLUTICASONE PROPIONATE (FLONASE) 50 MCG/ACTUATION NASAL SPRAY    2 Sprays by Both Nostrils route daily. HYDROCHLOROTHIAZIDE (HYDRODIURIL) 12.5 MG TABLET    TAKE 1 TABLET BY MOUTH DAILY    IBUPROFEN (MOTRIN) 600 MG TABLET    Take 1 Tablet by mouth every six (6) hours as needed for Pain. LINZESS 290 MCG CAP CAPSULE    TAKE 1 CAPSULE BY MOUTH EVERY DAY    METFORMIN (GLUCOPHAGE) 500 MG TABLET    Take  by mouth. METHOTREXATE (RHEUMATREX) 2.5 MG TABLET    Take  by mouth. MULTIVITAMIN CAPSULE    Take 1 Capsule by mouth daily. OMEPRAZOLE (PRILOSEC) 10 MG CAPSULE    Take  by mouth. PREDNISONE (DELTASONE) 20 MG TABLET    Take 3 Tablets by mouth daily for 4 days.    These Medications have changed    No medications on file   Stop Taking    No medications on file

## 2022-09-19 NOTE — ED TRIAGE NOTES
Reports sudden onset pain in right knee approximately 2200 tonight, pain radiates to shin. No known injury. Took 600mg Ibuprofen with no relief then tried [place] at 2300 with no relief.  Seen in ER last night for asthma exacerbation

## 2022-09-19 NOTE — ED NOTES
I have reviewed discharge instructions with the patient and spouse. The patient and spouse verbalized understanding.        Reviewed medication compliance, follow up with PCP, return to ER for any new or worrisome concerns

## 2022-09-21 ENCOUNTER — OFFICE VISIT (OUTPATIENT)
Dept: ORTHOPEDIC SURGERY | Age: 42
End: 2022-09-21
Payer: MEDICAID

## 2022-09-21 VITALS — TEMPERATURE: 97.8 F | HEIGHT: 65 IN | WEIGHT: 255 LBS | BODY MASS INDEX: 42.49 KG/M2

## 2022-09-21 DIAGNOSIS — M51.26 LUMBAR HERNIATED DISC: Primary | ICD-10-CM

## 2022-09-21 DIAGNOSIS — M17.11 PRIMARY OSTEOARTHRITIS OF RIGHT KNEE: ICD-10-CM

## 2022-09-21 PROCEDURE — 99203 OFFICE O/P NEW LOW 30 MIN: CPT | Performed by: ORTHOPAEDIC SURGERY

## 2022-09-21 PROCEDURE — 20611 DRAIN/INJ JOINT/BURSA W/US: CPT | Performed by: ORTHOPAEDIC SURGERY

## 2022-09-21 RX ORDER — BACLOFEN 10 MG/1
10 TABLET ORAL 3 TIMES DAILY
Qty: 60 TABLET | Refills: 1 | Status: SHIPPED | OUTPATIENT
Start: 2022-09-21

## 2022-09-21 RX ORDER — GABAPENTIN 100 MG/1
100 CAPSULE ORAL 3 TIMES DAILY
Qty: 90 CAPSULE | Refills: 0 | Status: SHIPPED | OUTPATIENT
Start: 2022-09-21

## 2022-09-21 RX ORDER — TRIAMCINOLONE ACETONIDE 40 MG/ML
40 INJECTION, SUSPENSION INTRA-ARTICULAR; INTRAMUSCULAR ONCE
Status: COMPLETED | OUTPATIENT
Start: 2022-09-21 | End: 2022-09-21

## 2022-09-21 RX ORDER — MELOXICAM 15 MG/1
15 TABLET ORAL
Qty: 30 TABLET | Refills: 1 | Status: SHIPPED | OUTPATIENT
Start: 2022-09-21 | End: 2022-09-23 | Stop reason: SDUPTHER

## 2022-09-21 RX ADMIN — TRIAMCINOLONE ACETONIDE 40 MG: 40 INJECTION, SUSPENSION INTRA-ARTICULAR; INTRAMUSCULAR at 15:14

## 2022-09-21 NOTE — PROGRESS NOTES
Freddy Nichole  1980   Chief Complaint   Patient presents with    Knee Pain     Rt    Leg Pain     Rt           HISTORY OF PRESENT ILLNESS  Freddy Nichole is a 40 y.o. female who presents today for evaluation of right knee/leg pain. She rates her pain 10/10 today. Patient states she had surgical partial lumpectomy for breast cancer last week and subsequently developed bronchitis. She was then placed on Prednisone and since taking this she has developed significant right knee and right leg pain. Describes a constant, sharp pain. Cannot bear weight on that leg and presents today in a wheelchair. Went to [facility], had XR taken. States she was worked up for blood clots at the ED which was negative. Has significant pain in the right lower leg even while at rest. Has been taking Norco and Ibuprofen for her partial lumpectomy. Reports she has had similar symptoms with her left leg before. Has tried following treatments: Injections:NO; Brace:NO;  Therapy:NO; Cane/Crutch:NO       Allergies   Allergen Reactions    Iodinated Contrast Media Other (comments)     NAUSEA, ANXIETY-- RECEIVED MULTIHANCE (GADOBENATE DIMEGLUTAMINE) 20ML 2/13/18 FOR MRI        Past Medical History:   Diagnosis Date    Asthma     History of partial nephrectomy 10/24/2018    Hyperthyroidism     Malignant tumor of kidney ([facility] [address] 10/24/2018    Polycystic ovary syndrome     Renal mass       Social History     Socioeconomic History    Marital status: SINGLE     Spouse name: Not on file    Number of children: Not on file    Years of education: Not on file    Highest education level: Not on file   Occupational History    Not on file   Tobacco Use    Smoking status: Never    Smokeless tobacco: Never   Vaping Use    Vaping Use: Never used   Substance and Sexual Activity    Alcohol use: No    Drug use: No    Sexual activity: Yes     Birth control/protection: None   Other Topics Concern    Not on file   Social History Narrative    Not on file     Social Determinants of Health     Financial Resource Strain: Not on file   Food Insecurity: Not on file   Transportation Needs: Not on file   Physical Activity: Not on file   Stress: Not on file   Social Connections: Not on file   Intimate Partner Violence: Not on file   Housing Stability: Not on file      Past Surgical History:   Procedure Laterality Date    HX BREAST LUMPECTOMY N/A     breast cancer surgery unknown side    HX HERNIA REPAIR      HX NEPHRECTOMY      HX OTHER SURGICAL Right     right lung collapsed as a child      Family History   Problem Relation Age of Onset    Hypertension Mother       Current Outpatient Medications   Medication Sig    azithromycin (Zithromax Z-[place]osh) 250 mg tablet Take 1 Tablet by mouth daily for 4 days. predniSONE (DELTASONE) 20 mg tablet Take 3 Tablets by mouth daily for 4 days. metFORMIN (GLUCOPHAGE) 500 mg tablet Take  by mouth. methotrexate (RHEUMATREX) 2.5 mg tablet Take  by mouth. omeprazole (PRILOSEC) 10 mg capsule Take  by mouth. fluticasone propionate (FLONASE) 50 mcg/actuation nasal spray 2 Sprays by Both Nostrils route daily. hydroCHLOROthiazide (HYDRODIURIL) 12.5 mg tablet TAKE 1 TABLET BY MOUTH DAILY    ibuprofen (MOTRIN) 600 mg tablet Take 1 Tablet by mouth every six (6) hours as needed for Pain.    multivitamin capsule Take 1 Capsule by mouth daily. Linzess 290 mcg cap capsule TAKE 1 CAPSULE BY MOUTH EVERY DAY     No current facility-administered medications for this visit. REVIEW OF SYSTEM   Patient denies: Weight loss, Fever/Chills, HA, Visual changes, Fatigue, Chest pain, SOB, Abdominal pain, N/V/D/C, Blood in stool or urine, Edema. Pertinent positive as above in HPI. All others were negative    PHYSICAL EXAM:   Visit Vitals  Temp 97.8 °F (36.6 °C) (Temporal)   Ht 5' 5\" (1.651 m)   Wt 255 lb (115.7 kg)   BMI 42.43 kg/m²     The patient is a well-developed, well-nourished female   in no acute distress.   The patient is alert and oriented times three. The patient is alert and oriented times three. Mood and affect are normal.  LYMPHATIC: lymph nodes are not enlarged and are within normal limits  SKIN: normal in color and non tender to palpation. There are no bruises or abrasions noted. NEUROLOGICAL: Motor sensory exam is within normal limits. Reflexes are equal bilaterally. There is normal sensation to pinprick and light touch  MUSCULOSKELETAL:  Examination Right knee   Skin Intact   Range of motion    Effusion +   Medial joint line tenderness +   Lateral joint line tenderness -   Tenderness Pes Bursa -   Tenderness insertion MCL -   Tenderness insertion LCL -   Selvins -   Patella crepitus -   Patella grind -   Lachman -   Pivot shift -   Anterior drawer -   Posterior drawer -   Varus stress -   Valgus stress -   Neurovascular Intact   Calf Swelling and Tenderness to Palpation -   Tao's Test -   Hamstring Cord Tightness -     Examination Lumbar   Skin Intact   Tenderness, Paraspinal muscles +   Paraspinal muscle spasms +   Flexion ltd   Extension 10   Knee reflexes Normal   Ankle reflexes Normal   Straight leg raise -   Calf tenderness -     PROCEDURE:   Right Knee Injection with Ultrasound Guidance    Indication:Right Knee pain/swelling    After sterile prep, 4 cc of Xylocaine and 1 cc of Kenalog were injected into the right Knee. Intra-articular Ultrasound images captured using [address] [place] [address] Loop Ultrasound machine using a frequency of 10 MHz with a linear transducer and scanned into patient's chart.            [facility] - [facility]  OFFICE PROCEDURE PROGRESS NOTE        Chart reviewed for the following:  Poppy Reyes M.D, have reviewed the History, Physical and updated the Allergic reactions for [place]     TIME OUT performed immediately prior to start of procedure:  Poppy Reyes M.D, have performed the following reviews on [facility] Esteban Padron prior to the start of the procedure:            * Patient was identified by name and date of birth   * Agreement on procedure being performed was verified  * Risks and Benefits explained to the patient  * Procedure site verified and marked as necessary  * Patient was positioned for comfort  * Needle placement confirmed by ultrasound  * Consent was signed and verified     Time: 3:04 PM     Date of procedure: 9/21/2022    Procedure performed by:  Kim Cadena M.D    Provider assisted by: (see medication administration)    How tolerated by patient: tolerated the procedure well with no complications    Comments: none      IMAGING: XR of the right knee with 2 views obtained at [facility] dated 9/19/2022 was reviewed and read by Dr. Deb Pang:   IMPRESSION:  1. Tricompartmental osteoarthritis with most severe degenerative changes at the patellofemoral compartment. Small knee joint effusion. IMPRESSION:      ICD-10-CM ICD-9-CM    1. Lumbar herniated disc  M51.26 722.10 MRI LUMB SPINE WO CONT      2. Primary osteoarthritis of right knee  M17.11 715.16            PLAN:  1. Pt presents today with significant right knee/leg pain and I am ordering a STAT MRI of the lumbar spine to r/o lumbar herniated disc. Was provided with prescriptions for Gabapentin, Baclofen, and Mobic. She also presents with degenerative arthritis of the right knee seen on XR and  received right knee cortisone injection in the office today. Risk factors include: BMI>40  2. No ultrasound exam indicated today  3. Yes cortisone injection indicated today R KNEE US  4. No Physical/Occupational Therapy indicated today  5. Yes diagnostic test indicated today: MRI LUMBAR SPINE  6. No durable medical equipment indicated today  7. No referral indicated today   8. Yes medications indicated today: BACLOFEN & GABAPENTIN & MOBIC  9.  No Narcotic indicated today      RTC following MRI      Scribed by Jacquie Farris (Man Hu) as dictated by Eliza Wright MD    I, Dr. Eliza Wright, confirm that all documentation is accurate.     Eliza Wright M.D.   [facility]

## 2022-09-23 DIAGNOSIS — M17.11 PRIMARY OSTEOARTHRITIS OF RIGHT KNEE: ICD-10-CM

## 2022-09-23 DIAGNOSIS — M51.26 LUMBAR HERNIATED DISC: ICD-10-CM

## 2022-09-23 RX ORDER — MELOXICAM 15 MG/1
15 TABLET ORAL
Qty: 90 TABLET | Refills: 0 | Status: SHIPPED | OUTPATIENT
Start: 2022-09-23

## 2022-09-23 NOTE — TELEPHONE ENCOUNTER
Per pharmacy, patient is requesting a 90 d/s    Requested Prescriptions     Pending Prescriptions Disp Refills    meloxicam (Mobic) 15 mg tablet 90 Tablet 0     Sig: Take 1 Tablet by mouth daily (with breakfast). For [address] [facility] in place:   Recommendation Provided To:    Intervention Detail: New Rx: 1, reason: Improve Adherence  Gap Closed?:   Intervention Accepted By:   Time Spent (min): 5

## 2022-11-14 ENCOUNTER — OFFICE VISIT (OUTPATIENT)
Dept: INTERNAL MEDICINE CLINIC | Age: 42
End: 2022-11-14
Payer: MEDICAID

## 2022-11-14 VITALS
SYSTOLIC BLOOD PRESSURE: 107 MMHG | RESPIRATION RATE: 20 BRPM | HEIGHT: 65 IN | DIASTOLIC BLOOD PRESSURE: 69 MMHG | TEMPERATURE: 97.8 F | OXYGEN SATURATION: 97 % | HEART RATE: 84 BPM | WEIGHT: 241.8 LBS | BODY MASS INDEX: 40.29 KG/M2

## 2022-11-14 DIAGNOSIS — R73.9 ELEVATED BLOOD SUGAR LEVEL: ICD-10-CM

## 2022-11-14 DIAGNOSIS — Z11.59 NEED FOR HEPATITIS C SCREENING TEST: ICD-10-CM

## 2022-11-14 DIAGNOSIS — J45.20 MILD INTERMITTENT ASTHMA WITHOUT COMPLICATION: ICD-10-CM

## 2022-11-14 DIAGNOSIS — C50.912 MALIGNANT NEOPLASM OF LEFT FEMALE BREAST, UNSPECIFIED ESTROGEN RECEPTOR STATUS, UNSPECIFIED SITE OF BREAST (HCC): Primary | ICD-10-CM

## 2022-11-14 DIAGNOSIS — R53.82 CHRONIC FATIGUE: ICD-10-CM

## 2022-11-14 DIAGNOSIS — R60.9 PERIPHERAL EDEMA: ICD-10-CM

## 2022-11-14 DIAGNOSIS — C64.1 RENAL CELL CARCINOMA OF RIGHT KIDNEY (HCC): ICD-10-CM

## 2022-11-14 DIAGNOSIS — E05.90 HYPERTHYROIDISM: ICD-10-CM

## 2022-11-14 DIAGNOSIS — Z13.220 SCREENING FOR HYPERLIPIDEMIA: ICD-10-CM

## 2022-11-14 PROCEDURE — 99215 OFFICE O/P EST HI 40 MIN: CPT | Performed by: FAMILY MEDICINE

## 2022-11-14 RX ORDER — DIPHENHYDRAMINE HCL 25 MG
50 CAPSULE ORAL
COMMUNITY

## 2022-11-14 RX ORDER — LORAZEPAM 0.5 MG/1
TABLET ORAL
COMMUNITY
Start: 2022-11-01 | End: 2022-11-14

## 2022-11-14 RX ORDER — HYDROCHLOROTHIAZIDE 12.5 MG/1
12.5 TABLET ORAL DAILY
Qty: 90 TABLET | Refills: 1 | Status: SHIPPED | OUTPATIENT
Start: 2022-11-14

## 2022-11-14 RX ORDER — DEXAMETHASONE 4 MG/1
TABLET ORAL
COMMUNITY
Start: 2022-11-10

## 2022-11-14 RX ORDER — FOLIC ACID 1 MG/1
1 TABLET ORAL DAILY
COMMUNITY
Start: 2022-10-01

## 2022-11-14 RX ORDER — GLUCOSAMINE SULFATE 1500 MG
POWDER IN PACKET (EA) ORAL DAILY
COMMUNITY

## 2022-11-14 RX ORDER — ONDANSETRON HYDROCHLORIDE 8 MG/1
TABLET, FILM COATED ORAL
COMMUNITY
Start: 2022-11-10

## 2022-11-14 RX ORDER — MONTELUKAST SODIUM 10 MG/1
TABLET ORAL
Qty: 90 TABLET | Refills: 0 | Status: SHIPPED | OUTPATIENT
Start: 2022-11-14

## 2022-11-14 RX ORDER — MONTELUKAST SODIUM 10 MG/1
10 TABLET ORAL DAILY
Qty: 30 TABLET | Refills: 0 | Status: SHIPPED | OUTPATIENT
Start: 2022-11-14 | End: 2022-11-14

## 2022-11-14 RX ORDER — ALBUTEROL SULFATE 90 UG/1
AEROSOL, METERED RESPIRATORY (INHALATION)
COMMUNITY
Start: 2022-08-28

## 2022-11-14 NOTE — PROGRESS NOTES
Verbal order from Rebeca Hummel MD to order labs/sign and draw them in office    Labs were drawn and sent to [facility] by Javon Franz LPN:    The following tubes were sent:    1 Lavendar, 0 Red, 2 SST, 0 Urine    Draw site right antecubital.  Patient tolerated draw with no distress. Rad Holley presents today for   Chief Complaint   Patient presents with    New Patient       Is someone accompanying this pt? no    Is the patient using any DME equipment during OV? no    Depression Screening:  3 most recent PHQ Screens 11/14/2022   Little interest or pleasure in doing things Not at all   Feeling down, depressed, irritable, or hopeless Not at all   Total Score PHQ 2 0       Learning Assessment:  No flowsheet data found. Health Maintenance reviewed and discussed and ordered per Provider. Health Maintenance Due   Topic Date Due    Hepatitis C Screening  Never done    Pneumococcal 0-64 years (1 - PCV) Never done    COVID-19 Vaccine (3 - Booster for Moderna series) 04/20/2021    DTaP/Tdap/Td series (2 - Td or Tdap) 03/14/2022    Flu Vaccine (1) 08/01/2022   . Coordination of Care:  1. \"Have you been to the ER, urgent care clinic since your last visit? Hospitalized since your last visit? \" No    2. \"Have you seen or consulted any other health care providers outside of the [address] [place] [address] since your last visit? \" No     3. For patients aged 39-70: Has the patient had a colonoscopy? NA - based on age     If the patient is female:    4. For patients aged 41-77: Has the patient had a mammogram within the past 2 years? Yes - no Care Gap present    5. For patients aged 21-65: Has the patient had a pap smear?  Yes - no Care Gap present

## 2022-11-14 NOTE — PROGRESS NOTES
Maxwell Sapp (: 1980) is a 40 y.o. female here for evaluation of the following chief concern(s):  Establishment of care; previously under the care of Dr. Khurram Abebe [facility]). Chronic condition management     ASSESSMENT/PLAN:  1. Malignant neoplasm of left female breast, unspecified estrogen receptor status, unspecified site of breast ([address]  2. Renal cell carcinoma of right kidney (HCC)  -     METABOLIC PANEL, COMPREHENSIVE  3. Chronic fatigue  -     METABOLIC PANEL, COMPREHENSIVE  -     COLLECTION VENOUS BLOOD,VENIPUNCTURE  -     TSH 3RD GENERATION  -     CBC WITH AUTOMATED DIFF  4. Mild intermittent asthma without complication  -     montelukast (SINGULAIR) 10 mg tablet; Take 1 Tablet by mouth daily. In place of Benadryl allergy pill., Normal, Disp-30 Tablet, R-0  5. Elevated blood sugar level  -     HEMOGLOBIN A1C WITH EAG  6. Hyperthyroidism  -     TSH 3RD GENERATION  7. Peripheral edema  The following orders have not been finalized:  -     hydroCHLOROthiazide (HYDRODIURIL) 12.5 mg tablet  8. Screening for hyperlipidemia  -     LIPID PANEL  9. Need for hepatitis C screening test  -     HEPATITIS C AB  -     COLLECTION VENOUS BLOOD,VENIPUNCTURE    Ms. Reta Manley has complex medical history though appears stable, normal hemodynamics and respiratory status. She is currently being actively treated for breast cancer. Given multiple primary cancer diagnoses, I advised against use of GLP-1 agonists including Victoza which pt use to take. Chronic fatigue is likely multifactorial- and may largely be related to chemotherapy. I advised wean off of Benadryl based anti-allergy medication, onto Montelukast.  DDx to also include sleep apnea. Standard labs as above. Return in about 2 weeks (around 2022) for follow-up chronic conditions or sooner if needed. Ralph Emanuel. Reta Manley agrees with plan as above and has no additional questions at this time. SUBJECTIVE/OBJECTIVE:  Overall, pt is feeling \"slow\". Breast cancer, Left s/p lumpectomy, radiation, chemo:  Found on routine screening  Surgeon was Reed Pope    Renal cell carcinoma, Right:  S/p partial nephrectomy. Father's side has strong family hx of cancers. Asthma:  Breathing is doing good at this time. PCOS, ?prediabetes, ? thyroid abnormality:  Pt was on Victoza and Metformin- hx followed w/ Endocrinology    Arthritis:  Left hip- did well s/p corticosteroid injection, Methotrexate is on hold. Dr. Booth Rota [facility])    Within the last year pt also had COVID and flu. Review of Systems   Constitutional:  Positive for fatigue. Negative for chills and fever. HENT:  Negative for hearing loss. Eyes:  Positive for visual disturbance (Blurry). Respiratory:  Negative for cough and shortness of breath. Cardiovascular:  Negative for chest pain and palpitations. Gastrointestinal:  Positive for constipation (Intermittent) and diarrhea (Intermittient). Negative for abdominal pain and blood in stool. Genitourinary:  Negative for hematuria and menstrual problem. Musculoskeletal:  Positive for arthralgias. Skin:  Negative for rash. Neurological:  Negative for seizures. Psychiatric/Behavioral:  Negative for decreased concentration. The patient is nervous/anxious.         Past Medical History:   Diagnosis Date    Asthma     History of partial nephrectomy 10/24/2018    Hyperthyroidism     Malignant tumor of kidney ([facility] [address] 10/24/2018    Polycystic ovary syndrome     Renal mass     Rheumatoid arthritis (HCC)      Past Surgical History:   Procedure Laterality Date    HX BREAST LUMPECTOMY N/A     breast cancer surgery unknown side    HX HERNIA REPAIR      HX NEPHRECTOMY      HX OTHER SURGICAL Right     right lung collapsed as a child     Family History   Problem Relation Age of Onset    Hypertension Mother     Cancer Other        Social History     Socioeconomic History Marital status: SINGLE     Spouse name: Not on file    Number of children: 2    Years of education: Not on file    Highest education level: Not on file   Occupational History    Not on file   Tobacco Use    Smoking status: Never    Smokeless tobacco: Never   Vaping Use    Vaping Use: Never used   Substance and Sexual Activity    Alcohol use: No    Drug use: No    Sexual activity: Not Currently     Birth control/protection: None   Other Topics Concern    Not on file   Social History Narrative    Not on file     Social Determinants of Health     Financial Resource Strain: Not on file   Food Insecurity: Not on file   Transportation Needs: Not on file   Physical Activity: Not on file   Stress: Not on file   Social Connections: Not on file   Intimate Partner Violence: Not on file   Housing Stability: Not on file     Social History     Tobacco Use   Smoking Status Never   Smokeless Tobacco Never       Current Outpatient Medications   Medication Sig Dispense Refill    ProAir HFA 90 mcg/actuation inhaler       dexAMETHasone (DECADRON) 4 mg tablet       diphenhydrAMINE (BENADRYL) 25 mg capsule Take 50 mg by mouth. folic acid (FOLVITE) 1 mg tablet Take 1 mg by mouth daily. ondansetron hcl (ZOFRAN) 8 mg tablet Starting the second day after chemo cycle finishes, take 8 mg every 8 hours as needed for nausea/vomiting      cholecalciferol (Vitamin D3) 25 mcg (1,000 unit) cap Take  by mouth daily. montelukast (SINGULAIR) 10 mg tablet Take 1 Tablet by mouth daily. In place of Benadryl allergy pill. 30 Tablet 0    metFORMIN (GLUCOPHAGE) 500 mg tablet Take  by mouth. omeprazole (PRILOSEC) 10 mg capsule Take  by mouth. fluticasone propionate (FLONASE) 50 mcg/actuation nasal spray 2 Sprays by Both Nostrils route daily.  1 Each 11    hydroCHLOROthiazide (HYDRODIURIL) 12.5 mg tablet TAKE 1 TABLET BY MOUTH DAILY 90 Tablet 2    ibuprofen (MOTRIN) 600 mg tablet Take 1 Tablet by mouth every six (6) hours as needed for Pain. 18 Tablet 0    multivitamin capsule Take 1 Capsule by mouth daily. 30 Capsule 0    Linzess 290 mcg cap capsule TAKE 1 CAPSULE BY MOUTH EVERY DAY 90 Capsule 3     Allergies   Allergen Reactions    Gadobenate Dimeglumine Nausea and Vomiting     Nausea, Anxiety - received Multihance Gabobenate Dimeglutamine 20mL 2/13/18 for MRI  Other reaction(s): gi distress  Nausea, Anxiety - received Multihance Gabobenate Dimeglutamine 20mL 2/13/18 for MRI      Iodinated Contrast Media Other (comments)     NAUSEA, ANXIETY-- RECEIVED MULTIHANCE (GADOBENATE DIMEGLUTAMINE) 20ML 2/13/18 FOR MRI       /69 (BP 1 Location: Right upper arm, BP Patient Position: Sitting, BP Cuff Size: Adult long)   Pulse 84   Temp 97.8 °F (36.6 °C) (Temporal)   Resp 20   Ht 5' 5\" (1.651 m)   Wt 241 lb 12.8 oz (109.7 kg)   SpO2 97%   BMI 40.24 kg/m²     Physical Exam  Constitutional:       General: She is not in acute distress. Appearance: Normal appearance. She is not ill-appearing. HENT:      Head: Normocephalic and atraumatic. Mouth/Throat:      Mouth: Mucous membranes are moist.      Pharynx: Oropharynx is clear. Comments: Mallampati III. Eyes:      General: No scleral icterus. Conjunctiva/sclera: Conjunctivae normal.      Pupils: Pupils are equal, round, and reactive to light. Cardiovascular:      Rate and Rhythm: Normal rate and regular rhythm. Pulses: Normal pulses. Heart sounds: Normal heart sounds. Pulmonary:      Effort: Pulmonary effort is normal.      Breath sounds: Normal breath sounds. Abdominal:      General: Bowel sounds are normal.      Palpations: Abdomen is soft. Tenderness: There is no abdominal tenderness. Musculoskeletal:      Cervical back: Neck supple. Right lower leg: No edema. Left lower leg: No edema. Lymphadenopathy:      Cervical: No cervical adenopathy. Skin:     General: Skin is warm and dry. Findings: Rash present.       Comments: Posterior and sides of neck w/ acanthosis- mild. Neurological:      General: No focal deficit present. Mental Status: She is alert and oriented to person, place, and time. Psychiatric:         Mood and Affect: Mood normal.       Lab Results   Component Value Date/Time    WBC 22.5 (H) 09/19/2022 01:30 AM    HGB 10.7 (L) 09/19/2022 01:30 AM    HCT 32.5 (L) 09/19/2022 01:30 AM    PLATELET 397 57/64/0746 01:30 AM    MCV 69.1 (L) 09/19/2022 01:30 AM     Lab Results   Component Value Date/Time    Sodium 139 09/19/2022 01:30 AM    Potassium 4.2 09/19/2022 01:30 AM    Chloride 106 09/19/2022 01:30 AM    CO2 27 09/19/2022 01:30 AM    Anion gap 6 09/19/2022 01:30 AM    Glucose 164 (H) 09/19/2022 01:30 AM    BUN 13 09/19/2022 01:30 AM    Creatinine 0.89 09/19/2022 01:30 AM    BUN/Creatinine ratio 15 09/19/2022 01:30 AM    GFR est AA >60 09/19/2022 01:30 AM    GFR est non-AA >60 09/19/2022 01:30 AM    Calcium 9.3 09/19/2022 01:30 AM    Bilirubin, total 0.9 02/07/2022 03:15 PM    Alk. phosphatase 61 02/07/2022 03:15 PM    Protein, total 7.7 02/07/2022 03:15 PM    Albumin 3.7 02/07/2022 03:15 PM    Globulin 3.2 12/10/2017 04:05 AM    A-G Ratio 0.8 12/10/2017 04:05 AM    ALT (SGPT) 17 02/07/2022 03:15 PM    AST (SGOT) 7 (L) 02/07/2022 03:15 PM     No results found for: CHOL, CHOLPOCT, CHOLX, CHLST, CHOLV, TOTCHOLEXT, HDL, HDLPOC, HDLEXT, HDLP, LDL, LDLCPOC, LDLCEXT, LDLC, DLDLP, VLDLC, VLDL, TGLX, TRIGL, TRIGLYCEXT, TRIGP, TGLPOCT, CHHD, CHHDX    On this date 11/14/22 I have spent >45 minutes reviewing previous notes, test results and face to face with the patient for interview/exam, discussing working diagnosis and treatment plan as well as documenting on the day of the visit. Medical decision making complexity: moderate-high.     Darin Dangelo MD   Family & Geriatric Medicine

## 2022-11-21 ENCOUNTER — APPOINTMENT (OUTPATIENT)
Dept: GENERAL RADIOLOGY | Age: 42
End: 2022-11-21
Attending: EMERGENCY MEDICINE
Payer: MEDICAID

## 2022-11-21 ENCOUNTER — HOSPITAL ENCOUNTER (EMERGENCY)
Age: 42
Discharge: HOME | End: 2022-11-21
Attending: EMERGENCY MEDICINE
Payer: MEDICAID

## 2022-11-21 VITALS
WEIGHT: 235 LBS | SYSTOLIC BLOOD PRESSURE: 114 MMHG | BODY MASS INDEX: 39.15 KG/M2 | RESPIRATION RATE: 16 BRPM | OXYGEN SATURATION: 100 % | DIASTOLIC BLOOD PRESSURE: 79 MMHG | HEIGHT: 65 IN | HEART RATE: 89 BPM | TEMPERATURE: 98.3 F

## 2022-11-21 DIAGNOSIS — K21.9 GASTROESOPHAGEAL REFLUX DISEASE, UNSPECIFIED WHETHER ESOPHAGITIS PRESENT: ICD-10-CM

## 2022-11-21 DIAGNOSIS — R07.89 ATYPICAL CHEST PAIN: Primary | ICD-10-CM

## 2022-11-21 LAB
ALBUMIN SERPL-MCNC: 3.2 G/DL (ref 3.4–5)
ALBUMIN/GLOB SERPL: 0.9 {RATIO} (ref 0.8–1.7)
ALP SERPL-CCNC: 98 U/L (ref 45–117)
ALT SERPL-CCNC: 34 U/L (ref 13–56)
ANION GAP SERPL CALC-SCNC: 10 MMOL/L (ref 3–18)
AST SERPL W P-5'-P-CCNC: 14 U/L (ref 10–38)
ATRIAL RATE: 88 BPM
BASOPHILS # BLD: 0 K/UL (ref 0–0.1)
BASOPHILS NFR BLD: 0 % (ref 0–2)
BILIRUB SERPL-MCNC: 0.4 MG/DL (ref 0.2–1)
BUN SERPL-MCNC: 11 MG/DL (ref 7–18)
BUN/CREAT SERPL: 14 (ref 12–20)
CA-I BLD-MCNC: 8.9 MG/DL (ref 8.5–10.1)
CALCULATED P AXIS, ECG09: 57 DEGREES
CALCULATED R AXIS, ECG10: 19 DEGREES
CALCULATED T AXIS, ECG11: 14 DEGREES
CHLORIDE SERPL-SCNC: 104 MMOL/L (ref 100–111)
CO2 SERPL-SCNC: 26 MMOL/L (ref 21–32)
CREAT SERPL-MCNC: 0.79 MG/DL (ref 0.6–1.3)
D DIMER PPP FEU-MCNC: 0.91 UG/ML(FEU)
DIAGNOSIS, 93000: NORMAL
DIFFERENTIAL METHOD BLD: ABNORMAL
EOSINOPHIL # BLD: 0.1 K/UL (ref 0–0.4)
EOSINOPHIL NFR BLD: 1 % (ref 0–5)
ERYTHROCYTE [DISTWIDTH] IN BLOOD BY AUTOMATED COUNT: 17.5 % (ref 11.6–14.5)
GLOBULIN SER CALC-MCNC: 3.6 G/DL (ref 2–4)
GLUCOSE SERPL-MCNC: 127 MG/DL (ref 74–99)
HCT VFR BLD AUTO: 32 % (ref 35–45)
HGB BLD-MCNC: 10.4 G/DL (ref 12–16)
IMM GRANULOCYTES # BLD AUTO: 0 K/UL
IMM GRANULOCYTES NFR BLD AUTO: 0 %
LIPASE SERPL-CCNC: 251 U/L (ref 73–393)
LYMPHOCYTES # BLD: 1.2 K/UL (ref 0.9–3.6)
LYMPHOCYTES NFR BLD: 10 % (ref 21–52)
MCH RBC QN AUTO: 22.7 PG (ref 24–34)
MCHC RBC AUTO-ENTMCNC: 32.5 G/DL (ref 31–37)
MCV RBC AUTO: 69.7 FL (ref 78–100)
METAMYELOCYTES NFR BLD MANUAL: 1 %
MONOCYTES # BLD: 0.5 K/UL (ref 0.05–1.2)
MONOCYTES NFR BLD: 4 % (ref 3–10)
MYELOCYTES NFR BLD MANUAL: 2 %
NEUTS BAND NFR BLD MANUAL: 4 % (ref 0–5)
NEUTS SEG # BLD: 9.6 K/UL (ref 1.8–8)
NEUTS SEG NFR BLD: 78 % (ref 40–73)
NRBC # BLD: 0.03 K/UL (ref 0–0.01)
NRBC BLD-RTO: 0.3 PER 100 WBC
P-R INTERVAL, ECG05: 150 MS
PLATELET # BLD AUTO: 258 K/UL (ref 135–420)
PMV BLD AUTO: 10.7 FL (ref 9.2–11.8)
POTASSIUM SERPL-SCNC: 3.7 MMOL/L (ref 3.5–5.5)
PROT SERPL-MCNC: 6.8 G/DL (ref 6.4–8.2)
Q-T INTERVAL, ECG07: 371 MS
QRS DURATION, ECG06: 93 MS
QTC CALCULATION (BEZET), ECG08: 447 MS
RBC # BLD AUTO: 4.59 M/UL (ref 4.2–5.3)
RBC MORPH BLD: ABNORMAL
SODIUM SERPL-SCNC: 140 MMOL/L (ref 136–145)
TROPONIN-HIGH SENSITIVITY: 4 NG/L (ref 0–54)
TROPONIN-HIGH SENSITIVITY: 4 NG/L (ref 0–54)
VENTRICULAR RATE, ECG03: 87 BPM
WBC # BLD AUTO: 11.7 K/UL (ref 4.6–13.2)
WBC MORPH BLD: ABNORMAL

## 2022-11-21 PROCEDURE — 74011250637 HC RX REV CODE- 250/637: Performed by: EMERGENCY MEDICINE

## 2022-11-21 PROCEDURE — 71045 X-RAY EXAM CHEST 1 VIEW: CPT

## 2022-11-21 PROCEDURE — 85379 FIBRIN DEGRADATION QUANT: CPT

## 2022-11-21 PROCEDURE — 85025 COMPLETE CBC W/AUTO DIFF WBC: CPT

## 2022-11-21 PROCEDURE — 80053 COMPREHEN METABOLIC PANEL: CPT

## 2022-11-21 PROCEDURE — 36415 COLL VENOUS BLD VENIPUNCTURE: CPT

## 2022-11-21 PROCEDURE — 96374 THER/PROPH/DIAG INJ IV PUSH: CPT

## 2022-11-21 PROCEDURE — 93005 ELECTROCARDIOGRAM TRACING: CPT

## 2022-11-21 PROCEDURE — 74011000250 HC RX REV CODE- 250: Performed by: EMERGENCY MEDICINE

## 2022-11-21 PROCEDURE — 99285 EMERGENCY DEPT VISIT HI MDM: CPT

## 2022-11-21 PROCEDURE — 83690 ASSAY OF LIPASE: CPT

## 2022-11-21 PROCEDURE — 84484 ASSAY OF TROPONIN QUANT: CPT

## 2022-11-21 PROCEDURE — 74011250636 HC RX REV CODE- 250/636: Performed by: EMERGENCY MEDICINE

## 2022-11-21 RX ORDER — LIDOCAINE HYDROCHLORIDE 20 MG/ML
5 SOLUTION OROPHARYNGEAL
Status: COMPLETED | OUTPATIENT
Start: 2022-11-21 | End: 2022-11-21

## 2022-11-21 RX ORDER — OMEPRAZOLE 20 MG/1
20 CAPSULE, DELAYED RELEASE ORAL DAILY
Qty: 20 CAPSULE | Refills: 0 | Status: SHIPPED | OUTPATIENT
Start: 2022-11-21

## 2022-11-21 RX ORDER — ONDANSETRON 4 MG/1
4 TABLET, FILM COATED ORAL
Qty: 12 TABLET | Refills: 0 | Status: SHIPPED | OUTPATIENT
Start: 2022-11-21

## 2022-11-21 RX ORDER — MAG HYDROX/ALUMINUM HYD/SIMETH 200-200-20
30 SUSPENSION, ORAL (FINAL DOSE FORM) ORAL
Status: COMPLETED | OUTPATIENT
Start: 2022-11-21 | End: 2022-11-21

## 2022-11-21 RX ORDER — ONDANSETRON 2 MG/ML
4 INJECTION INTRAMUSCULAR; INTRAVENOUS
Status: COMPLETED | OUTPATIENT
Start: 2022-11-21 | End: 2022-11-21

## 2022-11-21 RX ADMIN — ONDANSETRON 4 MG: 2 INJECTION INTRAMUSCULAR; INTRAVENOUS at 02:29

## 2022-11-21 RX ADMIN — LIDOCAINE HYDROCHLORIDE 5 ML: 20 SOLUTION ORAL at 02:29

## 2022-11-21 RX ADMIN — ALUMINA, MAGNESIA, AND SIMETHICONE ORAL SUSPENSION REGULAR STRENGTH 30 ML: 1200; 1200; 120 SUSPENSION ORAL at 02:29

## 2022-11-21 NOTE — ED PROVIDER NOTES
EMERGENCY DEPARTMENT HISTORY AND PHYSICAL EXAM      Date: 11/21/2022  Patient Name: Blanco Lyman    History of Presenting Illness     Chief Complaint   Patient presents with    Chest Pain       History Provided By: Patient    HPI: Blanco Lyman, 40 y.o. female with past medical history significant for hypothyroidism, asthma, malignant tumor of kidney, PCOS, and a history of breast cancer and had first dose chemo a week ago. She presents to ED complaining of chest pain which woke her up at about 11 PM.  Pain is midsternal, localized, she describes it as an aching pain. Pain associated with some nausea, patient vomited once. She rates the pain a 7 out of 10, did not get any relief with ibuprofen. She thinks she may have vomited the ibuprofen anyhow. She denies fever, chills, shortness of breath, paresthesias. She denies a history of diabetes, hypertension, smoking, family history or hypercholesterolemia. There are no other complaints, changes, or physical findings at this time. Past History   Past Medical History:  Past Medical History:   Diagnosis Date    Asthma     History of partial nephrectomy 10/24/2018    Hyperthyroidism     Malignant tumor of kidney ([facility] [address] 10/24/2018    Polycystic ovary syndrome     Renal mass     Rheumatoid arthritis (HCC)        Past Surgical History:  Past Surgical History:   Procedure Laterality Date    HX BREAST LUMPECTOMY N/A     breast cancer surgery unknown side    HX HERNIA REPAIR      HX NEPHRECTOMY      HX OTHER SURGICAL Right     right lung collapsed as a child       Family History:  Family History   Problem Relation Age of Onset    Hypertension Mother     Cancer Other        Social History:  Social History     Tobacco Use    Smoking status: Never    Smokeless tobacco: Never   Vaping Use    Vaping Use: Never used   Substance Use Topics    Alcohol use: No    Drug use: No       Allergies:   Allergies   Allergen Reactions    Gadobenate Dimeglumine Nausea and Vomiting     Nausea, Anxiety - received Multihance Gabobenate Dimeglutamine 20mL 2/13/18 for MRI  Other reaction(s): gi distress  Nausea, Anxiety - received Multihance Gabobenate Dimeglutamine 20mL 2/13/18 for MRI      Iodinated Contrast Media Other (comments)     NAUSEA, ANXIETY-- RECEIVED MULTIHANCE (GADOBENATE DIMEGLUTAMINE) 20ML 2/13/18 FOR MRI       PCP: Gardenia Roberts MD    Current Outpatient Medications   Medication Sig Dispense Refill    ondansetron hcl (Zofran) 4 mg tablet Take 1 Tablet by mouth every eight (8) hours as needed for Nausea. 12 Tablet 0    omeprazole (PRILOSEC) 20 mg capsule Take 1 Capsule by mouth daily. 20 Capsule 0    ProAir HFA 90 mcg/actuation inhaler       dexAMETHasone (DECADRON) 4 mg tablet       diphenhydrAMINE (BENADRYL) 25 mg capsule Take 50 mg by mouth. folic acid (FOLVITE) 1 mg tablet Take 1 mg by mouth daily. ondansetron hcl (ZOFRAN) 8 mg tablet Starting the second day after chemo cycle finishes, take 8 mg every 8 hours as needed for nausea/vomiting      cholecalciferol (Vitamin D3) 25 mcg (1,000 unit) cap Take  by mouth daily. hydroCHLOROthiazide (HYDRODIURIL) 12.5 mg tablet Take 1 Tablet by mouth daily. 90 Tablet 1    montelukast (SINGULAIR) 10 mg tablet TAKE 1 TABLET BY MOUTH DAILY 90 Tablet 0    metFORMIN (GLUCOPHAGE) 500 mg tablet Take  by mouth. omeprazole (PRILOSEC) 10 mg capsule Take  by mouth. fluticasone propionate (FLONASE) 50 mcg/actuation nasal spray 2 Sprays by Both Nostrils route daily. 1 Each 11    ibuprofen (MOTRIN) 600 mg tablet Take 1 Tablet by mouth every six (6) hours as needed for Pain. 18 Tablet 0    multivitamin capsule Take 1 Capsule by mouth daily. 30 Capsule 0    Linzess 290 mcg cap capsule TAKE 1 CAPSULE BY MOUTH EVERY DAY 90 Capsule 3     Review of Systems   Review of Systems   Constitutional:  Negative for chills and fever. HENT:  Negative for congestion and sore throat.     Respiratory:  Negative for cough and shortness of breath. Cardiovascular:  Positive for chest pain. Gastrointestinal:  Positive for nausea and vomiting. Negative for abdominal distention. Genitourinary:  Negative for difficulty urinating, dysuria, flank pain, frequency, hematuria, urgency, vaginal bleeding and vaginal discharge. Musculoskeletal:  Negative for arthralgias and joint swelling. Skin:  Negative for rash and wound. Neurological:  Negative for dizziness, weakness, light-headedness and headaches. Hematological:  Negative for adenopathy. Physical Exam   Physical Exam  Vitals and nursing note reviewed. Constitutional:       General: She is not in acute distress. Appearance: She is well-developed. She is not diaphoretic. Comments: Patient appears anxious but no acute distress. HENT:      Head: Normocephalic and atraumatic. Jaw: No trismus. Right Ear: External ear normal. No swelling or tenderness. Tympanic membrane is not perforated, erythematous or bulging. Left Ear: External ear normal. No swelling or tenderness. Tympanic membrane is not perforated, erythematous or bulging. Nose: Nose normal. No mucosal edema or rhinorrhea. Right Sinus: No maxillary sinus tenderness or frontal sinus tenderness. Left Sinus: No maxillary sinus tenderness or frontal sinus tenderness. Mouth/Throat:      Mouth: No oral lesions. Dentition: No dental abscesses. Pharynx: Uvula midline. No oropharyngeal exudate, posterior oropharyngeal erythema or uvula swelling. Tonsils: No tonsillar abscesses. Eyes:      General: No scleral icterus. Right eye: No discharge. Left eye: No discharge. Conjunctiva/sclera: Conjunctivae normal.   Cardiovascular:      Rate and Rhythm: Normal rate and regular rhythm. Heart sounds: Normal heart sounds. No murmur heard. No friction rub. No gallop.    Pulmonary:      Effort: Pulmonary effort is normal. No tachypnea, accessory muscle usage or respiratory distress. Breath sounds: Normal breath sounds. No decreased breath sounds, wheezing, rhonchi or rales. Chest:      Chest wall: Tenderness present. Comments: There is tenderness to palpation of the sternum but not clear if pain is same. Abdominal:      Palpations: Abdomen is soft. Musculoskeletal:         General: No tenderness. Normal range of motion. Cervical back: Normal range of motion and neck supple. Lymphadenopathy:      Cervical: No cervical adenopathy. Skin:     General: Skin is warm and dry. Findings: No erythema or rash. Neurological:      Mental Status: She is alert and oriented to person, place, and time.    Psychiatric:         Judgment: Judgment normal.       Lab and Diagnostic Study Results   Labs -     Recent Results (from the past 12 hour(s))   EKG, 12 LEAD, INITIAL    Collection Time: 11/21/22  1:56 AM   Result Value Ref Range    Ventricular Rate 87 BPM    Atrial Rate 88 BPM    P-R Interval 150 ms    QRS Duration 93 ms    Q-T Interval 371 ms    QTC Calculation (Bezet) 447 ms    Calculated P Axis 57 degrees    Calculated R Axis 19 degrees    Calculated T Axis 14 degrees    Diagnosis       Sinus rhythm  RSR' in V1 or V2, right VCD or RVH  LVH by voltage     CBC WITH AUTOMATED DIFF    Collection Time: 11/21/22  2:00 AM   Result Value Ref Range    WBC 11.7 4.6 - 13.2 K/uL    RBC 4.59 4.20 - 5.30 M/uL    HGB 10.4 (L) 12.0 - 16.0 g/dL    HCT 32.0 (L) 35.0 - 45.0 %    MCV 69.7 (L) 78.0 - 100.0 FL    MCH 22.7 (L) 24.0 - 34.0 PG    MCHC 32.5 31.0 - 37.0 g/dL    RDW 17.5 (H) 11.6 - 14.5 %    PLATELET 060 690 - 929 K/uL    MPV 10.7 9.2 - 11.8 FL    NRBC 0.3 (H) 0.0  WBC    ABSOLUTE NRBC 0.03 (H) 0.00 - 0.01 K/uL    NEUTROPHILS 78 (H) 40 - 73 %    BAND NEUTROPHILS 4 0 - 5 %    LYMPHOCYTES 10 (L) 21 - 52 %    MONOCYTES 4 3 - 10 %    EOSINOPHILS 1 0 - 5 %    BASOPHILS 0 0 - 2 %    METAMYELOCYTES 1 (H) 0 %    MYELOCYTES 2 (H) 0 %    IMMATURE GRANULOCYTES 0 %    ABS. NEUTROPHILS 9.6 (H) 1.8 - 8.0 K/UL    ABS. LYMPHOCYTES 1.2 0.9 - 3.6 K/UL    ABS. MONOCYTES 0.5 0.05 - 1.2 K/UL    ABS. EOSINOPHILS 0.1 0.0 - 0.4 K/UL    ABS. BASOPHILS 0.0 0.0 - 0.1 K/UL    ABS. IMM. GRANS. 0.0 K/UL    DF Manual      RBC COMMENTS Microcytosis  2+        WBC COMMENTS Toxic Granulation     TROPONIN-HIGH SENSITIVITY    Collection Time: 11/21/22  2:00 AM   Result Value Ref Range    Troponin-High Sensitivity 4 0 - 54 ng/L   METABOLIC PANEL, COMPREHENSIVE    Collection Time: 11/21/22  2:00 AM   Result Value Ref Range    Sodium 140 136 - 145 mmol/L    Potassium 3.7 3.5 - 5.5 mmol/L    Chloride 104 100 - 111 mmol/L    CO2 26 21 - 32 mmol/L    Anion gap 10 3.0 - 18.0 mmol/L    Glucose 127 (H) 74 - 99 mg/dL    BUN 11 7 - 18 mg/dL    Creatinine 0.79 0.60 - 1.30 mg/dL    BUN/Creatinine ratio 14 12 - 20      eGFR >60 >60 ml/min/1.73m2    Calcium 8.9 8.5 - 10.1 mg/dL    Bilirubin, total 0.4 0.2 - 1.0 mg/dL    AST (SGOT) 14 10 - 38 U/L    ALT (SGPT) 34 13 - 56 U/L    Alk. phosphatase 98 45 - 117 U/L    Protein, total 6.8 6.4 - 8.2 g/dL    Albumin 3.2 (L) 3.4 - 5.0 g/dL    Globulin 3.6 2.0 - 4.0 g/dL    A-G Ratio 0.9 0.8 - 1.7     D DIMER    Collection Time: 11/21/22  2:00 AM   Result Value Ref Range    D DIMER 0.91 (H) <0.46 ug/ml(FEU)   LIPASE    Collection Time: 11/21/22  2:00 AM   Result Value Ref Range    Lipase 251 73 - 393 U/L   TROPONIN-HIGH SENSITIVITY    Collection Time: 11/21/22  5:31 AM   Result Value Ref Range    Troponin-High Sensitivity 4 0 - 54 ng/L       Radiologic Studies -   [unfilled]  CT Results  (Last 48 hours)      None          CXR Results  (Last 48 hours)                 11/21/22 0258  XR CHEST PORT Final result    Impression:  --------------       Bilateral lower lung field increased markings likely chronic as similar change   was present previously. There is no focal lung consolidation or pleural effusion.        Narrative: ---------------------------------------------------------------------------   <<<<<<<<<           [facility]  [facility]           >>>>>>>>>    ---------------------------------------------------------------------------       CLINICAL HISTORY:  Chest pain. COMPARISON EXAMINATIONS:  September 17, 2022. ---  SINGLE FRONTAL VIEW OF THE CHEST  ---       The cardiomediastinal silhouette is stable. A Mediport is noted at the level of   the upper SVC. There is minimal lower lung field increased markings. There is no   focal lung consolidation or pleural effusion. No significant osseous   abnormalities are identified.             --------------               Medical Decision Making and ED Course   Differential Diagnosis & Medical Decision Making Provider Note:     MI, ACS, angina, PE, dissection, pneumonia, pneumothorax, pericarditis, chest wall pain, bronchitis  - I am the first and primary provider for this patient. I reviewed the vital signs, available nursing notes, past medical history, past surgical history, family history and social history. The patient's presenting problems have been discussed, and the staff are in agreement with the care plan formulated and outlined with them. I have encouraged them to ask questions as they arise throughout their visit. Vital Signs-Reviewed the patient's vital signs. Patient Vitals for the past 12 hrs:   Temp Pulse Resp BP SpO2   11/21/22 0637 -- 89 16 114/79 100 %   11/21/22 0528 -- 88 18 119/75 99 %   11/21/22 0345 -- 90 18 127/79 98 %   11/21/22 0200 98.3 °F (36.8 °C) 88 18 126/79 100 %       EKG interpretation: (Preliminary): EKG Interpreted by me. Shows EKG: normal EKG, normal sinus rhythm, unchanged from previous tracings, normal sinus rhythm, Rate 87. ED Course:      Heart score is a 0. Although D-dimer is elevated, P ERC is a low probability. Pain is atypical.  Resolved with a GI cocktail. Pain therefore most likely reflux. Patient also recently started chemotherapy, the pain was associated with nausea and vomiting, it possibly can be reaction to the chemo. Patient stable for discharge to follow-up with PCP. Procedures and Critical Care     P  Disposition   Disposition: Condition stable and improved  DC- Adult Discharges: All of the diagnostic tests were reviewed and questions answered. Diagnosis, care plan and treatment options were discussed. The patient understands the instructions and will follow up as directed. The patients results have been reviewed with them. They have been counseled regarding their diagnosis. The patient verbally convey understanding and agreement of the signs, symptoms, diagnosis, treatment and prognosis and additionally agrees to follow up as recommended with their PCP in 24 - 48 hours. They also agree with the care-plan and convey that all of their questions have been answered. I have also put together some discharge instructions for them that include: 1) educational information regarding their diagnosis, 2) how to care for their diagnosis at home, as well a 3) list of reasons why they would want to return to the ED prior to their follow-up appointment, should their condition change. DISCHARGE PLAN:  1. Current Discharge Medication List        CONTINUE these medications which have NOT CHANGED    Details   ProAir HFA 90 mcg/actuation inhaler       dexAMETHasone (DECADRON) 4 mg tablet       diphenhydrAMINE (BENADRYL) 25 mg capsule Take 50 mg by mouth. folic acid (FOLVITE) 1 mg tablet Take 1 mg by mouth daily. ondansetron hcl (ZOFRAN) 8 mg tablet Starting the second day after chemo cycle finishes, take 8 mg every 8 hours as needed for nausea/vomiting      cholecalciferol (Vitamin D3) 25 mcg (1,000 unit) cap Take  by mouth daily. hydroCHLOROthiazide (HYDRODIURIL) 12.5 mg tablet Take 1 Tablet by mouth daily.   Qty: 90 Tablet, Refills: 1    Associated Diagnoses: Peripheral edema montelukast (SINGULAIR) 10 mg tablet TAKE 1 TABLET BY MOUTH DAILY  Qty: 90 Tablet, Refills: 0    Comments: **Patient requests 90 days supply**  Associated Diagnoses: Mild intermittent asthma without complication      metFORMIN (GLUCOPHAGE) 500 mg tablet Take  by mouth. omeprazole (PRILOSEC) 10 mg capsule Take  by mouth. fluticasone propionate (FLONASE) 50 mcg/actuation nasal spray 2 Sprays by Both Nostrils route daily. Qty: 1 Each, Refills: 11      ibuprofen (MOTRIN) 600 mg tablet Take 1 Tablet by mouth every six (6) hours as needed for Pain. Qty: 18 Tablet, Refills: 0      multivitamin capsule Take 1 Capsule by mouth daily. Qty: 30 Capsule, Refills: 0      Linzess 290 mcg cap capsule TAKE 1 CAPSULE BY MOUTH EVERY DAY  Qty: 90 Capsule, Refills: 3           2. Follow-up Information       Follow up With Specialties Details Why Contact Info    Michel Willson MD Family Medicine In 2 days  [address] [place] [address]  [phone]      [facility] EMERGENCY DEPT Emergency Medicine  If symptoms worsen [facility] [id number]  [phone]          3. Return to ED if worse   4. Discharge Medication List as of 11/21/2022  6:27 AM        START taking these medications    Details   !! ondansetron hcl (Zofran) 4 mg tablet Take 1 Tablet by mouth every eight (8) hours as needed for Nausea., Normal, Disp-12 Tablet, R-0      !! omeprazole (PRILOSEC) 20 mg capsule Take 1 Capsule by mouth daily. , Normal, Disp-20 Capsule, R-0       !! - Potential duplicate medications found. Please discuss with provider. CONTINUE these medications which have NOT CHANGED    Details   ProAir HFA 90 mcg/actuation inhaler Historical Med, THOM      dexAMETHasone (DECADRON) 4 mg tablet Historical Med      diphenhydrAMINE (BENADRYL) 25 mg capsule Take 50 mg by mouth., Historical Med      folic acid (FOLVITE) 1 mg tablet Take 1 mg by mouth daily. , Historical Med      !! ondansetron hcl (ZOFRAN) 8 mg tablet Starting the second day after chemo cycle finishes, take 8 mg every 8 hours as needed for nausea/vomiting, Historical Med      cholecalciferol (Vitamin D3) 25 mcg (1,000 unit) cap Take  by mouth daily. , Historical Med      hydroCHLOROthiazide (HYDRODIURIL) 12.5 mg tablet Take 1 Tablet by mouth daily. , Normal, Disp-90 Tablet, R-1      montelukast (SINGULAIR) 10 mg tablet TAKE 1 TABLET BY MOUTH DAILY, Normal, Disp-90 Tablet, R-0**Patient requests 90 days supply**      metFORMIN (GLUCOPHAGE) 500 mg tablet Take  by mouth., Historical Med      !! omeprazole (PRILOSEC) 10 mg capsule Take  by mouth., Historical Med      fluticasone propionate (FLONASE) 50 mcg/actuation nasal spray 2 Sprays by Both Nostrils route daily. , Normal, Disp-1 Each, R-11      ibuprofen (MOTRIN) 600 mg tablet Take 1 Tablet by mouth every six (6) hours as needed for Pain., Normal, Disp-18 Tablet, R-0      multivitamin capsule Take 1 Capsule by mouth daily. , Normal, Disp-30 Capsule, R-0      Linzess 290 mcg cap capsule TAKE 1 CAPSULE BY MOUTH EVERY DAY, Normal, Disp-90 Capsule, R-3       !! - Potential duplicate medications found. Please discuss with provider. Remove if admitted/transferred    Diagnosis/Clinical Impression     Clinical Impression:   1. Atypical chest pain    2. Gastroesophageal reflux disease, unspecified whether esophagitis present        Attestations: IBud MD, am the primary clinician of record. Please note that this dictation was completed with [employer]spotter, the [employer] voice recognition software. Quite often unanticipated grammatical, syntax, homophones, and other interpretive errors are inadvertently transcribed by the computer software. Please disregard these errors. Please excuse any errors that have escaped final proofreading. Thank you.

## 2022-11-21 NOTE — ED TRIAGE NOTES
Pt states that she has had chest pain that started a few hours ago. Pt states she vomited twice and thought it was acid reflux. States she took an omeprazole but it has not gotten any better.

## 2022-11-23 ENCOUNTER — APPOINTMENT (OUTPATIENT)
Dept: CT IMAGING | Age: 42
End: 2022-11-23
Attending: EMERGENCY MEDICINE
Payer: MEDICAID

## 2022-11-23 ENCOUNTER — APPOINTMENT (OUTPATIENT)
Dept: GENERAL RADIOLOGY | Age: 42
End: 2022-11-23
Attending: EMERGENCY MEDICINE
Payer: MEDICAID

## 2022-11-23 ENCOUNTER — HOSPITAL ENCOUNTER (EMERGENCY)
Age: 42
Discharge: HOME | End: 2022-11-23
Attending: EMERGENCY MEDICINE
Payer: MEDICAID

## 2022-11-23 VITALS
RESPIRATION RATE: 18 BRPM | DIASTOLIC BLOOD PRESSURE: 66 MMHG | HEART RATE: 99 BPM | OXYGEN SATURATION: 99 % | SYSTOLIC BLOOD PRESSURE: 130 MMHG | HEIGHT: 65 IN | TEMPERATURE: 99.1 F | WEIGHT: 235 LBS | BODY MASS INDEX: 39.15 KG/M2

## 2022-11-23 DIAGNOSIS — R79.89 ELEVATED LIVER FUNCTION TESTS: ICD-10-CM

## 2022-11-23 DIAGNOSIS — R10.13 ABDOMINAL PAIN, EPIGASTRIC: Primary | ICD-10-CM

## 2022-11-23 DIAGNOSIS — R07.9 CHEST PAIN, UNSPECIFIED TYPE: ICD-10-CM

## 2022-11-23 LAB
ALBUMIN SERPL-MCNC: 3.4 G/DL (ref 3.4–5)
ALBUMIN/GLOB SERPL: 1 {RATIO} (ref 0.8–1.7)
ALP SERPL-CCNC: 154 U/L (ref 45–117)
ALT SERPL-CCNC: 380 U/L (ref 13–56)
ANION GAP SERPL CALC-SCNC: 9 MMOL/L (ref 3–18)
APPEARANCE UR: CLEAR
AST SERPL W P-5'-P-CCNC: 450 U/L (ref 10–38)
BACTERIA URNS QL MICRO: ABNORMAL /HPF
BASOPHILS # BLD: 0 K/UL (ref 0–0.1)
BASOPHILS NFR BLD: 0 % (ref 0–2)
BILIRUB DIRECT SERPL-MCNC: 1.5 MG/DL (ref 0–0.2)
BILIRUB SERPL-MCNC: 2.4 MG/DL (ref 0.2–1)
BILIRUB UR QL: NEGATIVE
BUN SERPL-MCNC: 7 MG/DL (ref 7–18)
BUN/CREAT SERPL: 9 (ref 12–20)
CA-I BLD-MCNC: 9.1 MG/DL (ref 8.5–10.1)
CHLORIDE SERPL-SCNC: 99 MMOL/L (ref 100–111)
CO2 SERPL-SCNC: 30 MMOL/L (ref 21–32)
COLOR UR: YELLOW
CREAT SERPL-MCNC: 0.77 MG/DL (ref 0.6–1.3)
DIFFERENTIAL METHOD BLD: ABNORMAL
EOSINOPHIL # BLD: 0 K/UL (ref 0–0.4)
EOSINOPHIL NFR BLD: 0 % (ref 0–5)
EPITH CASTS URNS QL MICRO: ABNORMAL /LPF (ref 0–20)
ERYTHROCYTE [DISTWIDTH] IN BLOOD BY AUTOMATED COUNT: 17.2 % (ref 11.6–14.5)
GLOBULIN SER CALC-MCNC: 3.3 G/DL (ref 2–4)
GLUCOSE SERPL-MCNC: 122 MG/DL (ref 74–99)
GLUCOSE UR STRIP.AUTO-MCNC: NEGATIVE MG/DL
HCG SERPL QL: NEGATIVE
HCT VFR BLD AUTO: 31.6 % (ref 35–45)
HGB BLD-MCNC: 10.3 G/DL (ref 12–16)
HGB UR QL STRIP: NEGATIVE
IMM GRANULOCYTES # BLD AUTO: 0.2 K/UL (ref 0–0.04)
IMM GRANULOCYTES NFR BLD AUTO: 2 % (ref 0–0.5)
KETONES UR QL STRIP.AUTO: NEGATIVE MG/DL
LEUKOCYTE ESTERASE UR QL STRIP.AUTO: ABNORMAL
LIPASE SERPL-CCNC: 186 U/L (ref 73–393)
LYMPHOCYTES # BLD: 0.6 K/UL (ref 0.9–3.6)
LYMPHOCYTES NFR BLD: 6 % (ref 21–52)
MCH RBC QN AUTO: 22.6 PG (ref 24–34)
MCHC RBC AUTO-ENTMCNC: 32.6 G/DL (ref 31–37)
MCV RBC AUTO: 69.3 FL (ref 78–100)
MONOCYTES # BLD: 0.6 K/UL (ref 0.05–1.2)
MONOCYTES NFR BLD: 6 % (ref 3–10)
NEUTS SEG # BLD: 9.3 K/UL (ref 1.8–8)
NEUTS SEG NFR BLD: 86 % (ref 40–73)
NITRITE UR QL STRIP.AUTO: NEGATIVE
NRBC # BLD: 0 K/UL (ref 0–0.01)
NRBC BLD-RTO: 0 PER 100 WBC
PH UR STRIP: 6.5 [PH] (ref 5–8)
PLATELET # BLD AUTO: 230 K/UL (ref 135–420)
PMV BLD AUTO: 10.4 FL (ref 9.2–11.8)
POTASSIUM SERPL-SCNC: 3.7 MMOL/L (ref 3.5–5.5)
PROT SERPL-MCNC: 6.7 G/DL (ref 6.4–8.2)
PROT UR STRIP-MCNC: NEGATIVE MG/DL
RBC # BLD AUTO: 4.56 M/UL (ref 4.2–5.3)
RBC #/AREA URNS HPF: ABNORMAL /HPF (ref 0–2)
RBC MORPH BLD: ABNORMAL
RBC MORPH BLD: ABNORMAL
SODIUM SERPL-SCNC: 138 MMOL/L (ref 136–145)
SP GR UR REFRACTOMETRY: <1.005 (ref 1–1.03)
TROPONIN-HIGH SENSITIVITY: 5 NG/L (ref 0–54)
UROBILINOGEN UR QL STRIP.AUTO: 1 EU/DL (ref 0.2–1)
WBC # BLD AUTO: 10.7 K/UL (ref 4.6–13.2)
WBC URNS QL MICRO: ABNORMAL /HPF (ref 0–4)

## 2022-11-23 PROCEDURE — 83690 ASSAY OF LIPASE: CPT

## 2022-11-23 PROCEDURE — 74176 CT ABD & PELVIS W/O CONTRAST: CPT

## 2022-11-23 PROCEDURE — 85025 COMPLETE CBC W/AUTO DIFF WBC: CPT

## 2022-11-23 PROCEDURE — 74011250636 HC RX REV CODE- 250/636: Performed by: EMERGENCY MEDICINE

## 2022-11-23 PROCEDURE — 80076 HEPATIC FUNCTION PANEL: CPT

## 2022-11-23 PROCEDURE — 71045 X-RAY EXAM CHEST 1 VIEW: CPT

## 2022-11-23 PROCEDURE — 80074 ACUTE HEPATITIS PANEL: CPT

## 2022-11-23 PROCEDURE — 84703 CHORIONIC GONADOTROPIN ASSAY: CPT

## 2022-11-23 PROCEDURE — 96375 TX/PRO/DX INJ NEW DRUG ADDON: CPT

## 2022-11-23 PROCEDURE — 84484 ASSAY OF TROPONIN QUANT: CPT

## 2022-11-23 PROCEDURE — 93005 ELECTROCARDIOGRAM TRACING: CPT

## 2022-11-23 PROCEDURE — 80048 BASIC METABOLIC PNL TOTAL CA: CPT

## 2022-11-23 PROCEDURE — 96374 THER/PROPH/DIAG INJ IV PUSH: CPT

## 2022-11-23 PROCEDURE — 36415 COLL VENOUS BLD VENIPUNCTURE: CPT

## 2022-11-23 PROCEDURE — 81001 URINALYSIS AUTO W/SCOPE: CPT

## 2022-11-23 PROCEDURE — 99285 EMERGENCY DEPT VISIT HI MDM: CPT

## 2022-11-23 RX ORDER — HYDROMORPHONE HYDROCHLORIDE 1 MG/ML
0.5 INJECTION, SOLUTION INTRAMUSCULAR; INTRAVENOUS; SUBCUTANEOUS ONCE
Status: COMPLETED | OUTPATIENT
Start: 2022-11-23 | End: 2022-11-23

## 2022-11-23 RX ORDER — SODIUM CHLORIDE 9 MG/ML
1000 INJECTION, SOLUTION INTRAVENOUS ONCE
Status: COMPLETED | OUTPATIENT
Start: 2022-11-23 | End: 2022-11-23

## 2022-11-23 RX ORDER — ONDANSETRON 2 MG/ML
4 INJECTION INTRAMUSCULAR; INTRAVENOUS
Status: COMPLETED | OUTPATIENT
Start: 2022-11-23 | End: 2022-11-23

## 2022-11-23 RX ADMIN — ONDANSETRON 4 MG: 2 INJECTION INTRAMUSCULAR; INTRAVENOUS at 20:30

## 2022-11-23 RX ADMIN — HYDROMORPHONE HYDROCHLORIDE 0.5 MG: 1 INJECTION, SOLUTION INTRAMUSCULAR; INTRAVENOUS; SUBCUTANEOUS at 20:33

## 2022-11-23 RX ADMIN — SODIUM CHLORIDE 1000 ML: 9 INJECTION, SOLUTION INTRAVENOUS at 20:28

## 2022-11-24 ENCOUNTER — APPOINTMENT (OUTPATIENT)
Dept: GENERAL RADIOLOGY | Age: 42
End: 2022-11-24
Attending: INTERNAL MEDICINE
Payer: MEDICAID

## 2022-11-24 ENCOUNTER — HOSPITAL ENCOUNTER (EMERGENCY)
Age: 42
Discharge: ANOTHER HOSPITAL | End: 2022-11-25
Attending: EMERGENCY MEDICINE
Payer: MEDICAID

## 2022-11-24 DIAGNOSIS — R10.13 ABDOMINAL PAIN, EPIGASTRIC: Primary | ICD-10-CM

## 2022-11-24 DIAGNOSIS — R79.89 ELEVATED LFTS: ICD-10-CM

## 2022-11-24 LAB
ALBUMIN SERPL-MCNC: 3.1 G/DL (ref 3.4–5)
ALBUMIN/GLOB SERPL: 0.8 {RATIO} (ref 0.8–1.7)
ALP SERPL-CCNC: 152 U/L (ref 45–117)
ALT SERPL-CCNC: 436 U/L (ref 13–56)
ANION GAP SERPL CALC-SCNC: 7 MMOL/L (ref 3–18)
APTT PPP: 32.8 SEC (ref 23–36.4)
AST SERPL W P-5'-P-CCNC: 273 U/L (ref 10–38)
BASOPHILS # BLD: 0 K/UL (ref 0–0.1)
BASOPHILS NFR BLD: 0 % (ref 0–2)
BILIRUB DIRECT SERPL-MCNC: 1.6 MG/DL (ref 0–0.2)
BILIRUB SERPL-MCNC: 2.5 MG/DL (ref 0.2–1)
BUN SERPL-MCNC: 8 MG/DL (ref 7–18)
BUN/CREAT SERPL: 8 (ref 12–20)
CA-I BLD-MCNC: 8.6 MG/DL (ref 8.5–10.1)
CHLORIDE SERPL-SCNC: 102 MMOL/L (ref 100–111)
CO2 SERPL-SCNC: 30 MMOL/L (ref 21–32)
CREAT SERPL-MCNC: 1.02 MG/DL (ref 0.6–1.3)
D DIMER PPP FEU-MCNC: 2.91 UG/ML(FEU)
DIFFERENTIAL METHOD BLD: ABNORMAL
EOSINOPHIL # BLD: 0 K/UL (ref 0–0.4)
EOSINOPHIL NFR BLD: 0 % (ref 0–5)
ERYTHROCYTE [DISTWIDTH] IN BLOOD BY AUTOMATED COUNT: 17 % (ref 11.6–14.5)
GLOBULIN SER CALC-MCNC: 3.8 G/DL (ref 2–4)
GLUCOSE SERPL-MCNC: 113 MG/DL (ref 74–99)
HAV IGM SER QL: NEGATIVE
HBV CORE IGM SER QL: NEGATIVE
HBV SURFACE AG SER QL: 0.63 INDEX
HBV SURFACE AG SER QL: NEGATIVE
HCG SERPL QL: NEGATIVE
HCT VFR BLD AUTO: 30 % (ref 35–45)
HCV AB SER IA-ACNC: 0.07 INDEX
HCV AB SERPL QL IA: NEGATIVE
HCV COMMENT,HCGAC: NORMAL
HGB BLD-MCNC: 9.6 G/DL (ref 12–16)
IMM GRANULOCYTES # BLD AUTO: 0.1 K/UL (ref 0–0.04)
IMM GRANULOCYTES NFR BLD AUTO: 1 % (ref 0–0.5)
INR PPP: 1.1 (ref 0.8–1.2)
LIPASE SERPL-CCNC: 198 U/L (ref 73–393)
LYMPHOCYTES # BLD: 1 K/UL (ref 0.9–3.6)
LYMPHOCYTES NFR BLD: 10 % (ref 21–52)
MCH RBC QN AUTO: 22.2 PG (ref 24–34)
MCHC RBC AUTO-ENTMCNC: 32 G/DL (ref 31–37)
MCV RBC AUTO: 69.3 FL (ref 78–100)
MONOCYTES # BLD: 0.5 K/UL (ref 0.05–1.2)
MONOCYTES NFR BLD: 5 % (ref 3–10)
NEUTS SEG # BLD: 8.4 K/UL (ref 1.8–8)
NEUTS SEG NFR BLD: 84 % (ref 40–73)
NRBC # BLD: 0 K/UL (ref 0–0.01)
NRBC BLD-RTO: 0 PER 100 WBC
PLATELET # BLD AUTO: 227 K/UL (ref 135–420)
PLATELET COMMENTS,PCOM: ADEQUATE
PMV BLD AUTO: 10 FL (ref 9.2–11.8)
POTASSIUM SERPL-SCNC: 3.6 MMOL/L (ref 3.5–5.5)
PROT SERPL-MCNC: 6.9 G/DL (ref 6.4–8.2)
PROTHROMBIN TIME: 14.5 SEC (ref 11.5–15.2)
RBC # BLD AUTO: 4.33 M/UL (ref 4.2–5.3)
RBC MORPH BLD: ABNORMAL
SODIUM SERPL-SCNC: 139 MMOL/L (ref 136–145)
SP1: NORMAL
SP2: NORMAL
SP3: NORMAL
THERAPEUTIC RANGE,PTTT: NORMAL SEC (ref 82–109)
TROPONIN-HIGH SENSITIVITY: 5 NG/L (ref 0–54)
WBC # BLD AUTO: 10 K/UL (ref 4.6–13.2)

## 2022-11-24 PROCEDURE — 85379 FIBRIN DEGRADATION QUANT: CPT

## 2022-11-24 PROCEDURE — 80076 HEPATIC FUNCTION PANEL: CPT

## 2022-11-24 PROCEDURE — 36415 COLL VENOUS BLD VENIPUNCTURE: CPT

## 2022-11-24 PROCEDURE — 83690 ASSAY OF LIPASE: CPT

## 2022-11-24 PROCEDURE — 99285 EMERGENCY DEPT VISIT HI MDM: CPT

## 2022-11-24 PROCEDURE — 71045 X-RAY EXAM CHEST 1 VIEW: CPT

## 2022-11-24 PROCEDURE — 74011250636 HC RX REV CODE- 250/636: Performed by: EMERGENCY MEDICINE

## 2022-11-24 PROCEDURE — 85730 THROMBOPLASTIN TIME PARTIAL: CPT

## 2022-11-24 PROCEDURE — 81003 URINALYSIS AUTO W/O SCOPE: CPT

## 2022-11-24 PROCEDURE — 96376 TX/PRO/DX INJ SAME DRUG ADON: CPT

## 2022-11-24 PROCEDURE — 85610 PROTHROMBIN TIME: CPT

## 2022-11-24 PROCEDURE — 85025 COMPLETE CBC W/AUTO DIFF WBC: CPT

## 2022-11-24 PROCEDURE — 80048 BASIC METABOLIC PNL TOTAL CA: CPT

## 2022-11-24 PROCEDURE — 96374 THER/PROPH/DIAG INJ IV PUSH: CPT

## 2022-11-24 PROCEDURE — 84484 ASSAY OF TROPONIN QUANT: CPT

## 2022-11-24 PROCEDURE — 84703 CHORIONIC GONADOTROPIN ASSAY: CPT

## 2022-11-24 PROCEDURE — 93005 ELECTROCARDIOGRAM TRACING: CPT

## 2022-11-24 PROCEDURE — 96375 TX/PRO/DX INJ NEW DRUG ADDON: CPT

## 2022-11-24 RX ORDER — HYDROMORPHONE HYDROCHLORIDE 1 MG/ML
0.5 INJECTION, SOLUTION INTRAMUSCULAR; INTRAVENOUS; SUBCUTANEOUS ONCE
Status: COMPLETED | OUTPATIENT
Start: 2022-11-24 | End: 2022-11-24

## 2022-11-24 RX ORDER — SODIUM CHLORIDE 9 MG/ML
1000 INJECTION, SOLUTION INTRAVENOUS ONCE
Status: COMPLETED | OUTPATIENT
Start: 2022-11-24 | End: 2022-11-24

## 2022-11-24 RX ORDER — ONDANSETRON 2 MG/ML
4 INJECTION INTRAMUSCULAR; INTRAVENOUS
Status: COMPLETED | OUTPATIENT
Start: 2022-11-24 | End: 2022-11-24

## 2022-11-24 RX ORDER — FAMOTIDINE 10 MG/ML
20 INJECTION INTRAVENOUS
Status: COMPLETED | OUTPATIENT
Start: 2022-11-24 | End: 2022-11-24

## 2022-11-24 RX ADMIN — ONDANSETRON 4 MG: 2 INJECTION INTRAMUSCULAR; INTRAVENOUS at 20:14

## 2022-11-24 RX ADMIN — HYDROMORPHONE HYDROCHLORIDE 0.5 MG: 1 INJECTION, SOLUTION INTRAMUSCULAR; INTRAVENOUS; SUBCUTANEOUS at 23:18

## 2022-11-24 RX ADMIN — ONDANSETRON 4 MG: 2 INJECTION INTRAMUSCULAR; INTRAVENOUS at 23:18

## 2022-11-24 RX ADMIN — SODIUM CHLORIDE 1000 ML: 9 INJECTION, SOLUTION INTRAVENOUS at 20:10

## 2022-11-24 RX ADMIN — HYDROMORPHONE HYDROCHLORIDE 0.5 MG: 1 INJECTION, SOLUTION INTRAMUSCULAR; INTRAVENOUS; SUBCUTANEOUS at 19:46

## 2022-11-24 RX ADMIN — FAMOTIDINE 20 MG: 10 INJECTION, SOLUTION INTRAVENOUS at 20:14

## 2022-11-24 NOTE — ED NOTES
Dr Callum Cordero has discussed transfer with patient and mother due to elevated liver enzymes and CT results. Patient wants to be discharge. Discussed transfer again with patient and mother including benefits of transfer and risks of not being transferred. Patient reports has own Gastroenterologist, would like to contact them to follow up and further work up. Will return to ER for any new or worrisome concerns.

## 2022-11-24 NOTE — ED TRIAGE NOTES
Patient states that she was ok yesterday after taking ativan. Patient states this morning she woke up and felt bad. Patient states she hasn't been able to keep anything down. Patient also getting chemo last dose on the 11th. Patient complaining of upper/middle abdominal pain.

## 2022-11-24 NOTE — DISCHARGE INSTRUCTIONS
Return for pain, fever not resolving with motrin or tylenol, shortness of breath, vomiting, decreased fluid intake, weakness, numbness, dizziness, or any change or concerns or for transfer to higher level of care as discussed now. Avoid tylenol and alcohol as discussed.

## 2022-11-24 NOTE — ED NOTES
I have reviewed discharge instructions with the patient and parent. The patient and parent verbalized understanding. Reviewed medication compliance, follow up with PCP, return to ER for any new or worrisome concerns.

## 2022-11-24 NOTE — ED PROVIDER NOTES
Pt c/o mid abd pain, rad to lower chest, x 3-4 days, wax/waning. No back pain. No cough. + nausea, no vomiting. No stool changes. No leg pain. No sob. On chemotx, last one week ago. For left breast ca. Says chemo almost complete, ca resolved. Tried zofran this am, didn't help. No stool changes. Mild low back pain also. No urinary changes. Fever yest, none today.          Past Medical History:   Diagnosis Date    Asthma     History of partial nephrectomy 10/24/2018    Hyperthyroidism     Malignant tumor of kidney ([facility] [address] 10/24/2018    Polycystic ovary syndrome     Renal mass     Rheumatoid arthritis (HCC)        Past Surgical History:   Procedure Laterality Date    HX BREAST LUMPECTOMY N/A     breast cancer surgery unknown side    HX HERNIA REPAIR      HX NEPHRECTOMY      HX OTHER SURGICAL Right     right lung collapsed as a child         Family History:   Problem Relation Age of Onset    Hypertension Mother     Cancer Other        Social History     Socioeconomic History    Marital status: SINGLE     Spouse name: Not on file    Number of children: 2    Years of education: Not on file    Highest education level: Not on file   Occupational History    Not on file   Tobacco Use    Smoking status: Never    Smokeless tobacco: Never   Vaping Use    Vaping Use: Never used   Substance and Sexual Activity    Alcohol use: No    Drug use: No    Sexual activity: Not Currently     Birth control/protection: None   Other Topics Concern    Not on file   Social History Narrative    Not on file     Social Determinants of Health     Financial Resource Strain: Not on file   Food Insecurity: Not on file   Transportation Needs: Not on file   Physical Activity: Not on file   Stress: Not on file   Social Connections: Not on file   Intimate Partner Violence: Not on file   Housing Stability: Not on file         ALLERGIES: Gadobenate dimeglumine and Iodinated contrast media    Review of Systems   Constitutional:  Negative for diaphoresis and fatigue. HENT:  Negative for congestion. Respiratory:  Negative for cough and shortness of breath. Cardiovascular:  Positive for chest pain. Gastrointestinal:  Positive for abdominal pain and nausea. Negative for vomiting. Musculoskeletal:  Negative for back pain. Skin:  Negative for rash. Neurological:  Negative for light-headedness. All other systems reviewed and are negative. Vitals:    11/23/22 1946 11/23/22 2036 11/23/22 2140 11/23/22 2300   BP: (!) 142/75 (!) 144/81 (!) 106/58 130/66   Pulse: (!) 104 91 96 99   Resp: 16 18 18 18   Temp: 99.1 °F (37.3 °C)      SpO2: 98% 99% 99% 99%   Weight: 106.6 kg (235 lb)      Height: 5' 5\" (1.651 m)               Physical Exam  Vitals and nursing note reviewed. Constitutional:       Appearance: She is well-developed. She is not diaphoretic. HENT:      Head: Normocephalic and atraumatic. Eyes:      Pupils: Pupils are equal, round, and reactive to light. Cardiovascular:      Rate and Rhythm: Normal rate and regular rhythm. Heart sounds: No murmur heard. Pulmonary:      Effort: Pulmonary effort is normal.      Breath sounds: No wheezing. Abdominal:      Palpations: Abdomen is soft. Tenderness: There is abdominal tenderness (+ epigastric). There is no guarding. Hernia: No hernia is present. Musculoskeletal:         General: No tenderness. Cervical back: Normal range of motion. Skin:     General: Skin is dry. Capillary Refill: Capillary refill takes less than 2 seconds. Findings: No rash. Neurological:      Mental Status: She is alert and oriented to person, place, and time.    Psychiatric:         Mood and Affect: Mood normal.        MDM         Procedures    Vitals:  Patient Vitals for the past 12 hrs:   Temp Pulse Resp BP SpO2   11/23/22 2300 -- 99 18 130/66 99 %   11/23/22 2140 -- 96 18 (!) 106/58 99 %   11/23/22 2036 -- 91 18 (!) 144/81 99 %   11/23/22 1946 99.1 °F (37.3 °C) (!) 104 16 (!) 142/75 98 % Medications ordered:   Medications   0.9% sodium chloride infusion 1,000 mL (0 mL IntraVENous IV Completed 11/23/22 2125)   ondansetron (ZOFRAN) injection 4 mg (4 mg IntraVENous Given 11/23/22 2030)   HYDROmorphone (DILAUDID) injection 0.5 mg (0.5 mg IntraVENous Given 11/23/22 2033)         Lab findings:  Recent Results (from the past 12 hour(s))   CBC WITH AUTOMATED DIFF    Collection Time: 11/23/22  7:50 PM   Result Value Ref Range    WBC 10.7 4.6 - 13.2 K/uL    RBC 4.56 4.20 - 5.30 M/uL    HGB 10.3 (L) 12.0 - 16.0 g/dL    HCT 31.6 (L) 35.0 - 45.0 %    MCV 69.3 (L) 78.0 - 100.0 FL    MCH 22.6 (L) 24.0 - 34.0 PG    MCHC 32.6 31.0 - 37.0 g/dL    RDW 17.2 (H) 11.6 - 14.5 %    PLATELET 504 697 - 669 K/uL    MPV 10.4 9.2 - 11.8 FL    NRBC 0.0 0.0  WBC    ABSOLUTE NRBC 0.00 0.00 - 0.01 K/uL    NEUTROPHILS 86 (H) 40 - 73 %    LYMPHOCYTES 6 (L) 21 - 52 %    MONOCYTES 6 3 - 10 %    EOSINOPHILS 0 0 - 5 %    BASOPHILS 0 0 - 2 %    IMMATURE GRANULOCYTES 2 (H) 0 - 0.5 %    ABS. NEUTROPHILS 9.3 (H) 1.8 - 8.0 K/UL    ABS. LYMPHOCYTES 0.6 (L) 0.9 - 3.6 K/UL    ABS. MONOCYTES 0.6 0.05 - 1.2 K/UL    ABS. EOSINOPHILS 0.0 0.0 - 0.4 K/UL    ABS. BASOPHILS 0.0 0.0 - 0.1 K/UL    ABS. IMM.  GRANS. 0.2 (H) 0.00 - 0.04 K/UL    DF Manual      RBC COMMENTS Microcytosis  2+        RBC COMMENTS Polychromasia  FEW  Schistocytes  FEW       METABOLIC PANEL, BASIC    Collection Time: 11/23/22  7:50 PM   Result Value Ref Range    Sodium 138 136 - 145 mmol/L    Potassium 3.7 3.5 - 5.5 mmol/L    Chloride 99 (L) 100 - 111 mmol/L    CO2 30 21 - 32 mmol/L    Anion gap 9 3.0 - 18.0 mmol/L    Glucose 122 (H) 74 - 99 mg/dL    BUN 7 7 - 18 mg/dL    Creatinine 0.77 0.60 - 1.30 mg/dL    BUN/Creatinine ratio 9 (L) 12 - 20      eGFR >60 >60 ml/min/1.73m2    Calcium 9.1 8.5 - 10.1 mg/dL   LIPASE    Collection Time: 11/23/22  7:50 PM   Result Value Ref Range    Lipase 186 73 - 393 U/L   HEPATIC FUNCTION PANEL    Collection Time: 11/23/22  7:50 PM Result Value Ref Range    Protein, total 6.7 6.4 - 8.2 g/dL    Albumin 3.4 3.4 - 5.0 g/dL    Globulin 3.3 2.0 - 4.0 g/dL    A-G Ratio 1.0 0.8 - 1.7      Bilirubin, total 2.4 (H) 0.2 - 1.0 mg/dL    Bilirubin, direct 1.5 (H) 0.0 - 0.2 mg/dL    Alk. phosphatase 154 (H) 45 - 117 U/L    AST (SGOT) 450 (H) 10 - 38 U/L    ALT (SGPT) 380 (H) 13 - 56 U/L   TROPONIN-HIGH SENSITIVITY    Collection Time: 11/23/22  7:50 PM   Result Value Ref Range    Troponin-High Sensitivity 5 0 - 54 ng/L   HCG QL SERUM    Collection Time: 11/23/22  7:50 PM   Result Value Ref Range    HCG, Ql. Negative     EKG, 12 LEAD, INITIAL    Collection Time: 11/23/22  8:24 PM   Result Value Ref Range    Ventricular Rate 92 BPM    Atrial Rate 93 BPM    P-R Interval 154 ms    QRS Duration 101 ms    Q-T Interval 354 ms    QTC Calculation (Bezet) 438 ms    Calculated P Axis 62 degrees    Calculated R Axis 19 degrees    Calculated T Axis 24 degrees    Diagnosis       Sinus rhythm  Left atrial enlargement  RSR' in V1 or V2, right VCD or RVH  Left ventricular hypertrophy     URINALYSIS W/ RFLX MICROSCOPIC    Collection Time: 11/23/22 10:08 PM   Result Value Ref Range    Color Yellow      Appearance Clear      Specific gravity <1.005 (L) 1.005 - 1.030    pH (UA) 6.5 5.0 - 8.0      Protein Negative Negative mg/dL    Glucose Negative Negative mg/dL    Ketone Negative Negative mg/dL    Bilirubin Negative Negative      Blood Negative Negative      Urobilinogen 1.0 0.2 - 1.0 EU/dL    Nitrites Negative Negative      Leukocyte Esterase Trace (A) Negative     URINE MICROSCOPIC    Collection Time: 11/23/22 10:08 PM   Result Value Ref Range    WBC 0-4 0 - 4 /hpf    RBC 0-5 0 - 2 /hpf    Epithelial cells Few 0 - 20 /lpf    Bacteria 1+ (A) None /hpf           X-Ray, CT or other radiology findings or impressions:  CT ABD PELV WO CONT   Final Result      No acute inflammatory or obstructive process of the abdomen or pelvis.       Hepatosplenomegaly and partial left nephrectomy changes are also similar to   prior. Partially imaged approximately 4 cm fluid collection noted in the medial left   breast, potentially a postoperative seroma.      _______________         XR CHEST PORT    (Results Pending)             Progress notes, Consult notes or additional Procedure notes: Told  of all findings inc elev bili/lft. Pt w no pain, declines tx as recommended for gi and prob mrcp or ercp. Diagnosis:   1. Abdominal pain, epigastric    2. Chest pain, unspecified type    3. Elevated liver function tests        Disposition: home    Follow-up Information       Follow up With Specialties Details Why Contact [facility] EMERGENCY DEPT Emergency Medicine Go to  As needed, If symptoms worsen [address] Berny Horn MD Family [facility]  [phone]      Camron Cedillo MD Gastroenterology Schedule an appointment as soon as possible for a visit in 2 days  . [address] [facility] [id number]  [phone]               Discharge Medication List as of 11/23/2022 10:52 PM        CONTINUE these medications which have NOT CHANGED    Details   !! ondansetron hcl (Zofran) 4 mg tablet Take 1 Tablet by mouth every eight (8) hours as needed for Nausea., Normal, Disp-12 Tablet, R-0      !! omeprazole (PRILOSEC) 20 mg capsule Take 1 Capsule by mouth daily. , Normal, Disp-20 Capsule, R-0      ProAir HFA 90 mcg/actuation inhaler Historical Med, THOM      dexAMETHasone (DECADRON) 4 mg tablet Historical Med      diphenhydrAMINE (BENADRYL) 25 mg capsule Take 50 mg by mouth., Historical Med      folic acid (FOLVITE) 1 mg tablet Take 1 mg by mouth daily. , Historical Med      !! ondansetron hcl (ZOFRAN) 8 mg tablet Starting the second day after chemo cycle finishes, take 8 mg every 8 hours as needed for nausea/vomiting, Historical Med cholecalciferol (Vitamin D3) 25 mcg (1,000 unit) cap Take  by mouth daily. , Historical Med      hydroCHLOROthiazide (HYDRODIURIL) 12.5 mg tablet Take 1 Tablet by mouth daily. , Normal, Disp-90 Tablet, R-1      montelukast (SINGULAIR) 10 mg tablet TAKE 1 TABLET BY MOUTH DAILY, Normal, Disp-90 Tablet, R-0**Patient requests 90 days supply**      metFORMIN (GLUCOPHAGE) 500 mg tablet Take  by mouth., Historical Med      !! omeprazole (PRILOSEC) 10 mg capsule Take  by mouth., Historical Med      fluticasone propionate (FLONASE) 50 mcg/actuation nasal spray 2 Sprays by Both Nostrils route daily. , Normal, Disp-1 Each, R-11      ibuprofen (MOTRIN) 600 mg tablet Take 1 Tablet by mouth every six (6) hours as needed for Pain., Normal, Disp-18 Tablet, R-0      multivitamin capsule Take 1 Capsule by mouth daily. , Normal, Disp-30 Capsule, R-0      Linzess 290 mcg cap capsule TAKE 1 CAPSULE BY MOUTH EVERY DAY, Normal, Disp-90 Capsule, R-3       !! - Potential duplicate medications found. Please discuss with provider.

## 2022-11-25 VITALS
TEMPERATURE: 98.2 F | DIASTOLIC BLOOD PRESSURE: 69 MMHG | OXYGEN SATURATION: 99 % | RESPIRATION RATE: 18 BRPM | SYSTOLIC BLOOD PRESSURE: 122 MMHG | BODY MASS INDEX: 39.11 KG/M2 | HEART RATE: 74 BPM | WEIGHT: 235 LBS

## 2022-11-25 LAB
APPEARANCE UR: CLEAR
ATRIAL RATE: 87 BPM
ATRIAL RATE: 93 BPM
BACTERIA URNS QL MICRO: NEGATIVE /HPF
BILIRUB UR QL: NEGATIVE
CALCULATED P AXIS, ECG09: 58 DEGREES
CALCULATED P AXIS, ECG09: 62 DEGREES
CALCULATED R AXIS, ECG10: 19 DEGREES
CALCULATED R AXIS, ECG10: 27 DEGREES
CALCULATED T AXIS, ECG11: 24 DEGREES
CALCULATED T AXIS, ECG11: 28 DEGREES
COLOR UR: YELLOW
DIAGNOSIS, 93000: NORMAL
DIAGNOSIS, 93000: NORMAL
EPITH CASTS URNS QL MICRO: ABNORMAL /LPF (ref 0–20)
GLUCOSE UR STRIP.AUTO-MCNC: NEGATIVE MG/DL
HGB UR QL STRIP: NEGATIVE
KETONES UR QL STRIP.AUTO: ABNORMAL MG/DL
LEUKOCYTE ESTERASE UR QL STRIP.AUTO: NEGATIVE
NITRITE UR QL STRIP.AUTO: NEGATIVE
P-R INTERVAL, ECG05: 146 MS
P-R INTERVAL, ECG05: 154 MS
PH UR STRIP: 6 [PH] (ref 5–8)
PROT UR STRIP-MCNC: NEGATIVE MG/DL
Q-T INTERVAL, ECG07: 354 MS
Q-T INTERVAL, ECG07: 361 MS
QRS DURATION, ECG06: 101 MS
QRS DURATION, ECG06: 92 MS
QTC CALCULATION (BEZET), ECG08: 430 MS
QTC CALCULATION (BEZET), ECG08: 438 MS
RBC #/AREA URNS HPF: ABNORMAL /HPF (ref 0–2)
SP GR UR REFRACTOMETRY: 1.02 (ref 1–1.03)
UROBILINOGEN UR QL STRIP.AUTO: 0.2 EU/DL (ref 0.2–1)
VENTRICULAR RATE, ECG03: 85 BPM
VENTRICULAR RATE, ECG03: 92 BPM
WBC URNS QL MICRO: ABNORMAL /HPF (ref 0–4)

## 2022-11-25 NOTE — ED PROVIDER NOTES
Pt c/o ruq abd pain, mid abd pain   w low chest pain. X 3-4 days. Here for same 2 x in last few day. No fever. No back pain. No leg pain. No vomit, but + nausea. No urinary changes. Took motrin for pain pta, no sig change. No cough. No sob. H/o breast ca, on chemo tx currently. Had lft elevation here yest, declines admit, says wants admit or transfer now. Can't eat due to pain. Moderate to severe. Wax/waning.  dull       Past Medical History:   Diagnosis Date    Asthma     History of partial nephrectomy 10/24/2018    Hyperthyroidism     Malignant tumor of kidney ([facility] [address] 10/24/2018    Polycystic ovary syndrome     Renal mass     Rheumatoid arthritis (HCC)        Past Surgical History:   Procedure Laterality Date    HX BREAST LUMPECTOMY N/A     breast cancer surgery unknown side    HX HERNIA REPAIR      HX NEPHRECTOMY      HX OTHER SURGICAL Right     right lung collapsed as a child         Family History:   Problem Relation Age of Onset    Hypertension Mother     Cancer Other        Social History     Socioeconomic History    Marital status: SINGLE     Spouse name: Not on file    Number of children: 2    Years of education: Not on file    Highest education level: Not on file   Occupational History    Not on file   Tobacco Use    Smoking status: Never    Smokeless tobacco: Never   Vaping Use    Vaping Use: Never used   Substance and Sexual Activity    Alcohol use: No    Drug use: No    Sexual activity: Not Currently     Birth control/protection: None   Other Topics Concern    Not on file   Social History Narrative    Not on file     Social Determinants of Health     Financial Resource Strain: Not on file   Food Insecurity: Not on file   Transportation Needs: Not on file   Physical Activity: Not on file   Stress: Not on file   Social Connections: Not on file   Intimate Partner Violence: Not on file   Housing Stability: Not on file         ALLERGIES: Gadobenate dimeglumine and Iodinated contrast media    Review of Systems   Constitutional:  Negative for fever. HENT:  Negative for congestion. Respiratory:  Negative for cough and shortness of breath. Cardiovascular:  Positive for chest pain. Gastrointestinal:  Positive for abdominal pain and nausea. Negative for vomiting. Musculoskeletal:  Negative for back pain. Skin:  Negative for rash. Neurological:  Negative for light-headedness. All other systems reviewed and are negative. Vitals:    11/24/22 1928 11/24/22 2300   BP: 121/69 120/68   Pulse: 88 70   Resp: 17 18   Temp: 98.2 °F (36.8 °C)    SpO2: 100% 100%   Weight: 106.6 kg (235 lb)             Physical Exam  Vitals and nursing note reviewed. Constitutional:       Appearance: She is well-developed. She is not diaphoretic. HENT:      Head: Normocephalic and atraumatic. Eyes:      Pupils: Pupils are equal, round, and reactive to light. Cardiovascular:      Rate and Rhythm: Normal rate and regular rhythm. Heart sounds: No murmur heard. Pulmonary:      Effort: Pulmonary effort is normal.      Breath sounds: No wheezing. Abdominal:      Palpations: Abdomen is soft. Tenderness: There is abdominal tenderness in the epigastric area. There is no guarding. Musculoskeletal:         General: No tenderness. Cervical back: Normal range of motion. Skin:     General: Skin is dry. Capillary Refill: Capillary refill takes less than 2 seconds. Findings: No rash. Neurological:      Mental Status: She is alert and oriented to person, place, and time.         MDM         Procedures      Vitals:  Patient Vitals for the past 12 hrs:   Temp Pulse Resp BP SpO2   11/24/22 2300 -- 70 18 120/68 100 %   11/24/22 1928 98.2 °F (36.8 °C) 88 17 121/69 100 %         Medications ordered:   Medications   HYDROmorphone (DILAUDID) injection 0.5 mg (0.5 mg IntraVENous Given 11/24/22 1946)   0.9% sodium chloride infusion 1,000 mL (1,000 mL IntraVENous New Bag 11/24/22 2010)   ondansetron ([place]) injection 4 mg (4 mg IntraVENous Given 11/24/22 2014)   famotidine (PF) (PEPCID) injection 20 mg (20 mg IntraVENous Given 11/24/22 2014)   HYDROmorphone (DILAUDID) injection 0.5 mg (0.5 mg IntraVENous Given 11/24/22 2318)   ondansetron (ZOFRAN) injection 4 mg (4 mg IntraVENous Given 11/24/22 2318)         Lab findings:  Recent Results (from the past 12 hour(s))   CBC WITH AUTOMATED DIFF    Collection Time: 11/24/22  7:23 PM   Result Value Ref Range    WBC 10.0 4.6 - 13.2 K/uL    RBC 4.33 4.20 - 5.30 M/uL    HGB 9.6 (L) 12.0 - 16.0 g/dL    HCT 30.0 (L) 35.0 - 45.0 %    MCV 69.3 (L) 78.0 - 100.0 FL    MCH 22.2 (L) 24.0 - 34.0 PG    MCHC 32.0 31.0 - 37.0 g/dL    RDW 17.0 (H) 11.6 - 14.5 %    PLATELET 463 293 - 580 K/uL    MPV 10.0 9.2 - 11.8 FL    NRBC 0.0 0.0  WBC    ABSOLUTE NRBC 0.00 0.00 - 0.01 K/uL    NEUTROPHILS 84 (H) 40 - 73 %    LYMPHOCYTES 10 (L) 21 - 52 %    MONOCYTES 5 3 - 10 %    EOSINOPHILS 0 0 - 5 %    BASOPHILS 0 0 - 2 %    IMMATURE GRANULOCYTES 1 (H) 0 - 0.5 %    ABS. NEUTROPHILS 8.4 (H) 1.8 - 8.0 K/UL    ABS. LYMPHOCYTES 1.0 0.9 - 3.6 K/UL    ABS. MONOCYTES 0.5 0.05 - 1.2 K/UL    ABS. EOSINOPHILS 0.0 0.0 - 0.4 K/UL    ABS. BASOPHILS 0.0 0.0 - 0.1 K/UL    ABS. IMM.  GRANS. 0.1 (H) 0.00 - 0.04 K/UL    DF AUTOMATED      PLATELET COMMENTS Adequate      RBC COMMENTS Microcytosis  2+        RBC COMMENTS Macrocytosis  1+        RBC COMMENTS Polychromasia  1+       D DIMER    Collection Time: 11/24/22  7:23 PM   Result Value Ref Range    D DIMER 2.91 (H) <0.46 ug/ml(FEU)   METABOLIC PANEL, BASIC    Collection Time: 11/24/22  7:23 PM   Result Value Ref Range    Sodium 139 136 - 145 mmol/L    Potassium 3.6 3.5 - 5.5 mmol/L    Chloride 102 100 - 111 mmol/L    CO2 30 21 - 32 mmol/L    Anion gap 7 3.0 - 18.0 mmol/L    Glucose 113 (H) 74 - 99 mg/dL    BUN 8 7 - 18 mg/dL    Creatinine 1.02 0.60 - 1.30 mg/dL    BUN/Creatinine ratio 8 (L) 12 - 20      eGFR >60 >60 ml/min/1.73m2    Calcium 8.6 8.5 - 10.1 mg/dL TROPONIN-HIGH SENSITIVITY    Collection Time: 11/24/22  7:23 PM   Result Value Ref Range    Troponin-High Sensitivity 5 0 - 54 ng/L   HCG QL SERUM    Collection Time: 11/24/22  7:23 PM   Result Value Ref Range    HCG, Ql. Negative     HEPATIC FUNCTION PANEL    Collection Time: 11/24/22  7:23 PM   Result Value Ref Range    Protein, total 6.9 6.4 - 8.2 g/dL    Albumin 3.1 (L) 3.4 - 5.0 g/dL    Globulin 3.8 2.0 - 4.0 g/dL    A-G Ratio 0.8 0.8 - 1.7      Bilirubin, total 2.5 (H) 0.2 - 1.0 mg/dL    Bilirubin, direct 1.6 (H) 0.0 - 0.2 mg/dL    Alk. phosphatase 152 (H) 45 - 117 U/L    AST (SGOT) 273 (H) 10 - 38 U/L    ALT (SGPT) 436 (H) 13 - 56 U/L   LIPASE    Collection Time: 11/24/22  7:23 PM   Result Value Ref Range    Lipase 198 73 - 393 U/L   PROTHROMBIN TIME + INR    Collection Time: 11/24/22  7:23 PM   Result Value Ref Range    Prothrombin time 14.5 11.5 - 15.2 sec    INR 1.1 0.8 - 1.2     PTT    Collection Time: 11/24/22  7:23 PM   Result Value Ref Range    aPTT 32.8 23.0 - 36.4 sec    aPTT, therapeutic range   82 - 109 sec           X-Ray, CT or other radiology findings or impressions:  XR CHEST PORT   Final Result      Left lung base opacity may represent atelectasis versus pneumonia. Recommend   repeat imaging following completion of appropriate medical therapy. Progress notes, Consult notes or additional Procedure notes:   11:37 PM elev lft, needs g eval, poss mrcp or ercp  Pt req tx to [facility]  D/w dr Estrella Drummond, told of results inc lft and d dimer, need for further testing, iv die allergy, hosp at [facility] to accept      Diagnosis:   1. Abdominal pain, epigastric    2. Elevated LFTs        Disposition: transfer to [facility]    Follow-up Information    None          Patient's Medications   Start Taking    No medications on file   Continue Taking    CHOLECALCIFEROL (VITAMIN D3) 25 MCG (1,000 UNIT) CAP    Take  by mouth daily.     DEXAMETHASONE (DECADRON) 4 MG TABLET        DIPHENHYDRAMINE (BENADRYL) 25 MG CAPSULE    Take 50 mg by mouth. FLUTICASONE PROPIONATE (FLONASE) 50 MCG/ACTUATION NASAL SPRAY    2 Sprays by Both Nostrils route daily. FOLIC ACID (FOLVITE) 1 MG TABLET    Take 1 mg by mouth daily. HYDROCHLOROTHIAZIDE (HYDRODIURIL) 12.5 MG TABLET    Take 1 Tablet by mouth daily. IBUPROFEN (MOTRIN) 600 MG TABLET    Take 1 Tablet by mouth every six (6) hours as needed for Pain. LINZESS 290 MCG CAP CAPSULE    TAKE 1 CAPSULE BY MOUTH EVERY DAY    METFORMIN (GLUCOPHAGE) 500 MG TABLET    Take  by mouth. MONTELUKAST (SINGULAIR) 10 MG TABLET    TAKE 1 TABLET BY MOUTH DAILY    MULTIVITAMIN CAPSULE    Take 1 Capsule by mouth daily. OMEPRAZOLE (PRILOSEC) 10 MG CAPSULE    Take  by mouth. OMEPRAZOLE (PRILOSEC) 20 MG CAPSULE    Take 1 Capsule by mouth daily. ONDANSETRON HCL (ZOFRAN) 4 MG TABLET    Take 1 Tablet by mouth every eight (8) hours as needed for Nausea.     ONDANSETRON HCL (ZOFRAN) 8 MG TABLET    Starting the second day after chemo cycle finishes, take 8 mg every 8 hours as needed for nausea/vomiting    PROAIR HFA 90 MCG/ACTUATION INHALER       These Medications have changed    No medications on file   Stop Taking    No medications on file

## 2022-11-25 NOTE — ED TRIAGE NOTES
Pt states this is her third time at this facility this week. Pt states her abdominal pain is not getting any better and states she just cannot eat anything. Pt states she also has back pain and chest pain. Pt states last night she declined transfer to [facility] but now is requesting it.

## 2022-12-26 ENCOUNTER — HOSPITAL ENCOUNTER (EMERGENCY)
Age: 42
Discharge: HOME | End: 2022-12-26
Attending: EMERGENCY MEDICINE
Payer: MEDICAID

## 2022-12-26 ENCOUNTER — APPOINTMENT (OUTPATIENT)
Dept: GENERAL RADIOLOGY | Age: 42
End: 2022-12-26
Attending: EMERGENCY MEDICINE
Payer: MEDICAID

## 2022-12-26 VITALS
TEMPERATURE: 98.3 F | OXYGEN SATURATION: 100 % | HEART RATE: 111 BPM | DIASTOLIC BLOOD PRESSURE: 72 MMHG | HEIGHT: 66 IN | WEIGHT: 230 LBS | SYSTOLIC BLOOD PRESSURE: 144 MMHG | RESPIRATION RATE: 18 BRPM | BODY MASS INDEX: 36.96 KG/M2

## 2022-12-26 DIAGNOSIS — B34.9 VIRAL SYNDROME: Primary | ICD-10-CM

## 2022-12-26 LAB
FLUAV AG NPH QL IA: NEGATIVE
FLUBV AG NOSE QL IA: NEGATIVE

## 2022-12-26 PROCEDURE — 99284 EMERGENCY DEPT VISIT MOD MDM: CPT

## 2022-12-26 PROCEDURE — 87804 INFLUENZA ASSAY W/OPTIC: CPT

## 2022-12-26 PROCEDURE — 71045 X-RAY EXAM CHEST 1 VIEW: CPT

## 2022-12-26 RX ORDER — NAPROXEN 500 MG/1
500 TABLET ORAL
Qty: 20 TABLET | Refills: 0 | Status: SHIPPED | OUTPATIENT
Start: 2022-12-26

## 2022-12-26 NOTE — ED PROVIDER NOTES
EMERGENCY DEPARTMENT HISTORY AND PHYSICAL EXAM      Date: 12/26/2022  Patient Name: Misty Valenzuela    History of Presenting Illness     Chief Complaint   Patient presents with    Flu Like Symptoms       History Provided By: Patient    HPI: Misty Valenzuela, 40 y.o. female presents to the ED with CC of cough and fever with chills and body aches going on for the past couple of days. Patient has a known past medical history significant for breast CA and is currently undergoing chemotherapy. She would like a flu swab today. Treated this with anything and is failed to improve. .       Patient denies SOB, chest pain, or any neurological symptoms. There are no other complaints, changes, or physical findings at this time. Past History     Past Medical History:  Past Medical History:   Diagnosis Date    Asthma     History of partial nephrectomy 10/24/2018    Hyperthyroidism     Malignant tumor of kidney ([facility] [address] 10/24/2018    Polycystic ovary syndrome     Renal mass     Rheumatoid arthritis (HCC)        Allergies: Allergies   Allergen Reactions    Gadobenate Dimeglumine Nausea and Vomiting     Nausea, Anxiety - received Multihance Gabobenate Dimeglutamine 20mL 2/13/18 for MRI  Other reaction(s): gi distress  Nausea, Anxiety - received Multihance Gabobenate Dimeglutamine 20mL 2/13/18 for MRI      Iodinated Contrast Media Other (comments)     NAUSEA, ANXIETY-- RECEIVED MULTIHANCE (GADOBENATE DIMEGLUTAMINE) 20ML 2/13/18 FOR MRI       Review of Systems   Vital signs and nursing notes reviewed  Review of Systems   Constitutional:  Positive for chills and fever. Negative for appetite change and fatigue. HENT: Negative. Negative for congestion and sinus pain. Eyes: Negative. Negative for pain and visual disturbance. Respiratory:  Positive for cough. Negative for chest tightness and shortness of breath. Cardiovascular: Negative. Negative for chest pain. Gastrointestinal: Negative.   Negative for abdominal pain, diarrhea, nausea and vomiting. Genitourinary: Negative. Negative for difficulty urinating. No discharge   Musculoskeletal:  Positive for myalgias. Negative for arthralgias. Skin: Negative. Negative for rash. Neurological: Negative. Negative for weakness and headaches. Hematological: Negative. Psychiatric/Behavioral: Negative. Negative for agitation. The patient is not nervous/anxious. All other systems reviewed and are negative. Physical Exam   Visit Vitals  BP (!) 144/72 (BP 1 Location: Left upper arm, BP Patient Position: Sitting)   Pulse (!) 111   Temp 98.3 °F (36.8 °C)   Resp 18   Ht 5' 5.5\" (1.664 m)   Wt 104.3 kg (230 lb)   SpO2 100%   BMI 37.69 kg/m²     CONSTITUTIONAL: Alert, in no distress. Appears stated age. HEAD:  Normocephalic, atraumatic  EYES: EOM intact. No conjunctival injection or scleral icterus  Neck:  Supple. No meningismus  RESP: Normal with no work of breathing, speaking in full sentences. CV: Well perfused. NEURO: Alert with normal mentation, moving extremities spontaneously  MSK: FROM of all extremities without neuro, motor, vascular or sensory embarassment  ENT: clear without erythema, exudate or edema    Medical Decision Making     ED Course:   Initial assessment performed. The patients presenting problems have been discussed, and they are in agreement with the care plan formulated and outlined with them. I have encouraged them to ask questions as they arise throughout their visit. Patient has some type of viral syndrome at this point time. Encouraged to treat with anti-inflammatories as an outpatient    Critical Care Time: None    Disposition:  DISCHARGE NOTE:  The pt is ready for discharge. The pt's signs, symptoms, diagnosis, and discharge instructions have been discussed and pt has conveyed their understanding. The pt is to follow up as recommended or return to ER should their symptoms worsen.  Plan has been discussed and pt is in agreement. PLAN:  1. Current Discharge Medication List        START taking these medications    Details   naproxen (NAPROSYN) 500 mg tablet Take 1 Tablet by mouth every twelve (12) hours as needed for Pain. Qty: 20 Tablet, Refills: 0  Start date: 12/26/2022           2. Follow-up Information       Follow up With Specialties Details Why Contact Info    Luiza Osuna MD Family Medicine Call in 2 days As needed, If symptoms worsen [facility]  [phone]            3. Take Tylenol or Ibuprofen as needed  4. Drink plenty of fluids  5. Return to ED if worse especially if any shortness of breath, chest pain or altered mentation. Diagnosis     Clinical Impression:   1. Viral syndrome            Please note that this dictation was completed with Troppin, the computer voice recognition software. Quite often unanticipated grammatical, syntax, homophones, and other interpretive errors are inadvertently transcribed by the computer software. Please   disregards these errors. Please excuse any errors that have escaped final proofreading.

## 2022-12-26 NOTE — Clinical Note
[facility] EMERGENCY DEPT  [address] [id number] [phone]    Work/School Note    Date: 12/26/2022    To Whom It May concern:    Jovany Dawn was seen and treated today in the emergency room by the following provider(s):  Attending Provider: Carter Cope MD.      Jovany Dawn is excused from work/school on 12/26/2022 through 12/28/2022. She is medically clear to return to work/school on 12/29/2022.          Sincerely,          Sergio Delacruz MD

## 2022-12-28 ENCOUNTER — VIRTUAL VISIT (OUTPATIENT)
Dept: FAMILY MEDICINE CLINIC | Age: 42
End: 2022-12-28
Payer: MEDICAID

## 2022-12-28 DIAGNOSIS — R05.1 ACUTE COUGH: ICD-10-CM

## 2022-12-28 DIAGNOSIS — U07.1 COVID-19: Primary | ICD-10-CM

## 2022-12-28 PROCEDURE — 99442 PR PHYS/QHP TELEPHONE EVALUATION 11-20 MIN: CPT | Performed by: NURSE PRACTITIONER

## 2022-12-28 RX ORDER — HYDROCODONE BITARTRATE AND HOMATROPINE METHYLBROMIDE 1.5; 5 MG/5ML; MG/5ML
5 SYRUP ORAL
Qty: 45 ML | Refills: 0 | Status: SHIPPED | OUTPATIENT
Start: 2022-12-28 | End: 2022-12-31

## 2022-12-28 RX ORDER — BENZONATATE 100 MG/1
CAPSULE ORAL
Qty: 15 CAPSULE | Refills: 0 | Status: SHIPPED | OUTPATIENT
Start: 2022-12-28

## 2022-12-28 NOTE — PROGRESS NOTES
Rad Holley (: 1980) is a 40 y.o. female, established, on a telephone visit patient, here for evaluation of the following chief complaint(s):   Positive For Covid-19 (Positive for covid and her ears keep popping has a cough )       ASSESSMENT/PLAN:  Below is the assessment and plan developed based on review of pertinent history, labs, studies, and medications. The patient may take the cough medicine liquid as prescribed only. She is aware that this has a strong medication and that can cause drowsiness she should not drink alcohol or drive a motor vehicle. The patient is aware that she can take the other cough medicine capsules Tessalon, if between her dosing she develops a strong cough. The patient will contin to use her nebulizer treatments. The patient should go to the emergency room for severe symptoms such as but not limited to shortness of breath, chest pain. The patient should monitor her symptoms and be seen in person and/or another visit if she has any concerns or continuation or worsening      I spent 20 with this patient performing a medically appropriate exam, ordering tests and medications, counseling and educating, and documenting in the EMR       1. COVID-19  -     HYDROcodone-homatropine (HYCODAN) 5-1.5 mg/5 mL (5 mL) oral solution; Take 5 mL by mouth three (3) times daily as needed for Cough for up to 3 days. Max Daily Amount: 15 mL., Normal, Disp-45 mL, R-0  -     benzonatate (TESSALON) 100 mg capsule; May take 1 cap every 8 hours for breath through cough, Normal, Disp-15 Capsule, R-0  2. Acute cough  -     HYDROcodone-homatropine (HYCODAN) 5-1.5 mg/5 mL (5 mL) oral solution; Take 5 mL by mouth three (3) times daily as needed for Cough for up to 3 days. Max Daily Amount: 15 mL., Normal, Disp-45 mL, R-0  -     benzonatate (TESSALON) 100 mg capsule;  May take 1 cap every 8 hours for breath through cough, Normal, Disp-15 Capsule, R-0    No follow-ups on file.    SUBJECTIVE/OBJECTIVE:  HPI 23-year-old female presents on a telephone visit with concern that she is positive for COVID and has a very strong cough. The patient states on Sunday afternoon she started feeling some symptoms, tired she is on chemotherapy every 2 weeks for breast cancer. She states she then noted she developed a fever of 100.5 and now she is also coughing with a sore throat headache and sinus congestion. She went to the emergency room on December 26 was tested for influenza and told negative she also had a chest x-ray that was negative. She states she tested herself at home on December 27 for COVID, and was positive. The patient has had 2 COVID vaccinations. The patient is using her nebulizer treatments she says this does relieve the congested feeling in her chest.  She denies struggling to breathe or shortness of breath, nausea, vomiting, inability to eat or hydrate. Review of Systems see above note        Physical Exam this is a telephone visit no physical exam possible. I did note that during the conversation on the phone the patient does have a very frequent cough. She is able to speak in full sentences, without struggling to breathe noted. Her responses and questions are appropriate. Christiane Ruffin, was evaluated through a s telephone visit the patient was located at: Home  The provider was located at: Facility       An electronic signature was used to authenticate this note.   -- JASVIR Boudreaux

## 2022-12-28 NOTE — PROGRESS NOTES
Christiane Ruffin presents today for   Chief Complaint   Patient presents with    Positive For Covid-19     Positive for covid and her ears keep popping has a cough        Virtual/telephone visit    Depression Screening:  3 most recent PHQ Screens 12/28/2022   Little interest or pleasure in doing things Not at all   Feeling down, depressed, irritable, or hopeless Not at all   Total Score PHQ 2 0       Learning Assessment:  No flowsheet data found. Fall Risk  No flowsheet data found. ADL  ADL Assessment 12/28/2022   Feeding yourself No Help Needed   Getting from bed to chair No Help Needed   Getting dressed No Help Needed   Bathing or showering No Help Needed   Walk across the room (includes cane/walker) No Help Needed   Using the telphone No Help Needed   Taking your medications No Help Needed   Preparing meals No Help Needed   Managing money (expenses/bills) No Help Needed   Moderately strenuous housework (laundry) No Help Needed   Shopping for personal items (toiletries/medicines) No Help Needed   Shopping for groceries No Help Needed   Driving No Help Needed   Climbing a flight of stairs No Help Needed   Getting to places beyond walking distances No Help Needed       Travel Screening:    Travel Screening       Question Response    In the last 10 days, have you been in contact with someone who was confirmed or suspected to have Coronavirus/COVID-19? Yes    Have you had a COVID-19 viral test in the last 10 days? Yes - Positive result    Do you have any of the following new or worsening symptoms? Chills; Fever;Cough    Have you traveled internationally or domestically in the last month? No          Travel History   Travel since 11/28/22    No documented travel since 11/28/22         Health Maintenance reviewed and discussed and ordered per Provider.     Health Maintenance Due   Topic Date Due    Pneumococcal 0-64 years (1 - PCV) Never done    COVID-19 Vaccine (3 - Booster for Moderna series) 04/20/2021 DTaP/Tdap/Td series (2 - Td or Tdap) 03/14/2022    Flu Vaccine (1) 08/01/2022   . Coordination of Care:  1. \"Have you been to the ER, urgent care clinic since your last visit? Hospitalized since your last visit? \" Yes When: 12/2022 Reason for visit: Cold Symptoms     2. \"Have you seen or consulted any other health care providers outside of the [address] [place] [address] since your last visit? \" No     3. For patients aged 39-70: Has the patient had a colonoscopy? NA - based on age     If the patient is female:    4. For patients aged 41-77: Has the patient had a mammogram within the past 2 years? Yes - no Care Gap present    5. For patients aged 21-65: Has the patient had a pap smear?  Yes - no Care Gap present

## 2023-01-05 NOTE — DISCHARGE SUMMARY
Discharge Summary    Patient: Harrison Haney               Sex: female          DOA: 12/7/2017         YOB: 1980      Age:  40 y.o.        LOS:  LOS: 3 days                Admit Date: 12/7/2017    Discharge Date: 12/10/2017    Admission Diagnoses: RENAL MASS N28.89; Renal mass, left    Discharge Diagnoses:    Problem List as of 12/10/2017  Date Reviewed: 12/7/2017          Codes Class Noted - Resolved    Renal mass, left ICD-10-CM: N28.89  ICD-9-CM: 593.9  12/7/2017 - Present        Renal mass ICD-10-CM: N28.89  ICD-9-CM: 593.9  Unknown - Present        Hyperthyroidism ICD-10-CM: E05.90  ICD-9-CM: 242.90  Unknown - Present        Polycystic ovary syndrome ICD-10-CM: E28.2  ICD-9-CM: 256.4  Unknown - Present              Discharge Condition: Stable    Hospital Course: Admitted postoperatively. She had fever the work up for which revealed atelectasis. Stable anemia (present preop). She is able to ambulated, is voiding, and tolerating a diet. AVSS.  BE is c/w with serum and was removed    Consults: None    Significant Diagnostic Studies:       CBC W/O DIFF    Collection Time: 12/10/17  4:05 AM   Result Value Ref Range    WBC 12.0 4.6 - 13.2 K/uL    RBC 3.50 (L) 4.20 - 5.30 M/uL    HGB 7.7 (L) 12.0 - 16.0 g/dL    HCT 23.6 (L) 35.0 - 45.0 %    MCV 67.4 (L) 74.0 - 97.0 FL    MCH 22.0 (L) 24.0 - 34.0 PG    MCHC 32.6 31.0 - 37.0 g/dL    RDW 15.2 (H) 11.6 - 14.5 %    PLATELET 143 749 - 586 K/uL    MPV 10.1 9.2 - 11.8 FL     BMP:   Lab Results   Component Value Date/Time     12/10/2017 04:05 AM    K 3.8 12/10/2017 04:05 AM     12/10/2017 04:05 AM    CO2 28 12/10/2017 04:05 AM    AGAP 6 12/10/2017 04:05 AM    GLU 94 12/10/2017 04:05 AM    BUN 7 12/10/2017 04:05 AM    CREA 0.88 12/10/2017 04:05 AM    GFRAA >60 12/10/2017 04:05 AM    GFRNA >60 12/10/2017 04:05 AM      Cx negative to date    Discharge Medications:     Current Discharge Medication List      START taking these medications    Details docusate sodium (COLACE) 100 mg capsule Take 1 Cap by mouth two (2) times a day for 30 days. Qty: 60 Cap, Refills: 0      oxyCODONE-acetaminophen (PERCOCET) 5-325 mg per tablet Take 1-2 Tabs by mouth every four (4) hours as needed for Pain. Max Daily Amount: 12 Tabs. Qty: 30 Tab, Refills: 0         CONTINUE these medications which have NOT CHANGED    Details   metFORMIN ER (GLUCOPHAGE XR) 750 mg tablet Take 750 mg by mouth two (2) times a day. metoprolol tartrate (LOPRESSOR) 100 mg IR tablet Take  by mouth two (2) times a day. cholecalciferol (VITAMIN D3) 1,000 unit cap Take  by mouth daily. trimethoprim-sulfamethoxazole (BACTRIM DS) 160-800 mg per tablet Take 1 Tab by mouth two (2) times a day.   Qty: 1 Tab, Refills: 0         STOP taking these medications       lisinopril (PRINIVIL, ZESTRIL) 2.5 mg tablet Comments:   Reason for Stopping:               Activity: No lifting, Driving, or Strenuous exercise until seen in clinic    Diet: Regular Diet    Wound Care: Keep wound clean and dry and may shower tomorrow    Follow-up: as scheduled with Dr Bruna Candelaria MD Admission

## 2023-01-14 ENCOUNTER — APPOINTMENT (OUTPATIENT)
Dept: GENERAL RADIOLOGY | Age: 43
DRG: 720 | End: 2023-01-14
Attending: EMERGENCY MEDICINE
Payer: MEDICAID

## 2023-01-14 ENCOUNTER — HOSPITAL ENCOUNTER (INPATIENT)
Age: 43
LOS: 3 days | Discharge: HOME | DRG: 720 | End: 2023-01-18
Attending: EMERGENCY MEDICINE | Admitting: INTERNAL MEDICINE
Payer: MEDICAID

## 2023-01-14 DIAGNOSIS — R79.89 ELEVATED D-DIMER: ICD-10-CM

## 2023-01-14 DIAGNOSIS — A41.9 SEPSIS, DUE TO UNSPECIFIED ORGANISM, UNSPECIFIED WHETHER ACUTE ORGAN DYSFUNCTION PRESENT (HCC): ICD-10-CM

## 2023-01-14 DIAGNOSIS — J45.42 MODERATE PERSISTENT ASTHMA WITH STATUS ASTHMATICUS: Primary | ICD-10-CM

## 2023-01-14 DIAGNOSIS — Z85.3 HISTORY OF BREAST CANCER: ICD-10-CM

## 2023-01-14 LAB
ALBUMIN SERPL-MCNC: 3.2 G/DL (ref 3.4–5)
ALBUMIN/GLOB SERPL: 1 (ref 0.8–1.7)
ALP SERPL-CCNC: 75 U/L (ref 45–117)
ALT SERPL-CCNC: 17 U/L (ref 13–56)
ANION GAP SERPL CALC-SCNC: 10 MMOL/L (ref 3–18)
ARTERIAL PATENCY WRIST A: ABNORMAL
AST SERPL W P-5'-P-CCNC: 8 U/L (ref 10–38)
BASE DEFICIT BLDA-SCNC: 0.2 MMOL/L
BASOPHILS # BLD: 0 K/UL (ref 0–0.1)
BASOPHILS NFR BLD: 1 % (ref 0–2)
BDY SITE: ABNORMAL
BILIRUB SERPL-MCNC: 0.4 MG/DL (ref 0.2–1)
BNP SERPL-MCNC: 20 PG/ML (ref 0–450)
BUN SERPL-MCNC: 12 MG/DL (ref 7–18)
BUN/CREAT SERPL: 16 (ref 12–20)
CA-I BLD-MCNC: 8.4 MG/DL (ref 8.5–10.1)
CHLORIDE SERPL-SCNC: 105 MMOL/L (ref 100–111)
CO2 SERPL-SCNC: 25 MMOL/L (ref 21–32)
COHGB MFR BLD: 0.4 % (ref 1–2)
CREAT SERPL-MCNC: 0.76 MG/DL (ref 0.6–1.3)
D DIMER PPP FEU-MCNC: 1.22 UG/ML(FEU)
DIFFERENTIAL METHOD BLD: ABNORMAL
EOSINOPHIL # BLD: 0.2 K/UL (ref 0–0.4)
EOSINOPHIL NFR BLD: 3 % (ref 0–5)
ERYTHROCYTE [DISTWIDTH] IN BLOOD BY AUTOMATED COUNT: 22.1 % (ref 11.6–14.5)
FIO2 ON VENT: 21 %
GLOBULIN SER CALC-MCNC: 3.2 G/DL (ref 2–4)
GLUCOSE SERPL-MCNC: 148 MG/DL (ref 74–99)
HCO3 BLDA-SCNC: 24 MMOL/L (ref 22–26)
HCT VFR BLD AUTO: 27.4 % (ref 35–45)
HGB BLD-MCNC: 9 G/DL (ref 12–16)
IMM GRANULOCYTES # BLD AUTO: 0.4 K/UL (ref 0–0.04)
IMM GRANULOCYTES NFR BLD AUTO: 5 % (ref 0–0.5)
LACTATE SERPL-SCNC: 2.5 MMOL/L (ref 0.4–2)
LACTATE SERPL-SCNC: 2.6 MMOL/L (ref 0.4–2)
LYMPHOCYTES # BLD: 0.9 K/UL (ref 0.9–3.6)
LYMPHOCYTES NFR BLD: 13 % (ref 21–52)
MCH RBC QN AUTO: 24.4 PG (ref 24–34)
MCHC RBC AUTO-ENTMCNC: 32.8 G/DL (ref 31–37)
MCV RBC AUTO: 74.3 FL (ref 78–100)
METHGB MFR BLD: 0.2 % (ref 0–1.4)
MONOCYTES # BLD: 0.9 K/UL (ref 0.05–1.2)
MONOCYTES NFR BLD: 13 % (ref 3–10)
NEUTS SEG # BLD: 4.3 K/UL (ref 1.8–8)
NEUTS SEG NFR BLD: 64 % (ref 40–73)
NRBC # BLD: 1.4 K/UL (ref 0–0.01)
NRBC BLD-RTO: 0.1 PER 100 WBC
OXYHGB MFR BLD: 93.8 % (ref 95–99)
PCO2 BLDA: 37 MMHG (ref 35–45)
PERFORMED BY, TECHID: ABNORMAL
PH BLDA: 7.43 (ref 7.35–7.45)
PLATELET # BLD AUTO: 189 K/UL (ref 135–420)
PMV BLD AUTO: 10.1 FL (ref 9.2–11.8)
PO2 BLDA: 74 MMHG (ref 80–100)
POTASSIUM SERPL-SCNC: 3.7 MMOL/L (ref 3.5–5.5)
PROCALCITONIN SERPL-MCNC: 0.12 NG/ML
PROT SERPL-MCNC: 6.4 G/DL (ref 6.4–8.2)
RBC # BLD AUTO: 3.69 M/UL (ref 4.2–5.3)
SAO2 % BLD: 94 % (ref 95–99)
SAO2% DEVICE SAO2% SENSOR NAME: ABNORMAL
SODIUM SERPL-SCNC: 140 MMOL/L (ref 136–145)
SPECIMEN SITE: ABNORMAL
WBC # BLD AUTO: 6.7 K/UL (ref 4.6–13.2)

## 2023-01-14 PROCEDURE — 94644 CONT INHLJ TX 1ST HOUR: CPT

## 2023-01-14 PROCEDURE — 99285 EMERGENCY DEPT VISIT HI MDM: CPT

## 2023-01-14 PROCEDURE — 96361 HYDRATE IV INFUSION ADD-ON: CPT

## 2023-01-14 PROCEDURE — 80053 COMPREHEN METABOLIC PANEL: CPT

## 2023-01-14 PROCEDURE — 96366 THER/PROPH/DIAG IV INF ADDON: CPT

## 2023-01-14 PROCEDURE — 94640 AIRWAY INHALATION TREATMENT: CPT

## 2023-01-14 PROCEDURE — 85379 FIBRIN DEGRADATION QUANT: CPT

## 2023-01-14 PROCEDURE — 96375 TX/PRO/DX INJ NEW DRUG ADDON: CPT

## 2023-01-14 PROCEDURE — 74011250636 HC RX REV CODE- 250/636: Performed by: EMERGENCY MEDICINE

## 2023-01-14 PROCEDURE — 74011250637 HC RX REV CODE- 250/637: Performed by: EMERGENCY MEDICINE

## 2023-01-14 PROCEDURE — 93005 ELECTROCARDIOGRAM TRACING: CPT

## 2023-01-14 PROCEDURE — 36600 WITHDRAWAL OF ARTERIAL BLOOD: CPT

## 2023-01-14 PROCEDURE — 71045 X-RAY EXAM CHEST 1 VIEW: CPT

## 2023-01-14 PROCEDURE — 83605 ASSAY OF LACTIC ACID: CPT

## 2023-01-14 PROCEDURE — 36415 COLL VENOUS BLD VENIPUNCTURE: CPT

## 2023-01-14 PROCEDURE — 85025 COMPLETE CBC W/AUTO DIFF WBC: CPT

## 2023-01-14 PROCEDURE — 84145 PROCALCITONIN (PCT): CPT

## 2023-01-14 PROCEDURE — 83880 ASSAY OF NATRIURETIC PEPTIDE: CPT

## 2023-01-14 PROCEDURE — 87040 BLOOD CULTURE FOR BACTERIA: CPT

## 2023-01-14 PROCEDURE — 81015 MICROSCOPIC EXAM OF URINE: CPT

## 2023-01-14 PROCEDURE — 74011000250 HC RX REV CODE- 250: Performed by: EMERGENCY MEDICINE

## 2023-01-14 PROCEDURE — 94645 CONT INHLJ TX EACH ADDL HOUR: CPT

## 2023-01-14 PROCEDURE — 96367 TX/PROPH/DG ADDL SEQ IV INF: CPT

## 2023-01-14 PROCEDURE — 82803 BLOOD GASES ANY COMBINATION: CPT

## 2023-01-14 PROCEDURE — 81003 URINALYSIS AUTO W/O SCOPE: CPT

## 2023-01-14 RX ORDER — IPRATROPIUM BROMIDE AND ALBUTEROL SULFATE 2.5; .5 MG/3ML; MG/3ML
3 SOLUTION RESPIRATORY (INHALATION) ONCE
Status: COMPLETED | OUTPATIENT
Start: 2023-01-14 | End: 2023-01-14

## 2023-01-14 RX ORDER — BENZONATATE 100 MG/1
200 CAPSULE ORAL
Status: COMPLETED | OUTPATIENT
Start: 2023-01-14 | End: 2023-01-14

## 2023-01-14 RX ORDER — ALBUTEROL SULFATE 0.83 MG/ML
10 SOLUTION RESPIRATORY (INHALATION)
Status: COMPLETED | OUTPATIENT
Start: 2023-01-14 | End: 2023-01-14

## 2023-01-14 RX ORDER — MAGNESIUM SULFATE HEPTAHYDRATE 40 MG/ML
2 INJECTION, SOLUTION INTRAVENOUS
Status: COMPLETED | OUTPATIENT
Start: 2023-01-14 | End: 2023-01-15

## 2023-01-14 RX ORDER — SODIUM CHLORIDE 0.9 % (FLUSH) 0.9 %
5-10 SYRINGE (ML) INJECTION AS NEEDED
Status: DISCONTINUED | OUTPATIENT
Start: 2023-01-14 | End: 2023-01-18 | Stop reason: HOSPADM

## 2023-01-14 RX ORDER — ONDANSETRON 2 MG/ML
4 INJECTION INTRAMUSCULAR; INTRAVENOUS
Status: COMPLETED | OUTPATIENT
Start: 2023-01-14 | End: 2023-01-14

## 2023-01-14 RX ADMIN — ONDANSETRON 4 MG: 2 INJECTION INTRAMUSCULAR; INTRAVENOUS at 21:02

## 2023-01-14 RX ADMIN — BENZONATATE 200 MG: 100 CAPSULE ORAL at 21:02

## 2023-01-14 RX ADMIN — MAGNESIUM SULFATE HEPTAHYDRATE 2 G: 40 INJECTION, SOLUTION INTRAVENOUS at 21:02

## 2023-01-14 RX ADMIN — ALBUTEROL SULFATE 10 MG: 2.5 SOLUTION RESPIRATORY (INHALATION) at 21:28

## 2023-01-14 RX ADMIN — IPRATROPIUM BROMIDE AND ALBUTEROL SULFATE 3 ML: 2.5; .5 SOLUTION RESPIRATORY (INHALATION) at 21:09

## 2023-01-14 RX ADMIN — METHYLPREDNISOLONE SODIUM SUCCINATE 125 MG: 125 INJECTION, POWDER, FOR SOLUTION INTRAMUSCULAR; INTRAVENOUS at 21:02

## 2023-01-14 RX ADMIN — IPRATROPIUM BROMIDE AND ALBUTEROL SULFATE 3 ML: 2.5; .5 SOLUTION RESPIRATORY (INHALATION) at 20:09

## 2023-01-14 RX ADMIN — SODIUM CHLORIDE 1000 ML: 9 INJECTION, SOLUTION INTRAVENOUS at 20:35

## 2023-01-14 NOTE — Clinical Note
Status[de-identified] INPATIENT [101]   Type of Bed: Remote Telemetry [29]   Cardiac Monitoring Required?: Yes   Inpatient Hospitalization Certified Necessary for the Following Reasons: 3.  Patient receiving treatment that can only be provided in an inpatient setting (further clarification in H&P documentation)   Admitting Diagnosis: Asthma exacerbation [2590999]   Admitting Diagnosis: SOB (shortness of breath) [350672]   Admitting Diagnosis: Suspected pulmonary embolism [5162592]   Admitting Physician: Kolton Arizmendi [[id number]]   Attending Physician: Kolton Arizmendi [[id number]]   Estimated Length of Stay: 2 Midnights   Discharge Plan[de-identified] Home with Office Follow-up

## 2023-01-15 ENCOUNTER — APPOINTMENT (OUTPATIENT)
Dept: CT IMAGING | Age: 43
DRG: 720 | End: 2023-01-15
Attending: NURSE PRACTITIONER
Payer: MEDICAID

## 2023-01-15 PROBLEM — J18.9 SEPSIS DUE TO PNEUMONIA (HCC): Status: ACTIVE | Noted: 2023-01-15

## 2023-01-15 PROBLEM — R09.89 SUSPECTED PULMONARY EMBOLISM: Status: ACTIVE | Noted: 2023-01-15

## 2023-01-15 PROBLEM — E11.9 TYPE II DIABETES MELLITUS (HCC): Status: ACTIVE | Noted: 2023-01-15

## 2023-01-15 PROBLEM — J18.9 PNA (PNEUMONIA): Status: ACTIVE | Noted: 2023-01-15

## 2023-01-15 PROBLEM — R06.02 SOB (SHORTNESS OF BREATH): Status: ACTIVE | Noted: 2023-01-15

## 2023-01-15 PROBLEM — A41.9 SEPSIS DUE TO PNEUMONIA (HCC): Status: ACTIVE | Noted: 2023-01-15

## 2023-01-15 PROBLEM — Z85.3 HISTORY OF BREAST CANCER: Status: ACTIVE | Noted: 2023-01-15

## 2023-01-15 PROBLEM — J45.901 ASTHMA EXACERBATION: Status: ACTIVE | Noted: 2023-01-15

## 2023-01-15 LAB
APPEARANCE UR: CLEAR
ATRIAL RATE: 115 BPM
BACTERIA URNS QL MICRO: ABNORMAL /HPF
BASOPHILS # BLD: 0 K/UL (ref 0–0.1)
BASOPHILS NFR BLD: 0 % (ref 0–2)
BILIRUB UR QL: NEGATIVE
CALCULATED P AXIS, ECG09: 60 DEGREES
CALCULATED R AXIS, ECG10: 51 DEGREES
CALCULATED T AXIS, ECG11: 17 DEGREES
COLOR UR: YELLOW
DIAGNOSIS, 93000: NORMAL
DIFFERENTIAL METHOD BLD: ABNORMAL
EOSINOPHIL # BLD: 0 K/UL (ref 0–0.4)
EOSINOPHIL NFR BLD: 0 % (ref 0–5)
EPITH CASTS URNS QL MICRO: ABNORMAL /LPF (ref 0–20)
ERYTHROCYTE [DISTWIDTH] IN BLOOD BY AUTOMATED COUNT: 22.4 % (ref 11.6–14.5)
GLUCOSE UR STRIP.AUTO-MCNC: NEGATIVE MG/DL
HCT VFR BLD AUTO: 26.4 % (ref 35–45)
HGB BLD-MCNC: 8.7 G/DL (ref 12–16)
HGB UR QL STRIP: NEGATIVE
IMM GRANULOCYTES # BLD AUTO: 0 K/UL
IMM GRANULOCYTES NFR BLD AUTO: 0 %
KETONES UR QL STRIP.AUTO: NEGATIVE MG/DL
LACTATE SERPL-SCNC: 2.2 MMOL/L (ref 0.4–2)
LACTATE SERPL-SCNC: 2.6 MMOL/L (ref 0.4–2)
LACTATE SERPL-SCNC: 4.1 MMOL/L (ref 0.4–2)
LEUKOCYTE ESTERASE UR QL STRIP.AUTO: ABNORMAL
LYMPHOCYTES # BLD: 1 K/UL (ref 0.9–3.6)
LYMPHOCYTES NFR BLD: 8 % (ref 21–52)
MCH RBC QN AUTO: 24.5 PG (ref 24–34)
MCHC RBC AUTO-ENTMCNC: 33 G/DL (ref 31–37)
MCV RBC AUTO: 74.4 FL (ref 78–100)
METAMYELOCYTES NFR BLD MANUAL: 1 %
MONOCYTES # BLD: 0.1 K/UL (ref 0.05–1.2)
MONOCYTES NFR BLD: 1 % (ref 3–10)
MYELOCYTES NFR BLD MANUAL: 3 %
NEUTS BAND NFR BLD MANUAL: 5 % (ref 0–5)
NEUTS SEG # BLD: 10.9 K/UL (ref 1.8–8)
NEUTS SEG NFR BLD: 79 % (ref 40–73)
NITRITE UR QL STRIP.AUTO: NEGATIVE
NRBC # BLD: 0.09 K/UL (ref 0–0.01)
NRBC BLD-RTO: 0.7 PER 100 WBC
OTHER CELLS NFR BLD MANUAL: 3 %
P-R INTERVAL, ECG05: 121 MS
PH UR STRIP: 6 (ref 5–8)
PLATELET # BLD AUTO: 188 K/UL (ref 135–420)
PMV BLD AUTO: 10.4 FL (ref 9.2–11.8)
PROT UR STRIP-MCNC: NEGATIVE MG/DL
Q-T INTERVAL, ECG07: 340 MS
QRS DURATION, ECG06: 81 MS
QTC CALCULATION (BEZET), ECG08: 471 MS
RBC # BLD AUTO: 3.55 M/UL (ref 4.2–5.3)
RBC #/AREA URNS HPF: ABNORMAL /HPF (ref 0–2)
RBC MORPH BLD: ABNORMAL
SP GR UR REFRACTOMETRY: 1.01 (ref 1–1.03)
UROBILINOGEN UR QL STRIP.AUTO: 0.2 EU/DL (ref 0.2–1)
VENTRICULAR RATE, ECG03: 115 BPM
WBC # BLD AUTO: 13 K/UL (ref 4.6–13.2)
WBC URNS QL MICRO: ABNORMAL /HPF (ref 0–4)

## 2023-01-15 PROCEDURE — 74011000250 HC RX REV CODE- 250: Performed by: NURSE PRACTITIONER

## 2023-01-15 PROCEDURE — 74011250636 HC RX REV CODE- 250/636: Performed by: EMERGENCY MEDICINE

## 2023-01-15 PROCEDURE — 96372 THER/PROPH/DIAG INJ SC/IM: CPT

## 2023-01-15 PROCEDURE — 74011250637 HC RX REV CODE- 250/637: Performed by: NURSE PRACTITIONER

## 2023-01-15 PROCEDURE — 74011250636 HC RX REV CODE- 250/636: Performed by: NURSE PRACTITIONER

## 2023-01-15 PROCEDURE — 83605 ASSAY OF LACTIC ACID: CPT

## 2023-01-15 PROCEDURE — 74011000258 HC RX REV CODE- 258: Performed by: EMERGENCY MEDICINE

## 2023-01-15 PROCEDURE — 65270000029 HC RM PRIVATE

## 2023-01-15 PROCEDURE — 94640 AIRWAY INHALATION TREATMENT: CPT

## 2023-01-15 PROCEDURE — 71275 CT ANGIOGRAPHY CHEST: CPT

## 2023-01-15 PROCEDURE — 74011000258 HC RX REV CODE- 258: Performed by: INTERNAL MEDICINE

## 2023-01-15 PROCEDURE — 36415 COLL VENOUS BLD VENIPUNCTURE: CPT

## 2023-01-15 PROCEDURE — 74011000636 HC RX REV CODE- 636: Performed by: INTERNAL MEDICINE

## 2023-01-15 PROCEDURE — 74011250636 HC RX REV CODE- 250/636: Performed by: INTERNAL MEDICINE

## 2023-01-15 PROCEDURE — 96365 THER/PROPH/DIAG IV INF INIT: CPT

## 2023-01-15 PROCEDURE — 85025 COMPLETE CBC W/AUTO DIFF WBC: CPT

## 2023-01-15 RX ORDER — POLYETHYLENE GLYCOL 3350 17 G/17G
17 POWDER, FOR SOLUTION ORAL DAILY PRN
Status: DISCONTINUED | OUTPATIENT
Start: 2023-01-15 | End: 2023-01-18 | Stop reason: HOSPADM

## 2023-01-15 RX ORDER — METFORMIN HYDROCHLORIDE 500 MG/1
500 TABLET ORAL 2 TIMES DAILY WITH MEALS
Status: DISCONTINUED | OUTPATIENT
Start: 2023-01-15 | End: 2023-01-18 | Stop reason: HOSPADM

## 2023-01-15 RX ORDER — BENZONATATE 100 MG/1
200 CAPSULE ORAL
Status: DISCONTINUED | OUTPATIENT
Start: 2023-01-15 | End: 2023-01-18 | Stop reason: HOSPADM

## 2023-01-15 RX ORDER — MELATONIN
1000 DAILY
Status: DISCONTINUED | OUTPATIENT
Start: 2023-01-15 | End: 2023-01-18 | Stop reason: HOSPADM

## 2023-01-15 RX ORDER — ONDANSETRON 2 MG/ML
4 INJECTION INTRAMUSCULAR; INTRAVENOUS
Status: DISCONTINUED | OUTPATIENT
Start: 2023-01-15 | End: 2023-01-18 | Stop reason: HOSPADM

## 2023-01-15 RX ORDER — PANTOPRAZOLE SODIUM 40 MG/1
40 TABLET, DELAYED RELEASE ORAL DAILY
Status: DISCONTINUED | OUTPATIENT
Start: 2023-01-15 | End: 2023-01-18 | Stop reason: HOSPADM

## 2023-01-15 RX ORDER — SODIUM CHLORIDE 0.9 % (FLUSH) 0.9 %
5-40 SYRINGE (ML) INJECTION AS NEEDED
Status: DISCONTINUED | OUTPATIENT
Start: 2023-01-15 | End: 2023-01-18 | Stop reason: HOSPADM

## 2023-01-15 RX ORDER — HYDROCHLOROTHIAZIDE 25 MG/1
12.5 TABLET ORAL DAILY
Status: DISCONTINUED | OUTPATIENT
Start: 2023-01-15 | End: 2023-01-18 | Stop reason: HOSPADM

## 2023-01-15 RX ORDER — SODIUM CHLORIDE 9 MG/ML
100 INJECTION, SOLUTION INTRAVENOUS CONTINUOUS
Status: DISCONTINUED | OUTPATIENT
Start: 2023-01-15 | End: 2023-01-17

## 2023-01-15 RX ORDER — DOXYCYCLINE HYCLATE 100 MG
100 TABLET ORAL EVERY 12 HOURS
Status: DISCONTINUED | OUTPATIENT
Start: 2023-01-15 | End: 2023-01-15

## 2023-01-15 RX ORDER — ACETAMINOPHEN 650 MG/1
650 SUPPOSITORY RECTAL
Status: DISCONTINUED | OUTPATIENT
Start: 2023-01-15 | End: 2023-01-16

## 2023-01-15 RX ORDER — ACETAMINOPHEN 325 MG/1
650 TABLET ORAL
Status: DISCONTINUED | OUTPATIENT
Start: 2023-01-15 | End: 2023-01-18 | Stop reason: HOSPADM

## 2023-01-15 RX ORDER — INSULIN LISPRO 100 [IU]/ML
INJECTION, SOLUTION INTRAVENOUS; SUBCUTANEOUS
Status: DISCONTINUED | OUTPATIENT
Start: 2023-01-15 | End: 2023-01-15

## 2023-01-15 RX ORDER — ONDANSETRON 4 MG/1
4 TABLET, ORALLY DISINTEGRATING ORAL
Status: DISCONTINUED | OUTPATIENT
Start: 2023-01-15 | End: 2023-01-18 | Stop reason: HOSPADM

## 2023-01-15 RX ORDER — ENOXAPARIN SODIUM 150 MG/ML
1 INJECTION SUBCUTANEOUS EVERY 24 HOURS
Status: DISCONTINUED | OUTPATIENT
Start: 2023-01-16 | End: 2023-01-15

## 2023-01-15 RX ORDER — SODIUM CHLORIDE 0.9 % (FLUSH) 0.9 %
5-40 SYRINGE (ML) INJECTION EVERY 8 HOURS
Status: DISCONTINUED | OUTPATIENT
Start: 2023-01-15 | End: 2023-01-18 | Stop reason: HOSPADM

## 2023-01-15 RX ORDER — ALBUTEROL SULFATE 90 UG/1
2 AEROSOL, METERED RESPIRATORY (INHALATION)
Status: DISCONTINUED | OUTPATIENT
Start: 2023-01-15 | End: 2023-01-16 | Stop reason: SDUPTHER

## 2023-01-15 RX ORDER — ENOXAPARIN SODIUM 100 MG/ML
40 INJECTION SUBCUTANEOUS DAILY
Status: DISCONTINUED | OUTPATIENT
Start: 2023-01-15 | End: 2023-01-15

## 2023-01-15 RX ORDER — MONTELUKAST SODIUM 10 MG/1
10 TABLET ORAL DAILY
Status: DISCONTINUED | OUTPATIENT
Start: 2023-01-15 | End: 2023-01-18 | Stop reason: HOSPADM

## 2023-01-15 RX ORDER — ENOXAPARIN SODIUM 100 MG/ML
40 INJECTION SUBCUTANEOUS EVERY 24 HOURS
Status: DISCONTINUED | OUTPATIENT
Start: 2023-01-16 | End: 2023-01-18 | Stop reason: HOSPADM

## 2023-01-15 RX ORDER — FOLIC ACID 1 MG/1
1 TABLET ORAL DAILY
Status: DISCONTINUED | OUTPATIENT
Start: 2023-01-15 | End: 2023-01-18 | Stop reason: HOSPADM

## 2023-01-15 RX ORDER — ENOXAPARIN SODIUM 100 MG/ML
100 INJECTION SUBCUTANEOUS
Status: COMPLETED | OUTPATIENT
Start: 2023-01-15 | End: 2023-01-15

## 2023-01-15 RX ORDER — OSELTAMIVIR PHOSPHATE 75 MG/1
75 CAPSULE ORAL 2 TIMES DAILY
Status: COMPLETED | OUTPATIENT
Start: 2023-01-15 | End: 2023-01-16

## 2023-01-15 RX ORDER — ENOXAPARIN SODIUM 150 MG/ML
1 INJECTION SUBCUTANEOUS
Status: DISCONTINUED | OUTPATIENT
Start: 2023-01-15 | End: 2023-01-15

## 2023-01-15 RX ORDER — FLUTICASONE PROPIONATE 50 MCG
2 SPRAY, SUSPENSION (ML) NASAL DAILY
Status: DISCONTINUED | OUTPATIENT
Start: 2023-01-15 | End: 2023-01-18 | Stop reason: HOSPADM

## 2023-01-15 RX ADMIN — OSELTAMIVIR PHOSPHATE 75 MG: 75 CAPSULE ORAL at 17:20

## 2023-01-15 RX ADMIN — BENZONATATE 200 MG: 100 CAPSULE ORAL at 10:53

## 2023-01-15 RX ADMIN — METHYLPREDNISOLONE SODIUM SUCCINATE 125 MG: 125 INJECTION, POWDER, FOR SOLUTION INTRAMUSCULAR; INTRAVENOUS at 14:49

## 2023-01-15 RX ADMIN — PIPERACILLIN AND TAZOBACTAM 3.38 G: 3; .375 INJECTION, POWDER, LYOPHILIZED, FOR SOLUTION INTRAVENOUS at 17:19

## 2023-01-15 RX ADMIN — CEFTRIAXONE 1 G: 1 INJECTION, POWDER, FOR SOLUTION INTRAMUSCULAR; INTRAVENOUS at 00:20

## 2023-01-15 RX ADMIN — VANCOMYCIN HYDROCHLORIDE 1000 MG: 1 INJECTION, POWDER, LYOPHILIZED, FOR SOLUTION INTRAVENOUS at 08:41

## 2023-01-15 RX ADMIN — IOPAMIDOL 96 ML: 755 INJECTION, SOLUTION INTRAVENOUS at 03:13

## 2023-01-15 RX ADMIN — Medication 1000 UNITS: at 08:42

## 2023-01-15 RX ADMIN — PANTOPRAZOLE SODIUM 40 MG: 40 TABLET, DELAYED RELEASE ORAL at 08:42

## 2023-01-15 RX ADMIN — SODIUM CHLORIDE, PRESERVATIVE FREE 10 ML: 5 INJECTION INTRAVENOUS at 14:49

## 2023-01-15 RX ADMIN — ALBUTEROL SULFATE 2 PUFF: 108 AEROSOL, METERED RESPIRATORY (INHALATION) at 12:30

## 2023-01-15 RX ADMIN — ENOXAPARIN SODIUM 100 MG: 100 INJECTION SUBCUTANEOUS at 01:14

## 2023-01-15 RX ADMIN — SODIUM CHLORIDE 100 ML/HR: 9 INJECTION, SOLUTION INTRAVENOUS at 21:00

## 2023-01-15 RX ADMIN — MONTELUKAST 10 MG: 10 TABLET, FILM COATED ORAL at 08:42

## 2023-01-15 RX ADMIN — PIPERACILLIN AND TAZOBACTAM 3.38 G: 3; .375 INJECTION, POWDER, LYOPHILIZED, FOR SOLUTION INTRAVENOUS at 09:54

## 2023-01-15 RX ADMIN — MULTIPLE VITAMINS W/ MINERALS TAB 1 TABLET: TAB at 08:41

## 2023-01-15 RX ADMIN — FOLIC ACID 1 MG: 1 TABLET ORAL at 08:42

## 2023-01-15 RX ADMIN — METHYLPREDNISOLONE SODIUM SUCCINATE 125 MG: 125 INJECTION, POWDER, FOR SOLUTION INTRAMUSCULAR; INTRAVENOUS at 23:15

## 2023-01-15 RX ADMIN — OSELTAMIVIR PHOSPHATE 75 MG: 75 CAPSULE ORAL at 08:42

## 2023-01-15 RX ADMIN — SODIUM CHLORIDE, PRESERVATIVE FREE 10 ML: 5 INJECTION INTRAVENOUS at 23:15

## 2023-01-15 RX ADMIN — HYDROCHLOROTHIAZIDE 12.5 MG: 25 TABLET ORAL at 08:42

## 2023-01-15 RX ADMIN — METHYLPREDNISOLONE SODIUM SUCCINATE 125 MG: 125 INJECTION, POWDER, FOR SOLUTION INTRAMUSCULAR; INTRAVENOUS at 08:46

## 2023-01-15 RX ADMIN — SODIUM CHLORIDE 150 ML/HR: 9 INJECTION, SOLUTION INTRAVENOUS at 01:09

## 2023-01-15 RX ADMIN — ALBUTEROL SULFATE 2 PUFF: 108 AEROSOL, METERED RESPIRATORY (INHALATION) at 18:34

## 2023-01-15 NOTE — PROGRESS NOTES
Lovenox Prophylactic dose  PVL in AM   D/c home in am ON PO Augmentin course  AC depend on PVL finding      Preet Ku MD

## 2023-01-15 NOTE — PROGRESS NOTES
Problem: Falls - Risk of  Goal: *Absence of Falls  Description: Document Trena Pacheco Fall Risk and appropriate interventions in the flowsheet.   Outcome: Progressing Towards Goal  Note: Fall Risk Interventions:                                Problem: Patient Education: Go to Patient Education Activity  Goal: Patient/Family Education  Outcome: Progressing Towards Goal     Problem: Patient Education:  Go to Education Activity  Goal: Patient/Family Education  Outcome: Progressing Towards Goal     Problem: Asthma: Day 1  Goal: Off Pathway (Use only if patient is Off Pathway)  Outcome: Progressing Towards Goal  Goal: Activity/Safety  Outcome: Progressing Towards Goal  Goal: Consults, if ordered  Outcome: Progressing Towards Goal  Goal: Diagnostic Test/Procedures  Outcome: Progressing Towards Goal  Goal: Nutrition/Diet  Outcome: Progressing Towards Goal  Goal: Discharge Planning  Outcome: Progressing Towards Goal  Goal: Medications  Outcome: Progressing Towards Goal  Goal: Respiratory  Outcome: Progressing Towards Goal  Goal: Treatments/Interventions/Procedures  Outcome: Progressing Towards Goal  Goal: Psychosocial  Outcome: Progressing Towards Goal  Goal: *Oxygen saturation returns to baseline  Outcome: Progressing Towards Goal  Goal: *Vital signs within defined limits  Outcome: Progressing Towards Goal  Goal: *Labs within defined limits  Outcome: Progressing Towards Goal     Problem: Asthma: Day 2  Goal: Off Pathway (Use only if patient is Off Pathway)  Outcome: Progressing Towards Goal  Goal: Activity/Safety  Outcome: Progressing Towards Goal  Goal: Consults, if ordered  Outcome: Progressing Towards Goal  Goal: Diagnostic Test/Procedures  Outcome: Progressing Towards Goal  Goal: Nutrition/Diet  Outcome: Progressing Towards Goal  Goal: Discharge Planning  Outcome: Progressing Towards Goal  Goal: Medications  Outcome: Progressing Towards Goal  Goal: Respiratory  Outcome: Progressing Towards Goal  Goal: Treatments/Interventions/Procedures  Outcome: Progressing Towards Goal  Goal: Psychosocial  Outcome: Progressing Towards Goal  Goal: *Oxygen saturation returns to baseline  Outcome: Progressing Towards Goal  Goal: *Vital signs within defined limits  Outcome: Progressing Towards Goal  Goal: *Labs within defined limits  Outcome: Progressing Towards Goal  Goal: *Lungs clear or at baseline  Outcome: Progressing Towards Goal  Goal: *Tolerating diet  Outcome: Progressing Towards Goal  Goal: *Demonstrates progressive activity  Outcome: Progressing Towards Goal     Problem: Asthma: Day 3  Goal: Off Pathway (Use only if patient is Off Pathway)  Outcome: Progressing Towards Goal  Goal: Activity/Safety  Outcome: Progressing Towards Goal  Goal: Diagnostic Test/Procedures  Outcome: Progressing Towards Goal  Goal: Nutrition/Diet  Outcome: Progressing Towards Goal  Goal: Discharge Planning  Outcome: Progressing Towards Goal  Goal: Medications  Outcome: Progressing Towards Goal  Goal: Respiratory  Outcome: Progressing Towards Goal  Goal: Treatments/Interventions/Procedures  Outcome: Progressing Towards Goal  Goal: Psychosocial  Outcome: Progressing Towards Goal     Problem: Asthma: Discharge Outcomes  Goal: *Hemodynamically stable  Outcome: Progressing Towards Goal  Goal: *Lungs clear or at baseline  Outcome: Progressing Towards Goal  Goal: *Adequate oxygenation  Outcome: Progressing Towards Goal  Goal: *Labs within defined limits  Outcome: Progressing Towards Goal  Goal: *Demonstrates progressive activity  Outcome: Progressing Towards Goal  Goal: *Participates in self care  Outcome: Progressing Towards Goal  Goal: *Verbalizes understanding and describes prescribed diet  Outcome: Progressing Towards Goal  Goal: *Tolerating diet  Outcome: Progressing Towards Goal  Goal: *Verbalizes name, dosage, time, side effects, and number of days to continue medications  Outcome: Progressing Towards Goal  Goal: *Anxiety reduced or absent  Outcome: Progressing Towards Goal  Goal: *Understands and describes signs and symptoms to report to providers(Stroke Metric)  Outcome: Progressing Towards Goal  Goal: *Describes follow-up/return visits to physicians  Outcome: Progressing Towards Goal  Goal: *Describes available resources and support systems  Outcome: Progressing Towards Goal  Goal: *Influenza immunization (Oct-Mar only)  Outcome: Progressing Towards Goal  Goal: *Pneumococcal immunization (if eligible)  Outcome: Progressing Towards Goal

## 2023-01-15 NOTE — PROGRESS NOTES
1749- transfer from ICU, received bedside shift report form Joe KNOX, alert and oriented, no acute distress at this time, ambulatory, droplet precaution in place, room set up, iv right arm in place intact, infusing at 100 ml/h NS, Zosyn running at 25 ml/h, iv flushed, medport right in place, limb alert left, tele on running [id number] [address], call bell in reach. 9418- Report given to ongoing nurse.

## 2023-01-15 NOTE — ED TRIAGE NOTES
Pt receiving chemo for breast CA, covid after gabriel, dx with flu on Wed at urgent care. Exp wheezing throughout, Dr Gareth Boyle to bedside. Pt speaking in complete sentences, c/o pain when coughing and some coughing episodes resulting in vomiting.

## 2023-01-15 NOTE — ASSESSMENT & PLAN NOTE
Patient CT scan showed right middle and left upper lobes with infectious inflammatory process  Lactate elevated to 4.1  Patient continues to be tachycardic  We will treat for sepsis due to pneumonia  Zosyn and vancomycin ordered  Continue IV fluid

## 2023-01-15 NOTE — ACP (ADVANCE CARE PLANNING)
Advance Care Planning     General Advance Care Planning (ACP) Conversation      Date of Conversation: 1/14/2023  Conducted with: Patient with Decision Making Capacity    Healthcare Decision Maker:   No healthcare decision makers have been documented.    Click here to complete [address] including selection of the Healthcare Decision Maker Relationship (ie \"Primary\")      Content/Action Overview:   DECLINED ACP conversation - will revisit periodically   Reviewed DNR/DNI and patient elects Full Code (Attempt Resuscitation)         Length of Voluntary ACP Conversation in minutes:  <16 minutes (Non-Billable)    Crispin Peers

## 2023-01-15 NOTE — ED NOTES
Pt diagnosed with Flu Wed. She has taken Tamiflu, Doxycycline, albuterol neb treatments, and rescue inhaler. Pt has had no relief. Mucus now green with cough. Has had episodes of emesis w/ coughing. Was Covid + in late December.

## 2023-01-15 NOTE — ROUTINE PROCESS
0700 Bedside shift change report given to LIANA Mathis RN (oncoming nurse) by NANCY Noonan RN (offgoing nurse). Report included the following information SBAR, Kardex, Intake/Output, MAR, Accordion, Recent Results, Med Rec Status, and Cardiac Rhythm NSR .     1120 Pt resting in bed with no signs of distress and no c/o pain at this time. CBWR, 2 side rails up and bed locked in lowest position. 1215 Visit Vitals  BP (!) 110/55 (BP 1 Location: Left upper arm, BP Patient Position: At rest;Sitting)   Pulse (!) 107   Temp 98.6 °F (37 °C)   Resp 23   Ht 5' 5\" (1.651 m)   Wt 106.6 kg (235 lb)   SpO2 100%   BMI 39.11 kg/m²     1230 Pt up to [facility] and back to bed. Pt has increased coughing and tightness to chest. RT called for albuterol inhaler. 1740 Report given to [address] TRANSFER - OUT REPORT:    Verbal report given to WHIT Sanders(name) on Denver Peck  being transferred to Med/Surg(unit) for routine progression of care       Report consisted of patients Situation, Background, Assessment and   Recommendations(SBAR). Information from the following report(s) SBAR, Kardex, Intake/Output, MAR, Accordion, Recent Results, Med Rec Status, and Cardiac Rhythm ST  was reviewed with the receiving nurse. Lines:   Peripheral IV 01/14/23 Right Antecubital (Active)   Site Assessment Clean, dry, & intact 01/15/23 0830   Phlebitis Assessment 0 01/15/23 0830   Infiltration Assessment 0 01/15/23 0830   Dressing Status Clean, dry, & intact 01/15/23 0830   Dressing Type Transparent 01/15/23 0830   Hub Color/Line Status Pink; Infusing;Patent 01/15/23 0830   Alcohol Cap Used Yes 01/15/23 0830        Opportunity for questions and clarification was provided.       Patient transported with:   Registered Nurse

## 2023-01-15 NOTE — ASSESSMENT & PLAN NOTE
Patient short of breath on admission to ER  Possible asthma exacerbation  Patient at risk for DVT and PE due to cancer and chemotherapy  VQ scan cancelled, pt states she can take IV dye from CT scan, she just gets anxious in an MRI tube  CT scan of the chest negative for any PE +bilateral infiltrate  Will start zosyn and vancomycin for suspected PNA  PVLs of legs Monday or dan do outpatient  Therapeutic dose of Lovenox daily

## 2023-01-15 NOTE — H&P
History and Physical    Subjective:     Nata Alfred is a 40 y.o. female  has a past medical history of Asthma, History of partial nephrectomy (10/24/2018), Hyperthyroidism, Malignant tumor of kidney ([facility] [address] (10/24/2018), Polycystic ovary syndrome, Renal mass, and Rheumatoid arthritis ([facility] [address] Patient is at bedside in emergency department. Patient came to emergency room tonight with shortness of breath. Patient had COVID after [place] and was diagnosed with the flu on Wednesday of this week that is now almost 4 days ago. Patient was on Tamiflu and doxycycline. Patient improved after continuous albuterol treatments steroids in the emergency room. Patient's chest x-ray is clear for any infiltrate or fluid overload. UA is negative for infection. Lactate is slightly elevated. No leukocytosis, no neutropenia, no fever, no indication for continuation of antibiotics. Patient is a chemotherapy patient for breast cancer her next chemotherapy is on Thursday. I will admit patient for IV hydration, treatment of asthma exacerbation, CT ordered. Patient states she only gets a little anxious mainly from the tube from MRI and got nauseous from MRI dye. Patient states she has had multiple CTs with diet without any problems. I will order a CTA of the chest to be done on her way to the floor. PVLs will be ordered. .      Past Medical History:   Diagnosis Date    Asthma     History of partial nephrectomy 10/24/2018    Hyperthyroidism     Malignant tumor of kidney ([facility] [address] 10/24/2018    Polycystic ovary syndrome     Renal mass     Rheumatoid arthritis (HCC)       Past Surgical History:   Procedure Laterality Date    HX BREAST LUMPECTOMY N/A     breast cancer surgery unknown side    HX HERNIA REPAIR      HX NEPHRECTOMY      HX OTHER SURGICAL Right     right lung collapsed as a child     Family History   Problem Relation Age of Onset    Hypertension Mother     Cancer Other       Social History     Tobacco Use Smoking status: Never    Smokeless tobacco: Never   Substance Use Topics    Alcohol use: No       Prior to Admission medications    Medication Sig Start Date End Date Taking? Authorizing Provider   benzonatate (TESSALON) 100 mg capsule May take 1 cap every 8 hours for breath through cough 12/28/22   Heath End, FNP   naproxen (NAPROSYN) 500 mg tablet Take 1 Tablet by mouth every twelve (12) hours as needed for Pain. 12/26/22   Priyank Gong MD   omeprazole (PRILOSEC) 20 mg capsule Take 1 Capsule by mouth daily. 11/21/22   Hussein Clark MD   ProAir HFA 90 mcg/actuation inhaler  8/28/22   Provider, Historical   dexAMETHasone (DECADRON) 4 mg tablet Takes for 3 days following chemo 11/10/22   Provider, Historical   folic acid (FOLVITE) 1 mg tablet Take 1 mg by mouth daily. 10/1/22   Provider, Historical   ondansetron hcl (ZOFRAN) 8 mg tablet Starting the second day after chemo cycle finishes, take 8 mg every 8 hours as needed for nausea/vomiting 11/10/22   Provider, Historical   cholecalciferol (VITAMIN D3) 25 mcg (1,000 unit) cap Take  by mouth daily. Provider, Historical   hydroCHLOROthiazide (HYDRODIURIL) 12.5 mg tablet Take 1 Tablet by mouth daily. 11/14/22   Rayne Maravilla MD   montelukast (SINGULAIR) 10 mg tablet TAKE 1 TABLET BY MOUTH DAILY 11/14/22   Rayne Maravilla MD   metFORMIN (GLUCOPHAGE) 500 mg tablet Take  by mouth. Other, MD Derek   fluticasone propionate (FLONASE) 50 mcg/actuation nasal spray 2 Sprays by Both Nostrils route daily. 6/16/22   Елена Garrett MD   multivitamin capsule Take 1 Capsule by mouth daily.  1/6/22   Leslie Welsh MD   Linzess 290 mcg cap capsule TAKE 1 CAPSULE BY MOUTH EVERY DAY 6/28/21   Yajaira Garcia MD     Allergies   Allergen Reactions    Gadobenate Dimeglumine Nausea and Vomiting     Nausea, Anxiety - received Multihance Gabobenate Dimeglutamine 20mL 2/13/18 for MRI  Other reaction(s): gi distress  Nausea, Anxiety - received Multihance Gabobenate Dimeglutamine 20mL 2/13/18 for MRI      Iodinated Contrast Media Other (comments)     NAUSEA, ANXIETY-- RECEIVED MULTIHANCE (GADOBENATE DIMEGLUTAMINE) 20ML 2/13/18 FOR MRI           Review of Systems   Constitutional:  Positive for malaise/fatigue. Negative for fever. Respiratory:  Positive for shortness of breath. Cardiovascular:  Negative for chest pain and leg swelling. Gastrointestinal:  Negative for nausea and vomiting. Neurological:  Negative for dizziness. All other systems reviewed and are negative. Objective:   VITALS:    Visit Vitals  /71   Pulse (!) 106   Temp 98.3 °F (36.8 °C)   Resp 20   Ht 5' 5\" (1.651 m)   Wt 106.6 kg (235 lb)   SpO2 100%   BMI 39.11 kg/m²       Physical Exam  Vitals and nursing note reviewed. Constitutional:       General: She is not in acute distress. Appearance: She is well-developed. She is ill-appearing. HENT:      Head: Normocephalic and atraumatic. Eyes:      Conjunctiva/sclera: Conjunctivae normal.      Pupils: Pupils are equal, round, and reactive to light. Cardiovascular:      Rate and Rhythm: Normal rate and regular rhythm. Pulses: Normal pulses. Heart sounds: Normal heart sounds. Pulmonary:      Effort: Pulmonary effort is normal. No respiratory distress. Breath sounds: No stridor. Wheezing present. No rhonchi. Abdominal:      General: Bowel sounds are normal. There is no distension. Palpations: Abdomen is soft. Tenderness: There is no abdominal tenderness. Musculoskeletal:         General: Normal range of motion. Cervical back: Normal range of motion and neck supple. Right lower leg: No edema. Left lower leg: No edema. Skin:     General: Skin is warm and dry. Capillary Refill: Capillary refill takes less than 2 seconds. Coloration: Skin is not jaundiced or pale. Neurological:      General: No focal deficit present.       Mental Status: She is alert and oriented to person, place, and time. Psychiatric:         Mood and Affect: Mood normal.         Behavior: Behavior normal.         Thought Content:  Thought content normal.         Judgment: Judgment normal.       _______________________________________________________________________  Care Plan discussed with:    Comments   Patient X    Family      RN X    Care Manager                    Consultant:      _______________________________________________________________________  Expected  Disposition:   Home with Family X   HH/PT/OT/RN    SNF/LTC    OVIDIO    ________________________________________________________________________  TOTAL TIME:  48 Minutes    Critical Care Provided     Minutes non procedure based      Comments    X Reviewed previous records   >50% of visit spent in counseling and coordination of care X Discussion with patient and/or family and questions answered       ________________________________________________________________________    Labs:  Recent Results (from the past 24 hour(s))   BLOOD GAS, ARTERIAL    Collection Time: 01/14/23  7:59 PM   Result Value Ref Range    pH 7.43 7.35 - 7.45      PCO2 37 35 - 45 mmHg    PO2 74 (L) 80 - 100 mmHg    O2 SATURATION 94 (L) 95 - 99 %    BICARBONATE 24 22 - 26 mmol/L    BASE DEFICIT 0.2 mmol/L    O2 METHOD Room air      FIO2 21.0 %    Sample source Arterial      SITE Right Brachial      GERALDNIE'S TEST NOT APPLICABLE      Carboxy-Hgb 0.4 (L) 1 - 2 %    Methemoglobin 0.2 0 - 1.4 %    Oxyhemoglobin 93.8 (L) 95 - 99 %    Performed by Jamarcus Nash    LACTIC ACID    Collection Time: 01/14/23  8:05 PM   Result Value Ref Range    Lactic acid 2.5 (HH) 0.4 - 2.0 mmol/L   METABOLIC PANEL, COMPREHENSIVE    Collection Time: 01/14/23  8:05 PM   Result Value Ref Range    Sodium 140 136 - 145 mmol/L    Potassium 3.7 3.5 - 5.5 mmol/L    Chloride 105 100 - 111 mmol/L    CO2 25 21 - 32 mmol/L    Anion gap 10 3.0 - 18.0 mmol/L    Glucose 148 (H) 74 - 99 mg/dL    BUN 12 7 - 18 mg/dL Creatinine 0.76 0.60 - 1.30 mg/dL    BUN/Creatinine ratio 16 12 - 20      eGFR >60 >60 ml/min/1.73m2    Calcium 8.4 (L) 8.5 - 10.1 mg/dL    Bilirubin, total 0.4 0.2 - 1.0 mg/dL    AST (SGOT) 8 (L) 10 - 38 U/L    ALT (SGPT) 17 13 - 56 U/L    Alk. phosphatase 75 45 - 117 U/L    Protein, total 6.4 6.4 - 8.2 g/dL    Albumin 3.2 (L) 3.4 - 5.0 g/dL    Globulin 3.2 2.0 - 4.0 g/dL    A-G Ratio 1.0 0.8 - 1.7     CBC WITH AUTOMATED DIFF    Collection Time: 01/14/23  8:05 PM   Result Value Ref Range    WBC 6.7 4.6 - 13.2 K/uL    RBC 3.69 (L) 4.20 - 5.30 M/uL    HGB 9.0 (L) 12.0 - 16.0 g/dL    HCT 27.4 (L) 35.0 - 45.0 %    MCV 74.3 (L) 78.0 - 100.0 FL    MCH 24.4 24.0 - 34.0 PG    MCHC 32.8 31.0 - 37.0 g/dL    RDW 22.1 (H) 11.6 - 14.5 %    PLATELET 467 776 - 186 K/uL    MPV 10.1 9.2 - 11.8 FL    NRBC 0.1 (H) 0.0  WBC    ABSOLUTE NRBC 1.40 (H) 0.00 - 0.01 K/uL    NEUTROPHILS 64 40 - 73 %    LYMPHOCYTES 13 (L) 21 - 52 %    MONOCYTES 13 (H) 3 - 10 %    EOSINOPHILS 3 0 - 5 %    BASOPHILS 1 0 - 2 %    IMMATURE GRANULOCYTES 5 (H) 0 - 0.5 %    ABS. NEUTROPHILS 4.3 1.8 - 8.0 K/UL    ABS. LYMPHOCYTES 0.9 0.9 - 3.6 K/UL    ABS. MONOCYTES 0.9 0.05 - 1.2 K/UL    ABS. EOSINOPHILS 0.2 0.0 - 0.4 K/UL    ABS. BASOPHILS 0.0 0.0 - 0.1 K/UL    ABS. IMM.  GRANS. 0.4 (H) 0.00 - 0.04 K/UL    DF AUTOMATED     NT-PRO BNP    Collection Time: 01/14/23  8:05 PM   Result Value Ref Range    NT pro-BNP 20 0 - 450 pg/mL   D DIMER    Collection Time: 01/14/23  8:05 PM   Result Value Ref Range    D DIMER 1.22 (H) <0.46 ug/ml(FEU)   EKG, 12 LEAD, INITIAL    Collection Time: 01/14/23  8:41 PM   Result Value Ref Range    Ventricular Rate 115 BPM    Atrial Rate 115 BPM    P-R Interval 121 ms    QRS Duration 81 ms    Q-T Interval 340 ms    QTC Calculation (Bezet) 471 ms    Calculated P Axis 60 degrees    Calculated R Axis 51 degrees    Calculated T Axis 17 degrees    Diagnosis       Sinus tachycardia  Borderline T wave abnormalities     LACTIC ACID Collection Time: 01/14/23  9:50 PM   Result Value Ref Range    Lactic acid 2.6 (HH) 0.4 - 2.0 mmol/L   PROCALCITONIN    Collection Time: 01/14/23  9:50 PM   Result Value Ref Range    Procalcitonin 0.12 ng/mL   LACTIC ACID    Collection Time: 01/14/23 10:40 PM   Result Value Ref Range    Lactic acid 2.2 (HH) 0.4 - 2.0 mmol/L   URINALYSIS W/ RFLX MICROSCOPIC    Collection Time: 01/14/23 11:40 PM   Result Value Ref Range    Color Yellow      Appearance Clear      Specific gravity 1.009 1.005 - 1.030      pH (UA) 6.0 5.0 - 8.0      Protein Negative Negative mg/dL    Glucose Negative Negative mg/dL    Ketone Negative Negative mg/dL    Bilirubin Negative Negative      Blood Negative Negative      Urobilinogen 0.2 0.2 - 1.0 EU/dL    Nitrites Negative Negative      Leukocyte Esterase Trace (A) Negative     URINE MICROSCOPIC    Collection Time: 01/14/23 11:40 PM   Result Value Ref Range    WBC 0-4 0 - 4 /hpf    RBC 0-5 0 - 2 /hpf    Epithelial cells Many 0 - 20 /lpf    Bacteria 2+ (A) None /hpf   LACTIC ACID    Collection Time: 01/15/23  4:30 AM   Result Value Ref Range    Lactic acid 4.1 (HH) 0.4 - 2.0 mmol/L       Imaging:  CTA CHEST W OR W WO CONT    Addendum Date: 1/15/2023    Addendum: This report accession number was erroneously linked with a head CT exam for a different patient. Please disregard the original report and see below: EXAM: CTA CHEST W OR W WO CONT CLINICAL INDICATION/HISTORY:  sob, elevated d dimer -Additional: None COMPARISON: None TECHNIQUE: Helical CT imaging from the thoracic inlet through the diaphragm with intravenous contrast. Coronal and sagittal MIP reformats were generated. One or more dose reduction techniques were used on this CT: automated exposure control, adjustment of the mAs and/or kVp according to patient size, and iterative reconstruction techniques. The specific techniques used on this CT exam have been documented in the patient's electronic medical record.  Digital Imaging and Communications in Medicine (DICOM) format image data are available to nonaffiliated external healthcare facilities or entities on a secure, media free, reciprocally searchable basis with patient authorization for at least a 12-month period after this study. _______________ FINDINGS: EXAM QUALITY: Adequate. PULMONARY ARTERIES: No pulmonary artery filling defects. Normal diameter of the main PA. MEDIASTINUM: Normal heart size. Aorta is unremarkable. No pericardial effusion. Port catheter tip in the mid SVC. LUNGS: Small regions of consolidation in the lateral aspect of right middle lobe and in the lingula. There is some clustered nodularity seen in the posterior segment of left upper lobe. Mild bibasilar subsegmental atelectasis. PLEURA: Normal. AIRWAY: Normal. LYMPH NODES:  No enlarged nodes. UPPER ABDOMEN: Evidence of hepatic steatosis. Moderate splenomegaly. OSSEOUS: No acute or aggressive osseous abnormalities identified. OTHER: None. _______________ IMPRESSION: 1. No evidence of pulmonary embolism. 2. Patchy pulmonary opacities in the right middle and left upper lobes appearing infectious/inflammatory. Result Date: 1/15/2023  EXAM: CT HEAD WITHOUT CONTRAST CLINICAL INDICATION/HISTORY: Cardiac arrest -Additional: None COMPARISON: 11/5/2021 TECHNIQUE: Serial axial images of the head were performed without intravenous contrast. Dose reduction techniques:  Automated exposure control, mAs and/or kVp reductions based on patient size, and iterative reconstruction. The specific techniques utilized on this CT exam have been documented in the patient's electronic medical record. Digital Imaging and Communications in Medicine (DICOM) format image data are available to nonaffiliated external healthcare facilities or entities on a secure, media free, reciprocally searchable basis with patient authorization for at least a 12-month period after this study.  _______________ FINDINGS: BRAIN:   > Intraparenchymal hemorrhage: None.   > Mass effect/edema: None.   > Infarct/encephalomalacia: No evidence of acute cortical ischemia.   > White matter: Unremarkable.   > Brain volume: Normal for age. EXTRA-AXIAL SPACES:   > No extra-axial hemorrhage.   > No hydrocephalus. SINUSES/MASTOIDS: Mild opacification of ethmoid and maxillary sinuses. CALVARIA: Intact. OTHER: None. _______________     No acute intracranial findings. XR CHEST PORT    Result Date: 1/14/2023  EXAM: XR CHEST PORT. CLINICAL INDICATION/HISTORY: Sepsis. -Additional: None. TECHNIQUE: A portable erect AP radiographic view of the chest. COMPARISON: Radiograph of 12/26/2022. _______________ FINDINGS: Lungs and pleural spaces: > The lungs are essentially clear. > No pneumothorax or appreciable significant pleural effusion. Cardiomediastinal silhouette: > Normal for age. Bones: > No acute findings. Other: > Right anterior chest wall Port-A-Cath tip in the central aspect of the superior vena cava. _______________     No acute process.  _______________       Assessment & Plan:     Sepsis due to pneumonia [facility])  Patient CT scan showed right middle and left upper lobes with infectious inflammatory process  Lactate elevated to 4.1  Patient continues to be tachycardic  We will treat for sepsis due to pneumonia  Zosyn and vancomycin ordered  Continue IV fluid    Elevated D-Dimer  Patient short of breath on admission to ER  Possible asthma exacerbation  Patient at risk for DVT and PE due to cancer and chemotherapy  VQ scan cancelled, pt states she can take IV dye from CT scan, she just gets anxious in an MRI tube  CT scan of the chest negative for any PE +bilateral infiltrate  Will start zosyn and vancomycin for suspected PNA  PVLs of legs Monday  Lovenox daily 40 mg    Asthma exacerbation  Patient has a history of asthma  DuoNeb as needed  Solu-Medrol every 8  Telemetry      SOB (shortness of breath)  Suspected cause of shortness of breath asthma exacerbation and PNA  Patient chemotherapy patient for breast CA  Next chemotherapy on Thursday  No fever no leukocytosis no neutropenia chest x-ray clear UA negative antibiotics not indicated    History of breast cancer  Patient undergoing chemotherapy for breast cancer  Next chemotherapy on Thursday  Patient high risk for PE and DVT due to chemotherapy  Lovenox 40 mg daily  CTA of chest neg PE, PVL's ordered    Type II diabetes mellitus ([facility] [address]  This is a chronic problem  Sliding scale AC at bedtime  Hold oral hypoglycemics at this time  Diabetic diet        Code Status: Full      Prophylaxis: Therapeutic dose of Lovenox      Electronically Signed : Derian Donis ENP-C, FNP-C, [address] voice recognition was used to generate this report, which may have resulted in some phonetic based errors in grammar and contents.  Even though attempts were made to correct all the mistakes, some may have been missed, and remained in the body of the document

## 2023-01-15 NOTE — PROGRESS NOTES
Uzair Loya 50 care of pt from ED nurse Belle Howardstefan, [facility]. Pt A&Ox4 with no c/o at this time. VS taken and initial assessment performed. 0445 Pt up to bedside commode producing 1 large loose stool. 0630 Critical lactic acid of 4.1. Provider NANCY Rowley, NP notified. Bedside and Verbal shift change report given to LIANA Welsh RN (oncoming nurse) by Smooth Huynh RN (offgoing nurse). Report included the following information SBAR, Kardex, Intake/Output, MAR, and Recent Results.

## 2023-01-15 NOTE — PROGRESS NOTES
Care Management Interventions  PCP Verified by CM: Yes  Last Visit to PCP: 11/21/22  Mode of Transport at Discharge: Self  Transition of Care Consult (CM Consult): Discharge Planning  Support Systems: Parent(s)  Confirm Follow Up Transport: Self  The Plan for Transition of Care is Related to the Following Treatment Goals : Pt centered discharge to ensure smooth transition to home, community, and PLOF. Name of the Patient Representative Who was Provided with a Choice of Provider and Agrees with the Discharge Plan: Carter Burgos PT  [place] of Choice List was Provided with Basic Dialogue that Supports the Patient's Individualized Plan of Care/Goals, Treatment Preferences and Shares the Quality Data Associated with the Providers?: Yes  Discharge Location  Patient Expects to be Discharged to[de-identified] Home   Reason for Admission:   Chart reviewed and pt interviewed. Adm noted for CC Flu diagnosed on 01/11/2023, getting worse inspite of out-pt meds. Endorses green mucous with cough as well as emesis. Covid + in Dec 2022. Hospitalist was consulted to admit. Noted that pt is on chemotherapy for breast cancer with next treatment due on Thursday, Jan.19th, 2023. PMH: Asthma, Malignant tumor of kidney, Polycystic ovary syndrome, Renal mass, Rheumatoid Arthritis, Hyperthyroidism. PSH: Partial Nephrectomy,Breast Lumpectomy, Hernia repair, Nephrectomy, Right lung collapsed as a child, right. DX: Sepsis due to Pneumonia, Elevated D-Dimer,SOB,  History of Breast cancer on chemotherapy, type II diabetes mellitus. RUR Score:     18%             PCP: First and Last name:   Missy Burroughs MD     Name of Practice:  Internal Medicine   Are you a current patient: Yes/No: Y   Approximate date of last visit: 11/21/2022   Can you participate in a virtual visit if needed:     Do you (patient/family) have any concerns for transition/discharge?      NO              Plan for utilizing home health:   TBD    Current Advanced Directive/Advance Care Plan:  Full Code      Healthcare Decision Maker:   Click here to complete HealthCare Decision Makers including selection of the Healthcare Decision Maker Relationship (ie \"Primary\")  Anabela Flores, Mother, [phone]            Transition of Care Plan:  Discharge planning will be based on pt interview to develop plan of care. HH will be offered at discharge. Appoint for PCP will be made within 7 days of discharge per nursing at discharge. No transitional needs identified on adm.

## 2023-01-15 NOTE — ED NOTES
TRANSFER - OUT REPORT:    Verbal report given to Bernadette Robins on Julio Valente  being transferred to ICU for routine progression of care       Report consisted of patients Situation, Background, Assessment and   Recommendations(SBAR). Information from the following report(s) SBAR was reviewed with the receiving nurse. Lines:   Peripheral IV 01/14/23 Right Antecubital (Active)   Site Assessment Clean, dry, & intact 01/14/23 2032   Phlebitis Assessment 0 01/14/23 2032   Infiltration Assessment 0 01/14/23 2032   Dressing Status Clean, dry, & intact 01/14/23 2032   Dressing Type Transparent 01/14/23 2032   Hub Color/Line Status Pink;Capped;Flushed 01/14/23 2032        Opportunity for questions and clarification was provided.       Patient transported with:   Monitor, gtt, by EDT

## 2023-01-15 NOTE — ED PROVIDER NOTES
[facility] Emergency [facility] 2 ICU  [facility]       Pt Name: Rick Matos  MRN: [id number]  [place] 1980  Date of evaluation: 1/14/2023  Provider: Genesis Hallman MD   PCP: Nuria Martinez MD  Note Started: 7:47 PM 1/14/23     CHIEF COMPLAINT       Chief Complaint   Patient presents with    Shortness of Breath    Cough        HISTORY OF PRESENT ILLNESS: 1 or more elements      History From: Patient  HPI Limitations : None     Rick Matos is a 40 y.o. female who presents to ED due to cough and shortness of breath and wheezing. She has history of asthma, has  history of breast cancer and is undergoing chemotherapy, has also had left partial nephrectomy due to malignant renal cancer, also has history of hyperthyroidism, she reports she had COVID soon after [place], she began having a dry cough and shortness of breath and wheezing 5 days ago, next day she was seen at an urgent care and diagnosed with flu, was put on Tamiflu in addition to albuterol vials, she has a nebulizer machine at home, was also put on doxycycline for ear infection. She reports using all her medications and not getting better. She has both subcostal margins due to cough is now productive of greenish sputum. She denies fever but admits to chills. She has no abdomen pain, no nausea vomiting or diarrhea. Nursing Notes were all reviewed and agreed with or any disagreements were addressed in the HPI. REVIEW OF SYSTEMS      Review of Systems   Hematological:  Adenopathy: hospitalistDeangelo NP. Positives and Pertinent negatives as per HPI.     PAST HISTORY     Past Medical History:  Past Medical History:   Diagnosis Date    Asthma     History of partial nephrectomy 10/24/2018    Hyperthyroidism     Malignant tumor of kidney ([facility] [address] 10/24/2018    Polycystic ovary syndrome     Renal mass     Rheumatoid arthritis ([facility] [address]        Past Surgical History:  Past Surgical History:   Procedure Laterality Date    HX BREAST LUMPECTOMY N/A     breast cancer surgery unknown side    HX HERNIA REPAIR      HX NEPHRECTOMY      HX OTHER SURGICAL Right     right lung collapsed as a child       Family History:  Family History   Problem Relation Age of Onset    Hypertension Mother     Cancer Other        Social History:  Social History     Tobacco Use    Smoking status: Never    Smokeless tobacco: Never   Vaping Use    Vaping Use: Never used   Substance Use Topics    Alcohol use: No    Drug use: No       Allergies: Allergies   Allergen Reactions    Gadobenate Dimeglumine Nausea and Vomiting     Nausea, Anxiety - received Multihance Gabobenate Dimeglutamine 20mL 2/13/18 for MRI  Other reaction(s): gi distress  Nausea, Anxiety - received Multihance Gabobenate Dimeglutamine 20mL 2/13/18 for MRI      Iodinated Contrast Media Other (comments)     NAUSEA, ANXIETY-- RECEIVED MULTIHANCE (GADOBENATE DIMEGLUTAMINE) 20ML 2/13/18 FOR MRI         CURRENT MEDICATIONS      Current Discharge Medication List        CONTINUE these medications which have NOT CHANGED    Details   benzonatate (TESSALON) 100 mg capsule May take 1 cap every 8 hours for breath through cough  Qty: 15 Capsule, Refills: 0    Associated Diagnoses: COVID-19; Acute cough      naproxen (NAPROSYN) 500 mg tablet Take 1 Tablet by mouth every twelve (12) hours as needed for Pain. Qty: 20 Tablet, Refills: 0      omeprazole (PRILOSEC) 20 mg capsule Take 1 Capsule by mouth daily. Qty: 20 Capsule, Refills: 0      ProAir HFA 90 mcg/actuation inhaler       dexAMETHasone (DECADRON) 4 mg tablet Takes for 3 days following chemo      folic acid (FOLVITE) 1 mg tablet Take 1 mg by mouth daily. ondansetron hcl (ZOFRAN) 8 mg tablet Starting the second day after chemo cycle finishes, take 8 mg every 8 hours as needed for nausea/vomiting      cholecalciferol (VITAMIN D3) 25 mcg (1,000 unit) cap Take  by mouth daily.       hydroCHLOROthiazide (HYDRODIURIL) 12.5 mg tablet Take 1 Tablet by mouth daily. Qty: 90 Tablet, Refills: 1    Associated Diagnoses: Peripheral edema      montelukast (SINGULAIR) 10 mg tablet TAKE 1 TABLET BY MOUTH DAILY  Qty: 90 Tablet, Refills: 0    Comments: **Patient requests 90 days supply**  Associated Diagnoses: Mild intermittent asthma without complication      metFORMIN (GLUCOPHAGE) 500 mg tablet Take  by mouth. fluticasone propionate (FLONASE) 50 mcg/actuation nasal spray 2 Sprays by Both Nostrils route daily. Qty: 1 Each, Refills: 11      multivitamin capsule Take 1 Capsule by mouth daily. Qty: 30 Capsule, Refills: 0      Linzess 290 mcg cap capsule TAKE 1 CAPSULE BY MOUTH EVERY DAY  Qty: 90 Capsule, Refills: 3             SCREENINGS               No data recorded         PHYSICAL EXAM      Vitals:    01/15/23 0022 01/15/23 0134 01/15/23 0343 01/15/23 0353   BP: 107/62 (!) 96/43 (!) 107/55 113/71   Pulse: (!) 110 70 (!) 105 (!) 106   Resp: 18 18 19 20   Temp:    98.3 °F (36.8 °C)   SpO2: 98% 98% 100% 100%   Weight:       Height:         Physical Exam    Nursing notes and vital signs reviewed  Yes  Constitutional: Non toxic appearing, moderate respiratory distress with audible wheezes, is not tachypneic, pulse ox however 100% on room air. Head: Normocephalic, Atraumatic  Eyes: EOMI  Neck: Supple  Cardiovascular: Regular rate and rhythm, tachycardic, no murmurs, rubs, or gallops  Chest: No retractions but tachypneic. Lungs: Diffuse expiratory wheezes bilaterally.   Abdomen: Soft, obese, non tender, non distended, normoactive bowel sounds  Back: No evidence of trauma or deformity  Extremities: No evidence of trauma or deformity, no LE edema, negative Homans  Skin: Warm and dry, normal cap refill  Neuro: Alert and appropriate, CN intact, normal speech, strength and sensation full and symmetric bilaterally,Psychiatric: Normal mood and affect     DIAGNOSTIC RESULTS   LABS:     Recent Results (from the past 12 hour(s))   BLOOD GAS, ARTERIAL    Collection Time: 01/14/23 7:59 PM   Result Value Ref Range    pH 7.43 7.35 - 7.45      PCO2 37 35 - 45 mmHg    PO2 74 (L) 80 - 100 mmHg    O2 SATURATION 94 (L) 95 - 99 %    BICARBONATE 24 22 - 26 mmol/L    BASE DEFICIT 0.2 mmol/L    O2 METHOD Room air      FIO2 21.0 %    Sample source Arterial      SITE Right Brachial      GERALDINE'S TEST NOT APPLICABLE      Carboxy-Hgb 0.4 (L) 1 - 2 %    Methemoglobin 0.2 0 - 1.4 %    Oxyhemoglobin 93.8 (L) 95 - 99 %    Performed by Edvin Montiel    LACTIC ACID    Collection Time: 01/14/23  8:05 PM   Result Value Ref Range    Lactic acid 2.5 (HH) 0.4 - 2.0 mmol/L   METABOLIC PANEL, COMPREHENSIVE    Collection Time: 01/14/23  8:05 PM   Result Value Ref Range    Sodium 140 136 - 145 mmol/L    Potassium 3.7 3.5 - 5.5 mmol/L    Chloride 105 100 - 111 mmol/L    CO2 25 21 - 32 mmol/L    Anion gap 10 3.0 - 18.0 mmol/L    Glucose 148 (H) 74 - 99 mg/dL    BUN 12 7 - 18 mg/dL    Creatinine 0.76 0.60 - 1.30 mg/dL    BUN/Creatinine ratio 16 12 - 20      eGFR >60 >60 ml/min/1.73m2    Calcium 8.4 (L) 8.5 - 10.1 mg/dL    Bilirubin, total 0.4 0.2 - 1.0 mg/dL    AST (SGOT) 8 (L) 10 - 38 U/L    ALT (SGPT) 17 13 - 56 U/L    Alk. phosphatase 75 45 - 117 U/L    Protein, total 6.4 6.4 - 8.2 g/dL    Albumin 3.2 (L) 3.4 - 5.0 g/dL    Globulin 3.2 2.0 - 4.0 g/dL    A-G Ratio 1.0 0.8 - 1.7     CBC WITH AUTOMATED DIFF    Collection Time: 01/14/23  8:05 PM   Result Value Ref Range    WBC 6.7 4.6 - 13.2 K/uL    RBC 3.69 (L) 4.20 - 5.30 M/uL    HGB 9.0 (L) 12.0 - 16.0 g/dL    HCT 27.4 (L) 35.0 - 45.0 %    MCV 74.3 (L) 78.0 - 100.0 FL    MCH 24.4 24.0 - 34.0 PG    MCHC 32.8 31.0 - 37.0 g/dL    RDW 22.1 (H) 11.6 - 14.5 %    PLATELET 562 109 - 046 K/uL    MPV 10.1 9.2 - 11.8 FL    NRBC 0.1 (H) 0.0  WBC    ABSOLUTE NRBC 1.40 (H) 0.00 - 0.01 K/uL    NEUTROPHILS 64 40 - 73 %    LYMPHOCYTES 13 (L) 21 - 52 %    MONOCYTES 13 (H) 3 - 10 %    EOSINOPHILS 3 0 - 5 %    BASOPHILS 1 0 - 2 %    IMMATURE GRANULOCYTES 5 (H) 0 - 0.5 %    ABS. NEUTROPHILS 4.3 1.8 - 8.0 K/UL    ABS. LYMPHOCYTES 0.9 0.9 - 3.6 K/UL    ABS. MONOCYTES 0.9 0.05 - 1.2 K/UL    ABS. EOSINOPHILS 0.2 0.0 - 0.4 K/UL    ABS. BASOPHILS 0.0 0.0 - 0.1 K/UL    ABS. IMM.  GRANS. 0.4 (H) 0.00 - 0.04 K/UL    DF AUTOMATED     NT-PRO BNP    Collection Time: 01/14/23  8:05 PM   Result Value Ref Range    NT pro-BNP 20 0 - 450 pg/mL   D DIMER    Collection Time: 01/14/23  8:05 PM   Result Value Ref Range    D DIMER 1.22 (H) <0.46 ug/ml(FEU)   EKG, 12 LEAD, INITIAL    Collection Time: 01/14/23  8:41 PM   Result Value Ref Range    Ventricular Rate 115 BPM    Atrial Rate 115 BPM    P-R Interval 121 ms    QRS Duration 81 ms    Q-T Interval 340 ms    QTC Calculation (Bezet) 471 ms    Calculated P Axis 60 degrees    Calculated R Axis 51 degrees    Calculated T Axis 17 degrees    Diagnosis       Sinus tachycardia  Borderline T wave abnormalities     LACTIC ACID    Collection Time: 01/14/23  9:50 PM   Result Value Ref Range    Lactic acid 2.6 (HH) 0.4 - 2.0 mmol/L   PROCALCITONIN    Collection Time: 01/14/23  9:50 PM   Result Value Ref Range    Procalcitonin 0.12 ng/mL   LACTIC ACID    Collection Time: 01/14/23 10:40 PM   Result Value Ref Range    Lactic acid 2.2 (HH) 0.4 - 2.0 mmol/L   URINALYSIS W/ RFLX MICROSCOPIC    Collection Time: 01/14/23 11:40 PM   Result Value Ref Range    Color Yellow      Appearance Clear      Specific gravity 1.009 1.005 - 1.030      pH (UA) 6.0 5.0 - 8.0      Protein Negative Negative mg/dL    Glucose Negative Negative mg/dL    Ketone Negative Negative mg/dL    Bilirubin Negative Negative      Blood Negative Negative      Urobilinogen 0.2 0.2 - 1.0 EU/dL    Nitrites Negative Negative      Leukocyte Esterase Trace (A) Negative     URINE MICROSCOPIC    Collection Time: 01/14/23 11:40 PM   Result Value Ref Range    WBC 0-4 0 - 4 /hpf    RBC 0-5 0 - 2 /hpf    Epithelial cells Many 0 - 20 /lpf    Bacteria 2+ (A) None /hpf        EKG:   EKG interpretation: (Preliminary)  EKG read by Dr. Devan Wolf at  8:45 PM  EKG: nonspecific ST and T waves changes, sinus tachycardia, Rate 115. RADIOLOGY:  Non-plain film images such as CT, Ultrasound and MRI are read by the radiologist. Plain radiographic images are visualized and preliminarily interpreted by the ED Provider with the below findings:     chest X-ray  NAD     Interpretation per the Radiologist below, if available at the time of this note:     CTA CHEST W OR W WO CONT    Addendum Date: 1/15/2023    Addendum: This report accession number was erroneously linked with a head CT exam for a different patient. Please disregard the original report and see below: EXAM: CTA CHEST W OR W WO CONT CLINICAL INDICATION/HISTORY:  sob, elevated d dimer -Additional: None COMPARISON: None TECHNIQUE: Helical CT imaging from the thoracic inlet through the diaphragm with intravenous contrast. Coronal and sagittal MIP reformats were generated. One or more dose reduction techniques were used on this CT: automated exposure control, adjustment of the mAs and/or kVp according to patient size, and iterative reconstruction techniques. The specific techniques used on this CT exam have been documented in the patient's electronic medical record. Digital Imaging and Communications in Medicine (DICOM) format image data are available to nonaffiliated external healthcare facilities or entities on a secure, media free, reciprocally searchable basis with patient authorization for at least a 12-month period after this study. _______________ FINDINGS: EXAM QUALITY: Adequate. PULMONARY ARTERIES: No pulmonary artery filling defects. Normal diameter of the main PA. MEDIASTINUM: Normal heart size. Aorta is unremarkable. No pericardial effusion. Port catheter tip in the mid SVC. LUNGS: Small regions of consolidation in the lateral aspect of right middle lobe and in the lingula. There is some clustered nodularity seen in the posterior segment of left upper lobe.  Mild bibasilar subsegmental atelectasis. PLEURA: Normal. AIRWAY: Normal. LYMPH NODES:  No enlarged nodes. UPPER ABDOMEN: Evidence of hepatic steatosis. Moderate splenomegaly. OSSEOUS: No acute or aggressive osseous abnormalities identified. OTHER: None. _______________ IMPRESSION: 1. No evidence of pulmonary embolism. 2. Patchy pulmonary opacities in the right middle and left upper lobes appearing infectious/inflammatory. Result Date: 1/15/2023  EXAM: CT HEAD WITHOUT CONTRAST CLINICAL INDICATION/HISTORY: Cardiac arrest -Additional: None COMPARISON: 11/5/2021 TECHNIQUE: Serial axial images of the head were performed without intravenous contrast. Dose reduction techniques:  Automated exposure control, mAs and/or kVp reductions based on patient size, and iterative reconstruction. The specific techniques utilized on this CT exam have been documented in the patient's electronic medical record. Digital Imaging and Communications in Medicine (DICOM) format image data are available to nonaffiliated external healthcare facilities or entities on a secure, media free, reciprocally searchable basis with patient authorization for at least a 12-month period after this study. _______________ FINDINGS: BRAIN:   > Intraparenchymal hemorrhage: None.   > Mass effect/edema: None.   > Infarct/encephalomalacia: No evidence of acute cortical ischemia.   > White matter: Unremarkable.   > Brain volume: Normal for age. EXTRA-AXIAL SPACES:   > No extra-axial hemorrhage.   > No hydrocephalus. SINUSES/MASTOIDS: Mild opacification of ethmoid and maxillary sinuses. CALVARIA: Intact. OTHER: None. _______________     No acute intracranial findings. XR CHEST PORT    Result Date: 1/14/2023  EXAM: XR CHEST PORT. CLINICAL INDICATION/HISTORY: Sepsis. -Additional: None.  TECHNIQUE: A portable erect AP radiographic view of the chest. COMPARISON: Radiograph of 12/26/2022. _______________ FINDINGS: Lungs and pleural spaces: > The lungs are essentially clear. > No pneumothorax or appreciable significant pleural effusion. Cardiomediastinal silhouette: > Normal for age. Bones: > No acute findings. Other: > Right anterior chest wall Port-A-Cath tip in the central aspect of the superior vena cava. _______________     No acute process. _______________        PROCEDURES   Unless otherwise noted below, none  Procedures     CRITICAL CARE TIME     CRITICAL CARE NOTE  I have spent 45 minutes of critical care time involved in lab review, consultations with specialist, family decision-making, and documentation. This time does not include time spent on separately billable procedures. During this entire length of time I was immediately available to the patient. Critical Care: The reason for providing this level of medical care for this critically ill patient was due a critical illness that impaired one or more vital organ systems such that there was a high probability of imminent or life threatening deterioration in the patients condition. This care involved high complexity decision making to assess, manipulate, and support vital system functions, to treat this degreee vital organ system failure and to prevent further life threatening deterioration of the patients condition.     Jenna Flores MD      EMERGENCY DEPARTMENT COURSE and DIFFERENTIAL DIAGNOSIS/MDM   Vitals:    Vitals:    01/15/23 0022 01/15/23 0134 01/15/23 0343 01/15/23 0353   BP: 107/62 (!) 96/43 (!) 107/55 113/71   Pulse: (!) 110 70 (!) 105 (!) 106   Resp: 18 18 19 20   Temp:    98.3 °F (36.8 °C)   SpO2: 98% 98% 100% 100%   Weight:       Height:            Patient was given the following medications:  Medications   sodium chloride (NS) flush 5-10 mL (has no administration in time range)   0.9% sodium chloride infusion (150 mL/hr IntraVENous New Bag 1/15/23 0109)   albuterol (PROVENTIL HFA, VENTOLIN HFA, PROAIR HFA) inhaler 2 Puff (has no administration in time range)   pantoprazole (PROTONIX) tablet 40 mg (has no administration in time range)   multivitamin, tx-iron-ca-min (THERA-M w/ IRON) tablet (has no administration in time range)   montelukast (SINGULAIR) tablet 10 mg (has no administration in time range)   insulin lispro (HUMALOG) injection (has no administration in time range)   hydroCHLOROthiazide (HYDRODIURIL) tablet 12.5 mg (has no administration in time range)   folic acid (FOLVITE) tablet 1 mg (has no administration in time range)   fluticasone propionate (FLONASE) 50 mcg/actuation nasal spray 2 Spray (has no administration in time range)   cholecalciferol (VITAMIN D3) (1000 Units /25 mcg) tablet 1,000 Units (has no administration in time range)   benzonatate (TESSALON) capsule 200 mg (has no administration in time range)   oseltamivir (TAMIFLU) capsule 75 mg (has no administration in time range)   doxycycline (VIBRA-TABS) tablet 100 mg (has no administration in time range)   methylPREDNISolone (PF) (Solu-MEDROL) injection 125 mg (has no administration in time range)   linaCLOtide (LINZESS) capsule 290 mcg (has no administration in time range)   sodium chloride (NS) flush 5-40 mL (has no administration in time range)   sodium chloride (NS) flush 5-40 mL (has no administration in time range)   acetaminophen (TYLENOL) tablet 650 mg (has no administration in time range)     Or   acetaminophen (TYLENOL) suppository 650 mg (has no administration in time range)   polyethylene glycol (MIRALAX) packet 17 g (has no administration in time range)   ondansetron (ZOFRAN ODT) tablet 4 mg (has no administration in time range)     Or   ondansetron (ZOFRAN) injection 4 mg (has no administration in time range)   enoxaparin (LOVENOX) injection 110 mg (has no administration in time range)   ondansetron (ZOFRAN) injection 4 mg (4 mg IntraVENous Given 1/14/23 2102)   methylPREDNISolone (PF) (Solu-MEDROL) injection 125 mg (125 mg IntraVENous Given 1/14/23 2102)   albuterol-ipratropium (DUO-NEB) 2.5 MG-0.5 MG/3 ML (3 mL Nebulization Given 1/14/23 2009)   magnesium sulfate 2 g/50 ml IVPB (premix or compounded) (0 g IntraVENous IV Completed 1/15/23 0021)   sodium chloride 0.9 % bolus infusion 1,000 mL (0 mL IntraVENous IV Completed 1/15/23 0021)   benzonatate (TESSALON) capsule 200 mg (200 mg Oral Given 1/14/23 2102)   albuterol-ipratropium (DUO-NEB) 2.5 MG-0.5 MG/3 ML (3 mL Nebulization Given 1/14/23 2109)   albuterol (PROVENTIL VENTOLIN) nebulizer solution 10 mg (10 mg Nebulization Given 1/14/23 2128)   cefTRIAXone (ROCEPHIN) 1 g in 0.9% sodium chloride (MBP/ADV) 50 mL MBP (0 g IntraVENous IV Completed 1/15/23 0346)   enoxaparin (LOVENOX) injection 100 mg (100 mg SubCUTAneous Given 1/15/23 0114)   iopamidoL (ISOVUE-370) 370 mg iodine /mL (76 %) injection  mL (96 mL IntraVENous Given 1/15/23 0313)       CONSULTS: (Who and What was discussed)  None    Chronic Conditions: As above    Social Determinants affecting Dx or Tx: None    Records Reviewed (source and summary): Nursing Notes, Old Medical Records, Previous electrocardiograms, Previous Radiology Studies, and Previous Laboratory Studies    CC/HPI Summary, DDx, ED Course, and Reassessment:   76-year-old female with history of asthma, hypothyroidism, history of renal cell cancer and is s/p partial nephrectomy, also recently diagnosed with left breast cancer and is undergoing chemotherapy, and the last dose was 1/623 and next dose is 1/20/2023. He had COVID soon after [place] and that was diagnosed with flu 4 days ago and is still on Tamiflu. She came to ED because of worsening cough productive of greenish sputum, chills, shortness of breath and wheezing. She is afebrile at arrival but is tachycardic and tachypneic and she needs criteria for sepsis with lactic acid 2.5. She is in some moderate respiratory distress.   Here differential included pneumonia bacterial versus viral, UTI, PE, asthma exacerbation, CHF, ACS, MI  Per respiratory distress was initially managed with Solu-Medrol, DuoNeb and patient eventually required multiple nebs including mag sulfate before she started to improve. Sepsis protocol was initiated with aggressive hydration however there was noted history of cardiomyopathy on patient's chart and I discussed with patient increase hydration with possible volume overload and exacerbation of CHF and patient did not consent to this. She however received 1 L normal saline and was given a gram of Rocephin. Although head PE RC is negative, D-dimer was done due to persistent tachycardia and that this was high. Patient is allergic to contrast dye so VQ scan was ordered and meantime patient given subcu Lovenox. On a repeat exam, patient appears more comfortable, pulse ox is still 100% on room air, this is after multiple nebs, mag sulfate, hydration and IV antibiotics. Will discuss with hospitalist admission, patient most likely with asthma with status asthmaticus and will need more steroids and nebs. She also needs sepsis ruled out, repeat lactic acid is 2.2, patient however does not appear toxic, chest x-ray is clear but she has greenish sputum, urine is trace leukocyte esterase. Disposition Considerations (Tests not done, Shared Decision Making, Pt Expectation of Test or Tx.):     FINAL IMPRESSION     1. Moderate persistent asthma with status asthmaticus    2. Sepsis, due to unspecified organism, unspecified whether acute organ dysfunction present ([facility] [address]    3. Elevated d-dimer          DISPOSITION/PLAN   Admitted    Admit Note: Pt is being admitted by hospitalist, Jamal Weiner NP. The results of their tests and reason(s) for their admission have been discussed with pt and/or available family. They convey agreement and understanding for the need to be admitted and for the admission diagnosis. I am the Primary Clinician of Record.    Lord Zheng MD (electronically signed)    (Please note that parts of this dictation were completed with voice recognition software. Quite often unanticipated grammatical, syntax, homophones, and other interpretive errors are inadvertently transcribed by the computer software. Please disregards these errors.  Please excuse any errors that have escaped final proofreading.)

## 2023-01-15 NOTE — ASSESSMENT & PLAN NOTE
This is a chronic problem  Sliding scale AC at bedtime  Hold oral hypoglycemics at this time  Diabetic diet

## 2023-01-15 NOTE — ASSESSMENT & PLAN NOTE
Patient undergoing chemotherapy for breast cancer  Next chemotherapy on Thursday  Patient high risk for PE and DVT due to chemotherapy  Therapeutic Lovenox dose  CTA of chest ordered, PVL's ordered

## 2023-01-15 NOTE — ASSESSMENT & PLAN NOTE
Suspected cause of shortness of breath asthma exacerbation versus pulmonary embolus  Patient chemotherapy patient for breast CA  Next chemotherapy on Thursday  No fever no leukocytosis no neutropenia chest x-ray clear UA negative antibiotics not indicated

## 2023-01-16 ENCOUNTER — APPOINTMENT (OUTPATIENT)
Dept: VASCULAR SURGERY | Age: 43
DRG: 720 | End: 2023-01-16
Attending: NURSE PRACTITIONER
Payer: MEDICAID

## 2023-01-16 LAB
ALBUMIN SERPL-MCNC: 3.1 G/DL (ref 3.4–5)
ALBUMIN/GLOB SERPL: ABNORMAL (ref 0.8–1.7)
ALP SERPL-CCNC: 78 U/L (ref 45–117)
ALT SERPL-CCNC: 19 U/L (ref 13–56)
ANION GAP SERPL CALC-SCNC: 9 MMOL/L (ref 3–18)
AST SERPL W P-5'-P-CCNC: 9 U/L (ref 10–38)
BASOPHILS # BLD: 0 K/UL (ref 0–0.1)
BASOPHILS NFR BLD: 0 % (ref 0–2)
BILIRUB SERPL-MCNC: 0.4 MG/DL (ref 0.2–1)
BUN SERPL-MCNC: 12 MG/DL (ref 7–18)
BUN/CREAT SERPL: 14 (ref 12–20)
CA-I BLD-MCNC: 8.9 MG/DL (ref 8.5–10.1)
CHLORIDE SERPL-SCNC: 106 MMOL/L (ref 100–111)
CO2 SERPL-SCNC: 24 MMOL/L (ref 21–32)
CREAT SERPL-MCNC: 0.88 MG/DL (ref 0.6–1.3)
DIFFERENTIAL METHOD BLD: ABNORMAL
EOSINOPHIL # BLD: 0 K/UL (ref 0–0.4)
EOSINOPHIL NFR BLD: 0 % (ref 0–5)
ERYTHROCYTE [DISTWIDTH] IN BLOOD BY AUTOMATED COUNT: 22.5 % (ref 11.6–14.5)
FLUAV RNA SPEC QL NAA+PROBE: NOT DETECTED
FLUBV RNA SPEC QL NAA+PROBE: NOT DETECTED
GLOBULIN SER CALC-MCNC: ABNORMAL G/DL (ref 2–4)
GLUCOSE SERPL-MCNC: 257 MG/DL (ref 74–99)
HCT VFR BLD AUTO: 26.9 % (ref 35–45)
HGB BLD-MCNC: 8.5 G/DL (ref 12–16)
IMM GRANULOCYTES # BLD AUTO: 0 K/UL
IMM GRANULOCYTES NFR BLD AUTO: 0 %
LACTATE SERPL-SCNC: 3.3 MMOL/L (ref 0.4–2)
LYMPHOCYTES # BLD: 1 K/UL (ref 0.9–3.6)
LYMPHOCYTES NFR BLD: 7 % (ref 21–52)
MAGNESIUM SERPL-MCNC: 2.2 MG/DL (ref 1.6–2.6)
MCH RBC QN AUTO: 23.4 PG (ref 24–34)
MCHC RBC AUTO-ENTMCNC: 31.6 G/DL (ref 31–37)
MCV RBC AUTO: 74.1 FL (ref 78–100)
METAMYELOCYTES NFR BLD MANUAL: 2 %
MONOCYTES # BLD: 1.2 K/UL (ref 0.05–1.2)
MONOCYTES NFR BLD: 8 % (ref 3–10)
MYELOCYTES NFR BLD MANUAL: 3 %
NEUTS BAND NFR BLD MANUAL: 13 % (ref 0–5)
NEUTS SEG # BLD: 11.9 K/UL (ref 1.8–8)
NEUTS SEG NFR BLD: 67 % (ref 40–73)
NRBC # BLD: 0.11 K/UL (ref 0–0.01)
NRBC BLD-RTO: 0.7 PER 100 WBC
PLATELET # BLD AUTO: 190 K/UL (ref 135–420)
PLATELET COMMENTS,PCOM: ABNORMAL
POTASSIUM SERPL-SCNC: 4.3 MMOL/L (ref 3.5–5.5)
PROT SERPL-MCNC: ABNORMAL G/DL (ref 6.4–8.2)
RBC # BLD AUTO: 3.63 M/UL (ref 4.2–5.3)
RBC MORPH BLD: ABNORMAL
SARS-COV-2, COV2: NOT DETECTED
SODIUM SERPL-SCNC: 139 MMOL/L (ref 136–145)
WBC # BLD AUTO: 14.9 K/UL (ref 4.6–13.2)

## 2023-01-16 PROCEDURE — 74011250636 HC RX REV CODE- 250/636: Performed by: NURSE PRACTITIONER

## 2023-01-16 PROCEDURE — 83605 ASSAY OF LACTIC ACID: CPT

## 2023-01-16 PROCEDURE — 74011250637 HC RX REV CODE- 250/637: Performed by: INTERNAL MEDICINE

## 2023-01-16 PROCEDURE — 74011000250 HC RX REV CODE- 250: Performed by: NURSE PRACTITIONER

## 2023-01-16 PROCEDURE — 93970 EXTREMITY STUDY: CPT

## 2023-01-16 PROCEDURE — 74011250637 HC RX REV CODE- 250/637: Performed by: NURSE PRACTITIONER

## 2023-01-16 PROCEDURE — 74011000258 HC RX REV CODE- 258: Performed by: INTERNAL MEDICINE

## 2023-01-16 PROCEDURE — 36415 COLL VENOUS BLD VENIPUNCTURE: CPT

## 2023-01-16 PROCEDURE — 87636 SARSCOV2 & INF A&B AMP PRB: CPT

## 2023-01-16 PROCEDURE — 65270000029 HC RM PRIVATE

## 2023-01-16 PROCEDURE — 83735 ASSAY OF MAGNESIUM: CPT

## 2023-01-16 PROCEDURE — 74011250636 HC RX REV CODE- 250/636: Performed by: INTERNAL MEDICINE

## 2023-01-16 PROCEDURE — 80053 COMPREHEN METABOLIC PANEL: CPT

## 2023-01-16 PROCEDURE — 74011000250 HC RX REV CODE- 250: Performed by: INTERNAL MEDICINE

## 2023-01-16 PROCEDURE — 85025 COMPLETE CBC W/AUTO DIFF WBC: CPT

## 2023-01-16 PROCEDURE — 94640 AIRWAY INHALATION TREATMENT: CPT

## 2023-01-16 RX ORDER — IBUPROFEN 600 MG/1
600 TABLET ORAL
Status: DISCONTINUED | OUTPATIENT
Start: 2023-01-16 | End: 2023-01-18 | Stop reason: HOSPADM

## 2023-01-16 RX ORDER — KETOROLAC TROMETHAMINE 30 MG/ML
15 INJECTION, SOLUTION INTRAMUSCULAR; INTRAVENOUS
Status: COMPLETED | OUTPATIENT
Start: 2023-01-16 | End: 2023-01-16

## 2023-01-16 RX ORDER — OXYCODONE AND ACETAMINOPHEN 5; 325 MG/1; MG/1
1 TABLET ORAL
Status: DISCONTINUED | OUTPATIENT
Start: 2023-01-16 | End: 2023-01-17 | Stop reason: SDUPTHER

## 2023-01-16 RX ORDER — IBUPROFEN 200 MG
4 TABLET ORAL AS NEEDED
Status: DISCONTINUED | OUTPATIENT
Start: 2023-01-16 | End: 2023-01-18 | Stop reason: HOSPADM

## 2023-01-16 RX ORDER — ALBUTEROL SULFATE 0.83 MG/ML
2.5 SOLUTION RESPIRATORY (INHALATION)
Status: DISCONTINUED | OUTPATIENT
Start: 2023-01-16 | End: 2023-01-18 | Stop reason: HOSPADM

## 2023-01-16 RX ORDER — DEXTROSE 50 % IN WATER (D50W) INTRAVENOUS SYRINGE
25-50 AS NEEDED
Status: DISCONTINUED | OUTPATIENT
Start: 2023-01-16 | End: 2023-01-17

## 2023-01-16 RX ORDER — INSULIN LISPRO 100 [IU]/ML
INJECTION, SOLUTION INTRAVENOUS; SUBCUTANEOUS
Status: DISCONTINUED | OUTPATIENT
Start: 2023-01-17 | End: 2023-01-18 | Stop reason: HOSPADM

## 2023-01-16 RX ADMIN — MONTELUKAST 10 MG: 10 TABLET, FILM COATED ORAL at 09:02

## 2023-01-16 RX ADMIN — FOLIC ACID 1 MG: 1 TABLET ORAL at 09:02

## 2023-01-16 RX ADMIN — OSELTAMIVIR PHOSPHATE 75 MG: 75 CAPSULE ORAL at 18:00

## 2023-01-16 RX ADMIN — PIPERACILLIN AND TAZOBACTAM 3.38 G: 3; .375 INJECTION, POWDER, LYOPHILIZED, FOR SOLUTION INTRAVENOUS at 02:05

## 2023-01-16 RX ADMIN — ACETAMINOPHEN 650 MG: 325 TABLET ORAL at 02:14

## 2023-01-16 RX ADMIN — KETOROLAC TROMETHAMINE 15 MG: 30 INJECTION, SOLUTION INTRAMUSCULAR at 22:55

## 2023-01-16 RX ADMIN — ACETAMINOPHEN 650 MG: 325 TABLET ORAL at 11:01

## 2023-01-16 RX ADMIN — PANTOPRAZOLE SODIUM 40 MG: 40 TABLET, DELAYED RELEASE ORAL at 09:02

## 2023-01-16 RX ADMIN — ENOXAPARIN SODIUM 40 MG: 100 INJECTION SUBCUTANEOUS at 01:00

## 2023-01-16 RX ADMIN — METHYLPREDNISOLONE SODIUM SUCCINATE 125 MG: 125 INJECTION, POWDER, FOR SOLUTION INTRAMUSCULAR; INTRAVENOUS at 21:27

## 2023-01-16 RX ADMIN — METHYLPREDNISOLONE SODIUM SUCCINATE 125 MG: 125 INJECTION, POWDER, FOR SOLUTION INTRAMUSCULAR; INTRAVENOUS at 13:33

## 2023-01-16 RX ADMIN — Medication 1000 UNITS: at 09:02

## 2023-01-16 RX ADMIN — PIPERACILLIN AND TAZOBACTAM 3.38 G: 3; .375 INJECTION, POWDER, LYOPHILIZED, FOR SOLUTION INTRAVENOUS at 20:23

## 2023-01-16 RX ADMIN — HYDROCHLOROTHIAZIDE 12.5 MG: 25 TABLET ORAL at 13:29

## 2023-01-16 RX ADMIN — METHYLPREDNISOLONE SODIUM SUCCINATE 125 MG: 125 INJECTION, POWDER, FOR SOLUTION INTRAMUSCULAR; INTRAVENOUS at 06:41

## 2023-01-16 RX ADMIN — OSELTAMIVIR PHOSPHATE 75 MG: 75 CAPSULE ORAL at 09:02

## 2023-01-16 RX ADMIN — ALBUTEROL SULFATE 2.5 MG: 2.5 SOLUTION RESPIRATORY (INHALATION) at 18:13

## 2023-01-16 RX ADMIN — SODIUM CHLORIDE, PRESERVATIVE FREE 10 ML: 5 INJECTION INTRAVENOUS at 17:01

## 2023-01-16 RX ADMIN — PIPERACILLIN AND TAZOBACTAM 3.38 G: 3; .375 INJECTION, POWDER, LYOPHILIZED, FOR SOLUTION INTRAVENOUS at 09:01

## 2023-01-16 RX ADMIN — SODIUM CHLORIDE, PRESERVATIVE FREE 10 ML: 5 INJECTION INTRAVENOUS at 21:27

## 2023-01-16 RX ADMIN — IBUPROFEN 600 MG: 600 TABLET, FILM COATED ORAL at 13:28

## 2023-01-16 RX ADMIN — IBUPROFEN 600 MG: 600 TABLET, FILM COATED ORAL at 20:23

## 2023-01-16 NOTE — PROGRESS NOTES
Problem: Falls - Risk of  Goal: *Absence of Falls  Description: Document Trena Pacheco Fall Risk and appropriate interventions in the flowsheet.   Outcome: Progressing Towards Goal  Note: Fall Risk Interventions:

## 2023-01-16 NOTE — PROGRESS NOTES
1900 Assumed care of pt from off going nurse NANCY Méndez LPN. Pt is A&Ox4  with no c/o at this time. 2200 HS medications given. Tolerated well. 0214 Pt c/o pain 3/10 in upper right arm. Tylenol given. Tolerated well.     [id number] Called in to pt bedside to check on IV. Noticed no leaking or infiltration,gives good blood return. This nurse attempted to start another IV for prophylaxis, pt refused stating that she will most likely be discharged today and her arm is too sore at this time to attempt an IV. Bedside and Verbal shift change report given to ZAKI Barrientos LPN (oncoming nurse) by Kia Marquez RN (offgoing nurse). Report included the following information SBAR, Kardex, Intake/Output, MAR, and Recent Results.

## 2023-01-16 NOTE — PROGRESS NOTES
Problem: Falls - Risk of  Goal: *Absence of Falls  Description: Document Eusebio Hankins Fall Risk and appropriate interventions in the flowsheet.   Outcome: Progressing Towards Goal  Note: Fall Risk Interventions:                                Problem: Patient Education: Go to Patient Education Activity  Goal: Patient/Family Education  Outcome: Progressing Towards Goal     Problem: Patient Education:  Go to Education Activity  Goal: Patient/Family Education  Outcome: Progressing Towards Goal     Problem: Asthma: Day 1  Goal: Off Pathway (Use only if patient is Off Pathway)  Outcome: Progressing Towards Goal  Goal: Activity/Safety  Outcome: Progressing Towards Goal  Goal: Consults, if ordered  Outcome: Progressing Towards Goal  Goal: Diagnostic Test/Procedures  Outcome: Progressing Towards Goal  Goal: Nutrition/Diet  Outcome: Progressing Towards Goal  Goal: Discharge Planning  Outcome: Progressing Towards Goal  Goal: Medications  Outcome: Progressing Towards Goal  Goal: Respiratory  Outcome: Progressing Towards Goal  Goal: Treatments/Interventions/Procedures  Outcome: Progressing Towards Goal  Goal: Psychosocial  Outcome: Progressing Towards Goal  Goal: *Oxygen saturation returns to baseline  Outcome: Progressing Towards Goal  Goal: *Vital signs within defined limits  Outcome: Progressing Towards Goal  Goal: *Labs within defined limits  Outcome: Progressing Towards Goal     Problem: Asthma: Day 2  Goal: Off Pathway (Use only if patient is Off Pathway)  Outcome: Progressing Towards Goal  Goal: Activity/Safety  Outcome: Progressing Towards Goal  Goal: Consults, if ordered  Outcome: Progressing Towards Goal  Goal: Diagnostic Test/Procedures  Outcome: Progressing Towards Goal  Goal: Nutrition/Diet  Outcome: Progressing Towards Goal  Goal: Discharge Planning  Outcome: Progressing Towards Goal  Goal: Medications  Outcome: Progressing Towards Goal  Goal: Respiratory  Outcome: Progressing Towards Goal  Goal: Treatments/Interventions/Procedures  Outcome: Progressing Towards Goal  Goal: Psychosocial  Outcome: Progressing Towards Goal  Goal: *Oxygen saturation returns to baseline  Outcome: Progressing Towards Goal  Goal: *Vital signs within defined limits  Outcome: Progressing Towards Goal  Goal: *Labs within defined limits  Outcome: Progressing Towards Goal  Goal: *Lungs clear or at baseline  Outcome: Progressing Towards Goal  Goal: *Tolerating diet  Outcome: Progressing Towards Goal  Goal: *Demonstrates progressive activity  Outcome: Progressing Towards Goal     Problem: Asthma: Day 3  Goal: Off Pathway (Use only if patient is Off Pathway)  Outcome: Progressing Towards Goal  Goal: Activity/Safety  Outcome: Progressing Towards Goal  Goal: Diagnostic Test/Procedures  Outcome: Progressing Towards Goal  Goal: Nutrition/Diet  Outcome: Progressing Towards Goal  Goal: Discharge Planning  Outcome: Progressing Towards Goal  Goal: Medications  Outcome: Progressing Towards Goal  Goal: Respiratory  Outcome: Progressing Towards Goal  Goal: Treatments/Interventions/Procedures  Outcome: Progressing Towards Goal  Goal: Psychosocial  Outcome: Progressing Towards Goal     Problem: Asthma: Discharge Outcomes  Goal: *Hemodynamically stable  Outcome: Progressing Towards Goal  Goal: *Lungs clear or at baseline  Outcome: Progressing Towards Goal  Goal: *Adequate oxygenation  Outcome: Progressing Towards Goal  Goal: *Labs within defined limits  Outcome: Progressing Towards Goal  Goal: *Demonstrates progressive activity  Outcome: Progressing Towards Goal  Goal: *Participates in self care  Outcome: Progressing Towards Goal  Goal: *Verbalizes understanding and describes prescribed diet  Outcome: Progressing Towards Goal  Goal: *Tolerating diet  Outcome: Progressing Towards Goal  Goal: *Verbalizes name, dosage, time, side effects, and number of days to continue medications  Outcome: Progressing Towards Goal  Goal: *Anxiety reduced or absent  Outcome: Progressing Towards Goal  Goal: *Understands and describes signs and symptoms to report to providers(Stroke Metric)  Outcome: Progressing Towards Goal  Goal: *Describes follow-up/return visits to physicians  Outcome: Progressing Towards Goal  Goal: *Describes available resources and support systems  Outcome: Progressing Towards Goal  Goal: *Influenza immunization (Oct-Mar only)  Outcome: Progressing Towards Goal  Goal: *Pneumococcal immunization (if eligible)  Outcome: Progressing Towards Goal

## 2023-01-16 NOTE — PROGRESS NOTES
HOSPITALIST PROGRESS NOTE  Laurita Woodruff MD, [address]          Daily Progress Note: 1/16/2023      Subjective:     Patient is alert and oriented x4    Currently not hypoxic however significant productive cough now with continued wheezing and asthma exacerbation. Patient currently significantly immunocompromised with current chemotherapy for breast cancer and counseled on continuing IV antibiotics for bilateral pneumonia. Patient thus far was unaware of her diagnosis of pneumonia. No overnight fever/chills, chest pain, nausea vomiting or abdominal pain noted.       Medications reviewed  Current Facility-Administered Medications   Medication Dose Route Frequency    0.9% sodium chloride infusion  100 mL/hr IntraVENous CONTINUOUS    albuterol (PROVENTIL HFA, VENTOLIN HFA, PROAIR HFA) inhaler 2 Puff  2 Puff Inhalation Q4H PRN    pantoprazole (PROTONIX) tablet 40 mg  40 mg Oral DAILY    [Held by provider] multivitamin, tx-iron-ca-min (THERA-M w/ IRON) tablet   Oral DAILY    montelukast (SINGULAIR) tablet 10 mg  10 mg Oral DAILY    [Held by provider] hydroCHLOROthiazide (HYDRODIURIL) tablet 12.5 mg  12.5 mg Oral DAILY    folic acid (FOLVITE) tablet 1 mg  1 mg Oral DAILY    fluticasone propionate (FLONASE) 50 mcg/actuation nasal spray 2 Spray  2 Spray Both Nostrils DAILY    cholecalciferol (VITAMIN D3) (1000 Units /25 mcg) tablet 1,000 Units  1,000 Units Oral DAILY    benzonatate (TESSALON) capsule 200 mg  200 mg Oral TID PRN    oseltamivir (TAMIFLU) capsule 75 mg  75 mg Oral BID    methylPREDNISolone (PF) (Solu-MEDROL) injection 125 mg  125 mg IntraVENous Q8H    linaCLOtide (LINZESS) capsule 290 mcg - patient supplied (Patient Supplied)  290 mcg Oral DAILY    sodium chloride (NS) flush 5-40 mL  5-40 mL IntraVENous Q8H    sodium chloride (NS) flush 5-40 mL  5-40 mL IntraVENous PRN    acetaminophen (TYLENOL) tablet 650 mg  650 mg Oral Q6H PRN Or    acetaminophen (TYLENOL) suppository 650 mg  650 mg Rectal Q6H PRN    polyethylene glycol (MIRALAX) packet 17 g  17 g Oral DAILY PRN    ondansetron (ZOFRAN ODT) tablet 4 mg  4 mg Oral Q8H PRN    Or    ondansetron (ZOFRAN) injection 4 mg  4 mg IntraVENous Q6H PRN    enoxaparin (LOVENOX) injection 40 mg  40 mg SubCUTAneous Q24H    [Held by provider] metFORMIN (GLUCOPHAGE) tablet 500 mg  500 mg Oral BID WITH MEALS    piperacillin-tazobactam (ZOSYN) 3.375 g in 0.9% sodium chloride (MBP/ADV) 100 mL MBP  3.375 g IntraVENous Q8H    sodium chloride (NS) flush 5-10 mL  5-10 mL IntraVENous PRN       Review of Systems:   A comprehensive review of systems was negative except for that written in the HPI. Objective:   Physical Exam:     Visit Vitals  /71 (BP 1 Location: Right upper arm, BP Patient Position: At rest)   Pulse 90   Temp 97.7 °F (36.5 °C)   Resp 18   Ht 5' 5\" (1.651 m)   Wt 106.6 kg (235 lb)   LMP 2022   SpO2 99%   BMI 39.11 kg/m²      O2 Device: None (Room air)  Patient Vitals for the past 8 hrs:   Temp Pulse Resp BP SpO2   23 1110 97.7 °F (36.5 °C) 90 18 120/71 99 %   23 0800 -- 80 -- -- --   23 0745 97.3 °F (36.3 °C) 88 18 124/79 97 %   23 0533 98.3 °F (36.8 °C) 76 18 108/60 99 %          Temp (24hrs), Av.7 °F (36.5 °C), Min:97.3 °F (36.3 °C), Max:98.3 °F (36.8 °C)    No intake/output data recorded.  1901 -  0700  In: 2950 [P.O.:650; I.V.:2300]  Out: 1500 [Urine:1500]    General:  Alert, cooperative, no distress, appears stated age. Lungs:   Diminished breath sounds with bilateral expiratory wheezing as well as mild Rales in the right and left lower lobes without any rhonchi. Chest wall:  No tenderness or deformity. Heart:  Regular rate and rhythm, S1, S2 normal, no murmur, click, rub or gallop. Abdomen:   Soft, non-tender. Bowel sounds normal. No masses,  No organomegaly. Extremities: Extremities normal, atraumatic, no cyanosis or edema. Pulses: 2+ and symmetric all extremities. Skin: Skin color, texture, turgor normal. No rashes or lesions   Neurologic: No gross sensory or motor deficits     Data Review:       Recent Days:  Recent Labs     01/16/23 0328 01/15/23  1450 01/14/23 2005   WBC 14.9* 13.0 6.7   HGB 8.5* 8.7* 9.0*   HCT 26.9* 26.4* 27.4*    188 189     Recent Labs     01/16/23  0328 01/14/23 2005    140   K 4.3 3.7    105   CO2 24 25   * 148*   BUN 12 12   CREA 0.88 0.76   CA 8.9 8.4*   MG 2.2  --    ALB 3.1* 3.2*   TBILI 0.4 0.4   ALT 19 17     Recent Labs     01/14/23 1959   PH 7.43   PCO2 37   PO2 74*   HCO3 24   FIO2 21.0       24 Hour Results:  Recent Results (from the past 24 hour(s))   CBC WITH AUTOMATED DIFF    Collection Time: 01/15/23  2:50 PM   Result Value Ref Range    WBC 13.0 4.6 - 13.2 K/uL    RBC 3.55 (L) 4.20 - 5.30 M/uL    HGB 8.7 (L) 12.0 - 16.0 g/dL    HCT 26.4 (L) 35.0 - 45.0 %    MCV 74.4 (L) 78.0 - 100.0 FL    MCH 24.5 24.0 - 34.0 PG    MCHC 33.0 31.0 - 37.0 g/dL    RDW 22.4 (H) 11.6 - 14.5 %    PLATELET 589 896 - 855 K/uL    MPV 10.4 9.2 - 11.8 FL    NRBC 0.7 (H) 0.0  WBC    ABSOLUTE NRBC 0.09 (H) 0.00 - 0.01 K/uL    NEUTROPHILS 79 (H) 40 - 73 %    BAND NEUTROPHILS 5 0 - 5 %    LYMPHOCYTES 8 (L) 21 - 52 %    MONOCYTES 1 (L) 3 - 10 %    EOSINOPHILS 0 0 - 5 %    BASOPHILS 0 0 - 2 %    METAMYELOCYTES 1 (H) 0 %    MYELOCYTES 3 (H) 0 %    OTHER CELL 3 %    IMMATURE GRANULOCYTES 0 %    ABS. NEUTROPHILS 10.9 (H) 1.8 - 8.0 K/UL    ABS. LYMPHOCYTES 1.0 0.9 - 3.6 K/UL    ABS. MONOCYTES 0.1 0.05 - 1.2 K/UL    ABS. EOSINOPHILS 0.0 0.0 - 0.4 K/UL    ABS. BASOPHILS 0.0 0.0 - 0.1 K/UL    ABS. IMM.  GRANS. 0.0 K/UL    DF AUTOMATED      RBC COMMENTS Microcytosis  2+        RBC COMMENTS Anisocytosis  3+        RBC COMMENTS Poikilocytosis  3+        RBC COMMENTS Target Cells  1+        RBC COMMENTS Polychromasia  1+        RBC COMMENTS Teardrop cells  2+       LACTIC ACID    Collection Time: 01/15/23  2:50 PM   Result Value Ref Range    Lactic acid 2.6 (HH) 0.4 - 2.0 mmol/L   METABOLIC PANEL, COMPREHENSIVE    Collection Time: 01/16/23  3:28 AM   Result Value Ref Range    Sodium 139 136 - 145 mmol/L    Potassium 4.3 3.5 - 5.5 mmol/L    Chloride 106 100 - 111 mmol/L    CO2 24 21 - 32 mmol/L    Anion gap 9 3.0 - 18.0 mmol/L    Glucose 257 (H) 74 - 99 mg/dL    BUN 12 7 - 18 mg/dL    Creatinine 0.88 0.60 - 1.30 mg/dL    BUN/Creatinine ratio 14 12 - 20      eGFR >60 >60 ml/min/1.73m2    Calcium 8.9 8.5 - 10.1 mg/dL    Bilirubin, total 0.4 0.2 - 1.0 mg/dL    AST (SGOT) 9 (L) 10 - 38 U/L    ALT (SGPT) 19 13 - 56 U/L    Alk. phosphatase 78 45 - 117 U/L    Protein, total TEST UNAVAILABLE 6.4 - 8.2 g/dL    Albumin 3.1 (L) 3.4 - 5.0 g/dL    Globulin Not calculated 2.0 - 4.0 g/dL    A-G Ratio Not calculated 0.8 - 1.7     MAGNESIUM    Collection Time: 01/16/23  3:28 AM   Result Value Ref Range    Magnesium 2.2 1.6 - 2.6 mg/dL   CBC WITH AUTOMATED DIFF    Collection Time: 01/16/23  3:28 AM   Result Value Ref Range    WBC 14.9 (H) 4.6 - 13.2 K/uL    RBC 3.63 (L) 4.20 - 5.30 M/uL    HGB 8.5 (L) 12.0 - 16.0 g/dL    HCT 26.9 (L) 35.0 - 45.0 %    MCV 74.1 (L) 78.0 - 100.0 FL    MCH 23.4 (L) 24.0 - 34.0 PG    MCHC 31.6 31.0 - 37.0 g/dL    RDW 22.5 (H) 11.6 - 14.5 %    PLATELET 756 134 - 761 K/uL    NRBC 0.7 (H) 0.0  WBC    ABSOLUTE NRBC 0.11 (H) 0.00 - 0.01 K/uL    NEUTROPHILS 67 40 - 73 %    BAND NEUTROPHILS 13 (H) 0 - 5 %    LYMPHOCYTES 7 (L) 21 - 52 %    MONOCYTES 8 3 - 10 %    EOSINOPHILS 0 0 - 5 %    BASOPHILS 0 0 - 2 %    METAMYELOCYTES 2 (H) 0 %    MYELOCYTES 3 (H) 0 %    IMMATURE GRANULOCYTES 0 %    ABS. NEUTROPHILS 11.9 (H) 1.8 - 8.0 K/UL    ABS. LYMPHOCYTES 1.0 0.9 - 3.6 K/UL    ABS. MONOCYTES 1.2 0.05 - 1.2 K/UL    ABS. EOSINOPHILS 0.0 0.0 - 0.4 K/UL    ABS. BASOPHILS 0.0 0.0 - 0.1 K/UL    ABS. IMM.  GRANS. 0.0 K/UL    DF Manual      PLATELET COMMENTS PLATELETS APPEAR WIDELY VARIABLE IN SIZE     RBC COMMENTS Anisocytosis  2+        RBC COMMENTS Microcytosis  1+        RBC COMMENTS Polychromasia  1+        RBC COMMENTS Poikilocytosis  2+        RBC COMMENTS Ovalocytes  Spherocytes  Teardrop cells       COVID-19 WITH INFLUENZA A/B    Collection Time: 01/16/23  8:55 AM   Result Value Ref Range    SARS-CoV-2 by PCR Not Detected Not Detected      Influenza A by PCR Not Detected Not Detected      Influenza B by PCR Not Detected Not Detected             Assessment/Plan:     Sepsis due to lateral pneumonia   CT scan showed right middle and left upper lobes with infectious inflammatory process. Lactate elevated to 4.1  Initially started vancomycin and Zosyn initially. Continue on Zosyn currently. Patient with worsening cough which is nonproductive however currently not hypoxic. COVID and influenza negative on 1/16/2023. Elevated D-Dimer  VQ scan cancelled, pt states she can take IV dye from CT scan, she just gets anxious in an MRI tube. Bilateral PVLs of legs are negative for DVT. Asthma exacerbation  Patient has a history of asthma  DuoNeb as needed  Decrease Solu-Medrol from 125 Mg 8 hours to 60 Mg every 12 hours. Telemetry     History of breast cancer  Patient undergoing chemotherapy for breast cancer  Next chemotherapy on Friday. Patient followed by [address] [place] [address] oncology. Type II diabetes mellitus (HCC)  Sliding scale AC at bedtime  Hold oral hypoglycemics at this time  Diabetic diet     Code Status: Full     DVT Prophylaxis:   Lovenox      Care Plan discussed with: Patient/Family, Nurse, and     Total time spent with patient: 30 minutes. With greater than 50% spent in coordination of care and counseling.     Bruce Sosa MD

## 2023-01-16 NOTE — PROGRESS NOTES
0700-Report received from off going nurse. Assumed care of patient. 0845-Covid/Flu obtained. Sent to lab.     0902-Scheduled meds given. Patient tolerated well. 0930-PVL being performed. 1318-Order to unhold 12.5 mg HCTZ per MD. New order for Ibprofen 600 mg q6hrs PRN per MD. Cy Kang.

## 2023-01-17 LAB
ANION GAP SERPL CALC-SCNC: 8 MMOL/L (ref 3–18)
BASOPHILS # BLD: 0 K/UL (ref 0–0.1)
BASOPHILS NFR BLD: 0 % (ref 0–2)
BUN SERPL-MCNC: 20 MG/DL (ref 7–18)
BUN/CREAT SERPL: 21 (ref 12–20)
CA-I BLD-MCNC: 8.6 MG/DL (ref 8.5–10.1)
CHLORIDE SERPL-SCNC: 109 MMOL/L (ref 100–111)
CO2 SERPL-SCNC: 25 MMOL/L (ref 21–32)
CREAT SERPL-MCNC: 0.94 MG/DL (ref 0.6–1.3)
DIFFERENTIAL METHOD BLD: ABNORMAL
EOSINOPHIL # BLD: 0 K/UL (ref 0–0.4)
EOSINOPHIL NFR BLD: 0 % (ref 0–5)
ERYTHROCYTE [DISTWIDTH] IN BLOOD BY AUTOMATED COUNT: 22.5 % (ref 11.6–14.5)
GLUCOSE BLD STRIP.AUTO-MCNC: 111 MG/DL (ref 70–110)
GLUCOSE BLD STRIP.AUTO-MCNC: 149 MG/DL (ref 70–110)
GLUCOSE BLD STRIP.AUTO-MCNC: 188 MG/DL (ref 70–110)
GLUCOSE BLD STRIP.AUTO-MCNC: 243 MG/DL (ref 70–110)
GLUCOSE SERPL-MCNC: 232 MG/DL (ref 74–99)
HCT VFR BLD AUTO: 24.5 % (ref 35–45)
HGB BLD-MCNC: 8.1 G/DL (ref 12–16)
IMM GRANULOCYTES # BLD AUTO: 0 K/UL
IMM GRANULOCYTES NFR BLD AUTO: 0 %
LACTATE SERPL-SCNC: 1.8 MMOL/L (ref 0.4–2)
LYMPHOCYTES # BLD: 1.1 K/UL (ref 0.9–3.6)
LYMPHOCYTES NFR BLD: 5 % (ref 21–52)
MCH RBC QN AUTO: 24.5 PG (ref 24–34)
MCHC RBC AUTO-ENTMCNC: 33.1 G/DL (ref 31–37)
MCV RBC AUTO: 74.2 FL (ref 78–100)
METAMYELOCYTES NFR BLD MANUAL: 3 %
MONOCYTES # BLD: 0 K/UL (ref 0.05–1.2)
MONOCYTES NFR BLD: 0 % (ref 3–10)
MYELOCYTES NFR BLD MANUAL: 2 %
NEUTS BAND NFR BLD MANUAL: 5 % (ref 0–5)
NEUTS SEG # BLD: 20.4 K/UL (ref 1.8–8)
NEUTS SEG NFR BLD: 85 % (ref 40–73)
NRBC # BLD: 0.11 K/UL (ref 0–0.01)
NRBC BLD-RTO: 0.5 PER 100 WBC
PERFORMED BY, TECHID: ABNORMAL
PLATELET # BLD AUTO: 161 K/UL (ref 135–420)
POTASSIUM SERPL-SCNC: 3.9 MMOL/L (ref 3.5–5.5)
RBC # BLD AUTO: 3.3 M/UL (ref 4.2–5.3)
RBC MORPH BLD: ABNORMAL
RBC MORPH BLD: ABNORMAL
SODIUM SERPL-SCNC: 142 MMOL/L (ref 136–145)
WBC # BLD AUTO: 22.7 K/UL (ref 4.6–13.2)

## 2023-01-17 PROCEDURE — 74011250636 HC RX REV CODE- 250/636: Performed by: NURSE PRACTITIONER

## 2023-01-17 PROCEDURE — 74011250637 HC RX REV CODE- 250/637: Performed by: NURSE PRACTITIONER

## 2023-01-17 PROCEDURE — 74011000258 HC RX REV CODE- 258: Performed by: INTERNAL MEDICINE

## 2023-01-17 PROCEDURE — 74011636637 HC RX REV CODE- 636/637: Performed by: NURSE PRACTITIONER

## 2023-01-17 PROCEDURE — 94640 AIRWAY INHALATION TREATMENT: CPT

## 2023-01-17 PROCEDURE — 74011250636 HC RX REV CODE- 250/636: Performed by: INTERNAL MEDICINE

## 2023-01-17 PROCEDURE — 85025 COMPLETE CBC W/AUTO DIFF WBC: CPT

## 2023-01-17 PROCEDURE — 83605 ASSAY OF LACTIC ACID: CPT

## 2023-01-17 PROCEDURE — 74011000250 HC RX REV CODE- 250: Performed by: INTERNAL MEDICINE

## 2023-01-17 PROCEDURE — 74011000250 HC RX REV CODE- 250: Performed by: NURSE PRACTITIONER

## 2023-01-17 PROCEDURE — 36415 COLL VENOUS BLD VENIPUNCTURE: CPT

## 2023-01-17 PROCEDURE — 65270000029 HC RM PRIVATE

## 2023-01-17 PROCEDURE — 82962 GLUCOSE BLOOD TEST: CPT

## 2023-01-17 PROCEDURE — 74011250637 HC RX REV CODE- 250/637: Performed by: INTERNAL MEDICINE

## 2023-01-17 PROCEDURE — 80048 BASIC METABOLIC PNL TOTAL CA: CPT

## 2023-01-17 RX ORDER — HYDROMORPHONE HYDROCHLORIDE 1 MG/ML
0.5 INJECTION, SOLUTION INTRAMUSCULAR; INTRAVENOUS; SUBCUTANEOUS ONCE
Status: COMPLETED | OUTPATIENT
Start: 2023-01-17 | End: 2023-01-17

## 2023-01-17 RX ORDER — DEXTROSE MONOHYDRATE 100 MG/ML
125-250 INJECTION, SOLUTION INTRAVENOUS AS NEEDED
Status: DISCONTINUED | OUTPATIENT
Start: 2023-01-17 | End: 2023-01-18 | Stop reason: HOSPADM

## 2023-01-17 RX ORDER — OXYCODONE AND ACETAMINOPHEN 5; 325 MG/1; MG/1
2 TABLET ORAL
Status: DISCONTINUED | OUTPATIENT
Start: 2023-01-17 | End: 2023-01-18

## 2023-01-17 RX ORDER — MORPHINE SULFATE 2 MG/ML
1 INJECTION, SOLUTION INTRAMUSCULAR; INTRAVENOUS
Status: DISCONTINUED | OUTPATIENT
Start: 2023-01-17 | End: 2023-01-18 | Stop reason: HOSPADM

## 2023-01-17 RX ADMIN — HYDROMORPHONE HYDROCHLORIDE 0.5 MG: 1 INJECTION, SOLUTION INTRAMUSCULAR; INTRAVENOUS; SUBCUTANEOUS at 02:43

## 2023-01-17 RX ADMIN — Medication 1000 UNITS: at 08:19

## 2023-01-17 RX ADMIN — ALBUTEROL SULFATE 2.5 MG: 2.5 SOLUTION RESPIRATORY (INHALATION) at 23:02

## 2023-01-17 RX ADMIN — MONTELUKAST 10 MG: 10 TABLET, FILM COATED ORAL at 08:19

## 2023-01-17 RX ADMIN — MORPHINE SULFATE 1 MG: 2 INJECTION, SOLUTION INTRAMUSCULAR; INTRAVENOUS at 16:13

## 2023-01-17 RX ADMIN — METHYLPREDNISOLONE SODIUM SUCCINATE 125 MG: 125 INJECTION, POWDER, FOR SOLUTION INTRAMUSCULAR; INTRAVENOUS at 05:31

## 2023-01-17 RX ADMIN — ENOXAPARIN SODIUM 40 MG: 100 INJECTION SUBCUTANEOUS at 01:27

## 2023-01-17 RX ADMIN — INSULIN LISPRO 4 UNITS: 100 INJECTION, SOLUTION INTRAVENOUS; SUBCUTANEOUS at 12:10

## 2023-01-17 RX ADMIN — IBUPROFEN 600 MG: 600 TABLET, FILM COATED ORAL at 07:35

## 2023-01-17 RX ADMIN — FLUTICASONE PROPIONATE 2 SPRAY: 50 SPRAY, METERED NASAL at 10:27

## 2023-01-17 RX ADMIN — HYDROCHLOROTHIAZIDE 12.5 MG: 25 TABLET ORAL at 08:19

## 2023-01-17 RX ADMIN — PIPERACILLIN AND TAZOBACTAM 3.38 G: 3; .375 INJECTION, POWDER, LYOPHILIZED, FOR SOLUTION INTRAVENOUS at 16:12

## 2023-01-17 RX ADMIN — PANTOPRAZOLE SODIUM 40 MG: 40 TABLET, DELAYED RELEASE ORAL at 08:19

## 2023-01-17 RX ADMIN — INSULIN LISPRO 2 UNITS: 100 INJECTION, SOLUTION INTRAVENOUS; SUBCUTANEOUS at 07:35

## 2023-01-17 RX ADMIN — SODIUM CHLORIDE, PRESERVATIVE FREE 10 ML: 5 INJECTION INTRAVENOUS at 13:58

## 2023-01-17 RX ADMIN — SODIUM CHLORIDE, PRESERVATIVE FREE 10 ML: 5 INJECTION INTRAVENOUS at 05:32

## 2023-01-17 RX ADMIN — METHYLPREDNISOLONE SODIUM SUCCINATE 40 MG: 125 INJECTION, POWDER, FOR SOLUTION INTRAMUSCULAR; INTRAVENOUS at 21:07

## 2023-01-17 RX ADMIN — SODIUM CHLORIDE 100 ML/HR: 9 INJECTION, SOLUTION INTRAVENOUS at 14:13

## 2023-01-17 RX ADMIN — SODIUM CHLORIDE, PRESERVATIVE FREE 10 ML: 5 INJECTION INTRAVENOUS at 21:08

## 2023-01-17 RX ADMIN — OXYCODONE AND ACETAMINOPHEN 1 TABLET: 5; 325 TABLET ORAL at 12:10

## 2023-01-17 RX ADMIN — IBUPROFEN 600 MG: 600 TABLET, FILM COATED ORAL at 17:22

## 2023-01-17 RX ADMIN — OXYCODONE AND ACETAMINOPHEN 2 TABLET: 5; 325 TABLET ORAL at 21:05

## 2023-01-17 RX ADMIN — ALBUTEROL SULFATE 2.5 MG: 2.5 SOLUTION RESPIRATORY (INHALATION) at 05:50

## 2023-01-17 RX ADMIN — OXYCODONE AND ACETAMINOPHEN 1 TABLET: 5; 325 TABLET ORAL at 00:28

## 2023-01-17 RX ADMIN — OXYCODONE AND ACETAMINOPHEN 2 TABLET: 5; 325 TABLET ORAL at 14:10

## 2023-01-17 RX ADMIN — FOLIC ACID 1 MG: 1 TABLET ORAL at 08:19

## 2023-01-17 RX ADMIN — PIPERACILLIN AND TAZOBACTAM 3.38 G: 3; .375 INJECTION, POWDER, LYOPHILIZED, FOR SOLUTION INTRAVENOUS at 05:31

## 2023-01-17 NOTE — PROGRESS NOTES
Problem: Falls - Risk of  Goal: *Absence of Falls  Description: Document Katelynn Mayfield Fall Risk and appropriate interventions in the flowsheet.   Outcome: Progressing Towards Goal  Note: Fall Risk Interventions:

## 2023-01-17 NOTE — PROGRESS NOTES
0700-Report received from off going nurse. Assumed care of patient. 0819-Scheduled meds given. Patient tolerated well. No other needs at this time. CBWR.     1020-Ok to discontinue tele per Tatyana Abdi. 1145-Patient up to shower at this time. N    1354-New order for Percocet 5-325 mg 2 tablets for severe pain per Dr. Tatyana Morrissey. RBVO.     1404-New order for IV morphine 1 mg q4PRN. RBVO. 1700-Patient resting in recliner at this time. NAD. CBWR.

## 2023-01-17 NOTE — PROGRESS NOTES
HOSPITALIST PROGRESS NOTE  Susan Negron MD, [address]          Daily Progress Note: 1/17/2023      Subjective:     Patient is alert and oriented x4    Currently not hypoxic however significant productive cough now with continued wheezing and asthma exacerbation. And wheezing have improved over the last 48 hours. Patient significantly immunocompromised with current chemotherapy for breast cancer and counseled on continuing IV antibiotics for bilateral pneumonia. Patient states that she was unaware of her diagnosis of pneumonia until I counseled her on 1/16/2023. No overnight fever/chills, chest pain, nausea vomiting or abdominal pain noted. Does complain of significant left shoulder and left hip pain with significant history of rheumatoid arthritis. Patient placed on Percocet overnight but states that Percocet does not seem to control pain therefore as needed morphine will be added on. Also counseled on continuing need for IV antibiotics with significant leukocytosis with increasing trend up to 22K noted.       Medications reviewed  Current Facility-Administered Medications   Medication Dose Route Frequency    methylPREDNISolone (PF) (Solu-MEDROL) injection 40 mg  40 mg IntraVENous Q12H    dextrose 10% infusion 125-250 mL  125-250 mL IntraVENous PRN    piperacillin-tazobactam (ZOSYN) 3.375 g in 0.9% sodium chloride (MBP/ADV) 100 mL MBP  3.375 g IntraVENous Q8H    oxyCODONE-acetaminophen (PERCOCET) 5-325 mg per tablet 2 Tablet  2 Tablet Oral Q4H PRN    morphine injection 1 mg  1 mg IntraVENous Q4H PRN    albuterol (PROVENTIL VENTOLIN) nebulizer solution 2.5 mg  2.5 mg Nebulization Q4H PRN    ibuprofen (MOTRIN) tablet 600 mg  600 mg Oral Q6H PRN    insulin lispro (HUMALOG) injection   SubCUTAneous AC&HS    glucose chewable tablet 16 g  4 Tablet Oral PRN    glucagon (GLUCAGEN) injection 1 mg  1 mg IntraMUSCular PRN    0.9% sodium chloride infusion  100 mL/hr IntraVENous CONTINUOUS    pantoprazole (PROTONIX) tablet 40 mg  40 mg Oral DAILY    [Held by provider] multivitamin, tx-iron-ca-min (THERA-M w/ IRON) tablet   Oral DAILY    montelukast (SINGULAIR) tablet 10 mg  10 mg Oral DAILY    hydroCHLOROthiazide (HYDRODIURIL) tablet 12.5 mg  12.5 mg Oral DAILY    folic acid (FOLVITE) tablet 1 mg  1 mg Oral DAILY    fluticasone propionate (FLONASE) 50 mcg/actuation nasal spray 2 Spray  2 Spray Both Nostrils DAILY    cholecalciferol (VITAMIN D3) (1000 Units /25 mcg) tablet 1,000 Units  1,000 Units Oral DAILY    benzonatate (TESSALON) capsule 200 mg  200 mg Oral TID PRN    sodium chloride (NS) flush 5-40 mL  5-40 mL IntraVENous Q8H    sodium chloride (NS) flush 5-40 mL  5-40 mL IntraVENous PRN    acetaminophen (TYLENOL) tablet 650 mg  650 mg Oral Q6H PRN    polyethylene glycol (MIRALAX) packet 17 g  17 g Oral DAILY PRN    ondansetron (ZOFRAN ODT) tablet 4 mg  4 mg Oral Q8H PRN    Or    ondansetron (ZOFRAN) injection 4 mg  4 mg IntraVENous Q6H PRN    enoxaparin (LOVENOX) injection 40 mg  40 mg SubCUTAneous Q24H    [Held by provider] metFORMIN (GLUCOPHAGE) tablet 500 mg  500 mg Oral BID WITH MEALS    sodium chloride (NS) flush 5-10 mL  5-10 mL IntraVENous PRN       Review of Systems:   A comprehensive review of systems was negative except for that written in the HPI. Objective:   Physical Exam:     Visit Vitals  /60   Pulse 75   Temp 97.3 °F (36.3 °C)   Resp 18   Ht 5' 5\" (1.651 m)   Wt 106.6 kg (235 lb)   LMP 2022   SpO2 99%   BMI 39.11 kg/m²      O2 Device: None (Room air)  Patient Vitals for the past 8 hrs:   Temp Pulse Resp BP SpO2   23 1411 -- -- -- 116/60 --   23 1145 97.3 °F (36.3 °C) 75 18 (!) 114/57 99 %   23 0811 96.9 °F (36.1 °C) 64 18 137/62 100 %   23 0800 -- 62 -- -- --          Temp (24hrs), Av.5 °F (36.4 °C), Min:96.9 °F (36.1 °C), Max:97.9 °F (36.6 °C)    No intake/output data recorded.    No intake/output data recorded. General:  Alert, cooperative, no distress, appears stated age. Lungs:   Diminished breath sounds with bilateral expiratory wheezing as well as mild Rales in the right and left lower lobes without any rhonchi. Chest wall:  No tenderness or deformity. Heart:  Regular rate and rhythm, S1, S2 normal, no murmur, click, rub or gallop. Abdomen:   Soft, non-tender. Bowel sounds normal. No masses,  No organomegaly. Extremities: Extremities normal, atraumatic, no cyanosis or edema. Pulses: 2+ and symmetric all extremities.    Skin: Skin color, texture, turgor normal. No rashes or lesions   Neurologic: No gross sensory or motor deficits     Data Review:       Recent Days:  Recent Labs     01/17/23  0330 01/16/23  0328 01/15/23  1450   WBC 22.7* 14.9* 13.0   HGB 8.1* 8.5* 8.7*   HCT 24.5* 26.9* 26.4*    190 188     Recent Labs     01/17/23  0330 01/16/23  0328 01/14/23 2005    139 140   K 3.9 4.3 3.7    106 105   CO2 25 24 25   * 257* 148*   BUN 20* 12 12   CREA 0.94 0.88 0.76   CA 8.6 8.9 8.4*   MG  --  2.2  --    ALB  --  3.1* 3.2*   TBILI  --  0.4 0.4   ALT  --  19 17     Recent Labs     01/14/23  1959   PH 7.43   PCO2 37   PO2 74*   HCO3 24   FIO2 21.0       24 Hour Results:  Recent Results (from the past 24 hour(s))   LACTIC ACID    Collection Time: 01/16/23  7:45 PM   Result Value Ref Range    Lactic acid 3.3 (HH) 0.4 - 2.0 mmol/L   METABOLIC PANEL, BASIC    Collection Time: 01/17/23  3:30 AM   Result Value Ref Range    Sodium 142 136 - 145 mmol/L    Potassium 3.9 3.5 - 5.5 mmol/L    Chloride 109 100 - 111 mmol/L    CO2 25 21 - 32 mmol/L    Anion gap 8 3.0 - 18.0 mmol/L    Glucose 232 (H) 74 - 99 mg/dL    BUN 20 (H) 7 - 18 mg/dL    Creatinine 0.94 0.60 - 1.30 mg/dL    BUN/Creatinine ratio 21 (H) 12 - 20      eGFR >60 >60 ml/min/1.73m2    Calcium 8.6 8.5 - 10.1 mg/dL   CBC WITH AUTOMATED DIFF    Collection Time: 01/17/23  3:30 AM   Result Value Ref Range WBC 22.7 (H) 4.6 - 13.2 K/uL    RBC 3.30 (L) 4.20 - 5.30 M/uL    HGB 8.1 (L) 12.0 - 16.0 g/dL    HCT 24.5 (L) 35.0 - 45.0 %    MCV 74.2 (L) 78.0 - 100.0 FL    MCH 24.5 24.0 - 34.0 PG    MCHC 33.1 31.0 - 37.0 g/dL    RDW 22.5 (H) 11.6 - 14.5 %    PLATELET 504 616 - 786 K/uL    NRBC 0.5 (H) 0.0  WBC    ABSOLUTE NRBC 0.11 (H) 0.00 - 0.01 K/uL    NEUTROPHILS 85 (H) 40 - 73 %    BAND NEUTROPHILS 5 0 - 5 %    LYMPHOCYTES 5 (L) 21 - 52 %    MONOCYTES 0 (L) 3 - 10 %    EOSINOPHILS 0 0 - 5 %    BASOPHILS 0 0 - 2 %    METAMYELOCYTES 3 (H) 0 %    MYELOCYTES 2 (H) 0 %    IMMATURE GRANULOCYTES 0 %    ABS. NEUTROPHILS 20.4 (H) 1.8 - 8.0 K/UL    ABS. LYMPHOCYTES 1.1 0.9 - 3.6 K/UL    ABS. MONOCYTES 0.0 (L) 0.05 - 1.2 K/UL    ABS. EOSINOPHILS 0.0 0.0 - 0.4 K/UL    ABS. BASOPHILS 0.0 0.0 - 0.1 K/UL    ABS. IMM. GRANS. 0.0 K/UL    DF AUTOMATED      RBC COMMENTS Anisocytosis  2+        RBC COMMENTS Microcytosis  2+       LACTIC ACID    Collection Time: 01/17/23  3:30 AM   Result Value Ref Range    Lactic acid 1.8 0.4 - 2.0 mmol/L   GLUCOSE, POC    Collection Time: 01/17/23  7:19 AM   Result Value Ref Range    Glucose (POC) 188 (H) 70 - 110 mg/dL    Performed by Titi Macdonald [place], POC    Collection Time: 01/17/23 11:25 AM   Result Value Ref Range    Glucose (POC) 243 (H) 70 - 110 mg/dL    Performed by Moose Rankin            Assessment/Plan:     Sepsis due to Bilateral pneumonia   CT scan showed right middle and left upper lobes with infectious inflammatory process. Lactate elevated to 4.1  Initially started vancomycin and Zosyn initially. Continue on Zosyn currently. Patient with worsening cough which is nonproductive however currently not hypoxic. COVID and influenza negative on 1/16/2023. Patient with significantly uptrending leukocytosis. Wean down Solu-Medrol from 125 every 8 hours to 40 Mg twice daily.     Elevated D-Dimer  VQ scan cancelled, pt states she can take IV dye from CT scan, she just gets anxious in an MRI tube. Bilateral PVLs of legs are negative for DVT. Asthma exacerbation  Patient has a history of asthma  DuoNeb as needed  Decrease Solu-Medrol from 125 Mg 8 hours to 40 Mg every 12 hours. Telemetry     History of breast cancer  Patient undergoing chemotherapy for breast cancer  Next chemotherapy on Friday. Patient followed by [facility] oncology. Rheumatoid arthritis  Started on Percocet and IV morphine as needed for continued left shoulder and left hip pain. Type II diabetes mellitus (HCC)  Sliding scale AC at bedtime  Hold oral hypoglycemics at this time  Diabetic diet     Code Status: Full     DVT Prophylaxis:   Lovenox    Counseled patient that likely she can be discharged home once leukocytosis is downtrending with significant immunocompromise and history of breast cancer on chemotherapy. Care Plan discussed with: Patient/Family, Nurse, and     Total time spent with patient: 40 minutes. With greater than 50% spent in coordination of care and counseling.     Garrison Doty MD

## 2023-01-17 NOTE — PROGRESS NOTES
Called by nursing staff earlier regarding patient with complaint of pain in her arms and legs. Patient recently diagnosed with rheumatoid arthritis back in the summer. In to speak with patient regarding addition of Percocet every 6 hours as needed. Patient mentioned that she was given Dilaudid back in the summer when she was admitted for gallstones and that it seemed to help. Educated patient on IV versus oral pain medications, she seems to understand and is okay with Percocets at this time.

## 2023-01-17 NOTE — PROGRESS NOTES
Patient is unable to communicate at this time.  offered silent prayer and left Spiritual Care brochure. Chaplains will continue to follow and will provide pastoral care on an as needed/requested basis.     [facility]   Staff [address] [facility]   [phone]

## 2023-01-17 NOTE — PROGRESS NOTES
1900 Assumed care of pt from off going nurse Esa Reis LPN. Pt is A&Ox4 with no c/o at this time. 2000 Pt given PRN ibuprofen. Tolerated well. 46 Pt called to report that her arms are still hurting as well as her left leg now and something we are giving her is upsetting her RA and needs something for pain that is not going away. Provider notified and new orders for 15 mg of toradol now. 0028 PT called c/o pain 10/10 in her arms and shoulders and would like me to call the provider a stronger medication for pain. She feels like the pain is getting worse and not better. Provider notified. New orders for percocet  1 tab of 5-325 PRN. 0243 Pt called to speak to provider about pain level that is not going away and still 10/10. States that if she does not get any relief that she would like to transfer hospitals. Provider notified. New orders for 0.5 mg of dilaudid once. Bedside and Verbal shift change report given to ZAKI Borwning LPN (oncoming nurse) by Leonardo Randolph RN (offgoing nurse). Report included the following information SBAR, Kardex, Intake/Output, MAR, and Recent Results.

## 2023-01-18 VITALS
TEMPERATURE: 96.8 F | RESPIRATION RATE: 18 BRPM | OXYGEN SATURATION: 97 % | DIASTOLIC BLOOD PRESSURE: 83 MMHG | BODY MASS INDEX: 42.02 KG/M2 | WEIGHT: 252.2 LBS | SYSTOLIC BLOOD PRESSURE: 134 MMHG | HEIGHT: 65 IN | HEART RATE: 77 BPM

## 2023-01-18 LAB
ANION GAP SERPL CALC-SCNC: 12 MMOL/L (ref 3–18)
BASOPHILS # BLD: 0 K/UL (ref 0–0.1)
BASOPHILS NFR BLD: 0 % (ref 0–2)
BUN SERPL-MCNC: 18 MG/DL (ref 7–18)
BUN/CREAT SERPL: 19 (ref 12–20)
CA-I BLD-MCNC: 8.9 MG/DL (ref 8.5–10.1)
CHLORIDE SERPL-SCNC: 105 MMOL/L (ref 100–111)
CO2 SERPL-SCNC: 26 MMOL/L (ref 21–32)
CREAT SERPL-MCNC: 0.95 MG/DL (ref 0.6–1.3)
DIFFERENTIAL METHOD BLD: ABNORMAL
EOSINOPHIL # BLD: 0 K/UL (ref 0–0.4)
EOSINOPHIL NFR BLD: 0 % (ref 0–5)
ERYTHROCYTE [DISTWIDTH] IN BLOOD BY AUTOMATED COUNT: 22.3 % (ref 11.6–14.5)
GLUCOSE BLD STRIP.AUTO-MCNC: 129 MG/DL (ref 70–110)
GLUCOSE BLD STRIP.AUTO-MCNC: 130 MG/DL (ref 70–110)
GLUCOSE SERPL-MCNC: 167 MG/DL (ref 74–99)
HCT VFR BLD AUTO: 25 % (ref 35–45)
HGB BLD-MCNC: 8.2 G/DL (ref 12–16)
IMM GRANULOCYTES # BLD AUTO: 0 K/UL
IMM GRANULOCYTES NFR BLD AUTO: 0 %
LYMPHOCYTES # BLD: 0.8 K/UL (ref 0.9–3.6)
LYMPHOCYTES NFR BLD: 4 % (ref 21–52)
MCH RBC QN AUTO: 24.4 PG (ref 24–34)
MCHC RBC AUTO-ENTMCNC: 32.8 G/DL (ref 31–37)
MCV RBC AUTO: 74.4 FL (ref 78–100)
MONOCYTES # BLD: 1.3 K/UL (ref 0.05–1.2)
MONOCYTES NFR BLD: 6 % (ref 3–10)
NEUTS BAND NFR BLD MANUAL: 3 % (ref 0–5)
NEUTS SEG # BLD: 19 K/UL (ref 1.8–8)
NEUTS SEG NFR BLD: 86 % (ref 40–73)
NRBC # BLD: 0.18 K/UL (ref 0–0.01)
NRBC # BLD: 0.21 K/UL
NRBC BLD MANUAL-RTO: 1 PER 100 WBC
NRBC BLD-RTO: 0.8 PER 100 WBC
OTHER CELLS NFR BLD MANUAL: 1 %
PERFORMED BY, TECHID: ABNORMAL
PERFORMED BY, TECHID: ABNORMAL
PLATELET # BLD AUTO: 114 K/UL (ref 135–420)
PLATELET COMMENTS,PCOM: ABNORMAL
POTASSIUM SERPL-SCNC: 3.8 MMOL/L (ref 3.5–5.5)
RBC # BLD AUTO: 3.36 M/UL (ref 4.2–5.3)
RBC MORPH BLD: ABNORMAL
SODIUM SERPL-SCNC: 143 MMOL/L (ref 136–145)
WBC # BLD AUTO: 21.1 K/UL (ref 4.6–13.2)

## 2023-01-18 PROCEDURE — 74011250636 HC RX REV CODE- 250/636: Performed by: NURSE PRACTITIONER

## 2023-01-18 PROCEDURE — 74011250637 HC RX REV CODE- 250/637: Performed by: INTERNAL MEDICINE

## 2023-01-18 PROCEDURE — 94640 AIRWAY INHALATION TREATMENT: CPT

## 2023-01-18 PROCEDURE — 74011000250 HC RX REV CODE- 250: Performed by: INTERNAL MEDICINE

## 2023-01-18 PROCEDURE — 36415 COLL VENOUS BLD VENIPUNCTURE: CPT

## 2023-01-18 PROCEDURE — 74011000258 HC RX REV CODE- 258: Performed by: INTERNAL MEDICINE

## 2023-01-18 PROCEDURE — 74011000250 HC RX REV CODE- 250: Performed by: NURSE PRACTITIONER

## 2023-01-18 PROCEDURE — 82962 GLUCOSE BLOOD TEST: CPT

## 2023-01-18 PROCEDURE — 74011250637 HC RX REV CODE- 250/637

## 2023-01-18 PROCEDURE — 74011250637 HC RX REV CODE- 250/637: Performed by: NURSE PRACTITIONER

## 2023-01-18 PROCEDURE — 80048 BASIC METABOLIC PNL TOTAL CA: CPT

## 2023-01-18 PROCEDURE — 85025 COMPLETE CBC W/AUTO DIFF WBC: CPT

## 2023-01-18 PROCEDURE — 74011250636 HC RX REV CODE- 250/636: Performed by: INTERNAL MEDICINE

## 2023-01-18 RX ORDER — AZITHROMYCIN 250 MG/1
TABLET, FILM COATED ORAL
Qty: 6 TABLET | Refills: 0 | Status: SHIPPED | OUTPATIENT
Start: 2023-01-18 | End: 2023-01-23

## 2023-01-18 RX ORDER — HYDROMORPHONE HYDROCHLORIDE 4 MG/1
4 TABLET ORAL
Qty: 30 TABLET | Refills: 0 | Status: SHIPPED | OUTPATIENT
Start: 2023-01-18 | End: 2023-01-25

## 2023-01-18 RX ORDER — HYDROMORPHONE HYDROCHLORIDE 4 MG/1
TABLET ORAL
Status: COMPLETED
Start: 2023-01-18 | End: 2023-01-18

## 2023-01-18 RX ORDER — HYDROMORPHONE HYDROCHLORIDE 4 MG/1
4 TABLET ORAL
Status: COMPLETED | OUTPATIENT
Start: 2023-01-18 | End: 2023-01-18

## 2023-01-18 RX ORDER — AMOXICILLIN AND CLAVULANATE POTASSIUM 875; 125 MG/1; MG/1
1 TABLET, FILM COATED ORAL EVERY 12 HOURS
Qty: 12 TABLET | Refills: 0 | Status: SHIPPED | OUTPATIENT
Start: 2023-01-18 | End: 2023-01-24

## 2023-01-18 RX ORDER — METHYLPREDNISOLONE 4 MG/1
TABLET ORAL
Qty: 1 DOSE PACK | Refills: 0 | Status: SHIPPED | OUTPATIENT
Start: 2023-01-18

## 2023-01-18 RX ORDER — HYDROMORPHONE HYDROCHLORIDE 4 MG/1
4 TABLET ORAL
Status: DISCONTINUED | OUTPATIENT
Start: 2023-01-18 | End: 2023-01-18 | Stop reason: HOSPADM

## 2023-01-18 RX ADMIN — HYDROMORPHONE HYDROCHLORIDE 4 MG: 4 TABLET ORAL at 09:16

## 2023-01-18 RX ADMIN — IBUPROFEN 600 MG: 600 TABLET, FILM COATED ORAL at 03:55

## 2023-01-18 RX ADMIN — Medication 1000 UNITS: at 08:09

## 2023-01-18 RX ADMIN — IBUPROFEN 600 MG: 600 TABLET, FILM COATED ORAL at 10:28

## 2023-01-18 RX ADMIN — SODIUM CHLORIDE, PRESERVATIVE FREE 10 ML: 5 INJECTION INTRAVENOUS at 06:19

## 2023-01-18 RX ADMIN — HYDROMORPHONE HYDROCHLORIDE 4 MG: 4 TABLET ORAL at 14:30

## 2023-01-18 RX ADMIN — MONTELUKAST 10 MG: 10 TABLET, FILM COATED ORAL at 08:09

## 2023-01-18 RX ADMIN — METHYLPREDNISOLONE SODIUM SUCCINATE 40 MG: 125 INJECTION, POWDER, FOR SOLUTION INTRAMUSCULAR; INTRAVENOUS at 08:10

## 2023-01-18 RX ADMIN — SODIUM CHLORIDE, PRESERVATIVE FREE 10 ML: 5 INJECTION INTRAVENOUS at 06:18

## 2023-01-18 RX ADMIN — FOLIC ACID 1 MG: 1 TABLET ORAL at 08:09

## 2023-01-18 RX ADMIN — ALBUTEROL SULFATE 2.5 MG: 2.5 SOLUTION RESPIRATORY (INHALATION) at 07:12

## 2023-01-18 RX ADMIN — OXYCODONE AND ACETAMINOPHEN 2 TABLET: 5; 325 TABLET ORAL at 00:36

## 2023-01-18 RX ADMIN — ENOXAPARIN SODIUM 40 MG: 100 INJECTION SUBCUTANEOUS at 00:37

## 2023-01-18 RX ADMIN — HYDROCHLOROTHIAZIDE 12.5 MG: 25 TABLET ORAL at 08:09

## 2023-01-18 RX ADMIN — PIPERACILLIN AND TAZOBACTAM 3.38 G: 3; .375 INJECTION, POWDER, LYOPHILIZED, FOR SOLUTION INTRAVENOUS at 00:37

## 2023-01-18 RX ADMIN — FLUTICASONE PROPIONATE 2 SPRAY: 50 SPRAY, METERED NASAL at 09:17

## 2023-01-18 RX ADMIN — PANTOPRAZOLE SODIUM 40 MG: 40 TABLET, DELAYED RELEASE ORAL at 08:09

## 2023-01-18 RX ADMIN — PIPERACILLIN AND TAZOBACTAM 3.38 G: 3; .375 INJECTION, POWDER, LYOPHILIZED, FOR SOLUTION INTRAVENOUS at 08:10

## 2023-01-18 RX ADMIN — ACETAMINOPHEN 650 MG: 325 TABLET ORAL at 08:09

## 2023-01-18 NOTE — PROGRESS NOTES
Problem: Falls - Risk of  Goal: *Absence of Falls  Description: Document Geoff Arvizuportillo Fall Risk and appropriate interventions in the flowsheet.   Outcome: Progressing Towards Goal  Note: Fall Risk Interventions:                                Problem: Asthma: Discharge Outcomes  Goal: *Demonstrates progressive activity  Outcome: Progressing Towards Goal  Goal: *Participates in self care  Outcome: Progressing Towards Goal  Goal: *Verbalizes understanding and describes prescribed diet  Outcome: Progressing Towards Goal  Goal: *Tolerating diet  Outcome: Progressing Towards Goal

## 2023-01-18 NOTE — DISCHARGE SUMMARY
Discharge Summary       PATIENT ID: Yvonne Gould  MRN: [id number]   YOB: 1980    DATE OF ADMISSION: 1/14/2023  7:19 PM    DATE OF DISCHARGE: 01/18/23    PRIMARY CARE PROVIDER: Kusum Hartman MD     ATTENDING PHYSICIAN: Dee Lock MD  DISCHARGING PROVIDER: Dee Lock MD        CONSULTATIONS: None    PROCEDURES/SURGERIES: * No surgery found *    ADMITTING [address] [place] [address] COURSE:   Yvonne Gould is a 40 y.o. female  has a past medical history of Asthma, History of partial nephrectomy (10/24/2018), Hyperthyroidism, Malignant tumor of kidney ([facility] (10/24/2018), Polycystic ovary syndrome, Renal mass, and Rheumatoid arthritis ([facility] Patient is at bedside in emergency department. Patient came to emergency room tonight with shortness of breath. Patient had COVID after [place] and was diagnosed with the flu on Wednesday of this week that is now almost 4 days ago. Patient was on Tamiflu and doxycycline. Patient improved after continuous albuterol treatments steroids in the emergency room. Patient's chest x-ray is clear for any infiltrate or fluid overload. UA is negative for infection. Lactate is slightly elevated. No leukocytosis, no neutropenia, no fever, no indication for continuation of antibiotics. Patient is a chemotherapy patient for breast cancer her next chemotherapy is on Thursday. I will admit patient for IV hydration, treatment of asthma exacerbation, CT ordered. Patient states she only gets a little anxious mainly from the tube from MRI and got nauseous from MRI dye. Patient states she has had multiple CTs with diet without any problems. I will order a CTA of the chest to be done on her way to the floor. PVLs will be ordered. Felix Colon DISCHARGE DIAGNOSES / PLAN:      Assessment/Plan:      Sepsis due to Bilateral pneumonia   CT scan showed right middle and left upper lobes with infectious inflammatory process.   Lactate elevated to 4.1  Initially started vancomycin and Zosyn initially. Continue on Zosyn currently. Patient with worsening cough which is nonproductive however currently not hypoxic. COVID and influenza negative on 1/16/2023. Patient with significantly uptrending leukocytosis. Wean down Solu-Medrol from 125 every 8 hours to 40 Mg twice daily. Elevated D-Dimer  VQ scan cancelled, pt states she can take IV dye from CT scan, she just gets anxious in an MRI tube. Bilateral PVLs of legs are negative for DVT. Asthma exacerbation  Patient has a history of asthma  DuoNeb as needed  Decrease Solu-Medrol from 125 Mg 8 hours to 40 Mg every 12 hours. Telemetry     History of breast cancer  Patient undergoing chemotherapy for breast cancer  Next chemotherapy on Friday. Patient followed by [address] [place] [address] oncology. Rheumatoid arthritis  Started on Percocet and IV morphine as needed for continued left shoulder and left hip pain. Type II diabetes mellitus (HCC)  Sliding scale AC at bedtime  Hold oral hypoglycemics at this time  Diabetic diet     Code Status: Full     DVT Prophylaxis:   Lovenox     Counseled patient that likely she can be discharged home once leukocytosis is downtrending with significant immunocompromise and history of breast cancer on chemotherapy. [id number] [address] for [employer] home  Suspect wbc elevation is from steriods, not uncontrolled infection  Use augmentin/zpak on dc  Steriods  Pain meds  Will fu with rheum for her joint pain    FOLLOW UP APPOINTMENTS:    Follow-up Information       Follow up With Specialties Details Why Contact Info    Tammy Rico MD [facility]  [phone]                 DIET: Resume previous diet        DISCHARGE MEDICATIONS:  Current Discharge Medication List        START taking these medications    Details   HYDROmorphone (DILAUDID) 4 mg tablet Take 1 Tablet by mouth every four (4) hours as needed for Pain for up to 7 days.  Max Daily Amount: 24 mg.  Qty: 30 Tablet, Refills: 0  Start date: 1/18/2023, End date: 1/25/2023    Associated Diagnoses: History of breast cancer      amoxicillin-clavulanate (Augmentin) 875-125 mg per tablet Take 1 Tablet by mouth every twelve (12) hours for 6 days. Qty: 12 Tablet, Refills: 0  Start date: 1/18/2023, End date: 1/24/2023      azithromycin (ZITHROMAX) 250 mg tablet Take as directed on packaging. Qty: 6 Tablet, Refills: 0  Start date: 1/18/2023, End date: 1/23/2023      methylPREDNISolone (Medrol, Ashutosh,) 4 mg tablet Take as directed on packaging. Qty: 1 Dose Pack, Refills: 0  Start date: 1/18/2023           CONTINUE these medications which have NOT CHANGED    Details   benzonatate (TESSALON) 100 mg capsule May take 1 cap every 8 hours for breath through cough  Qty: 15 Capsule, Refills: 0    Associated Diagnoses: COVID-19; Acute cough      naproxen (NAPROSYN) 500 mg tablet Take 1 Tablet by mouth every twelve (12) hours as needed for Pain. Qty: 20 Tablet, Refills: 0      omeprazole (PRILOSEC) 20 mg capsule Take 1 Capsule by mouth daily. Qty: 20 Capsule, Refills: 0      ProAir HFA 90 mcg/actuation inhaler       dexAMETHasone (DECADRON) 4 mg tablet Takes for 3 days following chemo      folic acid (FOLVITE) 1 mg tablet Take 1 mg by mouth daily. ondansetron hcl (ZOFRAN) 8 mg tablet Starting the second day after chemo cycle finishes, take 8 mg every 8 hours as needed for nausea/vomiting      cholecalciferol (VITAMIN D3) 25 mcg (1,000 unit) cap Take  by mouth daily. hydroCHLOROthiazide (HYDRODIURIL) 12.5 mg tablet Take 1 Tablet by mouth daily.   Qty: 90 Tablet, Refills: 1    Associated Diagnoses: Peripheral edema      montelukast (SINGULAIR) 10 mg tablet TAKE 1 TABLET BY MOUTH DAILY  Qty: 90 Tablet, Refills: 0    Comments: **Patient requests 90 days supply**  Associated Diagnoses: Mild intermittent asthma without complication      metFORMIN ER (GLUCOPHAGE XR) 750 mg tablet Take 750 mg by mouth two (2) times daily (with meals). fluticasone propionate (FLONASE) 50 mcg/actuation nasal spray 2 Sprays by Both Nostrils route daily. Qty: 1 Each, Refills: 11      multivitamin capsule Take 1 Capsule by mouth daily. Qty: 30 Capsule, Refills: 0      Linzess 290 mcg cap capsule TAKE 1 CAPSULE BY MOUTH EVERY DAY  Qty: 90 Capsule, Refills: 3               NOTIFY YOUR PHYSICIAN FOR ANY OF THE FOLLOWING:   Fever over 101 degrees for 24 hours. Chest pain, shortness of breath, fever, chills, nausea, vomiting, diarrhea, change in mentation, falling, weakness, bleeding. Severe pain or pain not relieved by medications. Or, any other signs or symptoms that you may have questions about. DISPOSITION:home    Home With:   OT  PT  HH  RN       Long term SNF/Inpatient Rehab    Independent/assisted living    Hospice    Other:       PATIENT CONDITION AT DISCHARGE:     Functional status    Poor     Deconditioned    x Independent      Cognition   x  Lucid     Forgetful     Dementia      Catheters/lines (plus indication)    Barrios     PICC     PEG    x None      Code status   x  Full code     DNR      PHYSICAL EXAMINATION AT DISCHARGE:  General:          Alert, cooperative, no distress, appears stated age. HEENT:           Atraumatic, anicteric sclerae, pink conjunctivae                          No oral ulcers, mucosa moist, throat clear, dentition fair  Neck:               Supple, symmetrical  Lungs:             Clear to auscultation bilaterally. No Wheezing or Rhonchi. No rales. Chest wall:      No tenderness  No Accessory muscle use. Heart:              Regular  rhythm,  No  murmur   No edema  Abdomen:        Soft, non-tender. Not distended. Bowel sounds normal  Extremities:     r shoulder limited rom due to pain  Skin:                Not pale. Not Jaundiced  No rashes   Psych:             Not anxious or agitated.   Neurologic:      Alert, moves all extremities, answers questions appropriately and responds to commands       CHRONIC MEDICAL DIAGNOSES:  Problem List as of 1/18/2023 Date Reviewed: 12/28/2022            Codes Class Noted - Resolved    SOB (shortness of breath) ICD-10-CM: R06.02  ICD-9-CM: 786.05  1/15/2023 - Present        Asthma exacerbation ICD-10-CM: J45.901  ICD-9-CM: 493.92  1/15/2023 - Present        Suspected pulmonary embolism ICD-10-CM: R09.89  ICD-9-CM: 786.9  1/15/2023 - Present        History of breast cancer ICD-10-CM: Z85.3  ICD-9-CM: V10.3  1/15/2023 - Present        Type II diabetes mellitus ([facility] ICD-10-CM: E11.9  ICD-9-CM: 250.00  1/15/2023 - Present        PNA (pneumonia) ICD-10-CM: J18.9  ICD-9-CM: 486  1/15/2023 - Present        * (Principal) Sepsis due to pneumonia [facility]) ICD-10-CM: J18.9, A41.9  ICD-9-CM: 024, 995.91  1/15/2023 - Present        Severe obesity ([facility] ICD-10-CM: E66.01  ICD-9-CM: 278.01  7/10/2020 - Present        Obesity (BMI 35.0-39.9 without comorbidity) ICD-10-CM: E66.9  ICD-9-CM: 278.00  4/9/2020 - Present        Moderate persistent asthma without complication [facility]-61-FS: L99.87  ICD-9-CM: 493.90  4/9/2020 - Present        History of partial nephrectomy ICD-10-CM: Z90.5  ICD-9-CM: V15.29  10/24/2018 - Present        Malignant tumor of kidney ([facility] ICD-10-CM: C64.9  ICD-9-CM: 189.0  10/24/2018 - Present        Renal mass, left ICD-10-CM: N28.89  ICD-9-CM: 593.9  12/7/2017 - Present        Renal mass ICD-10-CM: N28.89  ICD-9-CM: 593.9  Unknown - Present        Hyperthyroidism ICD-10-CM: E05.90  ICD-9-CM: 242.90  Unknown - Present        Polycystic ovary syndrome ICD-10-CM: E28.2  ICD-9-CM: 256.4  Unknown - Present           Greater than 35 minutes were spent with the patient on counseling and coordination of care    Signed:   Ora Roberson MD  1/18/2023  2:02 PM

## 2023-01-18 NOTE — PROGRESS NOTES
0715: Received care of patient. Resting in bed. Currently receiving breathing treatment. Respirations even and unlabored. Reports productive cough. Denies CP. Provided fresh water and Ice. CBWR.     0813: Called to patients room. IV to RAC painful. No reddness noted. Dc'd IV site. Am medications administered per order. Reports chronic pain to joints and breast. PO tylenol administered. Rated pain 8/10 on numeric pain scale. 0920: Discussed pain control with patient and MD. Changed pain medications to PO dilaudid. Rated pain 8/10 on numeric pain scale to hands, arms and legs. 1030: Report continued pain to joints rate 5/10 on numeric pain scale. Administered PO Motrin and warm blanket. 1300: Patient ok to be discharge. Reports pain better controlled. Patient to schedule follow-up appointments with her rheumatologist and oncologist.     9924: PO dilaudid administered for pain to body rated 6/10 on pain scale. 1450: Patient off unit via wheelchair with all belongings.

## 2023-01-18 NOTE — PROGRESS NOTES
1900 - Assumed care of pt, shift report given    1935 - Assessment completed. Pt resting in bed watching TV, SO at bedside. LS - scattered wheezing. Skin clean, dry and intact. A&Ox4. +1 BLE non pitting edema and trace edema to right hand. PIV flushes well with blood return. Fresh ice water given. Pt voiced concerns about plan of care and frustration of not getting better, opportunity for questions provided and spoke to pt at length about plan of care, pt satisfied at this time    2110 - PRN percocet given per pt request for arm, shoulder, hip and leg pain. SSI held for .     2302 - RT at bedside for prn tx per pt request    0100 - prn percocet given for 10/10 pain per pt request. Pt c/o being very anxious and ready to go home. Pt states her IV is bothering her and not allowing her to rest, PIV still flushing with blood return. Spoke to hospitalist, OK to D/C IVF. IVF disconnected. Sat with pt and spoke to her at length about options to make her more comfortable in bed and how we are trying to group care to encourage sleep. Pt given warm blankets and extra pillows. Fresh ice water given. Denies any further needs at this time. CBWR     0430 - Lab at bedside. VSS. Pt up in chair. Pt states she is \"achy\" prn motrin given. Pt back to bed. Morning weight obtained. 2930 - Antibiotic completed. PIV flushed without issue. Pt denies any needs at this time.  CBWR

## 2023-01-19 ENCOUNTER — PATIENT OUTREACH (OUTPATIENT)
Dept: CASE MANAGEMENT | Age: 43
End: 2023-01-19

## 2023-01-19 NOTE — PROGRESS NOTES
Care Transitions Initial Call    Call within 2 business days of discharge: Yes     Care Transitions Nurse (CTN) attempted to contact the patient by telephone to perform post hospital discharge assessment. Unable to reach. Left [facility] requesting a return call. The patient listed Haydee Granda on PHI release, but there is no associated phone number listed. Patient: Miracle Rojas Patient : 1980 MRN: [id number]    Last Discharge [address]       Date Complaint Diagnosis Description Type Department Provider    23 Shortness of Breath; Cough Moderate persistent asthma with status asthmaticus . .. ED to Hosp-Admission (Discharged) (ADMIT) [facility] Christine Jacques MD; JEAN-CLAUDE Clark Patient was admitted to [facility] from 23 to 23 for sepsis 2/2 bilateral PNA, recent flu diagnosis. Was this an external facility discharge? No Discharge Facility: [facility]      COVID-19 related testing was available at this time. Test results were negative. Advance Care Planning:   Does patient have an Advance Directive: not on file. Inpatient Readmission Risk score: Unplanned Readmit Risk Score: 17.4    Was this a readmission? no       Patients top risk factors for readmission: medical condition-Sepsis/PNA, Asthma, Breast Cancer-undergoing chemotherapy        Home Health/Outpatient orders at discharge: none      Durable Medical Equipment ordered at discharge: None      If no appointment was previously scheduled, appointment scheduling offered:  yes, referred to  . Is follow up appointment scheduled within 7 days of discharge? no.   [facility] follow up appointment(s):   Future Appointments   Date Time Provider Shanon Mcgregor   2023  1:00 PM SHANNON Huynh     [facility] follow up appointment(s): Hematologist Dr. Gloria Powers on 23    Plan for follow-up call in 1-2 days based on severity of symptoms and risk factors.   Plan for next call:  Complete initial [facility] outreach. CTN provided contact information for future needs.

## 2023-01-20 ENCOUNTER — PATIENT OUTREACH (OUTPATIENT)
Dept: CASE MANAGEMENT | Age: 43
End: 2023-01-20

## 2023-01-20 LAB
BACTERIA SPEC CULT: NORMAL
BACTERIA SPEC CULT: NORMAL
SPECIAL REQUESTS,SREQ: NORMAL
SPECIAL REQUESTS,SREQ: NORMAL

## 2023-01-20 RX ORDER — ALBUTEROL SULFATE 0.83 MG/ML
2.5 SOLUTION RESPIRATORY (INHALATION)
COMMUNITY
Start: 2023-01-11

## 2023-01-20 RX ORDER — TRAMADOL HYDROCHLORIDE 50 MG/1
50 TABLET ORAL
COMMUNITY
Start: 2023-01-18

## 2023-01-20 RX ORDER — KETOROLAC TROMETHAMINE 10 MG/1
10 TABLET, FILM COATED ORAL
COMMUNITY
End: 2023-01-20 | Stop reason: CLARIF

## 2023-01-20 NOTE — PROGRESS NOTES
Care Transitions Initial Call    Call within 2 business days of discharge: Yes     Patient: Rick Matos Patient : 1980 MRN: [id number]    Last Discharge [address]       Date Complaint Diagnosis Description Type Department Provider    23 Shortness of Breath; Cough Moderate persistent asthma with status asthmaticus . .. ED to Hosp-Admission (Discharged) (ADMIT) [facility] Jalil Barcenas MD; JEAN-CLAUDE Clark Patient was admitted to [facility] from 23 to 23 for sepsis 2/2 bilateral PNA, recent flu diagnosis. Was this an external facility discharge? No Discharge Facility: [facility]    Challenges to be reviewed by the provider   Additional needs identified to be addressed with provider: no  none         Method of communication with provider : none    COVID-19 related testing was available at this time. Test results were negative. Advance Care Planning:   Does patient have an Advance Directive: not on file. Inpatient Readmission Risk score: Unplanned Readmit Risk Score: 17.4    Was this a readmission? no   Patient stated reason for the admission: Chronic cough. I had flu and the cough kept getting worse. They saw that I had pneumonia. I had an arthritis flare up while I was there. Patients top risk factors for readmission:  medical condition-Sepsis/PNA, Asthma, Breast Cancer-undergoing chemotherapy    Interventions to address risk factors: Scheduled appointment with PCP-Referred to . Patient states a plan to call Monday to schedule appt if she does not receive a call before then., Obtained and reviewed discharge summary and/or continuity of care documents, Education of patient/family/caregiver/guardian to support self-management-Encouraged patient to obtain an ICS, reviewed reason/benefits of using it, and advised using it 10x/hr while awake. Reviewed use of Dilaudid for severe pain and Tramadol for moderate pain.  Advised patient against using them together and she stated she was aware of this info. Advised patient to notify oncologist that she is taking steroids currently since she also takes another type of steroid after each chemo session. , and Assessment and support for treatment adherence and medication management-Reviewed patient's current PNA symptoms, noted below in goal. She reports feeling \"pretty good\" in relation to her PNA and feels she is improving. Her main concern is RA flare up causing aching pain and inflammation in her R arm. She saw her RA specialist yesterday and will go today to have labs drawn, after which a treatment plan will be developed. She was concerned initially that the IV in her R arm had been infiltrated, but states the nurse in the hospital advised her it was not infiltrated. Reports IV site has bruising, but denies redness and drainage. The swelling in her RUE is improving. She is taking Dilaudid for pain control, applying a warm compress, and has Tramadol at home, although she has not taken any. She reports drinking fluids well, mostly water and OJ. She plans to take an albuterol neb treatment shortly. She states a plan to call her oncologist today to schedule her next chemo session and to ask when she may return to work. She works in an isolated office and wears a mask, but understands that her immune system is compromised and does not want to risk getting sick. Care Transition Nurse (CTN) contacted the patient by telephone to perform post hospital discharge assessment. Verified name and  with patient as identifiers. Provided introduction to self, and explanation of the CTN role. CTN reviewed discharge instructions, medical action plan and red flags with patient who verbalized understanding. Were discharge instructions available to patient? yes. Reviewed appropriate site of care based on symptoms and resources available to patient including: PCP, Specialist, and When to call 911.  Patient given an opportunity to ask questions and does not have any further questions or concerns at this time. The patient agrees to contact the PCP office for questions related to their healthcare. Medication reconciliation was performed with patient, who verbalizes understanding of administration of home medications. Advised obtaining a 90-day supply of all daily and as-needed medications. Referral to Pharm D needed: no     Home Health/Outpatient orders at discharge: none      Durable Medical Equipment ordered at discharge: None      Was patient discharged with a pulse oximeter? no    Discussed follow-up appointments. If no appointment was previously scheduled, appointment scheduling offered:  yes, referred to  . Is follow up appointment scheduled within 7 days of discharge? no.   [facility][1] follow up appointment(s):   Future Appointments   Date Time Provider Shanon Mcgregor   11/6/2023  1:00 PM Juan M Lowry, Aysha [facility] [employer]     [facility] follow up appointment(s):   Chemo TBD      Plan for follow-up call in 5-7 days based on severity of symptoms and risk factors. Plan for next call: symptom management-assess for PNA red flags, self management-review fluid intake, use of neb treatments/inhaler, ICS, and follow up appointment-confirm scheduling of appts with PCP and oncologist/chemo  CTN provided contact information for future needs. Goals Addressed                   This Visit's Progress     Attends follow-up appointments as directed. Patient will schedule and attend recommended follow up appointments over the next 30 days. 01/20/23  Referred to  to set up appt with PCP Dr. Iliana Miranda or associate, as Dr. Viviana Luis has been out on maternity leave per patient. Patient states a plan to call the office Monday to schedule appt if she does not receive a call before then. Patient states a plan to contact her oncologist today to schedule an appt to resume chemotherapy. Understands red flags post discharge. Patient will monitor for red flags r/t PNA which includes fever/chills/sweating, SOB/MIN, chest tightness, wheezing, productive cough, green/brown mucous, and will notify a provider or call 911.    01/20/23  Patient reports feeling pretty good. She has a productive cough with light colored yellowish mucous. Reports chest congestion/minimal tightness. She denies any red flags and feels she is improving.

## 2023-01-26 ENCOUNTER — PATIENT OUTREACH (OUTPATIENT)
Dept: CASE MANAGEMENT | Age: 43
End: 2023-01-26

## 2023-01-26 NOTE — PROGRESS NOTES
Care Transitions Follow Up Call    Challenges to be reviewed by the provider   Additional needs identified to be addressed with provider: no  none           Method of communication with provider : none    Care Transition Nurse (CTN) contacted the patient by telephone to follow up after admission on 23. Verified name and  with patient as identifiers. Addressed changes since last contact: none  Follow up appointment completed? no.   Was follow up appointment scheduled within 7 days of discharge? no.     Advance Care Planning:   Does patient have an Advance Directive:   not on file    CTN reviewed discharge instructions, medical action plan and red flags with patient and discussed any barriers to care and/or understanding of plan of care after discharge. Discussed appropriate site of care based on symptoms and resources available to patient including: PCP. The patient agrees to contact the PCP office for questions related to their healthcare. Patients top risk factors for readmission:  medical condition-Sepsis/PNA, Asthma, Breast Cancer-undergoing chemotherapy    Interventions to address risk factors: Assessment and support for treatment adherence and medication management-Patient is doing well, details noted below in goal. She plans to call this week to schedule PCP appt. [facility] follow up appointment(s):   Future Appointments   Date Time Provider Shanon Mcgregor   2023  1:00 PM SHANNON Constantino     [facility] follow up appointment(s): Chemotherapy TBD    CTN provided contact information for future needs. Plan for follow-up call in 7-10 days based on severity of symptoms and risk factors. Plan for next call: symptom management-assess for PNA red flags and follow up appointment-confirm scheduling of PCP appt       Goals Addressed                   This Visit's Progress     Attends follow-up appointments as directed.         Patient will schedule and attend recommended follow up appointments over the next 30 days. 01/20/23  Referred to  to set up appt with PCP Dr. Anjali Galvin or associate, as Dr. Armandina Dance has been out on maternity leave per patient. Patient states a plan to call the office Monday to schedule appt if she does not receive a call before then. Patient states a plan to contact her oncologist today to schedule an appt to resume chemotherapy. 01/26/23  Patient has been working on scheduling a dental appt and states a plan to call today or tomorrow to schedule appt with PCP for next week hopefully. Understands red flags post discharge. On track     Patient will monitor for red flags r/t PNA which includes fever/chills/sweating, SOB/MIN, chest tightness, wheezing, productive cough, green/brown mucous, and will notify a provider or call 911.    01/20/23  Patient reports feeling pretty good. She has a productive cough with light colored yellowish mucous. Reports chest congestion/minimal tightness. She denies any red flags and feels she is improving. 01/26/23  Patient reports feeling well, getter stronger, had has great activity tolerance. Cough has resolved. She had minimal wheezing last night and used the inhaler, which was effective. She returned to work this week.

## 2023-02-01 ENCOUNTER — PATIENT OUTREACH (OUTPATIENT)
Dept: CASE MANAGEMENT | Age: 43
End: 2023-02-01

## 2023-02-01 NOTE — PROGRESS NOTES
Care Transitions Follow Up Call        Care Transitions Nurse (CTN) attempted to contact patient to complete transitions of care follow up. Unable to reach. Left [facility] requesting a return call. Follow up appointment completed? no.   Was follow up appointment scheduled within 7 days of discharge? no.     Advance Care Planning:   Does patient have an Advance Directive:   not on file        Patients top risk factors for readmission:  medical condition-Sepsis/PNA, Asthma, Breast Cancer-undergoing chemotherapy        [facility] follow up appointment(s): No future appointments. Non-[facility] follow up appointment(s): Chemotherapy TBD    CTN provided contact information for future needs. Plan for follow-up call in 7-10 days based on severity of symptoms and risk factors.   Plan for next call:  symptom management-assess for PNA red flags and follow up appointment-confirm scheduling of PCP appt

## 2023-02-10 ENCOUNTER — PATIENT OUTREACH (OUTPATIENT)
Dept: CASE MANAGEMENT | Age: 43
End: 2023-02-10

## 2023-02-10 NOTE — PROGRESS NOTES
Care Transitions Follow Up Call        Care Transition Nurse (CTN) contacted the patient by telephone to follow up after admission on 1/14/23. She states she is on her way to chemotherapy and is running late, so she requested to contact CTN when she leaves. Follow up appointment completed? no.   Was follow up appointment scheduled within 7 days of discharge? no.     Advance Care Planning:   Does patient have an Advance Directive:   not on file        Patients top risk factors for readmission:  medical condition-Sepsis/PNA, Asthma, Breast Cancer-undergoing chemotherapy        [facility] follow up appointment(s): No future appointments. Non-[facility] follow up appointment(s): n/a    CTN provided contact information for future needs. CTN did not receive a return call from the patient. No further follow-up call indicated based on severity of symptoms and risk factors.

## 2023-02-13 ENCOUNTER — TELEPHONE (OUTPATIENT)
Facility: CLINIC | Age: 43
End: 2023-02-13

## 2023-02-20 ENCOUNTER — CARE COORDINATION (OUTPATIENT)
Facility: CLINIC | Age: 43
End: 2023-02-20

## 2023-02-20 ASSESSMENT — PATIENT HEALTH QUESTIONNAIRE - PHQ9
SUM OF ALL RESPONSES TO PHQ QUESTIONS 1-9: 0

## 2023-02-20 NOTE — CARE COORDINATION
Ambulatory Care Coordination Note  2023    Patient Current Location:  [place]    ACM contacted the patient by telephone. Verified name and  with patient as identifiers. Provided introduction to self, and explanation of the ACM role. ACM: Shon Aguirre RN    Challenges to be reviewed by the provider   Additional needs identified to be addressed with provider: No  none               Method of communication with provider: phone. Patient enrolled in Complex Case Management effective 2023 and will be followed per ACM protocol. Initial questions answered with subsequent encounters planned. Further / follow up appointments listed below - reviewed upcoming appointments  Further questions answered as needed and patient has ACM contact information  Patient receiving Chemo for breast CA - maintaining a positive outlook  Depression screen done - PHQ2 score = 0  Preliminary review of medications done during this encounter - will do full med rec on next encounter      Offered patient enrollment in the Remote Patient Monitoring (RPM) program for in-home monitoring: NA. Ambulatory Care Coordination Assessment    Care Coordination Protocol  Referral from Primary Care Provider: No  Week 1 - Initial Assessment        Do you have Home O2 Therapy?: No      Ability to seek help/take action for Emergent Urgent situations i.e. fire, crime, inclement weather or health crisis. : Independent  Ability to ambulate to restroom: Independent  Ability handle personal hygeine needs (bathing/dressing/grooming): Independent  Ability to manage Medications: Independent  Ability to prepare Food Preparation: Independent  Ability to maintain home (clean home, laundry): Independent  Ability to drive and/or has transportation: Independent  Ability to do shopping: Independent  Ability to manage finances:  Independent  Is patient able to live independently?: Yes                    Thinking about your patient's physical health needs, are there any symptoms or problems (risk indicators) you are unsure about that require further investigation?: Moderate to severe symptoms or problems that impact on daily life   Are there any problems with your patients lifestyle behaviors (alcohol, drugs, diet, exercise) that are impacting on physical or mental well-being?: No identified areas of concern   Do you have any other concerns about your patients mental well-being? How would you rate their severity and impact on the patient?: Mild problems - don't interfere with function   How wells does the patient now understand their health and well-being (symptoms, signs or risk factors) and what they need to do to manage their health?: Reasonable to good understanding and already engages in managing health or is willing to undertake better management   How well do you think your patient can engage in healthcare discussions? (Barriers include language, deafness, aphasia, alcohol or drug problems, learning difficulties, concentration): Clear and open communication, no identified barriers   Do other services need to be involved to help this patient?: Other care/services not required at this time   Are current services involved with this patient well-coordinated? (Include coordination with other services you are now recommendation): All required care/services in place and well-coordinated   Suggested Interventions and Community Resources   Zone Management Tools:  In Process                  Future Appointments   Date Time Provider Aliyah Kyle   11/6/2023  1:00 PM ARYAN Lundberg [facility]

## 2023-02-27 ENCOUNTER — OFFICE VISIT (OUTPATIENT)
Age: 43
End: 2023-02-27
Payer: COMMERCIAL

## 2023-02-27 VITALS — WEIGHT: 236 LBS | BODY MASS INDEX: 37.93 KG/M2 | HEART RATE: 113 BPM | OXYGEN SATURATION: 99 % | HEIGHT: 66 IN

## 2023-02-27 DIAGNOSIS — M17.12 PRIMARY OSTEOARTHRITIS OF LEFT KNEE: Primary | ICD-10-CM

## 2023-02-27 DIAGNOSIS — M25.562 CHRONIC PAIN OF LEFT KNEE: ICD-10-CM

## 2023-02-27 DIAGNOSIS — G89.29 CHRONIC PAIN OF LEFT KNEE: ICD-10-CM

## 2023-02-27 PROCEDURE — 99214 OFFICE O/P EST MOD 30 MIN: CPT | Performed by: ORTHOPAEDIC SURGERY

## 2023-02-27 PROCEDURE — 20611 DRAIN/INJ JOINT/BURSA W/US: CPT | Performed by: ORTHOPAEDIC SURGERY

## 2023-02-27 PROCEDURE — 73560 X-RAY EXAM OF KNEE 1 OR 2: CPT | Performed by: ORTHOPAEDIC SURGERY

## 2023-02-27 RX ORDER — CELECOXIB 200 MG/1
200 CAPSULE ORAL DAILY
Qty: 60 CAPSULE | Refills: 3 | Status: SHIPPED | OUTPATIENT
Start: 2023-02-27

## 2023-02-27 RX ORDER — TRIAMCINOLONE ACETONIDE 40 MG/ML
40 INJECTION, SUSPENSION INTRA-ARTICULAR; INTRAMUSCULAR ONCE
Status: COMPLETED | OUTPATIENT
Start: 2023-02-27 | End: 2023-02-27

## 2023-02-27 RX ADMIN — TRIAMCINOLONE ACETONIDE 40 MG: 40 INJECTION, SUSPENSION INTRA-ARTICULAR; INTRAMUSCULAR at 08:30

## 2023-02-27 NOTE — PROGRESS NOTES
Susie Levin  1980   Chief Complaint   Patient presents with    Knee Pain     Left        HISTORY OF PRESENT ILLNESS  Susie Levin is a 40 y.o. female who presents today for evaluation of left knee/leg pain. Pain is a 7/10. Pain has been present for awhile, worsened recently on 2/22/2023. She has recently started having trouble with walking on 2/22/2023 due to leg pain and was seen at [facility] where Doppler was done which was negative for DVT. She has been diagnosed with breast cancer and is currently undergoing chemotherapy treatments every Friday, has 7 more treatments. She can experience pain in the left hip joint radiating to the knee but pain is mostly localized to the knee today. Symptoms improve with Naproxen and ice. Describes a deep, aching, throbbing pain. Reports hx of RA, diagnosed last year. She was seen in the office on 9/21/2022 for right knee and leg pain at which time she received right knee cortisone injection. An MRI of the lumbar spine was also ordered at that time and she was prescribed Gabapentin, Baclofen and Mobic. Patient denies any fever, chills, chest pain, shortness of breath or calf pain. The remainder of the review of systems is negative. There are no new illness or injuries to report since last seen in the office. No changes in medications, allergies, social or family history. PHYSICAL EXAM:   Pulse (!) 113   Ht 5' 5.5\" (1.664 m)   Wt 236 lb (107 kg)   SpO2 99%   BMI 38.68 kg/m²   The patient is a well-developed, well-nourished female   in no acute distress. The patient is alert and oriented times three. The patient is alert and oriented times three. Mood and affect are normal.  LYMPHATIC: lymph nodes are not enlarged and are within normal limits  SKIN: normal in color and non tender to palpation. There are no bruises or abrasions noted. NEUROLOGICAL: Motor sensory exam is within normal limits. Reflexes are equal bilaterally.  There is normal sensation to pinprick and light touch  MUSCULOSKELETAL:   Examination Left knee   Skin Intact   Range of motion    Effusion +   Medial joint line tenderness +   Lateral joint line tenderness +   Tenderness Pes Bursa -   Tenderness insertion MCL -   Tenderness insertion LCL -   Les -   Patella crepitus +   Patella grind +   Lachman -   Pivot shift -   Anterior drawer -   Posterior drawer -   Varus stress -   Valgus stress -   Neurovascular Intact   Calf Swelling and Tenderness to Palpation -   Jasmina's Test -   Hamstring Cord Tightness -        PROCEDURE: left knee Injection with Ultrasound Guidance    Indication:  Left knee pain/swelling    After sterile prep, 4mL lidocaine with 1mL 40mg Kenalog were injected into the left knee intraarticular under ultrasound guidance. Ultrasound images captured and scanned into patient's chart.         [facility] [employer]  OFFICE PROCEDURE PROGRESS NOTE        Chart reviewed for the following:  Vance Ngo MD, have reviewed the History, Physical and updated the Allergic reactions for [address] performed immediately prior to start of procedure:  Vance Ngo MD have performed the following reviews on [employer] prior to the start of the procedure:            * Patient was identified by name and date of birth   * Agreement on procedure being performed was verified  * Risks and Benefits explained to the patient  * Procedure site verified and marked as necessary  * Patient was positioned for comfort  * Consent was signed and verified     Time: 8:36 AM    Date of procedure: 2/27/2023    Procedure performed by:  Machelle Castillo MD    Provider assisted by: (see medication administration)    How tolerated by patient: tolerated the procedure well with no complications    Comments: none      IMAGING:  XR of the left knee with 2 views obtained in the office dated 2/27/2023 reviewed and read by Dr. Shonna Ruby: Marked degenerative changes in the patellofemoral joint and lateral compartment    Doppler US of LLE obtained at [facility] dated 2/23/2023 reviewed and read by Dr. Yang Hines: No evidence of DVT    XR of the left hip and pelvis with 2 views obtained at [facility] dated 2/23/2023 reviewed and read by Dr. Yang Hines: Negative for fracture or dislocation of the left hip. XR of the right knee with 2 views obtained at [facility] dated 9/19/2022 was reviewed and read by Dr. Yang Hines:    IMPRESSION:  1. Tricompartmental osteoarthritis with most severe degenerative changes at the patellofemoral compartment. Small knee joint effusion. IMPRESSION:      ICD-10-CM    1. Primary osteoarthritis of left knee  M17.12 AMB POC US DRAIN/INJECT LARGE JOINT/BURSA     triamcinolone acetonide (KENALOG-40) injection 40 mg      2. Chronic pain of left knee  M25.562 [29891] Knee 1-2V    G89.29            PLAN:   1. Pt presents today with left knee pain due to primary OA and I am hopeful a cortisone injection will provide relief. Patient was provided with physician-directed home exercise program in the office today. Risk factors include: BMI>40, on chemotherapy  2. Yes cortisone injection indicated today L KNEE US  3. No Physical/Occupational Therapy indicated today  4. No diagnostic test indicated today:   5. No durable medical equipment indicated today  6. No referral indicated today   7. Yes medications indicated today:  CELEBREX  8. No Narcotic indicated today    RTC 3 weeks if pain continues       Scribed by Chuy Recinos) as dictated by Cathy Ramirez MD    I, Dr. Cathy Ramirez, confirm that all documentation is accurate.     Cathy Ramirez M.D.   [facility]

## 2023-03-02 ENCOUNTER — TELEPHONE (OUTPATIENT)
Age: 43
End: 2023-03-02

## 2023-03-02 RX ORDER — MELOXICAM 15 MG/1
15 TABLET ORAL DAILY
Qty: 30 TABLET | Refills: 3 | Status: SHIPPED | OUTPATIENT
Start: 2023-03-02

## 2023-03-02 NOTE — TELEPHONE ENCOUNTER
Drug Change Request    Drug: Celecoxib 200MG CAPSULES    Pharmacy Comments: Drug not covered by patient plan. The preferred alternative is ibuprofen, naproxen, meloxicam. Please call/fax the pharmacy to change medication along with strength, directions, quantity and refills.

## 2023-03-06 ENCOUNTER — TELEPHONE (OUTPATIENT)
Age: 43
End: 2023-03-06

## 2023-03-07 ENCOUNTER — CARE COORDINATION (OUTPATIENT)
Facility: CLINIC | Age: 43
End: 2023-03-07

## 2023-03-13 ENCOUNTER — CARE COORDINATION (OUTPATIENT)
Facility: CLINIC | Age: 43
End: 2023-03-13

## 2023-03-13 NOTE — CARE COORDINATION
Ambulatory Care Coordination Note  3/13/2023    Patient Current Location:  [place]    ACM contacted the patient by telephone. Verified name and  with patient as identifiers. Provided introduction to self, and explanation of the ACM role. ACM: Linh Wells RN    Challenges to be reviewed by the provider   Additional needs identified to be addressed with provider: No  none               Method of communication with provider: phone. Spoke with patient - Patient states doing well at present   Further / follow up appointments listed below - reviewed upcoming appointments  Further questions answered as needed and patient has ACM contact information  Patient receiving Chemo for breast CA - maintaining a positive outlook  Depression screen done - PHQ2 score = 0  Medication reconciliation done at this encounter with patient. Shows understanding of medication therapy      Offered patient enrollment in the Remote Patient Monitoring (RPM) program for in-home monitoring: NA. Ambulatory Care Coordination Assessment    Care Coordination Protocol  Referral from Primary Care Provider: No  Week 1 - Initial Assessment        Do you have Home O2 Therapy?: No      Ability to seek help/take action for Emergent Urgent situations i.e. fire, crime, inclement weather or health crisis. : Independent  Ability to ambulate to restroom: Independent  Ability handle personal hygeine needs (bathing/dressing/grooming): Independent  Ability to manage Medications: Independent  Ability to prepare Food Preparation: Independent  Ability to maintain home (clean home, laundry): Independent  Ability to drive and/or has transportation: Independent  Ability to do shopping: Independent  Ability to manage finances:  Independent  Is patient able to live independently?: Yes                    Thinking about your patient's physical health needs, are there any symptoms or problems (risk indicators) you are unsure about that require further investigation?: Moderate to severe symptoms or problems that impact on daily life   Are there any problems with your patients lifestyle behaviors (alcohol, drugs, diet, exercise) that are impacting on physical or mental well-being?: No identified areas of concern   Do you have any other concerns about your patients mental well-being? How would you rate their severity and impact on the patient?: Mild problems - don't interfere with function   How wells does the patient now understand their health and well-being (symptoms, signs or risk factors) and what they need to do to manage their health?: Reasonable to good understanding and already engages in managing health or is willing to undertake better management   How well do you think your patient can engage in healthcare discussions? (Barriers include language, deafness, aphasia, alcohol or drug problems, learning difficulties, concentration): Clear and open communication, no identified barriers   Do other services need to be involved to help this patient?: Other care/services not required at this time   Are current services involved with this patient well-coordinated? (Include coordination with other services you are now recommendation): All required care/services in place and well-coordinated   Suggested Interventions and Community Resources   Zone Management Tools:  In Process                  Future Appointments   Date Time Provider Aliyah Kyle   3/21/2023  8:00 AM Puja Campbell MD [facility] AMB   11/6/2023  1:00 PM ARYAN Allen [facility] Zach Mckeon

## 2023-03-21 ENCOUNTER — OFFICE VISIT (OUTPATIENT)
Age: 43
End: 2023-03-21
Payer: COMMERCIAL

## 2023-03-21 VITALS — WEIGHT: 243 LBS | HEIGHT: 66 IN | BODY MASS INDEX: 39.05 KG/M2 | TEMPERATURE: 97.8 F

## 2023-03-21 DIAGNOSIS — M17.12 PRIMARY OSTEOARTHRITIS OF LEFT KNEE: Primary | ICD-10-CM

## 2023-03-21 PROCEDURE — 99213 OFFICE O/P EST LOW 20 MIN: CPT | Performed by: ORTHOPAEDIC SURGERY

## 2023-03-21 NOTE — PROGRESS NOTES
Lindsay Vences  1980   Chief Complaint   Patient presents with    Knee Pain     Lt         HISTORY OF PRESENT ILLNESS  Lindsay Vences is a 40 y.o. female who presents today for reevaluation of left knee/leg. Pain is a 0/10. At last OV on 2/27/2023, patient had a left knee cortisone injection which provided relief. Was also prescribed Celebrex at last OV but her insurance would not cover this. She notes overall improvement after the cortisone injection. She has been diagnosed with breast cancer and is currently undergoing chemotherapy treatments every Friday. Reports hx of RA, diagnosed last year. Patient denies any fever, chills, chest pain, shortness of breath or calf pain. The remainder of the review of systems is negative. There are no new illness or injuries to report since last seen in the office. No changes in medications, allergies, social or family history. PHYSICAL EXAM:   Temp 97.8 °F (36.6 °C) (Temporal)   Ht 5' 5.5\" (1.664 m)   Wt 243 lb (110.2 kg)   BMI 39.82 kg/m²   The patient is a well-developed, well-nourished female   in no acute distress. The patient is alert and oriented times three. The patient is alert and oriented times three. Mood and affect are normal.  LYMPHATIC: lymph nodes are not enlarged and are within normal limits  SKIN: normal in color and non tender to palpation. There are no bruises or abrasions noted. NEUROLOGICAL: Motor sensory exam is within normal limits. Reflexes are equal bilaterally.  There is normal sensation to pinprick and light touch  MUSCULOSKELETAL:  Examination Left knee   Skin Intact   Range of motion 0-120   Effusion +   Medial joint line tenderness -   Lateral joint line tenderness -   Tenderness Pes Bursa -   Tenderness insertion MCL -   Tenderness insertion LCL -   Les -   Patella crepitus +   Patella grind +   Lachman -   Pivot shift -   Anterior drawer -   Posterior drawer -   Varus stress -   Valgus stress -   Neurovascular

## 2023-04-04 ENCOUNTER — CARE COORDINATION (OUTPATIENT)
Facility: CLINIC | Age: 43
End: 2023-04-04

## 2023-04-18 ENCOUNTER — CARE COORDINATION (OUTPATIENT)
Facility: CLINIC | Age: 43
End: 2023-04-18

## 2023-04-25 RX ORDER — HYDROCHLOROTHIAZIDE 12.5 MG/1
TABLET ORAL
Qty: 30 TABLET | Refills: 0 | Status: SHIPPED | OUTPATIENT
Start: 2023-04-25 | End: 2023-05-17 | Stop reason: SDUPTHER

## 2023-05-01 ENCOUNTER — CARE COORDINATION (OUTPATIENT)
Facility: CLINIC | Age: 43
End: 2023-05-01

## 2023-05-02 ENCOUNTER — TELEPHONE (OUTPATIENT)
Facility: CLINIC | Age: 43
End: 2023-05-02

## 2023-05-02 NOTE — TELEPHONE ENCOUNTER
In addition to the previous telephone encounter, patient declined an appointment, PSR offered several different times and dates. Patient stated she did not feel as if she was sick, only cough. No appointment made at this time, patient states she will call back.

## 2023-05-02 NOTE — TELEPHONE ENCOUNTER
Patient called, went to urgent care a week ago and was diagnosed with Bronchitis. States she is still coughing, no mucous coming up. No fever, no other symptoms. Covid test, strep and flu were all negative. States she was given tessalon pearls but they do not help. Patient is still on Doxycycline. Patient scheduled for sick appointment and will get notes from Urgent Care.

## 2023-05-16 ENCOUNTER — OFFICE VISIT (OUTPATIENT)
Facility: CLINIC | Age: 43
End: 2023-05-16
Payer: COMMERCIAL

## 2023-05-16 ENCOUNTER — CARE COORDINATION (OUTPATIENT)
Facility: CLINIC | Age: 43
End: 2023-05-16

## 2023-05-16 VITALS
SYSTOLIC BLOOD PRESSURE: 117 MMHG | BODY MASS INDEX: 40.66 KG/M2 | WEIGHT: 253 LBS | HEIGHT: 66 IN | HEART RATE: 109 BPM | RESPIRATION RATE: 20 BRPM | TEMPERATURE: 98 F | DIASTOLIC BLOOD PRESSURE: 82 MMHG | OXYGEN SATURATION: 96 %

## 2023-05-16 DIAGNOSIS — I10 HYPERTENSION, UNSPECIFIED TYPE: ICD-10-CM

## 2023-05-16 DIAGNOSIS — R60.9 EDEMA, UNSPECIFIED TYPE: ICD-10-CM

## 2023-05-16 DIAGNOSIS — Z51.81 ENCOUNTER FOR MONITORING DIURETIC THERAPY: ICD-10-CM

## 2023-05-16 DIAGNOSIS — G47.33 OSA (OBSTRUCTIVE SLEEP APNEA): ICD-10-CM

## 2023-05-16 DIAGNOSIS — E11.9 TYPE 2 DIABETES MELLITUS WITHOUT COMPLICATION, WITHOUT LONG-TERM CURRENT USE OF INSULIN (HCC): ICD-10-CM

## 2023-05-16 DIAGNOSIS — Z79.899 ENCOUNTER FOR MONITORING DIURETIC THERAPY: ICD-10-CM

## 2023-05-16 DIAGNOSIS — E87.6 HYPOKALEMIA: ICD-10-CM

## 2023-05-16 DIAGNOSIS — J45.40 MODERATE PERSISTENT ASTHMA WITHOUT COMPLICATION: Primary | ICD-10-CM

## 2023-05-16 LAB
ALBUMIN, URINE, POC: NORMAL MG/L
CREATININE, URINE, POC: NORMAL MG/DL
HBA1C MFR BLD: 4.5 %
MICROALB/CREAT RATIO, POC: <30 MG/G

## 2023-05-16 PROCEDURE — 99214 OFFICE O/P EST MOD 30 MIN: CPT | Performed by: FAMILY MEDICINE

## 2023-05-16 PROCEDURE — 3078F DIAST BP <80 MM HG: CPT | Performed by: FAMILY MEDICINE

## 2023-05-16 PROCEDURE — 3074F SYST BP LT 130 MM HG: CPT | Performed by: FAMILY MEDICINE

## 2023-05-16 PROCEDURE — 83036 HEMOGLOBIN GLYCOSYLATED A1C: CPT | Performed by: FAMILY MEDICINE

## 2023-05-16 PROCEDURE — 82044 UR ALBUMIN SEMIQUANTITATIVE: CPT | Performed by: FAMILY MEDICINE

## 2023-05-16 RX ORDER — IPRATROPIUM BROMIDE AND ALBUTEROL SULFATE 2.5; .5 MG/3ML; MG/3ML
3 SOLUTION RESPIRATORY (INHALATION) 3 TIMES DAILY
COMMUNITY
Start: 2023-05-15

## 2023-05-16 SDOH — ECONOMIC STABILITY: INCOME INSECURITY: HOW HARD IS IT FOR YOU TO PAY FOR THE VERY BASICS LIKE FOOD, HOUSING, MEDICAL CARE, AND HEATING?: SOMEWHAT HARD

## 2023-05-16 SDOH — ECONOMIC STABILITY: FOOD INSECURITY: WITHIN THE PAST 12 MONTHS, YOU WORRIED THAT YOUR FOOD WOULD RUN OUT BEFORE YOU GOT MONEY TO BUY MORE.: SOMETIMES TRUE

## 2023-05-16 SDOH — ECONOMIC STABILITY: FOOD INSECURITY: WITHIN THE PAST 12 MONTHS, THE FOOD YOU BOUGHT JUST DIDN'T LAST AND YOU DIDN'T HAVE MONEY TO GET MORE.: SOMETIMES TRUE

## 2023-05-16 SDOH — ECONOMIC STABILITY: HOUSING INSECURITY
IN THE LAST 12 MONTHS, WAS THERE A TIME WHEN YOU DID NOT HAVE A STEADY PLACE TO SLEEP OR SLEPT IN A SHELTER (INCLUDING NOW)?: NO

## 2023-05-16 ASSESSMENT — PATIENT HEALTH QUESTIONNAIRE - PHQ9
SUM OF ALL RESPONSES TO PHQ QUESTIONS 1-9: 0
1. LITTLE INTEREST OR PLEASURE IN DOING THINGS: 0
SUM OF ALL RESPONSES TO PHQ QUESTIONS 1-9: 0
2. FEELING DOWN, DEPRESSED OR HOPELESS: 0
SUM OF ALL RESPONSES TO PHQ9 QUESTIONS 1 & 2: 0
SUM OF ALL RESPONSES TO PHQ QUESTIONS 1-9: 0
SUM OF ALL RESPONSES TO PHQ QUESTIONS 1-9: 0

## 2023-05-16 NOTE — CARE COORDINATION
Patient has graduated from the Complex Case Management  program on 5/16/2023. Patient/family has the ability to self-manage at this time. Care management goals have been completed. No further Ambulatory Care Manager follow up scheduled. Goals Addressed                   This Visit's Progress     COMPLETED: Attend follow up appointments on schedule        COMPLETED: Prepare patients and caregivers for end of life decisions (ie. need for hospice, pain management, symptom relief, advance directives etc.)        COMPLETED: Take all medications as ordered               Patient has Ambulatory Care Manager's contact information for any further questions, concerns, or needs.   Patients upcoming visits:    Future Appointments   Date Time Provider Aliyah Kyle   5/16/2023  3:15 PM Kavon Ayala MD [facility] AMB   11/6/2023  1:00 [facility]

## 2023-05-17 RX ORDER — POTASSIUM CHLORIDE 750 MG/1
10 TABLET, FILM COATED, EXTENDED RELEASE ORAL DAILY
Qty: 60 TABLET | Refills: 3 | Status: SHIPPED | OUTPATIENT
Start: 2023-05-17

## 2023-05-17 RX ORDER — ALBUTEROL SULFATE 90 UG/1
2 AEROSOL, METERED RESPIRATORY (INHALATION) EVERY 6 HOURS PRN
Qty: 18 G | Refills: 1 | Status: SHIPPED | OUTPATIENT
Start: 2023-05-17

## 2023-05-17 RX ORDER — HYDROCHLOROTHIAZIDE 12.5 MG/1
12.5 TABLET ORAL DAILY
Qty: 90 TABLET | Refills: 2 | Status: SHIPPED | OUTPATIENT
Start: 2023-05-17

## 2023-05-17 RX ORDER — METFORMIN HYDROCHLORIDE 750 MG/1
750 TABLET, EXTENDED RELEASE ORAL
Qty: 180 TABLET | Refills: 1 | Status: SHIPPED | OUTPATIENT
Start: 2023-05-17

## 2023-06-26 ENCOUNTER — TELEPHONE (OUTPATIENT)
Facility: CLINIC | Age: 43
End: 2023-06-26

## 2023-06-26 DIAGNOSIS — J45.40 MODERATE PERSISTENT ASTHMA WITHOUT COMPLICATION: Primary | ICD-10-CM

## 2023-06-27 RX ORDER — IPRATROPIUM BROMIDE AND ALBUTEROL SULFATE 2.5; .5 MG/3ML; MG/3ML
3 SOLUTION RESPIRATORY (INHALATION) 3 TIMES DAILY
Qty: 360 ML | Refills: 0 | Status: SHIPPED | OUTPATIENT
Start: 2023-06-27

## 2023-07-06 DIAGNOSIS — J45.40 MODERATE PERSISTENT ASTHMA WITHOUT COMPLICATION: ICD-10-CM

## 2023-07-06 RX ORDER — FLUTICASONE PROPIONATE 50 MCG
2 SPRAY, SUSPENSION (ML) NASAL DAILY
Qty: 16 G | Refills: 1 | Status: SHIPPED | OUTPATIENT
Start: 2023-07-06

## 2023-07-06 RX ORDER — ALBUTEROL SULFATE 90 UG/1
2 AEROSOL, METERED RESPIRATORY (INHALATION) EVERY 6 HOURS PRN
Qty: 18 G | Refills: 1 | Status: SHIPPED | OUTPATIENT
Start: 2023-07-06

## 2023-08-29 ENCOUNTER — OFFICE VISIT (OUTPATIENT)
Facility: CLINIC | Age: 43
End: 2023-08-29
Payer: COMMERCIAL

## 2023-08-29 VITALS
HEIGHT: 66 IN | HEART RATE: 103 BPM | TEMPERATURE: 97.8 F | OXYGEN SATURATION: 96 % | SYSTOLIC BLOOD PRESSURE: 157 MMHG | BODY MASS INDEX: 39.7 KG/M2 | RESPIRATION RATE: 20 BRPM | DIASTOLIC BLOOD PRESSURE: 84 MMHG | WEIGHT: 247 LBS

## 2023-08-29 DIAGNOSIS — D84.9 IMMUNOCOMPROMISED STATE (HCC): ICD-10-CM

## 2023-08-29 DIAGNOSIS — U07.1 COVID-19 VIRUS INFECTION: Primary | ICD-10-CM

## 2023-08-29 DIAGNOSIS — J45.41 MODERATE PERSISTENT ASTHMA WITH EXACERBATION: ICD-10-CM

## 2023-08-29 LAB
EXP DATE SOLUTION: NORMAL
EXP DATE SWAB: NORMAL
EXPIRATION DATE: NORMAL
LOT NUMBER POC: NORMAL
LOT NUMBER SOLUTION: NORMAL
LOT NUMBER SWAB: NORMAL
QUICKVUE INFLUENZA TEST: NEGATIVE
SARS-COV-2 RNA, POC: POSITIVE
VALID INTERNAL CONTROL, POC: YES

## 2023-08-29 PROCEDURE — 87635 SARS-COV-2 COVID-19 AMP PRB: CPT | Performed by: FAMILY MEDICINE

## 2023-08-29 PROCEDURE — 87804 INFLUENZA ASSAY W/OPTIC: CPT | Performed by: FAMILY MEDICINE

## 2023-08-29 PROCEDURE — 99214 OFFICE O/P EST MOD 30 MIN: CPT | Performed by: FAMILY MEDICINE

## 2023-08-29 RX ORDER — PREDNISONE 20 MG/1
40 TABLET ORAL DAILY
Qty: 10 TABLET | Refills: 0 | Status: SHIPPED | OUTPATIENT
Start: 2023-08-29 | End: 2023-09-03

## 2023-08-29 ASSESSMENT — PATIENT HEALTH QUESTIONNAIRE - PHQ9
1. LITTLE INTEREST OR PLEASURE IN DOING THINGS: 0
SUM OF ALL RESPONSES TO PHQ QUESTIONS 1-9: 0
SUM OF ALL RESPONSES TO PHQ9 QUESTIONS 1 & 2: 0
SUM OF ALL RESPONSES TO PHQ QUESTIONS 1-9: 0
2. FEELING DOWN, DEPRESSED OR HOPELESS: 0

## 2023-08-29 NOTE — PROGRESS NOTES
Brien Valerio presents today for   Chief Complaint   Patient presents with    Pharyngitis    Wheezing       Coordination of Care:    1. \"Have you been to the ER, urgent care clinic since your last visit? Hospitalized since your last visit? \" No    2. \"Have you seen or consulted any other health care providers outside of the [address] [place] [address] since your last visit? \" No     3. For patients aged 43-73: Has the patient had a colonoscopy / FIT/ Cologuard? NA - based on age      If the patient is female:    4. For patients aged 43-66: Has the patient had a mammogram within the past 2 years? No      5. For patients aged 21-65: Has the patient had a pap smear? No    PSR reported that patient said she felt like she was gasping for breath. I spoke with Dr. Tamy Gutierrez and called patient to let her know that Dr. Tamy Gutierrez recommended that she go on to the ER for urgent treatment. Patient said that she had just used her inhaler and that she was almost asleep and she would rather see Dr. Tamy Gutierrez this afternoon. I did express to her multiple times that she should go to the ER and she refused each time. She said if she got worse before her appt she would call and go on to the ER but she would rather see Dr. Tamy Gutierrez. I did tell her that Dr. Tamy Gutierrez may send her to ER when she came into the office because we may not have the items she needs for treatment. She said that she would go after she came to the office. I again asked patient to please make sure she went if she felt any worse and she said that she would. Dr. Tamy Gutierrez made aware.   Elenita

## 2023-08-29 NOTE — PROGRESS NOTES
Bhavik Quintero (: 1980) is a 44 y.o. female here for evaluation of the following chief concern(s):  Acute condition management       ASSESSMENT/PLAN:  1. COVID-19 virus infection  -     AMB POC COVID-19 COV  -     AMB POC RAPID INFLUENZA TEST  -     nirmatrelvir/ritonavir 300/100 (PAXLOVID) 20 x 150 MG & 10 x 100MG TBPK; Take 3 tablets (two 150 mg nirmatrelvir and one 100 mg ritonavir tablets) by mouth every 12 hours for 5 days. , Disp-30 tablet, R-0Normal  2. Moderate persistent asthma with exacerbation  -     AMB POC COVID-19 COV  -     AMB POC RAPID INFLUENZA TEST  -     predniSONE (DELTASONE) 20 MG tablet; Take 2 tablets by mouth daily for 5 days . Take with food. , Disp-10 tablet, R-0Normal  3. Immunocompromised state [facility])    Ms. Umer Patel appears medically stable, afebrile, baseline hemodynamics and no hypoxia. She does not appear in respiratory distress at this time, good aeration on exam and no focal sounds to suggest consolidation. COVID positive, influenza negative. Will treat for asthma exacerbation w/ steroid and Paxlovid. We discussed isolation timeline, the need for close monitoring in acute phase of illness and for symptoms of bacterial superinfection in postinfectious phase. Strict ER precautions were reviewed and strongly advised. I called to pharmacy to confirm they have stock of Paxlovid. Pt wore a mask and I donned droplet plus PPE for the duration of our visit. Personalized AVS;  \"Ms. Umer Patel,   This COVID infection is causing an asthma exacerbation, though relatively mild at this time since you are not breating fast/wheezing/no low oxygen counts or sounds of pneumonia; if you start having troubles with your breathing, or feeling worse, please do not hesitate to be seen in the ER. I have prescribed Paxlovid and Prednisone. Please be sure to rest (but not stay bedbound) and drink plenty of water and small/bland meals.     You can consider OTC supplement for immune

## 2023-08-29 NOTE — PATIENT INSTRUCTIONS
Ms. Tuan Santana,   This COVID infection is causing an asthma exacerbation, though relatively mild at this time since you are not breating fast/wheezing/no low oxygen counts or sounds of pneumonia; if you start having troubles with your breathing, or feeling worse, please do not hesitate to be seen in the ER. I have prescribed Paxlovid and Prednisone. Please be sure to rest (but not stay bedbound) and drink plenty of water and small/bland meals. You can consider OTC supplement for immune support such as:  Vitamin C 250mg twice a day  Vitamin D3 1,000 IU daily  Zinc 25mg twice daily  Aspirin 81mg once daily    Please be sure to isolate at home through 9/1/23, and wear a mask and practice best hand hygiene through at least 9/6/23.   You can return to work on 9/4/23 if you are feeling better.      ~Dr. Lori Ortiz

## 2023-09-05 ENCOUNTER — APPOINTMENT (OUTPATIENT)
Age: 43
End: 2023-09-05
Payer: MEDICAID

## 2023-09-05 ENCOUNTER — HOSPITAL ENCOUNTER (EMERGENCY)
Age: 43
Discharge: HOME | End: 2023-09-05
Attending: FAMILY MEDICINE
Payer: MEDICAID

## 2023-09-05 VITALS
OXYGEN SATURATION: 100 % | RESPIRATION RATE: 18 BRPM | WEIGHT: 251 LBS | DIASTOLIC BLOOD PRESSURE: 80 MMHG | HEART RATE: 78 BPM | SYSTOLIC BLOOD PRESSURE: 131 MMHG | HEIGHT: 66 IN | BODY MASS INDEX: 40.34 KG/M2 | TEMPERATURE: 98 F

## 2023-09-05 DIAGNOSIS — R10.13 ABDOMINAL PAIN, EPIGASTRIC: Primary | ICD-10-CM

## 2023-09-05 LAB
ALBUMIN SERPL-MCNC: 3.6 G/DL (ref 3.4–5)
ALBUMIN/GLOB SERPL: 1 (ref 0.8–1.7)
ALP SERPL-CCNC: 94 U/L (ref 45–117)
ALT SERPL-CCNC: 77 U/L (ref 13–56)
ANION GAP SERPL CALC-SCNC: 5 MMOL/L (ref 3–18)
AST SERPL W P-5'-P-CCNC: 107 U/L (ref 10–38)
BASOPHILS # BLD: 0 K/UL (ref 0–0.1)
BASOPHILS NFR BLD: 0 % (ref 0–2)
BILIRUB SERPL-MCNC: 1.2 MG/DL (ref 0.2–1)
BUN SERPL-MCNC: 16 MG/DL (ref 7–18)
BUN/CREAT SERPL: 18 (ref 12–20)
CA-I BLD-MCNC: 9.2 MG/DL (ref 8.5–10.1)
CHLORIDE SERPL-SCNC: 103 MMOL/L (ref 100–111)
CO2 SERPL-SCNC: 33 MMOL/L (ref 21–32)
CREAT SERPL-MCNC: 0.88 MG/DL (ref 0.6–1.3)
DIFFERENTIAL METHOD BLD: ABNORMAL
EKG ATRIAL RATE: 80 BPM
EKG DIAGNOSIS: NORMAL
EKG P AXIS: 62 DEGREES
EKG P-R INTERVAL: 140 MS
EKG Q-T INTERVAL: 394 MS
EKG QRS DURATION: 82 MS
EKG QTC CALCULATION (BAZETT): 454 MS
EKG R AXIS: 24 DEGREES
EKG T AXIS: 40 DEGREES
EKG VENTRICULAR RATE: 80 BPM
EOSINOPHIL # BLD: 0 K/UL (ref 0–0.4)
EOSINOPHIL NFR BLD: 0 % (ref 0–5)
ERYTHROCYTE [DISTWIDTH] IN BLOOD BY AUTOMATED COUNT: 18.9 % (ref 11.6–14.5)
GLOBULIN SER CALC-MCNC: 3.5 G/DL (ref 2–4)
GLUCOSE SERPL-MCNC: 113 MG/DL (ref 74–99)
HCG SERPL QL: NEGATIVE
HCT VFR BLD AUTO: 35.3 % (ref 35–45)
HGB BLD-MCNC: 11.6 G/DL (ref 12–16)
IMM GRANULOCYTES # BLD AUTO: 0.1 K/UL (ref 0–0.04)
IMM GRANULOCYTES NFR BLD AUTO: 1 % (ref 0–0.5)
LIPASE SERPL-CCNC: 291 U/L (ref 73–393)
LYMPHOCYTES # BLD: 1.6 K/UL (ref 0.9–3.6)
LYMPHOCYTES NFR BLD: 16 % (ref 21–52)
MAGNESIUM SERPL-MCNC: 2.2 MG/DL (ref 1.6–2.6)
MCH RBC QN AUTO: 22.7 PG (ref 24–34)
MCHC RBC AUTO-ENTMCNC: 32.9 G/DL (ref 31–37)
MCV RBC AUTO: 68.9 FL (ref 78–100)
MONOCYTES # BLD: 0.5 K/UL (ref 0.05–1.2)
MONOCYTES NFR BLD: 5 % (ref 3–10)
NEUTS SEG # BLD: 7.9 K/UL (ref 1.8–8)
NEUTS SEG NFR BLD: 78 % (ref 40–73)
NRBC # BLD: 0 K/UL (ref 0–0.01)
NRBC BLD-RTO: 0 PER 100 WBC
PLATELET # BLD AUTO: 293 K/UL (ref 135–420)
PMV BLD AUTO: 9.6 FL (ref 9.2–11.8)
POTASSIUM SERPL-SCNC: 3.8 MMOL/L (ref 3.5–5.5)
PROT SERPL-MCNC: 7.1 G/DL (ref 6.4–8.2)
RBC # BLD AUTO: 5.12 M/UL (ref 4.2–5.3)
SODIUM SERPL-SCNC: 141 MMOL/L (ref 136–145)
TROPONIN I SERPL HS-MCNC: 6 NG/L (ref 0–54)
WBC # BLD AUTO: 10.1 K/UL (ref 4.6–13.2)

## 2023-09-05 PROCEDURE — 85025 COMPLETE CBC W/AUTO DIFF WBC: CPT

## 2023-09-05 PROCEDURE — 96374 THER/PROPH/DIAG INJ IV PUSH: CPT

## 2023-09-05 PROCEDURE — 84703 CHORIONIC GONADOTROPIN ASSAY: CPT

## 2023-09-05 PROCEDURE — 6360000002 HC RX W HCPCS: Performed by: FAMILY MEDICINE

## 2023-09-05 PROCEDURE — 74177 CT ABD & PELVIS W/CONTRAST: CPT

## 2023-09-05 PROCEDURE — 83735 ASSAY OF MAGNESIUM: CPT

## 2023-09-05 PROCEDURE — 99285 EMERGENCY DEPT VISIT HI MDM: CPT

## 2023-09-05 PROCEDURE — 71045 X-RAY EXAM CHEST 1 VIEW: CPT

## 2023-09-05 PROCEDURE — 93005 ELECTROCARDIOGRAM TRACING: CPT | Performed by: FAMILY MEDICINE

## 2023-09-05 PROCEDURE — 84484 ASSAY OF TROPONIN QUANT: CPT

## 2023-09-05 PROCEDURE — 80053 COMPREHEN METABOLIC PANEL: CPT

## 2023-09-05 PROCEDURE — 83690 ASSAY OF LIPASE: CPT

## 2023-09-05 PROCEDURE — 2580000003 HC RX 258: Performed by: FAMILY MEDICINE

## 2023-09-05 PROCEDURE — 6360000004 HC RX CONTRAST MEDICATION: Performed by: FAMILY MEDICINE

## 2023-09-05 PROCEDURE — 36415 COLL VENOUS BLD VENIPUNCTURE: CPT

## 2023-09-05 PROCEDURE — 96375 TX/PRO/DX INJ NEW DRUG ADDON: CPT

## 2023-09-05 RX ORDER — DICYCLOMINE HCL 20 MG
20 TABLET ORAL EVERY 8 HOURS PRN
Qty: 20 TABLET | Refills: 0 | Status: SHIPPED | OUTPATIENT
Start: 2023-09-05

## 2023-09-05 RX ORDER — MORPHINE SULFATE 2 MG/ML
2 INJECTION, SOLUTION INTRAMUSCULAR; INTRAVENOUS
Status: COMPLETED | OUTPATIENT
Start: 2023-09-05 | End: 2023-09-05

## 2023-09-05 RX ORDER — ONDANSETRON 2 MG/ML
4 INJECTION INTRAMUSCULAR; INTRAVENOUS ONCE
Status: COMPLETED | OUTPATIENT
Start: 2023-09-05 | End: 2023-09-05

## 2023-09-05 RX ORDER — 0.9 % SODIUM CHLORIDE 0.9 %
1000 INTRAVENOUS SOLUTION INTRAVENOUS ONCE
Status: COMPLETED | OUTPATIENT
Start: 2023-09-05 | End: 2023-09-05

## 2023-09-05 RX ADMIN — ONDANSETRON 4 MG: 2 INJECTION INTRAMUSCULAR; INTRAVENOUS at 05:01

## 2023-09-05 RX ADMIN — IOPAMIDOL 97 ML: 755 INJECTION, SOLUTION INTRAVENOUS at 06:54

## 2023-09-05 RX ADMIN — MORPHINE SULFATE 2 MG: 2 INJECTION, SOLUTION INTRAMUSCULAR; INTRAVENOUS at 05:03

## 2023-09-05 RX ADMIN — SODIUM CHLORIDE 1000 ML: 9 INJECTION, SOLUTION INTRAVENOUS at 05:05

## 2023-09-05 ASSESSMENT — PAIN SCALES - GENERAL: PAINLEVEL_OUTOF10: 7

## 2023-09-05 ASSESSMENT — PAIN DESCRIPTION - ORIENTATION
ORIENTATION: MID
ORIENTATION: RIGHT

## 2023-09-05 ASSESSMENT — PAIN DESCRIPTION - LOCATION
LOCATION: ABDOMEN
LOCATION: ABDOMEN;BACK

## 2023-09-05 ASSESSMENT — PAIN DESCRIPTION - DESCRIPTORS
DESCRIPTORS: STABBING
DESCRIPTORS: STABBING

## 2023-09-05 ASSESSMENT — PAIN - FUNCTIONAL ASSESSMENT
PAIN_FUNCTIONAL_ASSESSMENT: PREVENTS OR INTERFERES SOME ACTIVE ACTIVITIES AND ADLS
PAIN_FUNCTIONAL_ASSESSMENT: NONE - DENIES PAIN
PAIN_FUNCTIONAL_ASSESSMENT: 0-10

## 2023-09-05 NOTE — ED TRIAGE NOTES
Reports epigastric and back pain that woke her up last night and tonight. Did try pepto bismol last night that she was unable to keep down. Did vomit during triage process which helped pain. States was diagnosed with gall bladder issues last November but has not been able to address due to having chemotherapy.   Was diagnosed with Covid 8/29/2023 treated with prednisone and Paxlovid- finished yesterday

## 2023-09-05 NOTE — ED NOTES
Bedside and Verbal shift change report given to Marcin Ernst and NITHYA Bhandari (oncoming nurse) by Calvin Lopez (offgoing nurse). Report included the following information Nurse Handoff Report, ED Encounter Summary, ED SBAR, and MAR.        Jb Irizarry RN  09/05/23 0700

## 2023-09-05 NOTE — ED PROVIDER NOTES
EMERGENCY DEPARTMENT HISTORY AND PHYSICAL EXAM      Patient Name: Elise Templeton  MRN: [id number]  YOB: 1980  Provider: Luli Olivarez DO  PCP: Lisa Mcgill MD     History of Presenting Illness     Chief Complaint   Patient presents with    Abdominal Pain       History Provided By: Patient     HPI: Elise Templeton, 44 y.o. female  with a history of left sided breast cancer (invasive ductal carcinoma) s/p partial mastectomy 9/15/22, completed XRT 10/28/22, finished monthly Lupron and Exemestane in April of this year, in remission at this time (heme/onc [facility]), asthma, DM, obesity, RCC, COVID diagnosed 8/29/23 placed on Paxlovid and Prednisone by PCP, finished those medications yesterday; presents to the ED with cc of epigastric pain radiating to back. Started last night. Rates 6/10. Has had gallstones in the past, feels similar. Past History     Past Medical History:  Past Medical History:   Diagnosis Date    Asthma     Breast cancer ([address] [facility])     Hyperthyroidism     Renal cell carcinoma ([address])        Past Surgical History:  Past Surgical History:   Procedure Laterality Date    MASTECTOMY, PARTIAL Left     PARTIAL NEPHRECTOMY Left        Medications:  No current facility-administered medications for this encounter.      Current Outpatient Medications   Medication Sig Dispense Refill    dicyclomine (BENTYL) 20 MG tablet Take 1 tablet by mouth every 8 hours as needed (abdominal discomfort) 20 tablet 0    albuterol sulfate HFA (PROVENTIL;VENTOLIN;PROAIR) 108 (90 Base) MCG/ACT inhaler Inhale 2 puffs into the lungs every 6 hours as needed for Wheezing ceived the following from [facility]: Outside name: ProAir HFA 90 mcg/actuation inhaler 18 g 1    fluticasone (FLONASE) 50 MCG/ACT nasal spray 2 sprays by Nasal route daily 16 g 1    ipratropium 0.5 mg-albuterol 2.5 mg (DUONEB) 0.5-2.5 (3) MG/3ML SOLN nebulizer

## 2023-09-26 ENCOUNTER — TELEPHONE (OUTPATIENT)
Facility: CLINIC | Age: 43
End: 2023-09-26

## 2023-09-26 NOTE — TELEPHONE ENCOUNTER
Pt called and would like for you to call her re: a couple of medications she wants to ask a question about.  [phone]

## 2023-09-27 NOTE — TELEPHONE ENCOUNTER
Called patient and she states that she has a chronic cough. She has had covid recently. States that cough never really goes away. Also wants something to help her to suppress her appetite. States she tries to exercise but gets to coughing and can't. Notified patient she needs a sick appointment to discuss.

## 2023-09-29 ENCOUNTER — OFFICE VISIT (OUTPATIENT)
Facility: CLINIC | Age: 43
End: 2023-09-29
Payer: MEDICAID

## 2023-09-29 VITALS
BODY MASS INDEX: 41.11 KG/M2 | OXYGEN SATURATION: 97 % | HEIGHT: 66 IN | RESPIRATION RATE: 20 BRPM | SYSTOLIC BLOOD PRESSURE: 105 MMHG | WEIGHT: 255.8 LBS | HEART RATE: 103 BPM | DIASTOLIC BLOOD PRESSURE: 69 MMHG | TEMPERATURE: 97.4 F

## 2023-09-29 DIAGNOSIS — N63.20 MASS OF LEFT BREAST, UNSPECIFIED QUADRANT: ICD-10-CM

## 2023-09-29 DIAGNOSIS — B37.9 YEAST INFECTION: ICD-10-CM

## 2023-09-29 DIAGNOSIS — R74.01 TRANSAMINITIS: ICD-10-CM

## 2023-09-29 DIAGNOSIS — J45.41 MODERATE PERSISTENT ASTHMA WITH EXACERBATION: Primary | ICD-10-CM

## 2023-09-29 PROCEDURE — 99214 OFFICE O/P EST MOD 30 MIN: CPT | Performed by: FAMILY MEDICINE

## 2023-09-29 RX ORDER — AZITHROMYCIN 250 MG/1
250 TABLET, FILM COATED ORAL SEE ADMIN INSTRUCTIONS
Qty: 6 TABLET | Refills: 0 | Status: SHIPPED | OUTPATIENT
Start: 2023-09-29 | End: 2023-10-04

## 2023-09-29 RX ORDER — MONTELUKAST SODIUM 10 MG/1
10 TABLET ORAL DAILY
Qty: 90 TABLET | Refills: 1 | Status: SHIPPED | OUTPATIENT
Start: 2023-09-29

## 2023-09-29 RX ORDER — FLUCONAZOLE 150 MG/1
150 TABLET ORAL ONCE
Qty: 1 TABLET | Refills: 0 | Status: SHIPPED | OUTPATIENT
Start: 2023-09-29 | End: 2023-09-29

## 2023-09-29 RX ORDER — EXEMESTANE 25 MG/1
TABLET ORAL
COMMUNITY
Start: 2023-09-05

## 2023-09-29 RX ORDER — VENLAFAXINE HYDROCHLORIDE 37.5 MG/1
CAPSULE, EXTENDED RELEASE ORAL
COMMUNITY
Start: 2023-09-09

## 2023-09-29 RX ORDER — GLYCOPYRROLATE 1 MG/1
TABLET ORAL
COMMUNITY
Start: 2023-09-11

## 2023-09-29 RX ORDER — PREDNISONE 20 MG/1
40 TABLET ORAL DAILY
Qty: 10 TABLET | Refills: 0 | Status: SHIPPED | OUTPATIENT
Start: 2023-09-29 | End: 2023-10-04

## 2023-09-29 RX ORDER — FLUTICASONE PROPIONATE AND SALMETEROL XINAFOATE 45; 21 UG/1; UG/1
2 AEROSOL, METERED RESPIRATORY (INHALATION) 2 TIMES DAILY
Qty: 1 EACH | Refills: 3 | Status: SHIPPED | OUTPATIENT
Start: 2023-09-29

## 2023-09-29 RX ORDER — ALBUTEROL SULFATE 90 UG/1
2 AEROSOL, METERED RESPIRATORY (INHALATION) EVERY 6 HOURS PRN
Qty: 18 G | Refills: 1 | Status: SHIPPED | OUTPATIENT
Start: 2023-09-29

## 2023-09-29 ASSESSMENT — PATIENT HEALTH QUESTIONNAIRE - PHQ9
2. FEELING DOWN, DEPRESSED OR HOPELESS: 0
SUM OF ALL RESPONSES TO PHQ QUESTIONS 1-9: 0
1. LITTLE INTEREST OR PLEASURE IN DOING THINGS: 0
SUM OF ALL RESPONSES TO PHQ9 QUESTIONS 1 & 2: 0
SUM OF ALL RESPONSES TO PHQ QUESTIONS 1-9: 0

## 2023-09-29 NOTE — PROGRESS NOTES
Patient has had Covid recently 8/29/2023. States she has a chronic cough that will not go away. Also concerned about weight gain, wants to discuss medication to suppress her appetite. Patient states she has never gotten rid of the \"covid cough\". Patient also has asthma. Chief Complaint   Patient presents with    Cough     1. \"Have you been to the ER, urgent care clinic since your last visit? Hospitalized since your last visit? \" Yes Where: [facility] abdominal pain    2. \"Have you seen or consulted any other health care providers outside of the [address] [place] [address] since your last visit? \" No     3. For patients aged 43-73: Has the patient had a colonoscopy / FIT/ Cologuard? NA - based on age      If the patient is female:    4. For patients aged 43-66: Has the patient had a mammogram within the past 2 years? Yes - Care Gap present. Rooming MA/LPN to request most recent results      5. For patients aged 21-65: Has the patient had a pap smear? Yes - Care Gap present.  Rooming MA/LPN to request most recent results
Neurological:      Mental Status: She is alert. Mental status is at baseline. Lab Results   Component Value Date/Time    WBC 10.1 09/05/2023 04:50 AM    HGB 11.6 09/05/2023 04:50 AM    HCT 35.3 09/05/2023 04:50 AM     09/05/2023 04:50 AM    MCV 68.9 09/05/2023 04:50 AM     Lab Results   Component Value Date/Time     09/05/2023 04:50 AM    K 3.8 09/05/2023 04:50 AM     09/05/2023 04:50 AM    CO2 33 09/05/2023 04:50 AM    BUN 16 09/05/2023 04:50 AM    GFRAA >60 09/19/2022 01:30 AM    GLOB 3.5 09/05/2023 04:50 AM    ALT 77 09/05/2023 04:50 AM     09/05/2023 04:50 AM     No results found for: \"CHOL\", \"CHOLPOCT\", \"CHOLX\", \"CHLST\", \"CHOLV\", \"TOTCHOLEXT\", \"HDL\", \"HDLPOC\", \"HDLEXT\", \"HDLC\", \"LDL\", \"LDLCEXT\", \"LDLC\", \"VLDLC\", \"VLDL\", \"TGLX\", \"TRIGL\", \"TRIGLYCEXT\"    On this date 09/29/23 I have spent >30 minutes for chart review, face to face encounter with the patient for interview/exam, discussing working diagnosis and treatment plan as well as documenting on the day of the visit. Medical decision making complexity: moderate-high.       Donny Greer MD   Family & Geriatric Medicine

## 2023-10-13 DIAGNOSIS — R74.01 TRANSAMINITIS: ICD-10-CM

## 2023-11-07 ENCOUNTER — OFFICE VISIT (OUTPATIENT)
Age: 43
End: 2023-11-07
Payer: MEDICAID

## 2023-11-07 VITALS — BODY MASS INDEX: 39.86 KG/M2 | WEIGHT: 248 LBS | HEIGHT: 66 IN

## 2023-11-07 DIAGNOSIS — M25.561 RIGHT KNEE PAIN, UNSPECIFIED CHRONICITY: Primary | ICD-10-CM

## 2023-11-07 DIAGNOSIS — M17.11 PRIMARY OSTEOARTHRITIS OF RIGHT KNEE: ICD-10-CM

## 2023-11-07 PROCEDURE — 73560 X-RAY EXAM OF KNEE 1 OR 2: CPT | Performed by: ORTHOPAEDIC SURGERY

## 2023-11-07 PROCEDURE — 20611 DRAIN/INJ JOINT/BURSA W/US: CPT | Performed by: ORTHOPAEDIC SURGERY

## 2023-11-07 PROCEDURE — 99214 OFFICE O/P EST MOD 30 MIN: CPT | Performed by: ORTHOPAEDIC SURGERY

## 2023-11-07 RX ORDER — TRIAMCINOLONE ACETONIDE 40 MG/ML
40 INJECTION, SUSPENSION INTRA-ARTICULAR; INTRAMUSCULAR ONCE
Status: COMPLETED | OUTPATIENT
Start: 2023-11-07 | End: 2023-11-07

## 2023-11-07 RX ADMIN — TRIAMCINOLONE ACETONIDE 40 MG: 40 INJECTION, SUSPENSION INTRA-ARTICULAR; INTRAMUSCULAR at 15:52

## 2023-11-17 DIAGNOSIS — J45.41 MODERATE PERSISTENT ASTHMA WITH EXACERBATION: ICD-10-CM

## 2023-11-17 RX ORDER — ALBUTEROL SULFATE 90 UG/1
2 AEROSOL, METERED RESPIRATORY (INHALATION) EVERY 6 HOURS PRN
Qty: 18 G | Refills: 1 | Status: SHIPPED | OUTPATIENT
Start: 2023-11-17

## 2024-01-05 ENCOUNTER — OFFICE VISIT (OUTPATIENT)
Facility: CLINIC | Age: 44
End: 2024-01-05
Payer: MEDICAID

## 2024-01-05 VITALS
HEART RATE: 93 BPM | BODY MASS INDEX: 39.05 KG/M2 | OXYGEN SATURATION: 97 % | HEIGHT: 66 IN | WEIGHT: 243 LBS | RESPIRATION RATE: 20 BRPM | DIASTOLIC BLOOD PRESSURE: 63 MMHG | SYSTOLIC BLOOD PRESSURE: 109 MMHG | TEMPERATURE: 98.3 F

## 2024-01-05 DIAGNOSIS — J45.41 MODERATE PERSISTENT ASTHMA WITH ACUTE EXACERBATION: Primary | ICD-10-CM

## 2024-01-05 DIAGNOSIS — J30.9 ALLERGIC RHINITIS, UNSPECIFIED SEASONALITY, UNSPECIFIED TRIGGER: ICD-10-CM

## 2024-01-05 DIAGNOSIS — B37.31 YEAST VAGINITIS: ICD-10-CM

## 2024-01-05 PROCEDURE — 99214 OFFICE O/P EST MOD 30 MIN: CPT | Performed by: FAMILY MEDICINE

## 2024-01-05 RX ORDER — FLUCONAZOLE 150 MG/1
150 TABLET ORAL ONCE
Qty: 1 TABLET | Refills: 0 | Status: SHIPPED | OUTPATIENT
Start: 2024-01-05 | End: 2024-01-05

## 2024-01-05 RX ORDER — MEDROXYPROGESTERONE ACETATE 150 MG/ML
50 INJECTION, SUSPENSION INTRAMUSCULAR WEEKLY
COMMUNITY
Start: 2023-12-21

## 2024-01-05 RX ORDER — IPRATROPIUM BROMIDE AND ALBUTEROL SULFATE 2.5; .5 MG/3ML; MG/3ML
3 SOLUTION RESPIRATORY (INHALATION) 3 TIMES DAILY
Qty: 360 ML | Refills: 0 | Status: SHIPPED | OUTPATIENT
Start: 2024-01-05

## 2024-01-05 RX ORDER — BENZONATATE 100 MG/1
100 CAPSULE ORAL 3 TIMES DAILY PRN
Qty: 30 CAPSULE | Refills: 0 | Status: SHIPPED | OUTPATIENT
Start: 2024-01-05 | End: 2024-01-15

## 2024-01-05 RX ORDER — PREDNISONE 20 MG/1
20 TABLET ORAL DAILY
Qty: 5 TABLET | Refills: 0 | Status: SHIPPED | OUTPATIENT
Start: 2024-01-05 | End: 2024-01-10

## 2024-01-05 RX ORDER — AZITHROMYCIN 250 MG/1
250 TABLET, FILM COATED ORAL SEE ADMIN INSTRUCTIONS
Qty: 6 TABLET | Refills: 0 | Status: SHIPPED | OUTPATIENT
Start: 2024-01-05 | End: 2024-01-10

## 2024-01-05 RX ORDER — FLUTICASONE PROPIONATE 50 MCG
2 SPRAY, SUSPENSION (ML) NASAL DAILY
Qty: 16 G | Refills: 1 | Status: SHIPPED | OUTPATIENT
Start: 2024-01-05

## 2024-01-05 NOTE — PROGRESS NOTES
Started coughing a week and half ago and states she just has had aggravating cough, some headache, no fevers, no other symptoms. States she felt really bad new years leandro, did home covid test Monday night and negative. No body aches, no chills. Has been taking Mucinex, ibuprofen, tylenol, inhaler, cough drops.    *Patient has an appointment on Monday with pulmonology    Chief Complaint   Patient presents with    Cough     1. \"Have you been to the ER, urgent care clinic since your last visit?  Hospitalized since your last visit?\" No    2. \"Have you seen or consulted any other health care providers outside of the [facility] since your last visit?\" No     3. For patients aged 45-75: Has the patient had a colonoscopy / FIT/ Cologuard? NA - based on age      If the patient is female:    4. For patients aged 40-74: Has the patient had a mammogram within the past 2 years? yes    5. For patients aged 21-65: Has the patient had a pap smear? No  
inhaler, Inhale 2 puffs into the lungs 2 times daily .  Rinse mouth after use., Disp: 1 each, Rfl: 3    metFORMIN (GLUCOPHAGE-XR) 750 MG extended release tablet, Take 1 tablet by mouth daily (with breakfast), Disp: 180 tablet, Rfl: 1    potassium chloride (K-TAB) 10 MEQ extended release tablet, Take 1 tablet by mouth daily, Disp: 60 tablet, Rfl: 3    vitamin D 25 MCG (1000 UT) CAPS, Take by mouth daily, Disp: , Rfl:     folic acid (FOLVITE) 1 MG tablet, Take 1 tablet by mouth daily, Disp: , Rfl:     linaclotide (LINZESS) 290 MCG CAPS capsule, TAKE 1 CAPSULE BY MOUTH EVERY DAY, Disp: , Rfl:     omeprazole (PRILOSEC) 20 MG delayed release capsule, Take 1 capsule by mouth daily, Disp: , Rfl:     Allergies   Allergen Reactions    Gadobenate Nausea And Vomiting     Nausea, Anxiety - received Multihance Gabobenate Dimeglutamine 20mL 2/13/18 for MRI  Other reaction(s): gi distress  Nausea, Anxiety - received Multihance Gabobenate Dimeglutamine 20mL 2/13/18 for MRI    Iodinated Contrast Media Other (See Comments)     NAUSEA, ANXIETY-- RECEIVED MULTIHANCE (GADOBENATE DIMEGLUTAMINE) 20ML 2/13/18 FOR MRI       /63   Pulse 93   Temp 98.3 °F (36.8 °C)   Resp 20   Ht 1.664 m (5' 5.5\")   Wt 110.2 kg (243 lb)   SpO2 97%   BMI 39.82 kg/m²       Physical Exam  Constitutional:       Appearance: She is ill-appearing (Mild-moderate). She is not toxic-appearing.      Comments: Fatigued appearing   Cardiovascular:      Rate and Rhythm: Regular rhythm. Tachycardia present.   Pulmonary:      Effort: Pulmonary effort is normal. No respiratory distress.      Breath sounds: Normal breath sounds. No wheezing or rales.   Abdominal:      General: Bowel sounds are normal.      Palpations: Abdomen is soft.      Tenderness: There is no abdominal tenderness.   Musculoskeletal:      Right lower leg: No edema.      Left lower leg: No edema.   Neurological:      Mental Status: She is alert. Mental status is at baseline.         Lab Results

## 2024-01-08 ENCOUNTER — OFFICE VISIT (OUTPATIENT)
Age: 44
End: 2024-01-08
Payer: MEDICAID

## 2024-01-08 VITALS
BODY MASS INDEX: 40.98 KG/M2 | HEART RATE: 90 BPM | OXYGEN SATURATION: 96 % | DIASTOLIC BLOOD PRESSURE: 67 MMHG | TEMPERATURE: 97.8 F | HEIGHT: 65 IN | RESPIRATION RATE: 18 BRPM | SYSTOLIC BLOOD PRESSURE: 118 MMHG | WEIGHT: 246 LBS

## 2024-01-08 DIAGNOSIS — Z85.3 HISTORY OF BREAST CANCER: ICD-10-CM

## 2024-01-08 DIAGNOSIS — J45.40 MODERATE PERSISTENT ASTHMA WITHOUT COMPLICATION: ICD-10-CM

## 2024-01-08 DIAGNOSIS — I10 ESSENTIAL HYPERTENSION: ICD-10-CM

## 2024-01-08 DIAGNOSIS — Z90.5 HISTORY OF PARTIAL NEPHRECTOMY: ICD-10-CM

## 2024-01-08 DIAGNOSIS — E28.2 POLYCYSTIC OVARY SYNDROME: ICD-10-CM

## 2024-01-08 DIAGNOSIS — E11.9 DIABETES MELLITUS TYPE II, NON INSULIN DEPENDENT (HCC): ICD-10-CM

## 2024-01-08 DIAGNOSIS — G25.81 RESTLESS LEGS SYNDROME (RLS): ICD-10-CM

## 2024-01-08 DIAGNOSIS — R29.818 SUSPECTED SLEEP APNEA: ICD-10-CM

## 2024-01-08 DIAGNOSIS — E66.9 OBESITY (BMI 35.0-39.9 WITHOUT COMORBIDITY): ICD-10-CM

## 2024-01-08 DIAGNOSIS — R06.83 LOUD SNORING: Primary | ICD-10-CM

## 2024-01-08 DIAGNOSIS — G47.63 BRUXISM, SLEEP-RELATED: ICD-10-CM

## 2024-01-08 PROCEDURE — 99204 OFFICE O/P NEW MOD 45 MIN: CPT | Performed by: OTOLARYNGOLOGY

## 2024-01-08 PROCEDURE — 3074F SYST BP LT 130 MM HG: CPT | Performed by: OTOLARYNGOLOGY

## 2024-01-08 PROCEDURE — 3078F DIAST BP <80 MM HG: CPT | Performed by: OTOLARYNGOLOGY

## 2024-01-08 ASSESSMENT — SLEEP AND FATIGUE QUESTIONNAIRES
HOW LIKELY ARE YOU TO NOD OFF OR FALL ASLEEP WHILE SITTING AND READING: 0
ESS TOTAL SCORE: 9
HOW LIKELY ARE YOU TO NOD OFF OR FALL ASLEEP WHILE LYING DOWN TO REST IN THE AFTERNOON WHEN CIRCUMSTANCES PERMIT: 3
HOW LIKELY ARE YOU TO NOD OFF OR FALL ASLEEP WHILE WATCHING TV: 2
HOW LIKELY ARE YOU TO NOD OFF OR FALL ASLEEP WHEN YOU ARE A PASSENGER IN A CAR FOR AN HOUR WITHOUT A BREAK: 2
HOW LIKELY ARE YOU TO NOD OFF OR FALL ASLEEP WHILE SITTING QUIETLY AFTER LUNCH WITHOUT ALCOHOL: 2
NECK CIRCUMFERENCE (INCHES): 15
HOW LIKELY ARE YOU TO NOD OFF OR FALL ASLEEP IN A CAR, WHILE STOPPED FOR A FEW MINUTES IN TRAFFIC: 0
HOW LIKELY ARE YOU TO NOD OFF OR FALL ASLEEP WHILE SITTING AND TALKING TO SOMEONE: 0
HOW LIKELY ARE YOU TO NOD OFF OR FALL ASLEEP WHILE SITTING INACTIVE IN A PUBLIC PLACE: 0

## 2024-01-08 NOTE — PROGRESS NOTES
Livia Kraus presents today for   Chief Complaint   Patient presents with    Snoring    Fatigue    Sleep Problem       Is someone accompanying this pt? no    Is the patient using any DME equipment during OV? no    -DME Company N/A    Have you ever had a sleep study done before? no    Depression Screenin/8/2024     8:26 AM   PHQ-9    Little interest or pleasure in doing things 0   Feeling down, depressed, or hopeless 0   PHQ-2 Score 0   PHQ-9 Total Score 0        Coker Sleepiness Scale:      2024     8:30 AM   Sleep Medicine   Sitting and reading 0   Watching TV 2   Sitting, inactive in a public place (e.g. a theatre or a meeting) 0   As a passenger in a car for an hour without a break 2   Lying down to rest in the afternoon when circumstances permit 3   Sitting and talking to someone 0   Sitting quietly after a lunch without alcohol 2   In a car, while stopped for a few minutes in traffic 0   Coker Sleepiness Score 9   Neck circumference (Inches) 15       Stop-Ban/8/2024     8:00 AM   STOP-BANG QUESTIONNAIRE   Are you a loud and/or regular snorer? 1   Do you often feel tired or groggy upon awakening or do you awaken with a headache? 1   Have you been observed to gasp or stop breathing during sleep? 1   Are you often tired or fatigued during wake time hours?  0   Do you fall asleep sitting, reading, watching TV or driving? 0   Do you often have problems with memory or concentration? 0   Do you have or are you being treated for high blood pressure? 1   Recent BMI (Calculated) 39.9   Is BMI greater than 35 kg/m2? 1=Yes   Age older than 50 years old? 0=No   Is your neck circumference greater than 17 inches (Male) or 16 inches (Female)? 0   Gender - Male 0=No   STOP-Bang Total Score 44.9         Coordination of Care:  1. Have you been to the ER, urgent care clinic since your last visit? Hospitalized since your last visit? no    2. Have you seen or consulted any other health care providers 
Abused: No   Housing Stability: Unknown (5/16/2023)    Housing Stability Vital Sign     Unable to Pay for Housing in the Last Year: Not on file     Number of Places Lived in the Last Year: Not on file     Unstable Housing in the Last Year: No        Current Outpatient Medications   Medication Instructions    albuterol sulfate HFA (PROVENTIL;VENTOLIN;PROAIR) 108 (90 Base) MCG/ACT inhaler 2 puffs, Inhalation, EVERY 6 HOURS PRN, for wheezing    azithromycin (ZITHROMAX) 250 mg, Oral, SEE ADMIN INSTRUCTIONS, 500mg on day 1 followed by 250mg on days 2 - 5    benzonatate (TESSALON) 100 mg, Oral, 3 TIMES DAILY PRN    Enbrel SureClick 50 mg, Injection, WEEKLY    exemestane (AROMASIN) 25 MG tablet No dose, route, or frequency recorded.    fluticasone (FLONASE) 50 MCG/ACT nasal spray 2 sprays, Nasal, DAILY    fluticasone-salmeterol (ADVAIR HFA) 45-21 MCG/ACT inhaler 2 puffs, Inhalation, 2 TIMES DAILY, .  Rinse mouth after use.    folic acid (FOLVITE) 1 mg, Oral, DAILY    glycopyrrolate (ROBINUL) 1 MG tablet No dose, route, or frequency recorded.    hydroCHLOROthiazide (HYDRODIURIL) 12.5 mg, Oral, DAILY    ipratropium 0.5 mg-albuterol 2.5 mg (DUONEB) 0.5-2.5 (3) MG/3ML SOLN nebulizer solution 3 mLs, Inhalation, 3 TIMES DAILY    linaclotide (LINZESS) 290 MCG CAPS capsule TAKE 1 CAPSULE BY MOUTH EVERY DAY    metFORMIN (GLUCOPHAGE-XR) 750 mg, Oral, DAILY WITH BREAKFAST    methotrexate (RHEUMATREX) 2.5 MG chemo tablet No dose, route, or frequency recorded.    montelukast (SINGULAIR) 10 mg, Oral, DAILY    omeprazole (PRILOSEC) 20 mg, Oral, DAILY    potassium chloride (K-TAB) 10 MEQ extended release tablet 10 mEq, Oral, DAILY    predniSONE (DELTASONE) 20 mg, Oral, DAILY, .  Take in the morning with food.    venlafaxine (EFFEXOR XR) 37.5 MG extended release capsule No dose, route, or frequency recorded.    vitamin D 25 MCG (1000 UT) CAPS Oral, DAILY       Allergies as of 01/08/2024 - Fully Reviewed 01/08/2024   Allergen Reaction Noted

## 2024-01-08 NOTE — PATIENT INSTRUCTIONS
Please make a follow up appointment to discuss the results of your sleep study. If this is impossible for some reason, please send me a \"My Chart\" message so that I may get back with you in a timely manner.    The [facility] is located in the [place] [facility], adjacent to [facility]. The lab is on the second floor. The direct number to call for sleep study related questions is: [phone].    Please call our clinic back at [phone] or send a message on [facility] if you have any questions or concerns or if you are experiencing any of the following:     You have not received a follow up appointment within 30 days prior the recommended follow up time.    If you are not tolerating treatment plan and/or not able to obtain equipment or prescribed medication(s).  if you are experiencing any difficulties with the Durable Medical Equipment  (DME) Company you may be using or is assigned to you.  Two weeks have passed and you have not received an appointment for a scheduled procedure.  Two weeks have passed since you underwent a test and/or procedure and you have not received your results.     If you are using a CPAP/BIPAP, or Home Ventilator Device- Please note the following.  Currently, many DMEs are experiencing supply chain difficulties and orders for equipment may be back logged several weeks.     Your  Durable Medical Equipment (DME ) company is supposed to provide you with replacement filters, tubing and masks. You can either call your DME when you need new supplies or you can arrange for an automatic shipment schedule.    Your need to be seen by our office at lat minimum of every 12 months in order to renew the prescription for these supplies.   Please make note of who your DME company is and their phone number.   Please make sure that you clean your mask and hosing on a regular basis.  Your DME can provide you with additional information regarding proper care and cleaning of your

## 2024-01-08 NOTE — ASSESSMENT & PLAN NOTE
possible, will need to follow-up in this after treating her presumed obstructive sleep apnea.  Has had iron studies and ferritin in September/2023 was 107 which is certainly optimal for RLS purposes. One obvious risk factor could be effects or but again this is an intermittent problem and it is unclear if this is extremely bothersome.

## 2024-01-08 NOTE — ASSESSMENT & PLAN NOTE
was seen this past Friday for an asthma exacerbation by her PCP.  Is feeling better now, exam normal today, has a pulmonary appointment this coming Friday.

## 2024-01-10 ENCOUNTER — HOSPITAL ENCOUNTER (OUTPATIENT)
Facility: HOSPITAL | Age: 44
Discharge: HOME | End: 2024-01-13
Payer: MEDICAID

## 2024-01-10 PROCEDURE — 94060 EVALUATION OF WHEEZING: CPT

## 2024-01-10 PROCEDURE — 94726 PLETHYSMOGRAPHY LUNG VOLUMES: CPT

## 2024-01-10 PROCEDURE — 94729 DIFFUSING CAPACITY: CPT

## 2024-01-11 DIAGNOSIS — J45.41 MODERATE PERSISTENT ASTHMA WITH EXACERBATION: ICD-10-CM

## 2024-01-11 LAB
DLCO: NORMAL
FEV1/FVC: NORMAL
FEV1: NORMAL
FVC: NORMAL
TLC: NORMAL

## 2024-01-11 PROCEDURE — 94726 PLETHYSMOGRAPHY LUNG VOLUMES: CPT | Performed by: INTERNAL MEDICINE

## 2024-01-11 PROCEDURE — 94060 EVALUATION OF WHEEZING: CPT | Performed by: INTERNAL MEDICINE

## 2024-01-11 PROCEDURE — 94729 DIFFUSING CAPACITY: CPT | Performed by: INTERNAL MEDICINE

## 2024-01-12 RX ORDER — ALBUTEROL SULFATE 90 UG/1
2 AEROSOL, METERED RESPIRATORY (INHALATION) EVERY 6 HOURS PRN
Qty: 18 G | Refills: 1 | Status: SHIPPED | OUTPATIENT
Start: 2024-01-12

## 2024-01-14 DIAGNOSIS — E11.9 TYPE 2 DIABETES MELLITUS WITHOUT COMPLICATION, WITHOUT LONG-TERM CURRENT USE OF INSULIN (HCC): ICD-10-CM

## 2024-01-15 RX ORDER — METFORMIN HYDROCHLORIDE 750 MG/1
TABLET, EXTENDED RELEASE ORAL
Qty: 90 TABLET | Refills: 0 | Status: SHIPPED | OUTPATIENT
Start: 2024-01-15

## 2024-01-24 DIAGNOSIS — Z79.899 ENCOUNTER FOR MONITORING DIURETIC THERAPY: ICD-10-CM

## 2024-01-24 DIAGNOSIS — Z51.81 ENCOUNTER FOR MONITORING DIURETIC THERAPY: ICD-10-CM

## 2024-01-24 DIAGNOSIS — E87.6 HYPOKALEMIA: ICD-10-CM

## 2024-01-24 RX ORDER — POTASSIUM CHLORIDE 750 MG/1
10 TABLET, FILM COATED, EXTENDED RELEASE ORAL DAILY
Qty: 90 TABLET | Refills: 0 | Status: SHIPPED | OUTPATIENT
Start: 2024-01-24

## 2024-01-26 ENCOUNTER — HOSPITAL ENCOUNTER (OUTPATIENT)
Dept: SLEEP MEDICINE | Facility: HOSPITAL | Age: 44
Discharge: HOME | End: 2024-01-29
Attending: OTOLARYNGOLOGY
Payer: MEDICAID

## 2024-01-26 DIAGNOSIS — R06.83 LOUD SNORING: ICD-10-CM

## 2024-01-26 PROCEDURE — 95800 SLP STDY UNATTENDED: CPT

## 2024-02-03 ENCOUNTER — APPOINTMENT (OUTPATIENT)
Age: 44
End: 2024-02-03
Payer: MEDICAID

## 2024-02-03 ENCOUNTER — HOSPITAL ENCOUNTER (EMERGENCY)
Age: 44
Discharge: HOME | End: 2024-02-03
Attending: EMERGENCY MEDICINE
Payer: MEDICAID

## 2024-02-03 VITALS
HEART RATE: 86 BPM | TEMPERATURE: 98.2 F | OXYGEN SATURATION: 100 % | BODY MASS INDEX: 39.99 KG/M2 | RESPIRATION RATE: 21 BRPM | WEIGHT: 240 LBS | DIASTOLIC BLOOD PRESSURE: 73 MMHG | SYSTOLIC BLOOD PRESSURE: 116 MMHG | HEIGHT: 65 IN

## 2024-02-03 DIAGNOSIS — R07.9 CHEST PAIN, UNSPECIFIED TYPE: Primary | ICD-10-CM

## 2024-02-03 LAB
ALBUMIN SERPL-MCNC: 3.8 G/DL (ref 3.4–5)
ALBUMIN/GLOB SERPL: 1.2 (ref 0.8–1.7)
ALP SERPL-CCNC: 110 U/L (ref 45–117)
ALT SERPL-CCNC: 51 U/L (ref 13–56)
ANION GAP SERPL CALC-SCNC: 6 MMOL/L (ref 3–18)
AST SERPL W P-5'-P-CCNC: 58 U/L (ref 10–38)
BASOPHILS # BLD: 0 K/UL (ref 0–0.1)
BASOPHILS NFR BLD: 0 % (ref 0–2)
BILIRUB SERPL-MCNC: 1.5 MG/DL (ref 0.2–1)
BUN SERPL-MCNC: 11 MG/DL (ref 7–18)
BUN/CREAT SERPL: 15 (ref 12–20)
CA-I BLD-MCNC: 9.5 MG/DL (ref 8.5–10.1)
CHLORIDE SERPL-SCNC: 107 MMOL/L (ref 100–111)
CO2 SERPL-SCNC: 29 MMOL/L (ref 21–32)
CREAT SERPL-MCNC: 0.74 MG/DL (ref 0.6–1.3)
DIFFERENTIAL METHOD BLD: ABNORMAL
EOSINOPHIL # BLD: 0 K/UL (ref 0–0.4)
EOSINOPHIL NFR BLD: 0 % (ref 0–5)
ERYTHROCYTE [DISTWIDTH] IN BLOOD BY AUTOMATED COUNT: 17.9 % (ref 11.6–14.5)
GLOBULIN SER CALC-MCNC: 3.3 G/DL (ref 2–4)
GLUCOSE SERPL-MCNC: 109 MG/DL (ref 74–99)
HCT VFR BLD AUTO: 33 % (ref 35–45)
HGB BLD-MCNC: 10.8 G/DL (ref 12–16)
IMM GRANULOCYTES # BLD AUTO: 0 K/UL (ref 0–0.04)
IMM GRANULOCYTES NFR BLD AUTO: 0 % (ref 0–0.5)
LYMPHOCYTES # BLD: 1.3 K/UL (ref 0.9–3.6)
LYMPHOCYTES NFR BLD: 17 % (ref 21–52)
MCH RBC QN AUTO: 23 PG (ref 24–34)
MCHC RBC AUTO-ENTMCNC: 32.7 G/DL (ref 31–37)
MCV RBC AUTO: 70.2 FL (ref 78–100)
MONOCYTES # BLD: 0.6 K/UL (ref 0.05–1.2)
MONOCYTES NFR BLD: 8 % (ref 3–10)
NEUTS SEG # BLD: 5.6 K/UL (ref 1.8–8)
NEUTS SEG NFR BLD: 75 % (ref 40–73)
NRBC # BLD: 0 K/UL (ref 0–0.01)
NRBC BLD-RTO: 0 PER 100 WBC
PLATELET # BLD AUTO: 290 K/UL (ref 135–420)
PMV BLD AUTO: 9.9 FL (ref 9.2–11.8)
POTASSIUM SERPL-SCNC: 3.5 MMOL/L (ref 3.5–5.5)
PROT SERPL-MCNC: 7.1 G/DL (ref 6.4–8.2)
RBC # BLD AUTO: 4.7 M/UL (ref 4.2–5.3)
SODIUM SERPL-SCNC: 142 MMOL/L (ref 136–145)
WBC # BLD AUTO: 7.6 K/UL (ref 4.6–13.2)

## 2024-02-03 PROCEDURE — 85025 COMPLETE CBC W/AUTO DIFF WBC: CPT

## 2024-02-03 PROCEDURE — 6370000000 HC RX 637 (ALT 250 FOR IP): Performed by: EMERGENCY MEDICINE

## 2024-02-03 PROCEDURE — 93005 ELECTROCARDIOGRAM TRACING: CPT | Performed by: EMERGENCY MEDICINE

## 2024-02-03 PROCEDURE — 99285 EMERGENCY DEPT VISIT HI MDM: CPT

## 2024-02-03 PROCEDURE — 71045 X-RAY EXAM CHEST 1 VIEW: CPT

## 2024-02-03 PROCEDURE — 80053 COMPREHEN METABOLIC PANEL: CPT

## 2024-02-03 RX ORDER — ASPIRIN 81 MG/1
324 TABLET, CHEWABLE ORAL ONCE
Status: COMPLETED | OUTPATIENT
Start: 2024-02-03 | End: 2024-02-03

## 2024-02-03 RX ADMIN — ASPIRIN 81 MG 324 MG: 81 TABLET ORAL at 14:49

## 2024-02-03 ASSESSMENT — PAIN DESCRIPTION - ORIENTATION: ORIENTATION: RIGHT

## 2024-02-03 ASSESSMENT — LIFESTYLE VARIABLES
HOW OFTEN DO YOU HAVE A DRINK CONTAINING ALCOHOL: NEVER
HOW MANY STANDARD DRINKS CONTAINING ALCOHOL DO YOU HAVE ON A TYPICAL DAY: PATIENT DOES NOT DRINK

## 2024-02-03 ASSESSMENT — PAIN SCALES - GENERAL: PAINLEVEL_OUTOF10: 8

## 2024-02-03 ASSESSMENT — PAIN - FUNCTIONAL ASSESSMENT: PAIN_FUNCTIONAL_ASSESSMENT: 0-10

## 2024-02-03 ASSESSMENT — PAIN DESCRIPTION - LOCATION: LOCATION: ABDOMEN;CHEST

## 2024-02-03 NOTE — ED NOTES
Patient requesting to leave AMA, states she is thirsty, the ED is to hot, and she can go home and lay around. That if she is having an emergency she can go to another hospital. Patient aware that po intake is discouraged with abdominal pain per protocols. Blood pressure monitor and cardiac monitors removed during stay by patient. Patient unable to provide urine specimen. Multiple staff members including Charge nurse have spoken with the patient about stay at facility and influx of critical patients at this time

## 2024-02-03 NOTE — ED TRIAGE NOTES
Abdominal pain started this morning, then chest pain when she laid down. No n/v/d, occasional cough. Nothing abnormal. Chest pain sharp in mid sternal, more on the right breast side. Pain started around 1 pm.   Abdominal pain left lower quad, mid upper abdomen above umbilical area  Last bowel movement normal this morning

## 2024-02-03 NOTE — ED PROVIDER NOTES
[facility] EMERGENCY DEPT  EMERGENCY DEPARTMENT ENCOUNTER    Patient Name: Livia Kraus  MRN: [id number]  YOB: 1980  Provider: Henry Kumar DO  PCP: Genesis Acevedo MD   Time/Date of evaluation: 2:37 PM EST on 2/3/24    History of Presenting Illness     History Provided by: Patient  History is limited by: Nothing     HISTORY:   Livia Kraus is a 43 y.o. female with history of renal cell carcinoma status post partial nephrectomy, and left breast cancer status post resection with radiation and chemo treatment presents with a chief complaint of chest pain.  Patient states that she recently started Enbrel medication for rheumatoid arthritis.  She states that she has been having multiple side effects including palpitations.  Patient states that she was experiencing abdominal pain earlier today which resolved after taking Robinul 1 mg.  She denies any fever, chills, headache, sore throat, shortness of breath, palpitations, diaphoresis, nausea, vomiting, diarrhea, back pain, flank pain, dysuria, hematuria, constipation, vaginal discharge, vaginal bleeding, melena, or rectal bleeding.  Patient states that she also has a history of gallbladder disease.  She states that she contacted her doctor regarding concerns that she may be having reactions to the Enbrel medication.  She states that her doctor informed her that they do not believe that the Enbrel medication is causing her symptoms.    Nursing Notes were all reviewed and agreed with or any disagreements were addressed in the HPI.    Past History     PAST MEDICAL HISTORY:  Past Medical History:   Diagnosis Date    Asthma     Breast cancer (HCC)     Hyperthyroidism     Renal cell carcinoma (HCC)        PAST SURGICAL HISTORY:  Past Surgical History:   Procedure Laterality Date    MASTECTOMY, PARTIAL Left     PARTIAL NEPHRECTOMY Left     US I&D BREAST ABSCESS DEEP  5/26/2023    US I&D BREAST ABSCESS DEEP       FAMILY HISTORY:  History reviewed. No  Calculation (Bazett) 469 ms    P Axis 59 degrees    R Axis 40 degrees    T Axis 49 degrees    Diagnosis       Normal sinus rhythm  Normal ECG  When compared with ECG of 05-SEP-2023 04:59,  No significant change was found         RADIOLOGIC STUDIES:   Non x-ray images such as CT, Ultrasound and MRI are read by the radiologist. X-ray images are visualized and preliminarily interpreted by the ED Provider with the findings as listed in the ED Course section below.     Interpretation per the Radiologist is listed below, if available at the time of this note:    XR CHEST PORTABLE   Final Result   No acute cardiopulmonary process.          Procedures     Procedures    ED Course and Medical Decision Making     2:37 PM JACK Kumar DO) am the first provider for this patient. Initial assessment performed. I reviewed the vital signs, available nursing notes, past medical history, past surgical history, family history and social history. The patients presenting problems have been discussed, and they are in agreement with the care plan formulated and outlined with them. I have encouraged them to ask questions as they arise throughout their visit.     My differential diagnosis included but was not limited to     Chest wall pain, Anxiety, Pneumonia, angina, GERD, ACS, PE    Labs ordered and results noted above.    Troponin I ordered.  Patient became agitated after being in the emergency department for 2 hours.  I was notified by nursing staff that the patient was upset because she was not updated regarding additional testing.  I went to speak with the patient and she said that she was more upset with nursing staff and being told that she could not drink water to produce a urine specimen.  I attempted to  the patient on the importance of waiting for the additional results.  She states that she does not want to wait because she felt mistreated.  She states that she plans to follow-up with her primary care

## 2024-02-04 LAB
EKG ATRIAL RATE: 86 BPM
EKG DIAGNOSIS: NORMAL
EKG P AXIS: 59 DEGREES
EKG P-R INTERVAL: 144 MS
EKG Q-T INTERVAL: 392 MS
EKG QRS DURATION: 90 MS
EKG QTC CALCULATION (BAZETT): 469 MS
EKG R AXIS: 40 DEGREES
EKG T AXIS: 49 DEGREES
EKG VENTRICULAR RATE: 86 BPM

## 2024-02-06 PROBLEM — R06.83 LOUD SNORING: Status: ACTIVE | Noted: 2024-02-06

## 2024-02-06 PROBLEM — G47.33 OBSTRUCTIVE SLEEP APNEA (ADULT) (PEDIATRIC): Status: ACTIVE | Noted: 2024-02-06

## 2024-02-09 DIAGNOSIS — G47.33 MODERATE OBSTRUCTIVE SLEEP APNEA: Primary | ICD-10-CM

## 2024-02-09 NOTE — PROGRESS NOTES
Spoke with patient about sleep study results - she does have moderate Obstructive Sleep Apnea. Placing order for APAP. Will see her back in the office 6-8 weeks after getting her machine.

## 2024-02-14 ENCOUNTER — CLINICAL DOCUMENTATION (OUTPATIENT)
Age: 44
End: 2024-02-14

## 2024-02-26 ENCOUNTER — TELEMEDICINE (OUTPATIENT)
Facility: CLINIC | Age: 44
End: 2024-02-26
Payer: MEDICAID

## 2024-02-26 DIAGNOSIS — G47.33 OSA (OBSTRUCTIVE SLEEP APNEA): ICD-10-CM

## 2024-02-26 DIAGNOSIS — E87.6 HYPOKALEMIA: ICD-10-CM

## 2024-02-26 DIAGNOSIS — E28.2 PCOS (POLYCYSTIC OVARIAN SYNDROME): ICD-10-CM

## 2024-02-26 DIAGNOSIS — R60.9 EDEMA, UNSPECIFIED TYPE: ICD-10-CM

## 2024-02-26 DIAGNOSIS — Z51.81 ENCOUNTER FOR MONITORING DIURETIC THERAPY: ICD-10-CM

## 2024-02-26 DIAGNOSIS — Z79.899 ENCOUNTER FOR MONITORING DIURETIC THERAPY: ICD-10-CM

## 2024-02-26 DIAGNOSIS — R74.01 TRANSAMINITIS: ICD-10-CM

## 2024-02-26 DIAGNOSIS — N63.20 MASS OF LEFT BREAST, UNSPECIFIED QUADRANT: ICD-10-CM

## 2024-02-26 DIAGNOSIS — J45.40 MODERATE PERSISTENT ASTHMA WITHOUT COMPLICATION: Primary | ICD-10-CM

## 2024-02-26 PROCEDURE — 99214 OFFICE O/P EST MOD 30 MIN: CPT | Performed by: FAMILY MEDICINE

## 2024-02-26 RX ORDER — METFORMIN HYDROCHLORIDE 750 MG/1
TABLET, EXTENDED RELEASE ORAL
Qty: 90 TABLET | Refills: 1 | Status: SHIPPED | OUTPATIENT
Start: 2024-02-26

## 2024-02-26 RX ORDER — HYDROCHLOROTHIAZIDE 12.5 MG/1
12.5 TABLET ORAL DAILY
Qty: 90 TABLET | Refills: 1 | Status: SHIPPED | OUTPATIENT
Start: 2024-02-26

## 2024-02-26 ASSESSMENT — PATIENT HEALTH QUESTIONNAIRE - PHQ9
SUM OF ALL RESPONSES TO PHQ QUESTIONS 1-9: 0
SUM OF ALL RESPONSES TO PHQ QUESTIONS 1-9: 0
1. LITTLE INTEREST OR PLEASURE IN DOING THINGS: 0
SUM OF ALL RESPONSES TO PHQ QUESTIONS 1-9: 0
SUM OF ALL RESPONSES TO PHQ QUESTIONS 1-9: 0
2. FEELING DOWN, DEPRESSED OR HOPELESS: 0
SUM OF ALL RESPONSES TO PHQ9 QUESTIONS 1 & 2: 0

## 2024-02-26 NOTE — PROGRESS NOTES
Chief Complaint   Patient presents with    Follow-up     Chronic disease       \"Have you been to the ER, urgent care clinic since your last visit?  Hospitalized since your last visit?\"    NO    “Have you seen or consulted any other health care providers outside of [facility] since your last visit?”    NO        “Have you had a pap smear?”    NO

## 2024-02-26 NOTE — PATIENT INSTRUCTIONS
High potassium foods;  Highest content (>25 mEq/100 g)   Dried figs   Molasses   Seaweed   Very high content (>12.5 mEq/100 g)   Dried fruits (dates, prunes)   Nuts   Avocados   Bran cereals   Wheat germ   Lima beans   High content (>6.2 mEq/100 g)   Vegetables   Spinach   Tomatoes   Broccoli   Winter squash   Beets   Carrots   Cauliflower   Potatoes   Fruits   Bananas   Cantaloupe   Kiwis   Oranges   Mangos   Meats   Ground beef   Steak   Pork   Veal   Eugene

## 2024-02-26 NOTE — PROGRESS NOTES
Livia Kraus (: 1980) is a 43 y.o. female here for evaluation of the following chief concern(s):  Chronic condition management     The services today are being conducted using telemedicine which Livia Kraus has consented to at the time of scheduling.    ASSESSMENT/PLAN:  1. Moderate persistent asthma without complication  2. Edema, unspecified type  -     hydroCHLOROthiazide 12.5 MG tablet; Take 1 tablet by mouth daily, Disp-90 tablet, R-1Normal  3. Hypokalemia  4. Transaminitis  5. Mass of left breast, unspecified quadrant  6. BENEDICT (obstructive sleep apnea)  7. PCOS (polycystic ovarian syndrome)  -     metFORMIN (GLUCOPHAGE-XR) 750 MG extended release tablet; TAKE 1 TABLET BY MOUTH DAILY( WITH BREAKFAST), Disp-90 tablet, R-1ZERO refills remain on this prescription. Your patient is requesting advance approval of refills for this medication to PREVENT ANY MISSED DOSESNormal  8. BMI 39.0-39.9,adult  9. Encounter for monitoring diuretic therapy    Ms. Kraus appears medically stable.    Continue diuretic for management of edema, blood pressure effect as well as potassium supplementation, AVS with list of foods naturally high in potassium content.    Patient will continue ongoing care with Oncology, as well as Surgery for history of breast mass/transaminitis with possible need for cholecystectomy in the future.    Asthma sounds controlled at this time; appreciate shared care with Pulmonology.    Patient encouraged to call Sleep Medicine to help expedite obtaining CPAP if possible.    I advised that she may expect to feel better after getting started on CPAP, short and long-term effects of treating BENEDICT.    I have encouraged her to continue focus on healthy nutrition, exercise as tolerated.    We reviewed RTO precautions as well as ER precautions.    Return in about 5 months (around 2024) for follow-up chronic conditions or sooner if needed.  We reviewed plans for ongoing coverage for our

## 2024-03-04 ENCOUNTER — HOSPITAL ENCOUNTER (EMERGENCY)
Age: 44
Discharge: HOME | End: 2024-03-05
Attending: EMERGENCY MEDICINE
Payer: MEDICAID

## 2024-03-04 DIAGNOSIS — M13.861 OTHER SPECIFIED ARTHRITIS, RIGHT KNEE: Primary | ICD-10-CM

## 2024-03-04 PROCEDURE — 96372 THER/PROPH/DIAG INJ SC/IM: CPT

## 2024-03-04 PROCEDURE — 99284 EMERGENCY DEPT VISIT MOD MDM: CPT

## 2024-03-04 ASSESSMENT — PAIN DESCRIPTION - LOCATION: LOCATION: KNEE

## 2024-03-04 ASSESSMENT — PAIN DESCRIPTION - DESCRIPTORS: DESCRIPTORS: ACHING;THROBBING

## 2024-03-04 ASSESSMENT — PAIN SCALES - GENERAL: PAINLEVEL_OUTOF10: 9

## 2024-03-04 ASSESSMENT — PAIN DESCRIPTION - ORIENTATION: ORIENTATION: RIGHT

## 2024-03-05 ENCOUNTER — APPOINTMENT (OUTPATIENT)
Age: 44
End: 2024-03-05
Payer: MEDICAID

## 2024-03-05 VITALS
HEIGHT: 66 IN | WEIGHT: 253.3 LBS | SYSTOLIC BLOOD PRESSURE: 125 MMHG | TEMPERATURE: 98.2 F | HEART RATE: 96 BPM | DIASTOLIC BLOOD PRESSURE: 79 MMHG | RESPIRATION RATE: 16 BRPM | OXYGEN SATURATION: 99 % | BODY MASS INDEX: 40.71 KG/M2

## 2024-03-05 PROCEDURE — 96372 THER/PROPH/DIAG INJ SC/IM: CPT

## 2024-03-05 PROCEDURE — 6360000002 HC RX W HCPCS: Performed by: EMERGENCY MEDICINE

## 2024-03-05 PROCEDURE — 73562 X-RAY EXAM OF KNEE 3: CPT

## 2024-03-05 RX ORDER — HYDROCODONE BITARTRATE AND ACETAMINOPHEN 5; 325 MG/1; MG/1
1 TABLET ORAL
Status: DISCONTINUED | OUTPATIENT
Start: 2024-03-05 | End: 2024-03-05

## 2024-03-05 RX ORDER — OXYCODONE HYDROCHLORIDE AND ACETAMINOPHEN 5; 325 MG/1; MG/1
1 TABLET ORAL EVERY 6 HOURS PRN
Qty: 8 TABLET | Refills: 0 | Status: SHIPPED | OUTPATIENT
Start: 2024-03-05 | End: 2024-03-08

## 2024-03-05 RX ORDER — KETOROLAC TROMETHAMINE 30 MG/ML
30 INJECTION, SOLUTION INTRAMUSCULAR; INTRAVENOUS
Status: COMPLETED | OUTPATIENT
Start: 2024-03-05 | End: 2024-03-05

## 2024-03-05 RX ADMIN — KETOROLAC TROMETHAMINE 30 MG: 30 INJECTION, SOLUTION INTRAMUSCULAR at 00:40

## 2024-03-05 NOTE — ED TRIAGE NOTES
Patient reports that after getting off work today she started having right knee pain. Patient reports a hx of RA, but states it has not been this bad before. Denies any injury. Patient reports trying ice and motrin at home, last dose was around 1930 without relief.

## 2024-03-05 NOTE — ED PROVIDER NOTES
[facility] EMERGENCY DEPT  EMERGENCY DEPARTMENT ENCOUNTER       Pt Name: Livia Kraus  MRN: [id number]  Birthdate 1980  Date of evaluation: 3/4/2024  Provider: Vivek Guy MD   PCP: Genesis Acevedo MD  Note Started: 2:45 AM 3/5/24     CHIEF COMPLAINT       Chief Complaint   Patient presents with    Knee Pain        HISTORY OF PRESENT ILLNESS: 1 or more elements      History From: Patient  History limited by: Nothing     Livia Kraus is a 43 y.o. female who presents to the ED complaining of right knee pain.  Patient reports after getting off work today she started having right knee pain.  She reports is a history of rheumatoid arthritis.  She denies any trauma.  She has no fever or chills.  She reports took Motrin at home with no relief.  Also applied ice to the knee with no relief.     Nursing Notes were all reviewed and agreed with or any disagreements were addressed in the HPI.     REVIEW OF SYSTEMS      Review of Systems     Positives and Pertinent negatives as per HPI.    PAST HISTORY     Past Medical History:  Past Medical History:   Diagnosis Date    Asthma     Breast cancer (HCC)     Hyperthyroidism     Renal cell carcinoma (HCC)        Past Surgical History:  Past Surgical History:   Procedure Laterality Date    MASTECTOMY, PARTIAL Left     PARTIAL NEPHRECTOMY Left     US I&D BREAST ABSCESS DEEP  5/26/2023    US I&D BREAST ABSCESS DEEP       Family History:  History reviewed. No pertinent family history.    Social History:  Social History     Tobacco Use    Smoking status: Never    Smokeless tobacco: Never   Vaping Use    Vaping Use: Never used   Substance Use Topics    Alcohol use: Not Currently    Drug use: Never       Allergies:  Allergies   Allergen Reactions    Gadobenate Nausea And Vomiting     Nausea, Anxiety - received Multihance Gabobenate Dimeglutamine 20mL 2/13/18 for MRI  Other reaction(s): gi distress  Nausea, Anxiety - received Multihance Gabobenate Dimeglutamine 20mL 2/13/18

## 2024-03-25 ENCOUNTER — OFFICE VISIT (OUTPATIENT)
Dept: FAMILY MEDICINE CLINIC | Facility: CLINIC | Age: 44
End: 2024-03-25
Payer: MEDICAID

## 2024-03-25 VITALS
OXYGEN SATURATION: 96 % | HEIGHT: 66 IN | BODY MASS INDEX: 41.53 KG/M2 | SYSTOLIC BLOOD PRESSURE: 122 MMHG | TEMPERATURE: 98.5 F | DIASTOLIC BLOOD PRESSURE: 79 MMHG | WEIGHT: 258.4 LBS | HEART RATE: 93 BPM

## 2024-03-25 DIAGNOSIS — L81.9 DISCOLORATION OF SKIN: Primary | ICD-10-CM

## 2024-03-25 PROCEDURE — 3074F SYST BP LT 130 MM HG: CPT

## 2024-03-25 PROCEDURE — 99213 OFFICE O/P EST LOW 20 MIN: CPT

## 2024-03-25 PROCEDURE — 3078F DIAST BP <80 MM HG: CPT

## 2024-03-25 ASSESSMENT — ENCOUNTER SYMPTOMS
GASTROINTESTINAL NEGATIVE: 1
EYES NEGATIVE: 1
ALLERGIC/IMMUNOLOGIC NEGATIVE: 1
RESPIRATORY NEGATIVE: 1

## 2024-03-25 NOTE — PROGRESS NOTES
Livia Kraus is a 43 y.o. female presents with   Chief Complaint   Patient presents with    Other     Discoloration of both palms, patient said they had a orange color. Patient first noticed on 3/19/24 and by 3/23/24 it had gone away.         Diagnosis   1. Discoloration of skin    Patient presents today for discoloration of the palms of her hands.  She reports they were an orange color they have since subsided.  She reports she does not recall touching anything that would have caused this.  In office today they are normal color.           /79   Pulse 93   Temp 98.5 °F (36.9 °C) (Temporal)   Ht 1.664 m (5' 5.5\")   Wt 117.2 kg (258 lb 6.4 oz)   SpO2 96%   BMI 42.35 kg/m²   Subjective:     Past Medical History:   Diagnosis Date    Asthma     Breast cancer (HCC)     Hyperthyroidism     Renal cell carcinoma (HCC)      Past Surgical History:   Procedure Laterality Date    MASTECTOMY, PARTIAL Left     PARTIAL NEPHRECTOMY Left     US I&D BREAST ABSCESS DEEP  5/26/2023    US I&D BREAST ABSCESS DEEP     Social History     Socioeconomic History    Marital status: Single     Spouse name: None    Number of children: None    Years of education: None    Highest education level: None   Tobacco Use    Smoking status: Never    Smokeless tobacco: Never   Vaping Use    Vaping Use: Never used   Substance and Sexual Activity    Alcohol use: Not Currently    Drug use: Never    Sexual activity: Not Currently     Social Determinants of Health     Financial Resource Strain: Medium Risk (5/16/2023)    Overall Financial Resource Strain (CARDIA)     Difficulty of Paying Living Expenses: Somewhat hard   Food Insecurity:   Recent Concern: Food Insecurity - Food Insecurity Present (5/16/2023)    Hunger Vital Sign     Worried About Running Out of Food in the Last Year: Sometimes true     Ran Out of Food in the Last Year: Sometimes true   Transportation Needs: Unknown (5/16/2023)    PRAPARE - Transportation     Lack of

## 2024-03-25 NOTE — PROGRESS NOTES
Livia Kraus presents today for   Chief Complaint   Patient presents with    Other     Discoloration of both palms, patient said they had a orange color. Patient first noticed on 3/19/24 and by 3/23/24 it had gone away.        Is someone accompanying this pt? No     Is the patient using any DME equipment during OV? No     Depression Screenin/26/2024     9:34 AM 2024     8:26 AM 2023    11:36 AM 2023     3:35 PM 2023     3:23 PM 2023     9:31 AM 2023    11:12 AM   PHQ-9 Questionaire   Little interest or pleasure in doing things 0 0 0 0 0 0 0   Feeling down, depressed, or hopeless 0 0 0 0 0 0 0   PHQ-9 Total Score 0 0 0 0 0 0 0       Fall Risk       No data to display                 Health Maintenance reviewed and discussed and ordered per Provider.    Health Maintenance Due   Topic Date Due    Hepatitis B vaccine (1 of 3 - 3-dose series) Never done    Varicella vaccine (1 of 2 - 2-dose childhood series) Never done    Pneumococcal 0-64 years Vaccine (1 of 2 - PCV) Never done    HIV screen  Never done    Diabetic retinal exam  Never done    Shingles vaccine (1 of 2) Never done    Cervical cancer screen  Never done    DTaP/Tdap/Td vaccine (2 - Td or Tdap) 2022    Flu vaccine (1) 2023    COVID-19 Vaccine (4 -  season) 2023    Lipids  11/15/2023   .        \"Have you been to the ER, urgent care clinic since your last visit?  Hospitalized since your last visit?\"    NO    “Have you seen or consulted any other health care providers outside of [facility] since your last visit?”    NO        “Have you had a pap smear?”    NO

## 2024-04-02 ENCOUNTER — CLINICAL DOCUMENTATION (OUTPATIENT)
Age: 44
End: 2024-04-02

## 2024-04-02 NOTE — PROGRESS NOTES
4.2.24 Pt called stating that she has yet to hear  from a [employer] company,  Pt order was sent to [employer] on 2.12.24.  Didi will call pt back once she further investigate the order status  -  ADS

## 2024-04-02 NOTE — PROGRESS NOTES
Spoke with Ms. Kraus regarding her message about her CPAP order. I contacted [employer] and confirmed Ms. Kraus has a CPAP setup for 5-. Ms. Kraus acknowledge she receive a text message confirmation from [employer]. I provided the patient with [employer]'s phone number for any further question.

## 2024-04-08 DIAGNOSIS — J45.41 MODERATE PERSISTENT ASTHMA WITH EXACERBATION: ICD-10-CM

## 2024-04-09 RX ORDER — MONTELUKAST SODIUM 10 MG/1
10 TABLET ORAL DAILY
Qty: 90 TABLET | Refills: 0 | Status: SHIPPED | OUTPATIENT
Start: 2024-04-09

## 2024-04-18 LAB — PAP SMEAR, EXTERNAL: NEGATIVE

## 2024-04-23 DIAGNOSIS — Z79.899 ENCOUNTER FOR MONITORING DIURETIC THERAPY: ICD-10-CM

## 2024-04-23 DIAGNOSIS — Z51.81 ENCOUNTER FOR MONITORING DIURETIC THERAPY: ICD-10-CM

## 2024-04-23 DIAGNOSIS — E87.6 HYPOKALEMIA: ICD-10-CM

## 2024-04-23 RX ORDER — POTASSIUM CHLORIDE 750 MG/1
10 TABLET, FILM COATED, EXTENDED RELEASE ORAL DAILY
Qty: 90 TABLET | Refills: 0 | Status: SHIPPED | OUTPATIENT
Start: 2024-04-23

## 2024-05-05 DIAGNOSIS — J45.41 MODERATE PERSISTENT ASTHMA WITH EXACERBATION: ICD-10-CM

## 2024-05-06 RX ORDER — ALBUTEROL SULFATE 90 UG/1
2 AEROSOL, METERED RESPIRATORY (INHALATION) EVERY 6 HOURS PRN
Qty: 18 G | Refills: 1 | Status: SHIPPED | OUTPATIENT
Start: 2024-05-06

## 2024-05-20 ENCOUNTER — OFFICE VISIT (OUTPATIENT)
Age: 44
End: 2024-05-20
Payer: MEDICAID

## 2024-05-20 VITALS
RESPIRATION RATE: 18 BRPM | HEART RATE: 96 BPM | OXYGEN SATURATION: 97 % | HEIGHT: 65 IN | BODY MASS INDEX: 41.99 KG/M2 | WEIGHT: 252 LBS | DIASTOLIC BLOOD PRESSURE: 74 MMHG | SYSTOLIC BLOOD PRESSURE: 128 MMHG | TEMPERATURE: 97.2 F

## 2024-05-20 DIAGNOSIS — I10 ESSENTIAL HYPERTENSION: ICD-10-CM

## 2024-05-20 DIAGNOSIS — G25.81 RESTLESS LEGS SYNDROME (RLS): ICD-10-CM

## 2024-05-20 DIAGNOSIS — E66.9 OBESITY (BMI 35.0-39.9 WITHOUT COMORBIDITY): ICD-10-CM

## 2024-05-20 DIAGNOSIS — G47.63 BRUXISM, SLEEP-RELATED: ICD-10-CM

## 2024-05-20 DIAGNOSIS — G47.33 OBSTRUCTIVE SLEEP APNEA (ADULT) (PEDIATRIC): Primary | ICD-10-CM

## 2024-05-20 DIAGNOSIS — J45.40 MODERATE PERSISTENT ASTHMA WITHOUT COMPLICATION: ICD-10-CM

## 2024-05-20 DIAGNOSIS — E11.9 DIABETES MELLITUS TYPE II, NON INSULIN DEPENDENT (HCC): ICD-10-CM

## 2024-05-20 PROCEDURE — 3078F DIAST BP <80 MM HG: CPT | Performed by: OTOLARYNGOLOGY

## 2024-05-20 PROCEDURE — 3074F SYST BP LT 130 MM HG: CPT | Performed by: OTOLARYNGOLOGY

## 2024-05-20 PROCEDURE — 99214 OFFICE O/P EST MOD 30 MIN: CPT | Performed by: OTOLARYNGOLOGY

## 2024-05-20 ASSESSMENT — SLEEP AND FATIGUE QUESTIONNAIRES
HOW LIKELY ARE YOU TO NOD OFF OR FALL ASLEEP WHILE SITTING AND TALKING TO SOMEONE: WOULD NEVER DOZE
ESS TOTAL SCORE: 11
HOW LIKELY ARE YOU TO NOD OFF OR FALL ASLEEP WHILE WATCHING TV: HIGH CHANCE OF DOZING
HOW LIKELY ARE YOU TO NOD OFF OR FALL ASLEEP WHILE SITTING QUIETLY AFTER LUNCH WITHOUT ALCOHOL: MODERATE CHANCE OF DOZING
HOW LIKELY ARE YOU TO NOD OFF OR FALL ASLEEP WHILE SITTING AND READING: SLIGHT CHANCE OF DOZING
HOW LIKELY ARE YOU TO NOD OFF OR FALL ASLEEP WHEN YOU ARE A PASSENGER IN A CAR FOR AN HOUR WITHOUT A BREAK: MODERATE CHANCE OF DOZING
HOW LIKELY ARE YOU TO NOD OFF OR FALL ASLEEP IN A CAR, WHILE STOPPED FOR A FEW MINUTES IN TRAFFIC: WOULD NEVER DOZE
HOW LIKELY ARE YOU TO NOD OFF OR FALL ASLEEP WHILE LYING DOWN TO REST IN THE AFTERNOON WHEN CIRCUMSTANCES PERMIT: HIGH CHANCE OF DOZING
HOW LIKELY ARE YOU TO NOD OFF OR FALL ASLEEP WHILE SITTING INACTIVE IN A PUBLIC PLACE: WOULD NEVER DOZE

## 2024-05-20 ASSESSMENT — PATIENT HEALTH QUESTIONNAIRE - PHQ9
SUM OF ALL RESPONSES TO PHQ QUESTIONS 1-9: 0
1. LITTLE INTEREST OR PLEASURE IN DOING THINGS: NOT AT ALL
SUM OF ALL RESPONSES TO PHQ QUESTIONS 1-9: 0
2. FEELING DOWN, DEPRESSED OR HOPELESS: NOT AT ALL
SUM OF ALL RESPONSES TO PHQ9 QUESTIONS 1 & 2: 0
SUM OF ALL RESPONSES TO PHQ QUESTIONS 1-9: 0
SUM OF ALL RESPONSES TO PHQ QUESTIONS 1-9: 0

## 2024-05-20 NOTE — PATIENT INSTRUCTIONS
Please make a follow up appointment to discuss the results of your sleep study. If this is impossible for some reason, please send me a \"My Chart\" message so that I may get back with you in a timely manner.    The [facility] Sleep Lab is located in the [facility], adjacent to [facility]. The lab is on the second floor. The direct number to call for sleep study related questions is: [phone].    Please call our clinic back at [phone] or send a message on [employer] if you have any questions or concerns or if you are experiencing any of the following:     You have not received a follow up appointment within 30 days prior the recommended follow up time.    If you are not tolerating treatment plan and/or not able to obtain equipment or prescribed medication(s).  if you are experiencing any difficulties with the Durable Medical Equipment  (DME) Company you may be using or is assigned to you.  Two weeks have passed and you have not received an appointment for a scheduled procedure.  Two weeks have passed since you underwent a test and/or procedure and you have not received your results.     If you are using a CPAP/BIPAP, or Home Ventilator Device- Please note the following.  Currently, many DMEs are experiencing supply chain difficulties and orders for equipment may be back logged several weeks.     Your  Durable Medical Equipment (DME ) company is supposed to provide you with replacement filters, tubing and masks. You can either call your DME when you need new supplies or you can arrange for an automatic shipment schedule.    Your need to be seen by our office at lat minimum of every 12 months in order to renew the prescription for these supplies.   Please make note of who your DME company is and their phone number.   Please make sure that you clean your mask and hosing on a regular basis.  Your DME can provide you with additional information regarding proper care and cleaning of your

## 2024-05-20 NOTE — PROGRESS NOTES
Livia Kraus presents today for   Chief Complaint   Patient presents with    Follow-up     CPAP       Is someone accompanying this pt? no    Is the patient using any DME equipment during OV? no    -DME Company: [employer]     Depression Screenin/20/2024    11:47 AM   PHQ-9    Little interest or pleasure in doing things 0   Feeling down, depressed, or hopeless 0   PHQ-2 Score 0   PHQ-9 Total Score 0        [place] Sleepiness Scale:      2024    11:50 AM   Sleep Medicine   Sitting and reading 1   Watching TV 3   Sitting, inactive in a public place (e.g. a theatre or a meeting) 0   As a passenger in a car for an hour without a break 2   Lying down to rest in the afternoon when circumstances permit 3   Sitting and talking to someone 0   Sitting quietly after a lunch without alcohol 2   In a car, while stopped for a few minutes in traffic 0   [place] Sleepiness Score 11       Stop-Ban/8/2024     8:00 AM   STOP-BANG QUESTIONNAIRE   Are you a loud and/or regular snorer? 1   Do you often feel tired or groggy upon awakening or do you awaken with a headache? 1   Have you been observed to gasp or stop breathing during sleep? 1   Are you often tired or fatigued during wake time hours?  0   Do you fall asleep sitting, reading, watching TV or driving? 0   Do you often have problems with memory or concentration? 0   Do you have or are you being treated for high blood pressure? 1   Recent BMI (Calculated) 39.9   Is BMI greater than 35 kg/m2? 1=Yes   Age older than 50 years old? 0=No   Is your neck circumference greater than 17 inches (Male) or 16 inches (Female)? 0   Gender - Male 0=No   STOP-Bang Total Score 44.9           Coordination of Care:  1. Have you been to the ER, urgent care clinic since your last visit? Hospitalized since your last visit?  no    2. Have you seen or consulted any other health care providers outside of the [facility] since your last visit? Include any pap smears or 
exam)  Eyes:      Extraocular Movements: Extraocular movements intact.      Conjunctiva/sclera: Conjunctivae normal.      Pupils: Pupils are equal, round, and reactive to light.   Cardiovascular:      Rate and Rhythm: Normal rate.   Pulmonary:      Effort: Pulmonary effort is normal.      Breath sounds: No wheezing or rales.      Comments: Some coarse breath sounds but not consistent, R>L.  Neurological:      General: No focal deficit present.      Mental Status: She is alert and oriented to person, place, and time.   Psychiatric:         Mood and Affect: Mood normal.         Behavior: Behavior normal.       The mandibular molar Class :   [x]1 []2 []3        Mallampati I Airway Classification:   []1 []2 []3 [x]4          Lab Results   Component Value Date/Time     02/03/2024 02:25 PM    K 3.5 02/03/2024 02:25 PM     02/03/2024 02:25 PM    CO2 29 02/03/2024 02:25 PM    BUN 11 02/03/2024 02:25 PM    CREATININE 0.74 02/03/2024 02:25 PM    GLUCOSE 109 02/03/2024 02:25 PM    CALCIUM 9.5 02/03/2024 02:25 PM    LABGLOM >60 02/03/2024 02:25 PM    LABGLOM >60 01/18/2023 03:30 AM       Component  Ref Range & Units 12/21/23 0845   Albumin  3.5 - 5.0 g/dL 4.5   Total Protein  6.4 - 8.3 g/dL 7.0   Globulin  2.0 - 4.0 g/dL 2.5   A/G Ratio  1.1 - 2.6 ratio 1.8   Bilirubin Total  0.2 - 1.2 mg/dL 0.5   Bilirubin Direct  0.0 - 0.3 mg/dL <0.2   SGOT (AST)  10 - 37 U/L 10   Alkaline Phosphatase  25 - 115 U/L 102   SGPT (ALT)  5 - 40 U/L 13   Basic metabolic panel  Order: [id number]  Component  Ref Range & Units 10/17/23 0909   Sodium Level  135 - 148 mmol/L 141   Potassium Level  3.5 - 5.5 mmol/L 3.5   Chloride  100 - 110 mmol/L 106   Carbon Dioxide/CO2  22 - 32 mmol/L 27   Anion Gap  6 - 16 mmol/L 8   Glucose Random  70 - 120 mg/dL 136 High    Calcium Level Total  8.4 - 10.2 mg/dL 9.4   Blood Urea Nitrogen  7 - 24 mg/dL 12   Creatinine  0.44 - 1.03 mg/dL 0.83   BUN/Creatinine ratio  12.0 - 20.0 14.5   eGlomerular

## 2024-05-24 ENCOUNTER — CLINICAL DOCUMENTATION (OUTPATIENT)
Age: 44
End: 2024-05-24

## 2024-06-10 ENCOUNTER — TELEPHONE (OUTPATIENT)
Facility: CLINIC | Age: 44
End: 2024-06-10

## 2024-06-10 NOTE — TELEPHONE ENCOUNTER
Patient has already seen Pulmonology([facility]) for sleep study. States she was supposed to see Pulmonology(Dr Huggins) for her Asthma,however, keep getting her appointments put off. I have encouraged patient to call back to Pulmonology practice where she had her sleep study dont to request appointment for her Asthma as well. Stated understanding. Notified patient if it needs to be a different referral to let me(nurse) know.

## 2024-06-10 NOTE — TELEPHONE ENCOUNTER
----- Message from Yara Santiago sent at 6/10/2024  1:55 PM EDT -----  Subject: Referral Request    Reason for referral request? Pulmonology   Provider patient wants to be referred to(if known):     Provider Phone Number(if known):    Additional Information for Provider? Patient is asking for a new referral,   she has been rescheduled/pushed back twice.  ---------------------------------------------------------------------------  --------------  CALL BACK INFO    [phone]; OK to leave message on voicemail  ---------------------------------------------------------------------------  --------------

## 2024-06-11 ENCOUNTER — TELEPHONE (OUTPATIENT)
Facility: CLINIC | Age: 44
End: 2024-06-11

## 2024-06-11 NOTE — TELEPHONE ENCOUNTER
Pt called stating  she try to schedule with dr. Huggins office 2 times and they r/s her 2 times. She wants to be sooner and wonders if staff can referral her to someone else. She was told of judie ? But  states that not the right specialist for pulmonary. I explain she may need appt  for another referral since last 1 was in 2023.  But that I would send a message to nurse and psr.

## 2024-06-27 ENCOUNTER — TELEPHONE (OUTPATIENT)
Age: 44
End: 2024-06-27

## 2024-06-27 NOTE — TELEPHONE ENCOUNTER
Spoke to pt to let pt know that the medication she saw on tv has not been approved by the FDA yet.  Per Dr. Proctor she suggested weight management and suggested to talk to her PCP in regards to her sending a referral.   Pt will speak with her PCP when she goes back to see her.

## 2024-06-27 NOTE — TELEPHONE ENCOUNTER
Patient is requesting a call back from Dr. Boudreaux with regards to questions about weight loss and sleep apnea.  Contact # [phone]

## 2024-06-29 DIAGNOSIS — J45.41 MODERATE PERSISTENT ASTHMA WITH EXACERBATION: ICD-10-CM

## 2024-07-01 RX ORDER — ALBUTEROL SULFATE 90 UG/1
2 AEROSOL, METERED RESPIRATORY (INHALATION) EVERY 6 HOURS PRN
Qty: 18 G | Refills: 0 | Status: SHIPPED | OUTPATIENT
Start: 2024-07-01

## 2024-07-22 ENCOUNTER — TELEMEDICINE (OUTPATIENT)
Facility: CLINIC | Age: 44
End: 2024-07-22
Payer: MEDICAID

## 2024-07-22 DIAGNOSIS — G47.33 OSA ON CPAP: ICD-10-CM

## 2024-07-22 DIAGNOSIS — J45.40 MODERATE PERSISTENT ASTHMA WITHOUT COMPLICATION: ICD-10-CM

## 2024-07-22 DIAGNOSIS — Z51.81 ENCOUNTER FOR MONITORING DIURETIC THERAPY: ICD-10-CM

## 2024-07-22 DIAGNOSIS — Z79.899 ENCOUNTER FOR MONITORING DIURETIC THERAPY: ICD-10-CM

## 2024-07-22 DIAGNOSIS — E66.01 CLASS 3 SEVERE OBESITY WITH SERIOUS COMORBIDITY AND BODY MASS INDEX (BMI) OF 40.0 TO 44.9 IN ADULT, UNSPECIFIED OBESITY TYPE (HCC): Primary | ICD-10-CM

## 2024-07-22 DIAGNOSIS — I10 ESSENTIAL HYPERTENSION: ICD-10-CM

## 2024-07-22 DIAGNOSIS — E87.6 HYPOKALEMIA: ICD-10-CM

## 2024-07-22 PROCEDURE — 99213 OFFICE O/P EST LOW 20 MIN: CPT | Performed by: FAMILY MEDICINE

## 2024-07-22 RX ORDER — MONTELUKAST SODIUM 10 MG/1
10 TABLET ORAL DAILY
Qty: 90 TABLET | Refills: 1 | Status: SHIPPED | OUTPATIENT
Start: 2024-07-22

## 2024-07-22 RX ORDER — POTASSIUM CHLORIDE 750 MG/1
10 TABLET, FILM COATED, EXTENDED RELEASE ORAL DAILY
Qty: 90 TABLET | Refills: 1 | Status: SHIPPED | OUTPATIENT
Start: 2024-07-22

## 2024-07-22 NOTE — PROGRESS NOTES
Patient has her chronic follow up on 8/14/2024. Referral to Dr. Peterson    Chief Complaint   Patient presents with    Weight Management     Discuss referral       \"Have you been to the ER, urgent care clinic since your last visit?  Hospitalized since your last visit?\"    Urgent Care for allergic reaction    “Have you seen or consulted any other health care providers outside of [facility] since your last visit?”    Pulmonology Dr. Irvin       Have you had a mammogram?”   YES - Where: [facility] Nurse/CMA to request most recent records if not in the chart     “Have you had a pap smear?”    YES - Where: [facility] Minerva Rand NP Nurse/CMA to request most recent records if not in the chart             
[place] [address]  Confirm you are appropriately licensed, registered, or certified to deliver care in the state where the patient is located as indicated above. If you are not or unsure, please re-schedule the visit: Yes, I confirm.      Total time spent for this encounter: >20 minutes in total    Genesis Acevedo MD   Family & Geriatric Medicine

## 2024-07-25 ENCOUNTER — COMMUNITY OUTREACH (OUTPATIENT)
Facility: CLINIC | Age: 44
End: 2024-07-25

## 2024-08-01 ENCOUNTER — OFFICE VISIT (OUTPATIENT)
Age: 44
End: 2024-08-01
Payer: MEDICAID

## 2024-08-01 VITALS
RESPIRATION RATE: 18 BRPM | DIASTOLIC BLOOD PRESSURE: 80 MMHG | BODY MASS INDEX: 42.49 KG/M2 | OXYGEN SATURATION: 100 % | SYSTOLIC BLOOD PRESSURE: 122 MMHG | HEIGHT: 65 IN | HEART RATE: 96 BPM | WEIGHT: 255 LBS | TEMPERATURE: 97 F

## 2024-08-01 DIAGNOSIS — R79.89 ABNORMAL LIVER FUNCTION TESTS: ICD-10-CM

## 2024-08-01 DIAGNOSIS — Z85.3 HISTORY OF BREAST CANCER: ICD-10-CM

## 2024-08-01 DIAGNOSIS — E28.2 POLYCYSTIC OVARY SYNDROME: ICD-10-CM

## 2024-08-01 DIAGNOSIS — E78.2 MIXED HYPERLIPIDEMIA: ICD-10-CM

## 2024-08-01 DIAGNOSIS — E66.01 MORBID OBESITY DUE TO EXCESS CALORIES (HCC): ICD-10-CM

## 2024-08-01 DIAGNOSIS — K21.9 GASTROESOPHAGEAL REFLUX DISEASE, UNSPECIFIED WHETHER ESOPHAGITIS PRESENT: ICD-10-CM

## 2024-08-01 DIAGNOSIS — M06.9 RHEUMATOID ARTHRITIS, INVOLVING UNSPECIFIED SITE, UNSPECIFIED WHETHER RHEUMATOID FACTOR PRESENT (HCC): ICD-10-CM

## 2024-08-01 DIAGNOSIS — D57.3 SICKLE CELL TRAIT (HCC): ICD-10-CM

## 2024-08-01 DIAGNOSIS — I10 ESSENTIAL HYPERTENSION: ICD-10-CM

## 2024-08-01 DIAGNOSIS — J45.40 MODERATE PERSISTENT ASTHMA WITHOUT COMPLICATION: ICD-10-CM

## 2024-08-01 DIAGNOSIS — Z90.5 HISTORY OF PARTIAL NEPHRECTOMY: ICD-10-CM

## 2024-08-01 DIAGNOSIS — E66.01 MORBID OBESITY DUE TO EXCESS CALORIES (HCC): Primary | ICD-10-CM

## 2024-08-01 DIAGNOSIS — E11.9 DIABETES MELLITUS TYPE II, NON INSULIN DEPENDENT (HCC): ICD-10-CM

## 2024-08-01 DIAGNOSIS — G47.33 OBSTRUCTIVE SLEEP APNEA (ADULT) (PEDIATRIC): ICD-10-CM

## 2024-08-01 PROBLEM — C50.912 BREAST CANCER, LEFT (HCC): Status: ACTIVE | Noted: 2024-08-01

## 2024-08-01 PROBLEM — C50.912 INVASIVE DUCTAL CARCINOMA OF LEFT BREAST (HCC): Status: ACTIVE | Noted: 2022-10-14

## 2024-08-01 PROCEDURE — 3074F SYST BP LT 130 MM HG: CPT | Performed by: SURGERY

## 2024-08-01 PROCEDURE — 3079F DIAST BP 80-89 MM HG: CPT | Performed by: SURGERY

## 2024-08-01 PROCEDURE — 99204 OFFICE O/P NEW MOD 45 MIN: CPT | Performed by: SURGERY

## 2024-08-01 NOTE — PROGRESS NOTES
Chief Complaint   Patient presents with    Surgical Consult     Confirmed video    Pt ID confirmed        8/1/2024     2:26 PM 5/20/2024    11:46 AM 3/25/2024     7:11 AM 3/5/2024    12:45 AM 3/5/2024    12:30 AM 3/5/2024    12:00 AM 3/4/2024    11:45 PM   Ambulatory Bariatric Summary   Systolic  128 122 125 116 94 125   Diastolic  74 79 79 69 66 66   Pulse  96 93 96      Temp 97 °F (36.1 °C) 97.2 °F (36.2 °C) 98.5 °F (36.9 °C)       Respirations 18 18  16      Weight - Scale 255 252 258.4       Height 1.651 m (5' 5\") 1.651 m (5' 5\") 1.664 m (5' 5.5\")       BMI 42.5 kg/m2 42 kg/m2 42.4 kg/m2       Weight - Scale 115.7 kg (255 lb) 114.3 kg (252 lb) 117.2 kg (258 lb 6.4 oz)       BMI (Calculated) 42.5 42 42.4           Body mass index is 42.43 kg/m².   
umbilical, open via infraumbilical incision, unclear about mesh placement.    MASTECTOMY, PARTIAL Left     PARTIAL NEPHRECTOMY Left     robotic assisted lap partial left nephrectomy 12/2017    US I&D BREAST ABSCESS DEEP  05/26/2023    US I&D BREAST ABSCESS DEEP      Social History     Tobacco Use    Smoking status: Never     Passive exposure: Never    Smokeless tobacco: Never   Substance Use Topics    Alcohol use: Never      History reviewed. No pertinent family history.   Prior to Admission medications    Medication Sig Start Date End Date Taking? Authorizing Provider   leuprolide (LUPRON) 11.25 MG injection Inject 11.25 mg into the muscle once   Yes Sunny Mata MD   montelukast (SINGULAIR) 10 MG tablet Take 1 tablet by mouth daily 7/22/24  Yes Genesis Acevedo MD   potassium chloride (KLOR-CON) 10 MEQ extended release tablet Take 1 tablet by mouth daily 7/22/24  Yes Genesis Acevedo MD   albuterol sulfate HFA (PROVENTIL;VENTOLIN;PROAIR) 108 (90 Base) MCG/ACT inhaler INHALE 2 PUFFS INTO THE LUNGS EVERY 6 HOURS AS NEEDED FOR WHEEZING 7/1/24  Yes Genesis Acevedo MD   metFORMIN (GLUCOPHAGE-XR) 750 MG extended release tablet TAKE 1 TABLET BY MOUTH DAILY( WITH BREAKFAST) 2/26/24  Yes Genesis Acevedo MD   hydroCHLOROthiazide 12.5 MG tablet Take 1 tablet by mouth daily 2/26/24  Yes Genesis Acevedo MD   ENBREL SURECLICK 50 MG/ML SOAJ Inject 50 mg as directed once a week 12/21/23  Yes ProviderSunny MD   fluticasone (FLONASE) 50 MCG/ACT nasal spray 2 sprays by Nasal route daily 1/5/24  Yes Genesis Acevedo MD   ipratropium 0.5 mg-albuterol 2.5 mg (DUONEB) 0.5-2.5 (3) MG/3ML SOLN nebulizer solution Inhale 3 mLs into the lungs 3 times daily 1/5/24  Yes Genesis Acevedo MD   methotrexate (RHEUMATREX) 2.5 MG chemo tablet  10/6/23  Yes ProviderSunny MD   glycopyrrolate (ROBINUL) 1 MG tablet  9/11/23  Yes Sunny Mata MD   exemestane (AROMASIN) 25 MG tablet  9/5/23  Yes Esperanza

## 2024-08-08 ENCOUNTER — HOSPITAL ENCOUNTER (OUTPATIENT)
Facility: HOSPITAL | Age: 44
Discharge: HOME | End: 2024-08-11

## 2024-08-08 ENCOUNTER — CLINICAL DOCUMENTATION (OUTPATIENT)
Facility: HOSPITAL | Age: 44
End: 2024-08-08

## 2024-08-08 NOTE — PROGRESS NOTES
[facility]  [address], [address]    Patient's Name: Livia Kraus     Age: 44 y.o.  YOB: 1980     Sex: female    Session: 1 of  6  Surgeon:  Dr. Peterson    Height: 5 f 5   Current Weight:    254      Lbs.     BMI:    Pounds Lost since last month: 1                 Pounds Gained since last month: 0      Starting Weight: 255     Previous Month’s Weight: 255  Overall Pounds Lost: 1   Overall Pounds Gained: 0    Do you smoke?  Noen    Alcohol intake:  Number of drinks at a time:  None  Number of times a week: None    Patient has not attend a support group.  Patient was given information on the next meeting.  Office Use Only: v    Class Guidelines    Guidelines are reviewed with patient at the start of every class.    1. Patient understands that weight loss trial classes must be consecutive.  Patient understands if they miss a class, it is their responsibility to contact me to reschedule class.  I will reach out to patient after their first no show.  2.  Patient understands the expectations that weight maintenance/weight loss is expected during the classes.  Failure to demonstrate changes may result in one extra month of weight loss trial, followed by going back to see the surgeon.  Patient understands that they CAN NOT gain any weight during the weight loss trial.  Gaining weight will result in extra classes.  3. Patient is also instructed to be doing their labs, blood work, psych visit, support group and any other test that the surgeon has used while they are working on their weight loss trial.  4.  Patient was instructed to bring their blue binder to every class and appointment.      Other Pertinent Information:     Changes Made Since Last Class: NO more soda    Eating Habits and Behaviors    Today in class, we reviewed the key diet principles.  I have talked to patient about pushing the fluid and working towards 64

## 2024-08-14 DIAGNOSIS — R60.9 EDEMA, UNSPECIFIED TYPE: ICD-10-CM

## 2024-08-14 RX ORDER — HYDROCHLOROTHIAZIDE 12.5 MG/1
12.5 TABLET ORAL DAILY
Qty: 90 TABLET | Refills: 1 | Status: SHIPPED | OUTPATIENT
Start: 2024-08-14

## 2024-08-28 ENCOUNTER — HOSPITAL ENCOUNTER (OUTPATIENT)
Age: 44
Discharge: HOME | End: 2024-08-31
Attending: SURGERY
Payer: MEDICAID

## 2024-08-28 ENCOUNTER — HOSPITAL ENCOUNTER (OUTPATIENT)
Age: 44
Discharge: HOME | End: 2024-08-31

## 2024-08-28 DIAGNOSIS — E11.9 DIABETES MELLITUS TYPE II, NON INSULIN DEPENDENT (HCC): ICD-10-CM

## 2024-08-28 DIAGNOSIS — Z85.3 HISTORY OF BREAST CANCER: ICD-10-CM

## 2024-08-28 DIAGNOSIS — E78.2 MIXED HYPERLIPIDEMIA: ICD-10-CM

## 2024-08-28 DIAGNOSIS — G47.33 OBSTRUCTIVE SLEEP APNEA (ADULT) (PEDIATRIC): ICD-10-CM

## 2024-08-28 DIAGNOSIS — R79.89 ABNORMAL LIVER FUNCTION TESTS: ICD-10-CM

## 2024-08-28 DIAGNOSIS — D57.3 SICKLE CELL TRAIT (HCC): ICD-10-CM

## 2024-08-28 DIAGNOSIS — M06.9 RHEUMATOID ARTHRITIS, INVOLVING UNSPECIFIED SITE, UNSPECIFIED WHETHER RHEUMATOID FACTOR PRESENT (HCC): ICD-10-CM

## 2024-08-28 DIAGNOSIS — E66.01 MORBID OBESITY DUE TO EXCESS CALORIES (HCC): ICD-10-CM

## 2024-08-28 DIAGNOSIS — Z90.5 HISTORY OF PARTIAL NEPHRECTOMY: ICD-10-CM

## 2024-08-28 DIAGNOSIS — K21.9 GASTROESOPHAGEAL REFLUX DISEASE, UNSPECIFIED WHETHER ESOPHAGITIS PRESENT: ICD-10-CM

## 2024-08-28 DIAGNOSIS — E28.2 POLYCYSTIC OVARY SYNDROME: ICD-10-CM

## 2024-08-28 DIAGNOSIS — I10 ESSENTIAL HYPERTENSION: ICD-10-CM

## 2024-08-28 DIAGNOSIS — J45.40 MODERATE PERSISTENT ASTHMA WITHOUT COMPLICATION: ICD-10-CM

## 2024-08-28 LAB — SENTARA SPECIMEN COLLECTION: NORMAL

## 2024-08-28 PROCEDURE — 99001 SPECIMEN HANDLING PT-LAB: CPT

## 2024-08-28 PROCEDURE — 6370000000 HC RX 637 (ALT 250 FOR IP): Performed by: SURGERY

## 2024-08-28 PROCEDURE — 74246 X-RAY XM UPR GI TRC 2CNTRST: CPT

## 2024-08-28 PROCEDURE — 2500000003 HC RX 250 WO HCPCS: Performed by: SURGERY

## 2024-08-28 RX ADMIN — BARIUM SULFATE 1 TABLET: 700 TABLET ORAL at 11:42

## 2024-08-28 RX ADMIN — BARIUM SULFATE 177 ML: 0.6 SUSPENSION ORAL at 09:15

## 2024-08-28 RX ADMIN — BARIUM SULFATE 340 G: 980 POWDER, FOR SUSPENSION ORAL at 11:45

## 2024-08-28 RX ADMIN — ANTACID/ANTIFLATULENT 1 EACH: 380; 550; 10; 10 GRANULE, EFFERVESCENT ORAL at 09:05

## 2024-09-03 LAB — THIAMINE BLOOD: 113 NMOL/L (ref 78–185)

## 2024-09-11 ENCOUNTER — CLINICAL DOCUMENTATION (OUTPATIENT)
Facility: HOSPITAL | Age: 44
End: 2024-09-11

## 2024-09-11 ENCOUNTER — HOSPITAL ENCOUNTER (OUTPATIENT)
Facility: HOSPITAL | Age: 44
Discharge: HOME | End: 2024-09-14

## 2024-10-02 ENCOUNTER — PREP FOR PROCEDURE (OUTPATIENT)
Age: 44
End: 2024-10-02

## 2024-10-02 DIAGNOSIS — E66.01 MORBID OBESITY: ICD-10-CM

## 2024-10-02 DIAGNOSIS — E28.2 POLYCYSTIC OVARIAN SYNDROME: ICD-10-CM

## 2024-10-02 DIAGNOSIS — E11.9 DIABETES MELLITUS TYPE 2, NONINSULIN DEPENDENT (HCC): ICD-10-CM

## 2024-10-02 PROBLEM — M06.9 RHEUMATOID ARTHRITIS (HCC): Status: ACTIVE | Noted: 2024-10-02

## 2024-10-03 DIAGNOSIS — J45.41 MODERATE PERSISTENT ASTHMA WITH EXACERBATION: ICD-10-CM

## 2024-10-03 DIAGNOSIS — J30.9 ALLERGIC RHINITIS, UNSPECIFIED SEASONALITY, UNSPECIFIED TRIGGER: ICD-10-CM

## 2024-10-03 RX ORDER — ALBUTEROL SULFATE 90 UG/1
2 INHALANT RESPIRATORY (INHALATION) EVERY 6 HOURS PRN
Qty: 18 G | Refills: 0 | Status: SHIPPED | OUTPATIENT
Start: 2024-10-03

## 2024-10-03 RX ORDER — FLUTICASONE PROPIONATE 50 MCG
2 SPRAY, SUSPENSION (ML) NASAL DAILY
Qty: 16 G | Refills: 0 | Status: SHIPPED | OUTPATIENT
Start: 2024-10-03

## 2024-10-09 ENCOUNTER — CLINICAL DOCUMENTATION (OUTPATIENT)
Facility: HOSPITAL | Age: 44
End: 2024-10-09

## 2024-10-09 ENCOUNTER — HOSPITAL ENCOUNTER (OUTPATIENT)
Facility: HOSPITAL | Age: 44
Discharge: HOME | End: 2024-10-12

## 2024-10-09 NOTE — PROGRESS NOTES
[facility]  [address], [address]    Patient's Name: Livia Kraus     Age: 44 y.o.  YOB: 1980     Sex: female           Session: 3 of  6  Surgeon:  Dr. Peterson    Height: 5 f 5   Weight:    246      Lbs.     BMI:    Pounds Lost since last month: 5                 Pounds Gained since last month: 0    Starting Weight: 255     Previous Month’s Weight: 251  Overall Pounds Lost: 9   Overall Pounds Gained: 0    Patient has attended a support group meeting.    Do you smoke?  None    Alcohol intake:  Number of drinks at a time:  None  Number of times a week: None    Class Guidelines    Office Use only: v    Registered Dietitian Initial Class Notes:  n/a    Guidelines are reviewed with patient at the start of every class.    1. Patient understands that weight loss trial classes must be consecutive.  Patient understands if they miss a class, it is their responsibility to contact me to reschedule class.  I will reach out to patient after their first no show.  2.  Patient understands the expectations that weight maintenance/weight loss is expected during the classes.  Failure to demonstrate changes may result in one extra month of weight loss trial, followed by going back to see the surgeon.    3. Patient is also instructed to be doing their labs, blood work, psych visit, support group and any other test that the surgeon has used while they are working on their weight loss trial.  4. Patient is instructed to bring their education binder to all classes.        Changes Made Since Last Class: Drinking ice drinks and cut out eating after 7 pm    The areas that patient feels that are struggling the most with in term of their diet are:  Skipping meals   Portions:    Eating Habits and Behaviors      Today we reviewed key diet principles.   We talked about patient following a low calorie/low carbohydrate diet while they are in weight loss

## 2024-10-11 ENCOUNTER — OFFICE VISIT (OUTPATIENT)
Age: 44
End: 2024-10-11
Payer: MEDICAID

## 2024-10-11 VITALS
TEMPERATURE: 97.9 F | HEART RATE: 90 BPM | OXYGEN SATURATION: 99 % | HEIGHT: 65 IN | DIASTOLIC BLOOD PRESSURE: 72 MMHG | SYSTOLIC BLOOD PRESSURE: 114 MMHG | BODY MASS INDEX: 41.15 KG/M2 | RESPIRATION RATE: 18 BRPM | WEIGHT: 247 LBS

## 2024-10-11 DIAGNOSIS — Z71.89 ENCOUNTER FOR PRE-BARIATRIC SURGERY COUNSELING AND EDUCATION: Primary | ICD-10-CM

## 2024-10-11 DIAGNOSIS — E66.01 MORBID OBESITY: ICD-10-CM

## 2024-10-11 PROCEDURE — 3078F DIAST BP <80 MM HG: CPT | Performed by: REGISTERED NURSE

## 2024-10-11 PROCEDURE — 99213 OFFICE O/P EST LOW 20 MIN: CPT | Performed by: REGISTERED NURSE

## 2024-10-11 PROCEDURE — 3074F SYST BP LT 130 MM HG: CPT | Performed by: REGISTERED NURSE

## 2024-10-11 NOTE — H&P (VIEW-ONLY)
Chief Complaint   Patient presents with    Follow-up     Midtrial    1. Have you been to the ER, urgent care clinic since your last visit?  Hospitalized since your last visit? No    2. Have you seen or consulted any other health care providers outside of [facility] since your last visit?  Include any pap smears or colon screening. No     Pt ID confirmed        10/11/2024     3:36 PM 10/11/2024    11:24 AM 8/1/2024     2:26 PM 5/20/2024    11:46 AM 3/25/2024     7:11 AM 3/5/2024    12:45 AM 3/5/2024    12:30 AM   Ambulatory Bariatric Summary   Systolic  114 122 128 122 125 116   Diastolic  72 80 74 79 79 69   Pulse  90 96 96 93 96    Temp  97.9 °F (36.6 °C) 97 °F (36.1 °C) 97.2 °F (36.2 °C) 98.5 °F (36.9 °C)     Respirations  18 18 18  16    Weight - Scale 247 247 255 252 258.4     Height 1.651 m (5' 5\") 1.651 m (5' 5\") 1.651 m (5' 5\") 1.651 m (5' 5\") 1.664 m (5' 5.5\")     BMI 41.2 kg/m2 41.2 kg/m2 42.5 kg/m2 42 kg/m2 42.4 kg/m2     Weight - Scale 112 kg (247 lb) 112 kg (247 lb) 115.7 kg (255 lb) 114.3 kg (252 lb) 117.2 kg (258 lb 6.4 oz)     BMI (Calculated) 41.2 41.2 42.5 42 42.4       Bariatric Preoperative Progress Note    Subjective:     Livia Kraus is a 44 y.o. female who presents today for follow up of their candidacy for bariatric surgery.  Since last seen, Livia Kraus has been working through our bariatric program towards gastric sleeve with Dr. Peterson.  Consult Weight: 255 lbs  Today's Weight: 247 lbs    Past Medical History:   Diagnosis Date    Asthma     Breast cancer (HCC)     left breast    Hyperthyroidism     Osteoarthritis     RA    Renal cell carcinoma (HCC)     left    Sleep apnea     cpap     Past Surgical History:   Procedure Laterality Date    HERNIA REPAIR  2010    umbilical, open via infraumbilical incision, unclear about mesh placement.    MASTECTOMY, PARTIAL Left     PARTIAL NEPHRECTOMY Left     robotic assisted lap partial left nephrectomy 12/2017    US I&D

## 2024-10-11 NOTE — PROGRESS NOTES
Chief Complaint   Patient presents with    Follow-up     Midtrial    1. Have you been to the ER, urgent care clinic since your last visit?  Hospitalized since your last visit? No    2. Have you seen or consulted any other health care providers outside of the [facility] since your last visit?  Include any pap smears or colon screening. No     Pt ID confirmed        10/11/2024     3:36 PM 10/11/2024    11:24 AM 8/1/2024     2:26 PM 5/20/2024    11:46 AM 3/25/2024     7:11 AM 3/5/2024    12:45 AM 3/5/2024    12:30 AM   Ambulatory Bariatric Summary   Systolic  114 122 128 122 125 116   Diastolic  72 80 74 79 79 69   Pulse  90 96 96 93 96    Temp  97.9 °F (36.6 °C) 97 °F (36.1 °C) 97.2 °F (36.2 °C) 98.5 °F (36.9 °C)     Respirations  18 18 18  16    Weight - Scale 247 247 255 252 258.4     Height 1.651 m (5' 5\") 1.651 m (5' 5\") 1.651 m (5' 5\") 1.651 m (5' 5\") 1.664 m (5' 5.5\")     BMI 41.2 kg/m2 41.2 kg/m2 42.5 kg/m2 42 kg/m2 42.4 kg/m2     Weight - Scale 112 kg (247 lb) 112 kg (247 lb) 115.7 kg (255 lb) 114.3 kg (252 lb) 117.2 kg (258 lb 6.4 oz)     BMI (Calculated) 41.2 41.2 42.5 42 42.4       Bariatric Preoperative Progress Note    Subjective:     Livia Kraus is a 44 y.o. female who presents today for follow up of their candidacy for bariatric surgery.  Since last seen, Livia Kraus has been working through our bariatric program towards gastric sleeve with Dr. Peterson.  Consult Weight: 255 lbs  Today's Weight: 247 lbs    Past Medical History:   Diagnosis Date    Asthma     Breast cancer (HCC)     left breast    Hyperthyroidism     Osteoarthritis     RA    Renal cell carcinoma (HCC)     left    Sleep apnea     cpap     Past Surgical History:   Procedure Laterality Date    HERNIA REPAIR  2010    umbilical, open via infraumbilical incision, unclear about mesh placement.    MASTECTOMY, PARTIAL Left     PARTIAL NEPHRECTOMY Left     robotic assisted lap partial left nephrectomy 12/2017    US I&D

## 2024-10-17 ENCOUNTER — ANESTHESIA EVENT (OUTPATIENT)
Facility: HOSPITAL | Age: 44
End: 2024-10-17
Payer: MEDICAID

## 2024-10-18 ENCOUNTER — HOSPITAL ENCOUNTER (OUTPATIENT)
Facility: HOSPITAL | Age: 44
Setting detail: OUTPATIENT SURGERY
Discharge: HOME | End: 2024-10-18
Attending: SURGERY | Admitting: SURGERY
Payer: MEDICAID

## 2024-10-18 ENCOUNTER — ANESTHESIA (OUTPATIENT)
Facility: HOSPITAL | Age: 44
End: 2024-10-18
Payer: MEDICAID

## 2024-10-18 VITALS
BODY MASS INDEX: 41.15 KG/M2 | SYSTOLIC BLOOD PRESSURE: 106 MMHG | WEIGHT: 247 LBS | OXYGEN SATURATION: 98 % | DIASTOLIC BLOOD PRESSURE: 73 MMHG | HEIGHT: 65 IN | RESPIRATION RATE: 16 BRPM | HEART RATE: 77 BPM | TEMPERATURE: 98.8 F

## 2024-10-18 LAB — HCG UR QL: NEGATIVE

## 2024-10-18 PROCEDURE — 2580000003 HC RX 258: Performed by: NURSE ANESTHETIST, CERTIFIED REGISTERED

## 2024-10-18 PROCEDURE — 43239 EGD BIOPSY SINGLE/MULTIPLE: CPT | Performed by: SURGERY

## 2024-10-18 PROCEDURE — 2709999900 HC NON-CHARGEABLE SUPPLY: Performed by: SURGERY

## 2024-10-18 PROCEDURE — 3700000000 HC ANESTHESIA ATTENDED CARE: Performed by: SURGERY

## 2024-10-18 PROCEDURE — 3700000001 HC ADD 15 MINUTES (ANESTHESIA): Performed by: SURGERY

## 2024-10-18 PROCEDURE — 2500000003 HC RX 250 WO HCPCS: Performed by: NURSE ANESTHETIST, CERTIFIED REGISTERED

## 2024-10-18 PROCEDURE — 7100000000 HC PACU RECOVERY - FIRST 15 MIN: Performed by: SURGERY

## 2024-10-18 PROCEDURE — 3600007512: Performed by: SURGERY

## 2024-10-18 PROCEDURE — 7100000011 HC PHASE II RECOVERY - ADDTL 15 MIN: Performed by: SURGERY

## 2024-10-18 PROCEDURE — 7100000001 HC PACU RECOVERY - ADDTL 15 MIN: Performed by: SURGERY

## 2024-10-18 PROCEDURE — 6360000002 HC RX W HCPCS: Performed by: NURSE ANESTHETIST, CERTIFIED REGISTERED

## 2024-10-18 PROCEDURE — 88305 TISSUE EXAM BY PATHOLOGIST: CPT

## 2024-10-18 PROCEDURE — 81025 URINE PREGNANCY TEST: CPT

## 2024-10-18 PROCEDURE — 7100000010 HC PHASE II RECOVERY - FIRST 15 MIN: Performed by: SURGERY

## 2024-10-18 PROCEDURE — 3600007502: Performed by: SURGERY

## 2024-10-18 RX ORDER — LIDOCAINE HYDROCHLORIDE 10 MG/ML
1 INJECTION, SOLUTION EPIDURAL; INFILTRATION; INTRACAUDAL; PERINEURAL
Status: DISCONTINUED | OUTPATIENT
Start: 2024-10-18 | End: 2024-10-18 | Stop reason: HOSPADM

## 2024-10-18 RX ORDER — SODIUM CHLORIDE, SODIUM LACTATE, POTASSIUM CHLORIDE, CALCIUM CHLORIDE 600; 310; 30; 20 MG/100ML; MG/100ML; MG/100ML; MG/100ML
INJECTION, SOLUTION INTRAVENOUS CONTINUOUS
Status: DISCONTINUED | OUTPATIENT
Start: 2024-10-18 | End: 2024-10-18 | Stop reason: HOSPADM

## 2024-10-18 RX ORDER — PROPOFOL 10 MG/ML
INJECTION, EMULSION INTRAVENOUS
Status: DISCONTINUED | OUTPATIENT
Start: 2024-10-18 | End: 2024-10-18 | Stop reason: SDUPTHER

## 2024-10-18 RX ADMIN — DEXMEDETOMIDINE HYDROCHLORIDE 10 MCG: 100 INJECTION, SOLUTION INTRAVENOUS at 09:31

## 2024-10-18 RX ADMIN — PROPOFOL 100 MG: 10 INJECTION, EMULSION INTRAVENOUS at 09:38

## 2024-10-18 RX ADMIN — SODIUM CHLORIDE, POTASSIUM CHLORIDE, SODIUM LACTATE AND CALCIUM CHLORIDE: 600; 310; 30; 20 INJECTION, SOLUTION INTRAVENOUS at 08:23

## 2024-10-18 RX ADMIN — PROPOFOL 50 MG: 10 INJECTION, EMULSION INTRAVENOUS at 09:39

## 2024-10-18 NOTE — ANESTHESIA POSTPROCEDURE EVALUATION
Department of Anesthesiology  Postprocedure Note    Patient: Livia Kraus  MRN: [id number]  YOB: 1980  Date of evaluation: 10/18/2024    Procedure Summary       Date: 10/18/24 Room / Location: [facility] ENDO 01 / [facility] ENDOSCOPY    Anesthesia Start: 0928 Anesthesia Stop: 0953    Procedure: ESOPHAGOGASTRODUODENOSCOPY W/Biopsies (Upper GI Region) Diagnosis:       Morbid obesity      Rheumatoid arthritis, involving unspecified site, unspecified whether rheumatoid factor present (HCC)      Essential hypertension      Moderate persistent asthma without complication      Gastroesophageal reflux disease, unspecified whether esophagitis present      Polycystic ovarian syndrome      Diabetes mellitus type 2, noninsulin dependent (HCC)      Obstructive sleep apnea (adult) (pediatric)      History of partial nephrectomy      History of breast cancer      Abnormal liver function tests      Mixed hyperlipidemia      Sickle cell trait (HCC)      (Morbid obesity [E66.01])      (Rheumatoid arthritis, involving unspecified site, unspecified whether rheumatoid factor present (HCC) [M06.9])      (Essential hypertension [I10])      (Moderate persistent asthma without complication [J45.40])      (Gastroesophageal reflux disease, unspecified whether esophagitis present [K21.9])      (Polycystic ovarian syndrome [E28.2])      (Diabetes mellitus type 2, noninsulin dependent (HCC) [E11.9])      (Obstructive sleep apnea (adult) (pediatric) [G47.33])      (History of partial nephrectomy [Z90.5])      (History of breast cancer [Z85.3])      (Abnormal liver function tests [R79.89])      (Mixed hyperlipidemia [E78.2])      (Sickle cell trait (HCC) [D57.3])    Surgeons: Tito Peterson MD Responsible Provider: Michelle Cortes MD    Anesthesia Type: MAC ASA Status: 3            Anesthesia Type: MAC    Chace Phase I: Chace Score: 10    Chace Phase II:      Anesthesia Post Evaluation    Patient location during evaluation:

## 2024-10-18 NOTE — INTERVAL H&P NOTE
Update History & Physical    The patient's History and Physical of October 11, history and procedure was reviewed with the patient and I examined the patient. There was no change. The surgical site was confirmed by the patient and me.     Plan: The risks, benefits, expected outcome, and alternative to the recommended procedure have been discussed with the patient. Patient understands and wants to proceed with the procedure.     Electronically signed by Tito Peterson MD on 10/18/2024 at 9:11 AM

## 2024-10-18 NOTE — ANESTHESIA PRE PROCEDURE
ductal carcinoma of left breast (HCC) C50.912   • Mixed hyperlipidemia E78.2   • Sickle cell trait (HCC) D57.3   • Morbid obesity E66.01   • Rheumatoid arthritis (HCC) M06.9   • Polycystic ovarian syndrome E28.2   • Diabetes mellitus type 2, noninsulin dependent (HCC) E11.9       Past Medical History:        Diagnosis Date   • Asthma    • Breast cancer (HCC)     left breast   • Hyperthyroidism    • Osteoarthritis     RA   • Renal cell carcinoma (HCC)     left   • Sleep apnea     cpap       Past Surgical History:        Procedure Laterality Date   • HERNIA REPAIR  2010    umbilical, open via infraumbilical incision, unclear about mesh placement.   • MASTECTOMY, PARTIAL Left    • PARTIAL NEPHRECTOMY Left     robotic assisted lap partial left nephrectomy 12/2017   • US I&D BREAST ABSCESS DEEP  05/26/2023    US I&D BREAST ABSCESS DEEP       Social History:    Social History     Tobacco Use   • Smoking status: Never     Passive exposure: Never   • Smokeless tobacco: Never   Substance Use Topics   • Alcohol use: Never                                Counseling given: Not Answered      Vital Signs (Current):   Vitals:    10/11/24 1536   Weight: 112 kg (247 lb)   Height: 1.651 m (5' 5\")                                              BP Readings from Last 3 Encounters:   10/11/24 114/72   08/01/24 122/80   05/20/24 128/74       NPO Status:                                                   Date of last liquid consumption: 10/17/24                             BMI:   Wt Readings from Last 3 Encounters:   10/11/24 112 kg (247 lb)   10/11/24 112 kg (247 lb)   08/01/24 115.7 kg (255 lb)     Body mass index is 41.1 kg/m².    CBC:   Lab Results   Component Value Date/Time    WBC 7.7 08/28/2024 07:59 AM    RBC 5.11 08/28/2024 07:59 AM    HGB 11.7 08/28/2024 07:59 AM    HCT 37.7 08/28/2024 07:59 AM    MCV 74 08/28/2024 07:59 AM    RDW 18.7 08/28/2024 07:59 AM     08/28/2024 07:59 AM       CMP:   Lab Results   Component Value

## 2024-10-18 NOTE — DISCHARGE INSTRUCTIONS
failure or if you experience any of the following symptoms:  Weight gain of 3 pounds or more overnight or 5 pounds in a week, increased swelling in our hands or feet or shortness of breath while lying flat in bed.  Please call your doctor as soon as you notice any of these symptoms; do not wait until your next office visit.        The discharge information has been reviewed with the patient.  The patient verbalized understanding.  Discharge medications reviewed with the patient and appropriate educational materials and side effects teaching were provided.  ___________________________________________________________________________________________________________________________________

## 2024-11-03 DIAGNOSIS — J30.9 ALLERGIC RHINITIS, UNSPECIFIED SEASONALITY, UNSPECIFIED TRIGGER: ICD-10-CM

## 2024-11-03 DIAGNOSIS — J45.41 MODERATE PERSISTENT ASTHMA WITH EXACERBATION: ICD-10-CM

## 2024-11-04 RX ORDER — FLUTICASONE PROPIONATE 50 MCG
2 SPRAY, SUSPENSION (ML) NASAL DAILY
Qty: 16 G | Refills: 0 | OUTPATIENT
Start: 2024-11-04

## 2024-11-04 RX ORDER — ALBUTEROL SULFATE 90 UG/1
2 INHALANT RESPIRATORY (INHALATION) EVERY 6 HOURS PRN
Qty: 18 G | Refills: 0 | OUTPATIENT
Start: 2024-11-04

## 2024-11-13 ENCOUNTER — HOSPITAL ENCOUNTER (OUTPATIENT)
Facility: HOSPITAL | Age: 44
Discharge: HOME | End: 2024-11-16

## 2024-11-13 ENCOUNTER — CLINICAL DOCUMENTATION (OUTPATIENT)
Facility: HOSPITAL | Age: 44
End: 2024-11-13

## 2024-11-13 NOTE — PROGRESS NOTES
[facility]  [address], [address]    Patient's Name: Livia Kraus     Age: 44 y.o.  YOB: 1980     Sex: female           Session 4 of 6   Surgeon:  Dr. Peterson    Height: 5 f 5   Weight:    246      Lbs.     BMI:    Pounds Lost since last month: 0                 Pounds Gained since last month: 0    Starting Weight: 255     Previous Month’s Weight: 246  Overall Pounds Lost: 9   Overall Pounds Gained: 0      Class Guidelines    Guidelines are reviewed with patient at the start of every class.    1. Patient understands that weight loss trial classes must be consecutive.  Patient understands if they miss a class, it is their responsibility to contact me to reschedule class.  I will reach out to patient after their first no show.  2.  Patient understands the expectations that weight maintenance/weight loss is expected during the classes.  Failure to demonstrate changes may result in one extra month of weight loss trial, followed by going back to see the surgeon.    3. Patient is also instructed to be doing their labs, blood work, psych visit, support group and any other test that the surgeon has used while they are working on their weight loss trial.    Other Pertinent Information:     Class Guidelines    Office Use only: IP    Registered Dietitian Initial Class Notes:  n/a    Patient is seeking a revision procedure: No.      Patient has been to a support group meeting.      Do you smoke?  None    Alcohol intake:  Number of drinks at a time:  0  Number of times a week: 0    Diet Changes Made Since Last Class: Added in more protein    Patient feels they are struggling with the following:     Skipping meals   Eating late at night   Food choices (sweets, carbohydrates, junk food):    Dietary Instruction    During today's class we continued to focus on the key diet principles.  Patient was instructed to follow a low carbohydrate

## 2024-11-19 ENCOUNTER — TELEMEDICINE (OUTPATIENT)
Facility: CLINIC | Age: 44
End: 2024-11-19
Payer: MEDICAID

## 2024-11-19 DIAGNOSIS — J10.1 INFLUENZA B: ICD-10-CM

## 2024-11-19 DIAGNOSIS — J45.21 MILD INTERMITTENT ASTHMA WITH EXACERBATION: Primary | ICD-10-CM

## 2024-11-19 DIAGNOSIS — U07.1 COVID: ICD-10-CM

## 2024-11-19 DIAGNOSIS — D84.9 IMMUNOCOMPROMISED STATE (HCC): ICD-10-CM

## 2024-11-19 PROCEDURE — 99214 OFFICE O/P EST MOD 30 MIN: CPT | Performed by: FAMILY MEDICINE

## 2024-11-19 RX ORDER — NIRMATRELVIR AND RITONAVIR 300-100 MG
3 KIT ORAL EVERY 12 HOURS
COMMUNITY
Start: 2024-11-17

## 2024-11-19 RX ORDER — FLUTICASONE FUROATE, UMECLIDINIUM BROMIDE AND VILANTEROL TRIFENATATE 100; 62.5; 25 UG/1; UG/1; UG/1
1 POWDER RESPIRATORY (INHALATION) DAILY
COMMUNITY
Start: 2024-10-12

## 2024-11-19 RX ORDER — AZITHROMYCIN 250 MG/1
TABLET, FILM COATED ORAL
Qty: 6 TABLET | Refills: 0 | Status: SHIPPED | OUTPATIENT
Start: 2024-11-19 | End: 2024-11-29

## 2024-11-19 RX ORDER — OSELTAMIVIR PHOSPHATE 75 MG/1
75 CAPSULE ORAL 2 TIMES DAILY
COMMUNITY

## 2024-11-19 RX ORDER — PREDNISONE 20 MG/1
TABLET ORAL
Qty: 18 TABLET | Refills: 0 | Status: SHIPPED | OUTPATIENT
Start: 2024-11-19

## 2024-11-19 NOTE — PROGRESS NOTES
Livia Kraus (: 1980) is a 44 y.o. female here for evaluation of the following chief concern(s):  Acute condition management     The services today are being conducted using telemedicine which Livia Kraus has consented to at the time of scheduling.    ASSESSMENT/PLAN:  1. Mild intermittent asthma with exacerbation  -     predniSONE (DELTASONE) 20 MG tablet; Take THREE tablets (60mg) now, then take TWO tablets (40mg) for five days, then ONE tablet (20mg) for four days, then HALF a tablet (10mg) for two days.  Take in the morning with food., Disp-18 tablet, R-0Normal  -     azithromycin (ZITHROMAX) 250 MG tablet; 500mg on day 1 followed by 250mg on days 2 - 5, Disp-6 tablet, R-0Normal  2. Influenza B  3. COVID  4. Immunocompromised state (HCC)    Livia appears medically stable.    Continue Tamiflu and Paxlovid.  Start Prednisone and Z-pack.  Home nebs Q4-6 hours.  Close monitoring and LOW THRESHOLD for ER eval.   Isolate for 5 days, then mask and diligent hand washing for at least another 5 days.      Return in about 2 weeks (around 12/3/2024) for follow-up chronic conditions or sooner if needed.    Livia Kraus agrees with plan as above and has no additional questions at this time.       SUBJECTIVE/OBJECTIVE:    She developed URI symptoms, head congestion ~5 days ago; pt presumed asthma flare related to change in weather.    Mild dyspnea and moderate cough she was treating w/ neb and inhaler.  She went [facility] for Left ear popping/pain hoping for antibiotic.  She (unexpectedly) tested positive for flu B and COVID.  No lung exam or CXR completed.    Pt was told Left ear was \"pretty red\".  No treatment in house.  She started Tamiflu on Saturday.  She started Paxlovid yesterday.    No steroid or antibiotics were prescribed.    Last night she had headache and fever; feeling a bit better today.  At this time she is having chest tightness, shortness of breath.  She is not

## 2024-11-19 NOTE — PROGRESS NOTES
Patient went to Urgent Care Saturday and was positive for Covid and the Flu. States that her chest is tight, and she is having difficulty breathing. She was given Tamiflu and Paxlovid.    Chief Complaint   Patient presents with    Positive For Covid-19     Asthmatic, chest tight       \"Have you been to the ER, urgent care clinic since your last visit?  Hospitalized since your last visit?\"    YES - When: approximately Saturday ago.  Where and Why: Urgent Care sick.    “Have you seen or consulted any other health care providers outside our system since your last visit?”    NO

## 2024-12-05 DIAGNOSIS — J30.9 ALLERGIC RHINITIS, UNSPECIFIED SEASONALITY, UNSPECIFIED TRIGGER: ICD-10-CM

## 2024-12-05 DIAGNOSIS — J45.41 MODERATE PERSISTENT ASTHMA WITH EXACERBATION: ICD-10-CM

## 2024-12-05 DIAGNOSIS — J45.41 MODERATE PERSISTENT ASTHMA WITH ACUTE EXACERBATION: ICD-10-CM

## 2024-12-05 RX ORDER — IPRATROPIUM BROMIDE AND ALBUTEROL SULFATE 2.5; .5 MG/3ML; MG/3ML
3 SOLUTION RESPIRATORY (INHALATION) 3 TIMES DAILY
Qty: 360 ML | Refills: 0 | Status: SHIPPED | OUTPATIENT
Start: 2024-12-05

## 2024-12-05 RX ORDER — ALBUTEROL SULFATE 90 UG/1
2 INHALANT RESPIRATORY (INHALATION) EVERY 6 HOURS PRN
Qty: 18 G | Refills: 0 | Status: SHIPPED | OUTPATIENT
Start: 2024-12-05

## 2024-12-05 RX ORDER — FLUTICASONE PROPIONATE 50 MCG
2 SPRAY, SUSPENSION (ML) NASAL DAILY
Qty: 16 G | Refills: 0 | Status: SHIPPED | OUTPATIENT
Start: 2024-12-05

## 2024-12-05 NOTE — TELEPHONE ENCOUNTER
Pt called for refills.     She also wants to know if she should get the flu shot now since she has recently been sick.

## 2024-12-11 ENCOUNTER — HOSPITAL ENCOUNTER (OUTPATIENT)
Facility: HOSPITAL | Age: 44
Discharge: HOME | End: 2024-12-14

## 2024-12-11 ENCOUNTER — CLINICAL DOCUMENTATION (OUTPATIENT)
Facility: HOSPITAL | Age: 44
End: 2024-12-11

## 2024-12-11 NOTE — PROGRESS NOTES
[facility]  [address], [address]    Patient's Name: Livia Kraus     Age: 44 y.o.  YOB: 1980     Sex: female           Session 5 of 6   Surgeon:  Dr. Peterson    Height: 5 f 5   Weight:    247      Lbs.     BMI:    Pounds Lost since last month: 0                 Pounds Gained since last month: 1    Starting Weight: 255     Previous Month’s Weight: 246  Overall Pounds Lost: 8   Overall Pounds Gained: 0    Office Use only: v  Class Guidelines  Guidelines are reviewed with patient at the start of every class.  1. Patient understands that weight loss trial classes must be consecutive.  Patient understands if they miss a class, it is their responsibility to contact me to reschedule class.  I will reach out to patient after their first no show.  2.  Patient understands the expectations that weight maintenance/weight loss is expected during the classes.  Failure to demonstrate changes may result in one extra month of weight loss trial, followed by going back to see the surgeon.    3. Patient is also instructed to be doing their labs, blood work, psych visit, support group and any other test that the surgeon has used while they are working on their weight loss trial.    Other Pertinent Information:     Weight Loss Attempts:    Patient is seeking a revision procedure: None.      Other:  Patient has been to a support group yet?    Lifestyle Habits:  Does patient smoke?  0    Alcohol intake:  Number of drinks at a time:  0  Number of times a week: 0    Diet Changes Made Since Last Class: Drinking protein shakes, snacking on almonds or more fruit    Patient feels they are struggling with the following:     Skipping meals   Not exercising   Large portions   Stress Eating    Eating Habits and Behaviors      Today we reviewed key diet principles.  We talked about protein drinks and ones that would be okay.  Patient was encouraged to

## 2024-12-27 DIAGNOSIS — J45.41 MODERATE PERSISTENT ASTHMA WITH EXACERBATION: ICD-10-CM

## 2024-12-30 RX ORDER — ALBUTEROL SULFATE 90 UG/1
INHALANT RESPIRATORY (INHALATION)
Qty: 18 G | Refills: 0 | Status: SHIPPED | OUTPATIENT
Start: 2024-12-30

## 2024-12-31 DIAGNOSIS — E28.2 PCOS (POLYCYSTIC OVARIAN SYNDROME): ICD-10-CM

## 2024-12-31 RX ORDER — METFORMIN HYDROCHLORIDE 750 MG/1
TABLET, EXTENDED RELEASE ORAL
Qty: 90 TABLET | OUTPATIENT
Start: 2024-12-31

## 2024-12-31 RX ORDER — METFORMIN HYDROCHLORIDE 750 MG/1
TABLET, EXTENDED RELEASE ORAL
Qty: 30 TABLET | Refills: 0 | Status: SHIPPED | OUTPATIENT
Start: 2024-12-31

## 2024-12-31 NOTE — TELEPHONE ENCOUNTER
Patient has follow up on 1/24/25. Refill sent to last until appointment when A1C will be rechecked.

## 2025-01-01 DIAGNOSIS — J30.9 ALLERGIC RHINITIS, UNSPECIFIED SEASONALITY, UNSPECIFIED TRIGGER: ICD-10-CM

## 2025-01-02 RX ORDER — FLUTICASONE PROPIONATE 50 MCG
SPRAY, SUSPENSION (ML) NASAL
Qty: 16 G | Refills: 0 | Status: SHIPPED | OUTPATIENT
Start: 2025-01-02

## 2025-01-02 NOTE — TELEPHONE ENCOUNTER
Wikiswayt message sent to patient to only use as needed. Flonase shouldn't be used long term. 1 refill sent, patient has a follow up before that runs out and can discuss continued use at appointment.

## 2025-01-08 ENCOUNTER — CLINICAL DOCUMENTATION (OUTPATIENT)
Facility: HOSPITAL | Age: 45
End: 2025-01-08

## 2025-01-08 ENCOUNTER — HOSPITAL ENCOUNTER (OUTPATIENT)
Facility: HOSPITAL | Age: 45
Discharge: HOME | End: 2025-01-11

## 2025-01-08 NOTE — PROGRESS NOTES
[facility]  [address], [address]    Patient's Name: Livia Kraus     Age: 44 y.o.  YOB: 1980     Sex: female           Session 6 of 6   Surgeon:  Dr. Peterson    Height: 5 f 5   Current Weight:    252      Lbs.     BMI:    Pounds Lost since last month: 0                 Pounds Gained since last month: 5  (Patient states she was recently put on steroids)  Starting Weight: 255     Previous Month’s Weight: 247  Overall Pounds Lost: 3   Overall Pounds Gained: 0    Office Use only: IP  Class Guidelines  Guidelines are reviewed with patient at the start of every class.  1. Patient understands that weight loss trial classes must be consecutive.  Patient understands if they miss a class, it is their responsibility to contact me to reschedule class.  I will reach out to patient after their first no show.  2.  Patient understands the expectations that weight maintenance/weight loss is expected during the classes.  Failure to demonstrate changes may result in one extra month of weight loss trial, followed by going back to see the surgeon.    3. Patient is also instructed to be doing their labs, blood work, psych visit, support group and any other test that the surgeon has used while they are working on their weight loss trial.    Other Pertinent Information:     Weight Loss Attempts:    Patient is seeking a revision procedure: None.      Other:  Patient has been to a support group yet?    Lifestyle Habits:  Does patient smoke?  None    Alcohol intake:  Number of drinks at a time:  None  Number of times a week: None    Diet Changes Made Since Last Class: No soda, added in protein    Eating Habits and Behaviors    Today we reviewed key diet principles.   We talked about snack ideas that would focus more on protein.  We also talked about the benefits of filling up on protein first and keeping the daily carbohydrate intake to less

## 2025-01-08 NOTE — PROGRESS NOTES
Nutrition Evaluation    Patient's Name: Livia Kraus   Age: 44 y.o.  YOB: 1980   Sex: female    Height: 5 f 5   Starting Weight:  255  Weight: 252   BMI:            Smoking Status:  None  Alcohol Intake:  Number of Drinks at a Time: None  Number of Times a Week: None    Changes made during classes include:  Added in protein shakes  Packing her lunch  Meal prep and eating dinner by 7 pm        Summary:  I feel that Livia Kraus has demonstrated appropriate diet changes and is ready to move forward with surgery.  Patient has been briefed on the importance of the protein drinks, vitamins, and the transition of the diet stages. Patient understands that the long-term diet will focus on protein and vegetables.  Patient understand the effects of carbohydrates after surgery and what reactive hypoglycemia is.      Patient is aware that they will be attending pre-op class 2 weeks before surgery and will get more detailed information on the post-op diet guidelines.    Patient will see me again at 6 weeks post-op. At this 6 week visit, RD will assess how patient is tolerating soft protein and advance to vegetables, if tolerating soft protein without difficulty.  Patient will also see RD again at 9 months post-op.  This visit will assess patient's compliance with current protocol, including diet, vitamins, protein shakes, and exercise.  Post-op diet guidelines will be reinforced.  RD is available for questions and to meet with patient outside of the 6 week and 9 month post-op visit.     We spent a lot of time talking about the vitamins.  Patient understands the importance of being compliant with the diet protocol and the complications and risks that can occur if they are non-compliant with the nutritional protocol.     Patient has attended at least one support group.    Candidate for surgery: Yes  Re-evaluation Date:     Procedure:  Gastric Sleeve     Farnaz Turner, PAT    1/8/2025

## 2025-01-13 DIAGNOSIS — E87.6 HYPOKALEMIA: ICD-10-CM

## 2025-01-13 DIAGNOSIS — Z51.81 ENCOUNTER FOR MONITORING DIURETIC THERAPY: ICD-10-CM

## 2025-01-13 DIAGNOSIS — Z79.899 ENCOUNTER FOR MONITORING DIURETIC THERAPY: ICD-10-CM

## 2025-01-15 RX ORDER — POTASSIUM CHLORIDE 750 MG/1
10 TABLET, EXTENDED RELEASE ORAL DAILY
Qty: 90 TABLET | Refills: 0 | Status: SHIPPED | OUTPATIENT
Start: 2025-01-15

## 2025-01-20 ENCOUNTER — APPOINTMENT (OUTPATIENT)
Age: 45
End: 2025-01-20
Payer: MEDICAID

## 2025-01-20 ENCOUNTER — HOSPITAL ENCOUNTER (EMERGENCY)
Age: 45
Discharge: HOME | End: 2025-01-21
Attending: FAMILY MEDICINE
Payer: MEDICAID

## 2025-01-20 DIAGNOSIS — J10.1 INFLUENZA A: Primary | ICD-10-CM

## 2025-01-20 DIAGNOSIS — R07.89 OTHER CHEST PAIN: ICD-10-CM

## 2025-01-20 DIAGNOSIS — E87.6 HYPOKALEMIA: ICD-10-CM

## 2025-01-20 LAB
ALBUMIN SERPL-MCNC: 3.9 G/DL (ref 3.4–5)
ALBUMIN/GLOB SERPL: 1.1 (ref 0.8–1.7)
ALP SERPL-CCNC: 103 U/L (ref 45–117)
ALT SERPL-CCNC: 31 U/L (ref 13–56)
ANION GAP SERPL CALC-SCNC: 8 MMOL/L (ref 3–18)
AST SERPL W P-5'-P-CCNC: 18 U/L (ref 10–38)
BASOPHILS # BLD: 0.04 K/UL (ref 0–0.1)
BASOPHILS NFR BLD: 0.4 % (ref 0–2)
BILIRUB SERPL-MCNC: 1.1 MG/DL (ref 0.2–1)
BUN SERPL-MCNC: 9 MG/DL (ref 7–18)
BUN/CREAT SERPL: 10 (ref 12–20)
CA-I BLD-MCNC: 9.1 MG/DL (ref 8.5–10.1)
CHLORIDE SERPL-SCNC: 103 MMOL/L (ref 100–111)
CO2 SERPL-SCNC: 27 MMOL/L (ref 21–32)
CREAT SERPL-MCNC: 0.87 MG/DL (ref 0.6–1.3)
DIFFERENTIAL METHOD BLD: ABNORMAL
EOSINOPHIL # BLD: 0.08 K/UL (ref 0–0.4)
EOSINOPHIL NFR BLD: 0.7 % (ref 0–5)
ERYTHROCYTE [DISTWIDTH] IN BLOOD BY AUTOMATED COUNT: 17.1 % (ref 11.6–14.5)
FLUAV RNA SPEC QL NAA+PROBE: DETECTED
FLUBV RNA SPEC QL NAA+PROBE: NOT DETECTED
GLOBULIN SER CALC-MCNC: 3.7 G/DL (ref 2–4)
GLUCOSE SERPL-MCNC: 120 MG/DL (ref 74–99)
HCT VFR BLD AUTO: 35.2 % (ref 35–45)
HGB BLD-MCNC: 11.7 G/DL (ref 12–16)
IMM GRANULOCYTES # BLD AUTO: 0.05 K/UL (ref 0–0.04)
IMM GRANULOCYTES NFR BLD AUTO: 0.4 % (ref 0–0.5)
LACTATE SERPL-SCNC: 1.5 MMOL/L (ref 0.4–2)
LACTATE SERPL-SCNC: 2.1 MMOL/L (ref 0.4–2)
LIPASE SERPL-CCNC: 35 U/L (ref 13–75)
LYMPHOCYTES # BLD: 0.73 K/UL (ref 0.9–3.6)
LYMPHOCYTES NFR BLD: 6.6 % (ref 21–52)
MAGNESIUM SERPL-MCNC: 1.7 MG/DL (ref 1.6–2.6)
MCH RBC QN AUTO: 23.6 PG (ref 24–34)
MCHC RBC AUTO-ENTMCNC: 33.2 G/DL (ref 31–37)
MCV RBC AUTO: 71 FL (ref 78–100)
MONOCYTES # BLD: 0.81 K/UL (ref 0.05–1.2)
MONOCYTES NFR BLD: 7.3 % (ref 3–10)
NEUTS SEG # BLD: 9.42 K/UL (ref 1.8–8)
NEUTS SEG NFR BLD: 84.6 % (ref 40–73)
NRBC # BLD: 0 K/UL (ref 0–0.01)
NRBC BLD-RTO: 0 PER 100 WBC
PLATELET # BLD AUTO: 316 K/UL (ref 135–420)
PMV BLD AUTO: 10.4 FL (ref 9.2–11.8)
POTASSIUM SERPL-SCNC: 3.3 MMOL/L (ref 3.5–5.5)
PROT SERPL-MCNC: 7.6 G/DL (ref 6.4–8.2)
RBC # BLD AUTO: 4.96 M/UL (ref 4.2–5.3)
SARS-COV-2 RNA RESP QL NAA+PROBE: NOT DETECTED
SODIUM SERPL-SCNC: 138 MMOL/L (ref 136–145)
TROPONIN I SERPL HS-MCNC: 4 NG/L (ref 0–54)
TSH SERPL DL<=0.05 MIU/L-ACNC: 0.09 UIU/ML (ref 0.36–3.74)
WBC # BLD AUTO: 11.1 K/UL (ref 4.6–13.2)

## 2025-01-20 PROCEDURE — 94640 AIRWAY INHALATION TREATMENT: CPT

## 2025-01-20 PROCEDURE — 83690 ASSAY OF LIPASE: CPT

## 2025-01-20 PROCEDURE — 87636 SARSCOV2 & INF A&B AMP PRB: CPT

## 2025-01-20 PROCEDURE — 85025 COMPLETE CBC W/AUTO DIFF WBC: CPT

## 2025-01-20 PROCEDURE — 96361 HYDRATE IV INFUSION ADD-ON: CPT

## 2025-01-20 PROCEDURE — 83735 ASSAY OF MAGNESIUM: CPT

## 2025-01-20 PROCEDURE — 99285 EMERGENCY DEPT VISIT HI MDM: CPT

## 2025-01-20 PROCEDURE — 96360 HYDRATION IV INFUSION INIT: CPT

## 2025-01-20 PROCEDURE — 83605 ASSAY OF LACTIC ACID: CPT

## 2025-01-20 PROCEDURE — 2580000003 HC RX 258: Performed by: FAMILY MEDICINE

## 2025-01-20 PROCEDURE — 80053 COMPREHEN METABOLIC PANEL: CPT

## 2025-01-20 PROCEDURE — 6370000000 HC RX 637 (ALT 250 FOR IP): Performed by: FAMILY MEDICINE

## 2025-01-20 PROCEDURE — 84484 ASSAY OF TROPONIN QUANT: CPT

## 2025-01-20 PROCEDURE — 71045 X-RAY EXAM CHEST 1 VIEW: CPT

## 2025-01-20 PROCEDURE — 87040 BLOOD CULTURE FOR BACTERIA: CPT

## 2025-01-20 PROCEDURE — 84443 ASSAY THYROID STIM HORMONE: CPT

## 2025-01-20 PROCEDURE — 36415 COLL VENOUS BLD VENIPUNCTURE: CPT

## 2025-01-20 PROCEDURE — 93005 ELECTROCARDIOGRAM TRACING: CPT | Performed by: FAMILY MEDICINE

## 2025-01-20 RX ORDER — IBUPROFEN 800 MG/1
800 TABLET, FILM COATED ORAL EVERY 8 HOURS PRN
Qty: 20 TABLET | Refills: 0 | Status: SHIPPED | OUTPATIENT
Start: 2025-01-20 | End: 2025-01-22

## 2025-01-20 RX ORDER — POTASSIUM CHLORIDE 750 MG/1
40 TABLET, EXTENDED RELEASE ORAL ONCE
Status: COMPLETED | OUTPATIENT
Start: 2025-01-20 | End: 2025-01-21

## 2025-01-20 RX ORDER — SODIUM CHLORIDE 0.9 % (FLUSH) 0.9 %
5-40 SYRINGE (ML) INJECTION PRN
Status: DISCONTINUED | OUTPATIENT
Start: 2025-01-20 | End: 2025-01-21 | Stop reason: HOSPADM

## 2025-01-20 RX ORDER — SODIUM CHLORIDE, SODIUM LACTATE, POTASSIUM CHLORIDE, AND CALCIUM CHLORIDE .6; .31; .03; .02 G/100ML; G/100ML; G/100ML; G/100ML
30 INJECTION, SOLUTION INTRAVENOUS ONCE
Status: COMPLETED | OUTPATIENT
Start: 2025-01-20 | End: 2025-01-21

## 2025-01-20 RX ORDER — IPRATROPIUM BROMIDE AND ALBUTEROL SULFATE 2.5; .5 MG/3ML; MG/3ML
1 SOLUTION RESPIRATORY (INHALATION)
Status: COMPLETED | OUTPATIENT
Start: 2025-01-20 | End: 2025-01-20

## 2025-01-20 RX ORDER — SODIUM CHLORIDE 0.9 % (FLUSH) 0.9 %
5-40 SYRINGE (ML) INJECTION EVERY 12 HOURS SCHEDULED
Status: DISCONTINUED | OUTPATIENT
Start: 2025-01-20 | End: 2025-01-21 | Stop reason: HOSPADM

## 2025-01-20 RX ORDER — BENZONATATE 200 MG/1
200 CAPSULE ORAL 3 TIMES DAILY PRN
Qty: 20 CAPSULE | Refills: 0 | Status: SHIPPED | OUTPATIENT
Start: 2025-01-20 | End: 2025-01-27

## 2025-01-20 RX ORDER — SODIUM CHLORIDE 9 MG/ML
INJECTION, SOLUTION INTRAVENOUS PRN
Status: DISCONTINUED | OUTPATIENT
Start: 2025-01-20 | End: 2025-01-21 | Stop reason: HOSPADM

## 2025-01-20 RX ORDER — ACETAMINOPHEN 500 MG
1000 TABLET ORAL ONCE
Status: COMPLETED | OUTPATIENT
Start: 2025-01-20 | End: 2025-01-20

## 2025-01-20 RX ADMIN — ACETAMINOPHEN 1000 MG: 500 TABLET ORAL at 20:57

## 2025-01-20 RX ADMIN — IPRATROPIUM BROMIDE AND ALBUTEROL SULFATE 1 DOSE: .5; 2.5 SOLUTION RESPIRATORY (INHALATION) at 20:44

## 2025-01-20 RX ADMIN — SODIUM CHLORIDE, POTASSIUM CHLORIDE, SODIUM LACTATE AND CALCIUM CHLORIDE 999 ML: 600; 310; 30; 20 INJECTION, SOLUTION INTRAVENOUS at 20:55

## 2025-01-21 VITALS
SYSTOLIC BLOOD PRESSURE: 124 MMHG | RESPIRATION RATE: 17 BRPM | BODY MASS INDEX: 41.6 KG/M2 | HEART RATE: 90 BPM | WEIGHT: 250 LBS | OXYGEN SATURATION: 95 % | DIASTOLIC BLOOD PRESSURE: 74 MMHG | TEMPERATURE: 99.8 F

## 2025-01-21 LAB
EKG ATRIAL RATE: 111 BPM
EKG DIAGNOSIS: NORMAL
EKG P AXIS: 65 DEGREES
EKG P-R INTERVAL: 130 MS
EKG Q-T INTERVAL: 320 MS
EKG QRS DURATION: 82 MS
EKG QTC CALCULATION (BAZETT): 435 MS
EKG R AXIS: 50 DEGREES
EKG T AXIS: 44 DEGREES
EKG VENTRICULAR RATE: 111 BPM

## 2025-01-21 PROCEDURE — 6370000000 HC RX 637 (ALT 250 FOR IP): Performed by: FAMILY MEDICINE

## 2025-01-21 RX ADMIN — POTASSIUM CHLORIDE 40 MEQ: 750 TABLET, EXTENDED RELEASE ORAL at 00:10

## 2025-01-21 NOTE — ED PROVIDER NOTES
U/L    Alk Phosphatase 103 45 - 117 U/L    Total Protein 7.6 6.4 - 8.2 g/dL    Albumin 3.9 3.4 - 5.0 g/dL    Globulin 3.7 2.0 - 4.0 g/dL    Albumin/Globulin Ratio 1.1 0.8 - 1.7     Lipase    Collection Time: 01/20/25  8:40 PM   Result Value Ref Range    Lipase 35 13 - 75 U/L   Magnesium    Collection Time: 01/20/25  8:40 PM   Result Value Ref Range    Magnesium 1.7 1.6 - 2.6 mg/dL   Troponin    Collection Time: 01/20/25  8:40 PM   Result Value Ref Range    Troponin, High Sensitivity 4 0 - 54 ng/L   TSH    Collection Time: 01/20/25  8:40 PM   Result Value Ref Range    TSH, 3rd Generation 0.09 (L) 0.36 - 3.74 uIU/mL   Lactate, Sepsis    Collection Time: 01/20/25  8:40 PM   Result Value Ref Range    Lactic Acid, Sepsis 2.1 (HH) 0.4 - 2.0 mmol/L   Lactate, Sepsis    Collection Time: 01/20/25 10:39 PM   Result Value Ref Range    Lactic Acid, Sepsis 1.5 0.4 - 2.0 mmol/L       Radiologic Studies -   XR CHEST PORTABLE   Final Result   No acute cardiopulmonary process.         Electronically signed by Pino Weeks              Medical Decision Making     I reviewed the vital signs, available nursing notes, past medical history, past surgical history, family history and social history.    Vital Signs-I have reviewed the vital signs that have been made available during the patient's emergency department visit.  The vital signs auto-populated below are obtained mostly by electronic means through monitoring devices that have been downloaded into the patient's chart by the nursing staff.  Some vital signs are not downloaded into the chart until after the patient has been discharged and this note has been completed, therefore some vital signs may not be available to the physician for review prior to patient's discharge or admission.  The physician has reviewed the patient's triage vital signs, monitored the electronic monitoring devices remotely for any significant vital sign abnormalities, and have reviewed vital signs prior to

## 2025-01-21 NOTE — ED TRIAGE NOTES
Patient reports she has been sick for the past 2 days. Today complains of increased shortness of breath and chest tightness. Audible wheezing noted. Patient does have a hx of asthma

## 2025-01-22 ENCOUNTER — TELEPHONE (OUTPATIENT)
Facility: CLINIC | Age: 45
End: 2025-01-22

## 2025-01-22 ENCOUNTER — HOSPITAL ENCOUNTER (EMERGENCY)
Age: 45
Discharge: HOME | End: 2025-01-22
Attending: EMERGENCY MEDICINE
Payer: MEDICAID

## 2025-01-22 ENCOUNTER — APPOINTMENT (OUTPATIENT)
Age: 45
End: 2025-01-22
Payer: MEDICAID

## 2025-01-22 VITALS
OXYGEN SATURATION: 98 % | SYSTOLIC BLOOD PRESSURE: 113 MMHG | WEIGHT: 250 LBS | RESPIRATION RATE: 18 BRPM | TEMPERATURE: 100.8 F | HEART RATE: 113 BPM | HEIGHT: 65 IN | BODY MASS INDEX: 41.65 KG/M2 | DIASTOLIC BLOOD PRESSURE: 62 MMHG

## 2025-01-22 DIAGNOSIS — J11.1 INFLUENZA WITH RESPIRATORY MANIFESTATION OTHER THAN PNEUMONIA: Primary | ICD-10-CM

## 2025-01-22 DIAGNOSIS — J45.909 UNCOMPLICATED ASTHMA, UNSPECIFIED ASTHMA SEVERITY, UNSPECIFIED WHETHER PERSISTENT: ICD-10-CM

## 2025-01-22 LAB — S PYO DNA THROAT QL NAA+PROBE: NOT DETECTED

## 2025-01-22 PROCEDURE — 6370000000 HC RX 637 (ALT 250 FOR IP): Performed by: EMERGENCY MEDICINE

## 2025-01-22 PROCEDURE — 87651 STREP A DNA AMP PROBE: CPT

## 2025-01-22 PROCEDURE — 99284 EMERGENCY DEPT VISIT MOD MDM: CPT

## 2025-01-22 PROCEDURE — 71046 X-RAY EXAM CHEST 2 VIEWS: CPT

## 2025-01-22 PROCEDURE — 94640 AIRWAY INHALATION TREATMENT: CPT

## 2025-01-22 RX ORDER — ONDANSETRON 4 MG/1
4 TABLET, ORALLY DISINTEGRATING ORAL 3 TIMES DAILY PRN
Qty: 15 TABLET | Refills: 0 | Status: SHIPPED | OUTPATIENT
Start: 2025-01-22

## 2025-01-22 RX ORDER — OSELTAMIVIR PHOSPHATE 75 MG/1
75 CAPSULE ORAL ONCE
Status: COMPLETED | OUTPATIENT
Start: 2025-01-22 | End: 2025-01-22

## 2025-01-22 RX ORDER — ALBUTEROL SULFATE 90 UG/1
2 INHALANT RESPIRATORY (INHALATION) 4 TIMES DAILY PRN
Qty: 54 G | Refills: 0 | Status: SHIPPED | OUTPATIENT
Start: 2025-01-22

## 2025-01-22 RX ORDER — IPRATROPIUM BROMIDE AND ALBUTEROL SULFATE 2.5; .5 MG/3ML; MG/3ML
1 SOLUTION RESPIRATORY (INHALATION)
Status: COMPLETED | OUTPATIENT
Start: 2025-01-22 | End: 2025-01-22

## 2025-01-22 RX ORDER — OSELTAMIVIR PHOSPHATE 75 MG/1
75 CAPSULE ORAL 2 TIMES DAILY
Qty: 10 CAPSULE | Refills: 0 | Status: SHIPPED | OUTPATIENT
Start: 2025-01-22 | End: 2025-01-27

## 2025-01-22 RX ORDER — PREDNISONE 20 MG/1
60 TABLET ORAL ONCE
Status: COMPLETED | OUTPATIENT
Start: 2025-01-22 | End: 2025-01-22

## 2025-01-22 RX ORDER — PREDNISONE 20 MG/1
60 TABLET ORAL DAILY
Qty: 12 TABLET | Refills: 0 | Status: SHIPPED | OUTPATIENT
Start: 2025-01-22 | End: 2025-01-26

## 2025-01-22 RX ORDER — ACETAMINOPHEN 325 MG/1
975 TABLET ORAL
Status: COMPLETED | OUTPATIENT
Start: 2025-01-22 | End: 2025-01-22

## 2025-01-22 RX ORDER — IBUPROFEN 600 MG/1
600 TABLET, FILM COATED ORAL 4 TIMES DAILY PRN
Qty: 15 TABLET | Refills: 0 | Status: SHIPPED | OUTPATIENT
Start: 2025-01-22

## 2025-01-22 RX ORDER — ONDANSETRON 4 MG/1
4 TABLET, ORALLY DISINTEGRATING ORAL
Status: COMPLETED | OUTPATIENT
Start: 2025-01-22 | End: 2025-01-22

## 2025-01-22 RX ADMIN — ONDANSETRON 4 MG: 4 TABLET, ORALLY DISINTEGRATING ORAL at 20:45

## 2025-01-22 RX ADMIN — OSELTAMIVIR PHOSPHATE 75 MG: 75 CAPSULE ORAL at 20:45

## 2025-01-22 RX ADMIN — PREDNISONE 60 MG: 20 TABLET ORAL at 20:45

## 2025-01-22 RX ADMIN — IPRATROPIUM BROMIDE AND ALBUTEROL SULFATE 1 DOSE: .5; 2.5 SOLUTION RESPIRATORY (INHALATION) at 20:29

## 2025-01-22 RX ADMIN — ACETAMINOPHEN 975 MG: 325 TABLET ORAL at 20:45

## 2025-01-22 ASSESSMENT — PAIN DESCRIPTION - DESCRIPTORS: DESCRIPTORS: ACHING

## 2025-01-22 ASSESSMENT — PAIN DESCRIPTION - LOCATION: LOCATION: GENERALIZED

## 2025-01-22 ASSESSMENT — LIFESTYLE VARIABLES
HOW MANY STANDARD DRINKS CONTAINING ALCOHOL DO YOU HAVE ON A TYPICAL DAY: PATIENT DOES NOT DRINK
HOW OFTEN DO YOU HAVE A DRINK CONTAINING ALCOHOL: NEVER

## 2025-01-22 ASSESSMENT — PAIN SCALES - GENERAL: PAINLEVEL_OUTOF10: 6

## 2025-01-22 ASSESSMENT — PAIN - FUNCTIONAL ASSESSMENT: PAIN_FUNCTIONAL_ASSESSMENT: 0-10

## 2025-01-22 NOTE — TELEPHONE ENCOUNTER
Pt called [facility] - transferred - wanting to talk to danny but knows danny is not in. Pt states she was seen at the ER and was diagnose with the Flu - states given nothing. Pt would like to talk to the nurse since she has asthma - I inform pt if she feels it urgent please go to urgent care / wheezing. Please advise call pt.

## 2025-01-23 NOTE — ED PROVIDER NOTES
tablet by mouth daily    GLYCOPYRROLATE (ROBINUL) 1 MG TABLET    Take 1 tablet by mouth as needed    HYDROCHLOROTHIAZIDE 12.5 MG TABLET    TAKE 1 TABLET BY MOUTH DAILY    IPRATROPIUM 0.5 MG-ALBUTEROL 2.5 MG (DUONEB) 0.5-2.5 (3) MG/3ML SOLN NEBULIZER SOLUTION    Inhale 3 mLs into the lungs 3 times daily    LEUPROLIDE (LUPRON) 11.25 MG INJECTION    Inject 11.25 mg into the muscle once    LINACLOTIDE (LINZESS) 290 MCG CAPS CAPSULE    TAKE 1 CAPSULE BY MOUTH EVERY DAY    METFORMIN (GLUCOPHAGE-XR) 750 MG EXTENDED RELEASE TABLET    TAKE 1 TABLET BY MOUTH DAILY( WITH BREAKFAST)    METHOTREXATE (RHEUMATREX) 2.5 MG CHEMO TABLET        MONTELUKAST (SINGULAIR) 10 MG TABLET    Take 1 tablet by mouth daily    OMEPRAZOLE (PRILOSEC) 20 MG DELAYED RELEASE CAPSULE    Take 1 capsule by mouth daily    PAXLOVID, 300/100, 20 X 150 MG & 10 X 100MG TBPK    Take 3 tablets by mouth in the morning and 3 tablets in the evening.    POTASSIUM CHLORIDE (KLOR-CON) 10 MEQ EXTENDED RELEASE TABLET    TAKE 1 TABLET BY MOUTH DAILY    TRELEGY ELLIPTA 100-62.5-25 MCG/ACT AEPB INHALER    Inhale 1 puff into the lungs daily    VENLAFAXINE (EFFEXOR XR) 37.5 MG EXTENDED RELEASE CAPSULE        VITAMIN D 25 MCG (1000 UT) CAPS    Take by mouth daily       ALLERGIES     Gadobenate and Iodinated contrast media    FAMILY HISTORY       Family History   Problem Relation Age of Onset    Arthritis Mother           SOCIAL HISTORY       Social History     Socioeconomic History    Marital status: Single     Spouse name: None    Number of children: None    Years of education: None    Highest education level: None   Tobacco Use    Smoking status: Never     Passive exposure: Never    Smokeless tobacco: Never   Vaping Use    Vaping status: Never Used   Substance and Sexual Activity    Alcohol use: Never    Drug use: Never    Sexual activity: Yes     Partners: Male     Social Determinants of Health     Financial Resource Strain: Medium Risk (5/16/2023)    Overall Financial

## 2025-01-23 NOTE — ED TRIAGE NOTES
Pt. States she started with flu like symptoms Sunday. Pt. Was diagnosed with the flu on Monday but still feels bad. PT. States she has used all of her inhalers. Pt. Has also been taking ibuprofen with no relief. Pt. Does also complain of a sore throat.

## 2025-01-23 NOTE — DISCHARGE INSTRUCTIONS
Return for pain, fever not resolving with motrin or tylenol, difficulty swallowing, any shortness of breath, vomiting, decreased fluid intake, weakness, numbness, dizziness, or any change or concerns.

## 2025-01-26 LAB
BACTERIA SPEC CULT: NORMAL
BACTERIA SPEC CULT: NORMAL
Lab: NORMAL
Lab: NORMAL

## 2025-01-27 ENCOUNTER — HOSPITAL ENCOUNTER (EMERGENCY)
Age: 45
Discharge: HOME | End: 2025-01-27
Attending: EMERGENCY MEDICINE
Payer: MEDICAID

## 2025-01-27 VITALS
TEMPERATURE: 98.5 F | DIASTOLIC BLOOD PRESSURE: 102 MMHG | HEIGHT: 65 IN | SYSTOLIC BLOOD PRESSURE: 150 MMHG | WEIGHT: 250 LBS | OXYGEN SATURATION: 100 % | HEART RATE: 85 BPM | BODY MASS INDEX: 41.65 KG/M2 | RESPIRATION RATE: 18 BRPM

## 2025-01-27 DIAGNOSIS — M25.50 POLYARTHRALGIA: Primary | ICD-10-CM

## 2025-01-27 DIAGNOSIS — D72.829 LEUKOCYTOSIS, UNSPECIFIED TYPE: ICD-10-CM

## 2025-01-27 LAB
ANION GAP SERPL CALC-SCNC: 8 MMOL/L (ref 3–18)
BASOPHILS # BLD: 0 K/UL (ref 0–0.1)
BASOPHILS NFR BLD: 0 % (ref 0–2)
BUN SERPL-MCNC: 15 MG/DL (ref 7–18)
BUN/CREAT SERPL: 17 (ref 12–20)
CA-I BLD-MCNC: 9.4 MG/DL (ref 8.5–10.1)
CHLORIDE SERPL-SCNC: 100 MMOL/L (ref 100–111)
CO2 SERPL-SCNC: 28 MMOL/L (ref 21–32)
CREAT SERPL-MCNC: 0.86 MG/DL (ref 0.6–1.3)
DIFFERENTIAL METHOD BLD: ABNORMAL
EOSINOPHIL # BLD: 0 K/UL (ref 0–0.4)
EOSINOPHIL NFR BLD: 0 % (ref 0–5)
ERYTHROCYTE [DISTWIDTH] IN BLOOD BY AUTOMATED COUNT: 16.8 % (ref 11.6–14.5)
ERYTHROCYTE [SEDIMENTATION RATE] IN BLOOD: 17 MM/HR (ref 0–30)
GLUCOSE SERPL-MCNC: 179 MG/DL (ref 74–99)
HCT VFR BLD AUTO: 32.7 % (ref 35–45)
HGB BLD-MCNC: 11.1 G/DL (ref 12–16)
IMM GRANULOCYTES # BLD AUTO: 0 K/UL
IMM GRANULOCYTES NFR BLD AUTO: 0 %
LYMPHOCYTES # BLD: 2.05 K/UL (ref 0.9–3.6)
LYMPHOCYTES NFR BLD: 10 % (ref 21–52)
MCH RBC QN AUTO: 23.9 PG (ref 24–34)
MCHC RBC AUTO-ENTMCNC: 33.9 G/DL (ref 31–37)
MCV RBC AUTO: 70.3 FL (ref 78–100)
MONOCYTES # BLD: 0.82 K/UL (ref 0.05–1.2)
MONOCYTES NFR BLD: 4 % (ref 3–10)
NEUTS BAND NFR BLD MANUAL: 3 % (ref 0–5)
NEUTS SEG # BLD: 17.63 K/UL (ref 1.8–8)
NEUTS SEG NFR BLD: 83 % (ref 40–73)
NRBC # BLD: 0.04 K/UL (ref 0–0.01)
NRBC BLD-RTO: 0.2 PER 100 WBC
PLATELET # BLD AUTO: 359 K/UL (ref 135–420)
PMV BLD AUTO: 9.9 FL (ref 9.2–11.8)
POTASSIUM SERPL-SCNC: 3.7 MMOL/L (ref 3.5–5.5)
RBC # BLD AUTO: 4.65 M/UL (ref 4.2–5.3)
RBC MORPH BLD: ABNORMAL
SODIUM SERPL-SCNC: 136 MMOL/L (ref 136–145)
WBC # BLD AUTO: 20.5 K/UL (ref 4.6–13.2)
WBC MORPH BLD: ABNORMAL

## 2025-01-27 PROCEDURE — 36415 COLL VENOUS BLD VENIPUNCTURE: CPT

## 2025-01-27 PROCEDURE — 6360000002 HC RX W HCPCS: Performed by: EMERGENCY MEDICINE

## 2025-01-27 PROCEDURE — 2500000003 HC RX 250 WO HCPCS: Performed by: EMERGENCY MEDICINE

## 2025-01-27 PROCEDURE — 85025 COMPLETE CBC W/AUTO DIFF WBC: CPT

## 2025-01-27 PROCEDURE — 80048 BASIC METABOLIC PNL TOTAL CA: CPT

## 2025-01-27 PROCEDURE — 99284 EMERGENCY DEPT VISIT MOD MDM: CPT

## 2025-01-27 PROCEDURE — 96374 THER/PROPH/DIAG INJ IV PUSH: CPT

## 2025-01-27 PROCEDURE — 85651 RBC SED RATE NONAUTOMATED: CPT

## 2025-01-27 PROCEDURE — 96375 TX/PRO/DX INJ NEW DRUG ADDON: CPT

## 2025-01-27 RX ORDER — ONDANSETRON 2 MG/ML
4 INJECTION INTRAMUSCULAR; INTRAVENOUS
Status: COMPLETED | OUTPATIENT
Start: 2025-01-27 | End: 2025-01-27

## 2025-01-27 RX ORDER — TRAMADOL HYDROCHLORIDE 50 MG/1
50 TABLET ORAL EVERY 4 HOURS PRN
Qty: 12 TABLET | Refills: 0 | Status: SHIPPED | OUTPATIENT
Start: 2025-01-27 | End: 2025-01-30

## 2025-01-27 RX ORDER — KETOROLAC TROMETHAMINE 10 MG/1
10 TABLET, FILM COATED ORAL EVERY 8 HOURS PRN
Qty: 15 TABLET | Refills: 0 | Status: SHIPPED | OUTPATIENT
Start: 2025-01-27 | End: 2025-01-29 | Stop reason: ALTCHOICE

## 2025-01-27 RX ORDER — KETOROLAC TROMETHAMINE 30 MG/ML
30 INJECTION, SOLUTION INTRAMUSCULAR; INTRAVENOUS
Status: COMPLETED | OUTPATIENT
Start: 2025-01-27 | End: 2025-01-27

## 2025-01-27 RX ORDER — OMEPRAZOLE 40 MG/1
40 CAPSULE, DELAYED RELEASE ORAL
Qty: 20 CAPSULE | Refills: 5 | Status: SHIPPED | OUTPATIENT
Start: 2025-01-27

## 2025-01-27 RX ADMIN — WATER 125 MG: 1 INJECTION INTRAMUSCULAR; INTRAVENOUS; SUBCUTANEOUS at 01:06

## 2025-01-27 RX ADMIN — ONDANSETRON 4 MG: 2 INJECTION, SOLUTION INTRAMUSCULAR; INTRAVENOUS at 01:05

## 2025-01-27 RX ADMIN — KETOROLAC TROMETHAMINE 30 MG: 30 INJECTION, SOLUTION INTRAMUSCULAR at 01:03

## 2025-01-27 RX ADMIN — HYDROMORPHONE HYDROCHLORIDE 0.5 MG: 1 INJECTION, SOLUTION INTRAMUSCULAR; INTRAVENOUS; SUBCUTANEOUS at 02:06

## 2025-01-27 ASSESSMENT — PAIN DESCRIPTION - DESCRIPTORS: DESCRIPTORS: ACHING

## 2025-01-27 ASSESSMENT — PAIN DESCRIPTION - ORIENTATION: ORIENTATION: RIGHT;LEFT

## 2025-01-27 ASSESSMENT — PAIN DESCRIPTION - PAIN TYPE: TYPE: CHRONIC PAIN

## 2025-01-27 ASSESSMENT — PAIN DESCRIPTION - LOCATION: LOCATION: HIP

## 2025-01-27 ASSESSMENT — PAIN SCALES - GENERAL: PAINLEVEL_OUTOF10: 9

## 2025-01-27 NOTE — ED TRIAGE NOTES
Pt seen at this facility on 1/22 and dx with flu. Pt states she is out of her enbrel and this evening started having pain all over mainly located in her joints, noted history of RA.

## 2025-01-27 NOTE — ED PROVIDER NOTES
capsule TAKE 1 CAPSULE BY MOUTH EVERY DAYHistorical Med             DISCONTINUED MEDICATIONS:  Discharge Medication List as of 1/27/2025  2:33 AM          I am the Primary Clinician of Record.   Vivek Guy MD (electronically signed)    (Please note that parts of this dictation were completed with voice recognition software. Quite often unanticipated grammatical, syntax, homophones, and other interpretive errors are inadvertently transcribed by the computer software. Please disregards these errors. Please excuse any errors that have escaped final proofreading.)        Vivek Guy MD  01/29/25 1097

## 2025-01-29 ENCOUNTER — OFFICE VISIT (OUTPATIENT)
Facility: CLINIC | Age: 45
End: 2025-01-29
Payer: MEDICAID

## 2025-01-29 VITALS
WEIGHT: 248.8 LBS | HEART RATE: 105 BPM | TEMPERATURE: 99.3 F | HEIGHT: 65 IN | RESPIRATION RATE: 22 BRPM | SYSTOLIC BLOOD PRESSURE: 106 MMHG | OXYGEN SATURATION: 96 % | DIASTOLIC BLOOD PRESSURE: 70 MMHG | BODY MASS INDEX: 41.45 KG/M2

## 2025-01-29 DIAGNOSIS — J10.1 INFLUENZA A: ICD-10-CM

## 2025-01-29 DIAGNOSIS — R53.81 MALAISE: ICD-10-CM

## 2025-01-29 DIAGNOSIS — J45.41 MODERATE PERSISTENT ASTHMA WITH EXACERBATION: Primary | ICD-10-CM

## 2025-01-29 DIAGNOSIS — M79.10 MYALGIA: ICD-10-CM

## 2025-01-29 DIAGNOSIS — E05.90 HYPERTHYROIDISM: ICD-10-CM

## 2025-01-29 DIAGNOSIS — E11.9 DIABETES MELLITUS TYPE II, NON INSULIN DEPENDENT (HCC): ICD-10-CM

## 2025-01-29 DIAGNOSIS — D84.9 IMMUNOCOMPROMISED (HCC): ICD-10-CM

## 2025-01-29 LAB — HBA1C MFR BLD: 6.2 %

## 2025-01-29 PROCEDURE — 83036 HEMOGLOBIN GLYCOSYLATED A1C: CPT | Performed by: FAMILY MEDICINE

## 2025-01-29 PROCEDURE — 3078F DIAST BP <80 MM HG: CPT | Performed by: FAMILY MEDICINE

## 2025-01-29 PROCEDURE — 3074F SYST BP LT 130 MM HG: CPT | Performed by: FAMILY MEDICINE

## 2025-01-29 RX ORDER — AZITHROMYCIN 250 MG/1
TABLET, FILM COATED ORAL
Qty: 6 TABLET | Refills: 0 | Status: SHIPPED | OUTPATIENT
Start: 2025-01-29 | End: 2025-02-08

## 2025-01-29 RX ORDER — OSELTAMIVIR PHOSPHATE 75 MG/1
75 CAPSULE ORAL 2 TIMES DAILY
Qty: 10 CAPSULE | Refills: 0 | Status: SHIPPED | OUTPATIENT
Start: 2025-01-29 | End: 2025-02-03

## 2025-01-29 RX ORDER — PREDNISONE 10 MG/1
10 TABLET ORAL DAILY
COMMUNITY
Start: 2025-01-27

## 2025-01-29 RX ORDER — HYDROMORPHONE HYDROCHLORIDE 2 MG/1
1 TABLET ORAL EVERY 12 HOURS PRN
Qty: 2 TABLET | Refills: 0 | Status: SHIPPED | OUTPATIENT
Start: 2025-01-29 | End: 2025-02-03

## 2025-01-29 SDOH — ECONOMIC STABILITY: FOOD INSECURITY: WITHIN THE PAST 12 MONTHS, THE FOOD YOU BOUGHT JUST DIDN'T LAST AND YOU DIDN'T HAVE MONEY TO GET MORE.: NEVER TRUE

## 2025-01-29 SDOH — ECONOMIC STABILITY: FOOD INSECURITY: WITHIN THE PAST 12 MONTHS, YOU WORRIED THAT YOUR FOOD WOULD RUN OUT BEFORE YOU GOT MONEY TO BUY MORE.: NEVER TRUE

## 2025-01-29 ASSESSMENT — PATIENT HEALTH QUESTIONNAIRE - PHQ9
SUM OF ALL RESPONSES TO PHQ QUESTIONS 1-9: 0
SUM OF ALL RESPONSES TO PHQ QUESTIONS 1-9: 0
1. LITTLE INTEREST OR PLEASURE IN DOING THINGS: NOT AT ALL
2. FEELING DOWN, DEPRESSED OR HOPELESS: NOT AT ALL
SUM OF ALL RESPONSES TO PHQ9 QUESTIONS 1 & 2: 0
SUM OF ALL RESPONSES TO PHQ QUESTIONS 1-9: 0
SUM OF ALL RESPONSES TO PHQ QUESTIONS 1-9: 0

## 2025-01-29 NOTE — PROGRESS NOTES
Patient assisted into office today, somewhat unsteady on feet, states her body hurts very bad and states she is coughing up green sputum with blood noted. States she is in pain all over her body. Diagnosed with flu on 1/20/2025 from ER Visit. Returned to ER on 1/22/25 for worsening symptoms from flu, Returned to ER 1/27/25 for body aching all over. Patient still not feeling well in office today. Mother is accompanying patient.    Chief Complaint   Patient presents with    Follow-Up from Hospital       \"Have you been to the ER, urgent care clinic since your last visit?  Hospitalized since your last visit?\"    See above note    “Have you seen or consulted any other health care providers outside our system since your last visit?”    NO     “Have you had a pap smear?”    NO    “Have you had a diabetic eye exam?”    YES - Where: [facility]/CMA to request most recent records if not in the chart     Fingerstick for HBa1C done in middle finger by Venita Hebert LPN per order of Genesis Acevedo MD after cleaning area with alcohol wipe.  Patient tolerated procedure well.         
     General: Bowel sounds are normal.      Palpations: Abdomen is soft.   Skin:     Findings: No rash.   Neurological:      Mental Status: She is alert and oriented to person, place, and time.         Lab Results   Component Value Date/Time    WBC 20.5 01/27/2025 12:55 AM    HGB 11.1 01/27/2025 12:55 AM    HCT 32.7 01/27/2025 12:55 AM     01/27/2025 12:55 AM    MCV 70.3 01/27/2025 12:55 AM     Lab Results   Component Value Date/Time     01/27/2025 12:55 AM    K 3.7 01/27/2025 12:55 AM     01/27/2025 12:55 AM    CO2 28 01/27/2025 12:55 AM    BUN 15 01/27/2025 12:55 AM    GFRAA >60 09/19/2022 01:30 AM    GLOB 3.7 01/20/2025 08:40 PM    ALT 31 01/20/2025 08:40 PM    AST 18 01/20/2025 08:40 PM     No results found for: \"CHOL\", \"CHOLPOCT\", \"CHLST\", \"CHOLV\", \"TOTCHOLEXT\", \"HDL\", \"HDLPOC\", \"HDLEXT\", \"HDLC\", \"LDL\", \"LDLCEXT\", \"LDLC\", \"VLDLC\", \"VLDL\", \"TRIGLYCEXT\"    On this date 01/29/25 I have spent >45 minutes for chart review, face to face encounter with the patient for interview/exam, discussing working diagnosis and treatment plan as well as documenting on the day of the visit.     Medical decision making complexity: moderate-high.    Genesis Acevedo MD   Family & Geriatric Medicine

## 2025-01-30 ENCOUNTER — TELEPHONE (OUTPATIENT)
Facility: CLINIC | Age: 45
End: 2025-01-30

## 2025-01-30 DIAGNOSIS — E03.9 HYPOTHYROIDISM, UNSPECIFIED TYPE: Primary | ICD-10-CM

## 2025-01-30 NOTE — TELEPHONE ENCOUNTER
Per Dr. Acevedo patient needs referral to Endocrinology in [place] for low thyroid lab result/hypothyroidism.    Called patient to check on her from yesterday and see how she is feeling. States she feels a little better just weak. States she would like a few tramadol. Patient notified of need for Endocrinology referral and agrees, however she would like to go somewhere other than [place].

## 2025-02-04 ENCOUNTER — OFFICE VISIT (OUTPATIENT)
Facility: CLINIC | Age: 45
End: 2025-02-04
Payer: MEDICAID

## 2025-02-04 VITALS
RESPIRATION RATE: 20 BRPM | HEIGHT: 65 IN | OXYGEN SATURATION: 96 % | WEIGHT: 258.1 LBS | TEMPERATURE: 98.1 F | HEART RATE: 104 BPM | BODY MASS INDEX: 43 KG/M2 | SYSTOLIC BLOOD PRESSURE: 113 MMHG | DIASTOLIC BLOOD PRESSURE: 73 MMHG

## 2025-02-04 DIAGNOSIS — E11.9 DIABETES MELLITUS TYPE II, NON INSULIN DEPENDENT (HCC): ICD-10-CM

## 2025-02-04 DIAGNOSIS — D84.9 IMMUNOCOMPROMISED (HCC): ICD-10-CM

## 2025-02-04 DIAGNOSIS — E05.90 HYPERTHYROIDISM: ICD-10-CM

## 2025-02-04 DIAGNOSIS — J10.1 INFLUENZA A: Primary | ICD-10-CM

## 2025-02-04 DIAGNOSIS — E28.2 PCOS (POLYCYSTIC OVARIAN SYNDROME): ICD-10-CM

## 2025-02-04 PROCEDURE — 3078F DIAST BP <80 MM HG: CPT | Performed by: FAMILY MEDICINE

## 2025-02-04 PROCEDURE — 3074F SYST BP LT 130 MM HG: CPT | Performed by: FAMILY MEDICINE

## 2025-02-04 PROCEDURE — 99214 OFFICE O/P EST MOD 30 MIN: CPT | Performed by: FAMILY MEDICINE

## 2025-02-04 RX ORDER — METFORMIN HYDROCHLORIDE 750 MG/1
TABLET, EXTENDED RELEASE ORAL
Qty: 30 TABLET | Refills: 0 | Status: SHIPPED | OUTPATIENT
Start: 2025-02-04 | End: 2025-02-04

## 2025-02-04 RX ORDER — MULTIVIT-MIN/IRON/FOLIC ACID/K 18-600-40
1 CAPSULE ORAL DAILY
COMMUNITY

## 2025-02-04 RX ORDER — LANOLIN ALCOHOL/MO/W.PET/CERES
1000 CREAM (GRAM) TOPICAL DAILY
COMMUNITY

## 2025-02-04 RX ORDER — METFORMIN HYDROCHLORIDE 750 MG/1
TABLET, EXTENDED RELEASE ORAL
Qty: 90 TABLET | Refills: 1 | Status: SHIPPED | OUTPATIENT
Start: 2025-02-04

## 2025-02-04 ASSESSMENT — PATIENT HEALTH QUESTIONNAIRE - PHQ9
SUM OF ALL RESPONSES TO PHQ QUESTIONS 1-9: 0
2. FEELING DOWN, DEPRESSED OR HOPELESS: NOT AT ALL
SUM OF ALL RESPONSES TO PHQ9 QUESTIONS 1 & 2: 0
SUM OF ALL RESPONSES TO PHQ QUESTIONS 1-9: 0
1. LITTLE INTEREST OR PLEASURE IN DOING THINGS: NOT AT ALL

## 2025-02-04 NOTE — PROGRESS NOTES
Livia Kraus (: 1980) is a 44 y.o. female here for evaluation of the following chief concern(s):  Acute condition management     ASSESSMENT/PLAN:  1. Influenza A  2. Immunocompromised (HCC)  3. Hyperthyroidism  4. Diabetes mellitus type II, non insulin dependent (HCC)  -     Albumin/Creatinine Ratio, Urine; Future  -     Lipid Panel; Future  5. PCOS (polycystic ovarian syndrome)  -     metFORMIN (GLUCOPHAGE-XR) 750 MG extended release tablet; TAKE 1 TABLET BY MOUTH DAILY( WITH BREAKFAST), Disp-90 tablet, R-1Normal    Ms. Kraus appears much better, clinically.   She will plan for EOC w/ Endocrinology to have eval for her thyroid, ideally prior to her surgery.  I advised need for clearance w/ Oncology and Pulmonology prior to elective gastric sleeve.    Follow-up in ~2 weeks for PREOP clearance exam.    Livia Kraus agrees with plan as above and has no additional questions at this time.       SUBJECTIVE/OBJECTIVE:    Today, she is feeling \"so much better\".  Muscle aches essentially resolved.  Left shin tender to touch- no LE edema/calf pain; scheduled for Ortho follow-up later today and plans to address.    Last visit, 25 pt treated w/ Azithromycin and extended course of Tamiflu, was already on extended antibiotics from Rheumatology.    Pertinent hx;  25 Influenza A POSITIVE, COVID NEGATIVE  CXR: No acute cardiopulmonary process.   WBC 10  Lactic acid 2.1 to 1.5  K+ 3.3  TSH 0.09  Bcx NGTD  ;;; prescribed Tamiflu, Prednisone 60mg x4 days  ;  25   CXR: Normal PA and lateral chest views.   ;  25   WBC 20  K+ 3.3  ;  2024 pt had COVID and influenza B; treated w/ Paxlovid, Tamiflu, Prednisone, Z-pack    Past Medical History:   Diagnosis Date    Asthma     Breast cancer (HCC)     left breast    Hyperthyroidism     Obesity     Osteoarthritis     RA    Renal cell carcinoma (HCC)     left    Sleep apnea     cpap     Past Surgical History:   Procedure Laterality

## 2025-02-04 NOTE — PROGRESS NOTES
Chief Complaint   Patient presents with    Follow-up     Follow up on sick visit       \"Have you been to the ER, urgent care clinic since your last visit?  Hospitalized since your last visit?\"    NO    “Have you seen or consulted any other health care providers outside our system since your last visit?”    NO     “Have you had a pap smear?”    NO    April 2025 with Minerva Rand NP [facility]      “Have you had a diabetic eye exam?”    [facility] in [place]

## 2025-02-05 LAB
CREATININE, URINE  MG/DL: 61 MG/DL
MICROALBUMIN/CREAT 24H UR: <12 MG/L (ref 0.1–17)
MICROALBUMIN/CREAT UR-RTO: NORMAL

## 2025-02-06 DIAGNOSIS — E28.2 PCOS (POLYCYSTIC OVARIAN SYNDROME): ICD-10-CM

## 2025-02-11 DIAGNOSIS — R60.9 EDEMA, UNSPECIFIED TYPE: ICD-10-CM

## 2025-02-11 RX ORDER — HYDROCHLOROTHIAZIDE 12.5 MG/1
12.5 TABLET ORAL DAILY
Qty: 90 TABLET | Refills: 1 | Status: SHIPPED | OUTPATIENT
Start: 2025-02-11

## 2025-02-11 RX ORDER — METFORMIN HYDROCHLORIDE 750 MG/1
TABLET, EXTENDED RELEASE ORAL
Qty: 90 TABLET | Refills: 1 | OUTPATIENT
Start: 2025-02-11

## 2025-02-13 DIAGNOSIS — E11.9 DIABETES MELLITUS TYPE II, NON INSULIN DEPENDENT (HCC): ICD-10-CM

## 2025-02-18 ENCOUNTER — TELEPHONE (OUTPATIENT)
Facility: CLINIC | Age: 45
End: 2025-02-18

## 2025-02-18 ENCOUNTER — OFFICE VISIT (OUTPATIENT)
Age: 45
End: 2025-02-18
Payer: MEDICAID

## 2025-02-18 VITALS
RESPIRATION RATE: 16 BRPM | OXYGEN SATURATION: 96 % | TEMPERATURE: 99.3 F | WEIGHT: 253 LBS | HEART RATE: 103 BPM | HEIGHT: 65 IN | DIASTOLIC BLOOD PRESSURE: 77 MMHG | BODY MASS INDEX: 42.15 KG/M2 | SYSTOLIC BLOOD PRESSURE: 134 MMHG

## 2025-02-18 DIAGNOSIS — E05.90 HYPERTHYROIDISM: ICD-10-CM

## 2025-02-18 DIAGNOSIS — E05.90 HYPERTHYROIDISM: Primary | ICD-10-CM

## 2025-02-18 PROCEDURE — 99203 OFFICE O/P NEW LOW 30 MIN: CPT | Performed by: STUDENT IN AN ORGANIZED HEALTH CARE EDUCATION/TRAINING PROGRAM

## 2025-02-18 PROCEDURE — 3075F SYST BP GE 130 - 139MM HG: CPT | Performed by: STUDENT IN AN ORGANIZED HEALTH CARE EDUCATION/TRAINING PROGRAM

## 2025-02-18 PROCEDURE — 3078F DIAST BP <80 MM HG: CPT | Performed by: STUDENT IN AN ORGANIZED HEALTH CARE EDUCATION/TRAINING PROGRAM

## 2025-02-18 ASSESSMENT — ENCOUNTER SYMPTOMS
GASTROINTESTINAL NEGATIVE: 1
ABDOMINAL PAIN: 0
EYE REDNESS: 0
SORE THROAT: 0
EYES NEGATIVE: 1
TROUBLE SWALLOWING: 0
VOMITING: 0
CONSTIPATION: 0
EYE PAIN: 0
SHORTNESS OF BREATH: 0
EYE DISCHARGE: 0
DIARRHEA: 0
COUGH: 0
NAUSEA: 0
RESPIRATORY NEGATIVE: 1

## 2025-02-18 NOTE — PROGRESS NOTES
\"Have you been to the ER, urgent care clinic since your last visit?  Hospitalized since your last visit?\"    YES - When: approximately 2  weeks ago.  Where and Why: [facility] for Flu.    “Have you seen or consulted any other health care providers outside our system since your last visit?”    YES - When: approximately 3 months ago.  Where and Why: PCP.      “Have you had a diabetic eye exam?”    NO     No diabetic eye exam on file     Chief Complaint   Patient presents with    New Patient    Thyroid Problem     /77 (Site: Left Upper Arm, Position: Sitting, Cuff Size: Medium Adult)   Pulse (!) 103   Temp 99.3 °F (37.4 °C) (Oral)   Resp 16   Ht 1.651 m (5' 5\")   Wt 114.8 kg (253 lb)   SpO2 96%   BMI 42.10 kg/m²

## 2025-02-18 NOTE — TELEPHONE ENCOUNTER
Pt called states she has a pre op appt coming up with provider but was just seen endocrinology referral recently said due to her medication may have messed the reading of her labs. Pt asking if she still even needs her pre op ?? Please call and advise

## 2025-02-18 NOTE — PROGRESS NOTES
[facility]                                      Patient Information Name : Livia Kraus 44 y.o.   YOB: 1980         Referred by: Genesis Acevedo MD         Chief Complaint   Patient presents with    New Patient    Thyroid Problem       History of Present Illness: Livia Kraus is a 44 y.o. female here for initial visit of  hyperthyroidism.   She was first diagnosed in 2010 with Grave's disease and she was prescribed methimazole and metoprolol. She is scheduled for gastric sleeve surgery .     She has been off methimazole since pandemic . She was lost to follow up. She is taking Biotin supplements for hair loss . She takes vitamin d and C and B12. She has hx left sided breast cancer and had chemotherapy as well.     No palpitations, no depression or anxiety , constipation or diarrhea, has hot flashes , no cold intolerance, no visual problems, no redness or watering of eyes, no pain in the eyes, no foreign body sensation in the eyes. No tremors .   The patient also took prednisone 2 weeks ago for lower respiratory tract infection.   Wt Readings from Last 3 Encounters:   02/18/25 114.8 kg (253 lb)   02/04/25 117.1 kg (258 lb 1.6 oz)   01/29/25 112.9 kg (248 lb 12.8 oz)       BP Readings from Last 3 Encounters:   02/18/25 134/77   02/04/25 113/73   01/29/25 106/70           Past Medical History:   Diagnosis Date    Asthma     Breast cancer (HCC)     left breast    Hyperthyroidism     Obesity     Osteoarthritis     RA    Renal cell carcinoma (HCC)     left    Sleep apnea     cpap       Past Surgical History:   Procedure Laterality Date    HERNIA REPAIR  2010    umbilical, open via infraumbilical incision, unclear about mesh placement.    MASTECTOMY, PARTIAL Left     PARTIAL NEPHRECTOMY Left     robotic assisted lap partial left nephrectomy 12/2017    UPPER GASTROINTESTINAL ENDOSCOPY N/A 10/18/2024    ESOPHAGOGASTRODUODENOSCOPY

## 2025-02-27 ENCOUNTER — OFFICE VISIT (OUTPATIENT)
Facility: CLINIC | Age: 45
End: 2025-02-27
Payer: MEDICAID

## 2025-02-27 VITALS
RESPIRATION RATE: 20 BRPM | BODY MASS INDEX: 42.09 KG/M2 | HEIGHT: 65 IN | HEART RATE: 115 BPM | DIASTOLIC BLOOD PRESSURE: 66 MMHG | SYSTOLIC BLOOD PRESSURE: 114 MMHG | TEMPERATURE: 98.2 F | OXYGEN SATURATION: 98 % | WEIGHT: 252.6 LBS

## 2025-02-27 DIAGNOSIS — R79.89 ABNORMAL CBC: ICD-10-CM

## 2025-02-27 DIAGNOSIS — D64.9 ANEMIA, UNSPECIFIED TYPE: ICD-10-CM

## 2025-02-27 DIAGNOSIS — Z01.818 PREOP EXAMINATION: Primary | ICD-10-CM

## 2025-02-27 LAB
BILIRUBIN, URINE, POC: NEGATIVE
BLOOD URINE, POC: NORMAL
GLUCOSE URINE, POC: NEGATIVE
KETONES, URINE, POC: NEGATIVE
LEUKOCYTE ESTERASE, URINE, POC: NORMAL
NITRITE, URINE, POC: NEGATIVE
PH, URINE, POC: 6 (ref 4.6–8)
PROTEIN,URINE, POC: NEGATIVE
SPECIFIC GRAVITY, URINE, POC: 1.01 (ref 1–1.03)
URINALYSIS CLARITY, POC: CLEAR
URINALYSIS COLOR, POC: YELLOW
UROBILINOGEN, POC: NORMAL

## 2025-02-27 PROCEDURE — 81003 URINALYSIS AUTO W/O SCOPE: CPT | Performed by: FAMILY MEDICINE

## 2025-02-27 PROCEDURE — 99214 OFFICE O/P EST MOD 30 MIN: CPT | Performed by: FAMILY MEDICINE

## 2025-02-27 PROCEDURE — 93000 ELECTROCARDIOGRAM COMPLETE: CPT | Performed by: FAMILY MEDICINE

## 2025-02-27 PROCEDURE — 3078F DIAST BP <80 MM HG: CPT | Performed by: FAMILY MEDICINE

## 2025-02-27 PROCEDURE — 36415 COLL VENOUS BLD VENIPUNCTURE: CPT | Performed by: FAMILY MEDICINE

## 2025-02-27 PROCEDURE — 3074F SYST BP LT 130 MM HG: CPT | Performed by: FAMILY MEDICINE

## 2025-02-27 ASSESSMENT — PATIENT HEALTH QUESTIONNAIRE - PHQ9
SUM OF ALL RESPONSES TO PHQ QUESTIONS 1-9: 0
2. FEELING DOWN, DEPRESSED OR HOPELESS: NOT AT ALL
1. LITTLE INTEREST OR PLEASURE IN DOING THINGS: NOT AT ALL

## 2025-02-27 NOTE — PROGRESS NOTES
Chief Complaint   Patient presents with    Pre-op Exam       \"Have you been to the ER, urgent care clinic since your last visit?  Hospitalized since your last visit?\"    NO    “Have you seen or consulted any other health care providers outside our system since your last visit?”    NO      “Have you had a diabetic eye exam?”    NO       Verbal order from Genesis Acevedo MD to order labs/sign and draw them in office    Labs were drawn and sent to [facility] by Venita Hebert LPN:    The following tubes were sent:    1 Lavendar, 0 Red, 1 SST, 1 Urine    Draw site right antecubital.  Patient tolerated draw with no distress.     
lumpectomy and port placement.  No troubles w/ wound healing.    No troubles w/ bleeding or clotting.  No loose teeth.    +Asthma.  No COPD.  +BENEDICT on CPAP; pt to bring her machine.  +Reflux.  +Immunosuppressive.   No tobacco/alcohol/illicts.  No chance of pregnancy.      Review of Systems   Constitutional:  Negative for chills and fever.   Respiratory:  Positive for cough (Chronic, mild/intermittent cough.). Negative for shortness of breath.    Cardiovascular:  Negative for chest pain and palpitations.   Gastrointestinal:  Negative for blood in stool, constipation and diarrhea.   Genitourinary:  Negative for difficulty urinating and hematuria.         Past Medical History:   Diagnosis Date    Asthma     Breast cancer (HCC)     left breast    Hyperthyroidism     Obesity     Osteoarthritis     RA    Renal cell carcinoma (HCC)     left    Sleep apnea     cpap     Past Surgical History:   Procedure Laterality Date    HERNIA REPAIR  2010    umbilical, open via infraumbilical incision, unclear about mesh placement.    MASTECTOMY, PARTIAL Left     PARTIAL NEPHRECTOMY Left     robotic assisted lap partial left nephrectomy 12/2017    UPPER GASTROINTESTINAL ENDOSCOPY N/A 10/18/2024    ESOPHAGOGASTRODUODENOSCOPY W/Biopsies performed by Tito Peterson MD at [facility]    US I&D BREAST ABSCESS DEEP  05/26/2023    US I&D BREAST ABSCESS DEEP     Family History   Problem Relation Age of Onset    Arthritis Mother        Social History     Socioeconomic History    Marital status: Single     Spouse name: Not on file    Number of children: Not on file    Years of education: Not on file    Highest education level: Not on file   Occupational History    Not on file   Tobacco Use    Smoking status: Never     Passive exposure: Never    Smokeless tobacco: Never   Vaping Use    Vaping status: Never Used   Substance and Sexual Activity    Alcohol use: Never    Drug use: Never    Sexual activity: Yes     Partners: Male   Other Topics

## 2025-03-01 LAB
A/G RATIO: 2.1 RATIO (ref 1.1–2.6)
ALBUMIN: 4.6 G/DL (ref 3.5–5)
ALP BLD-CCNC: 177 U/L (ref 25–115)
ALT SERPL-CCNC: 22 U/L (ref 5–40)
ANION GAP SERPL CALCULATED.3IONS-SCNC: 14 MMOL/L (ref 3–15)
ANISOCYTOSIS: ABNORMAL
AST SERPL-CCNC: 22 U/L (ref 10–37)
BASOPHILS # BLD: 0 % (ref 0–2)
BASOPHILS ABSOLUTE: 0 K/UL (ref 0–0.2)
BILIRUB SERPL-MCNC: 1.2 MG/DL (ref 0.2–1.2)
BUN BLDV-MCNC: 10 MG/DL (ref 6–22)
CALCIUM SERPL-MCNC: 9.5 MG/DL (ref 8.4–10.5)
CHLORIDE BLD-SCNC: 102 MMOL/L (ref 98–110)
CO2: 24 MMOL/L (ref 20–32)
CREAT SERPL-MCNC: 0.8 MG/DL (ref 0.5–1.2)
EOSINOPHIL # BLD: 0 % (ref 0–6)
EOSINOPHILS ABSOLUTE: 0 K/UL (ref 0–0.5)
GFR, ESTIMATED: >60 ML/MIN/1.73 SQ.M.
GLOBULIN: 2.2 G/DL (ref 2–4)
GLUCOSE: 147 MG/DL (ref 70–99)
HCT VFR BLD CALC: 33.2 % (ref 35.1–46.5)
HEMOGLOBIN: 10.1 G/DL (ref 11.7–15.5)
LYMPHOCYTES # BLD: 15 % (ref 20–45)
LYMPHOCYTES ABSOLUTE: 1.5 K/UL (ref 1–4.8)
MCH RBC QN AUTO: 24 PG (ref 26–34)
MCHC RBC AUTO-ENTMCNC: 30 G/DL (ref 31–36)
MCV RBC AUTO: 78 FL (ref 80–99)
MICROCYTES: ABNORMAL
MONOCYTES ABSOLUTE: 0.5 K/UL (ref 0.1–1)
MONOCYTES: 5 % (ref 3–12)
NEUTROPHILS ABSOLUTE: 8.4 K/UL (ref 1.8–7.7)
NEUTROPHILS SEGMENTED: 79 % (ref 40–75)
OVALOCYTES: ABNORMAL
PDW BLD-RTO: 20.8 % (ref 10–15.5)
PLATELET # BLD: 361 K/UL (ref 140–440)
PMV BLD AUTO: 11.5 FL (ref 9–13)
POLYCHROMASIA: ABNORMAL
POTASSIUM SERPL-SCNC: 3.5 MMOL/L (ref 3.5–5.5)
RBC # BLD: 4.25 M/UL (ref 3.8–5.2)
SMEAR REVIEW: ABNORMAL
SODIUM BLD-SCNC: 140 MMOL/L (ref 133–145)
TEAR DROP CELLS: ABNORMAL
TOTAL PROTEIN: 6.8 G/DL (ref 6.4–8.3)
WBC # BLD: 10.5 K/UL (ref 4–11)

## 2025-03-02 LAB — URINE CULTURE RESULT: NORMAL

## 2025-03-03 ENCOUNTER — NURSE ONLY (OUTPATIENT)
Facility: CLINIC | Age: 45
End: 2025-03-03
Payer: MEDICAID

## 2025-03-03 DIAGNOSIS — Z01.818 PREOP EXAMINATION: Primary | ICD-10-CM

## 2025-03-03 PROCEDURE — 93000 ELECTROCARDIOGRAM COMPLETE: CPT | Performed by: FAMILY MEDICINE

## 2025-03-03 NOTE — PROGRESS NOTES
Repeat EKG this morning for elevated heart rate on last EKG. Patient needs pre-op clearance completed.

## 2025-03-06 ENCOUNTER — TELEPHONE (OUTPATIENT)
Facility: CLINIC | Age: 45
End: 2025-03-06

## 2025-03-06 DIAGNOSIS — Z01.810 ENCOUNTER FOR PRE-OPERATIVE CARDIOVASCULAR CLEARANCE: Primary | ICD-10-CM

## 2025-03-06 DIAGNOSIS — R94.31 ABNORMAL EKG: ICD-10-CM

## 2025-03-06 DIAGNOSIS — R00.0 TACHYCARDIA: ICD-10-CM

## 2025-03-06 NOTE — TELEPHONE ENCOUNTER
Pt called to follow up from the EKG she had done on Monday. She wasn't sure if anything else needed to be done.

## 2025-03-10 ENCOUNTER — TELEPHONE (OUTPATIENT)
Age: 45
End: 2025-03-10

## 2025-03-10 NOTE — TELEPHONE ENCOUNTER
I called and I spoke with the patient, this is regarding pending PCP clearance.     The patient is checking with PCP office regarding the status of clearance.     This is the only missing requirement.     Dinorah

## 2025-03-12 ENCOUNTER — RESULTS FOLLOW-UP (OUTPATIENT)
Age: 45
End: 2025-03-12

## 2025-03-12 NOTE — RESULT ENCOUNTER NOTE
Can you please let the patient know:   She has iron deficiency anemia , will need iron supplementation, please  reach out to PCP or can take OTC iron supplements, but verify the dose with PCP     - one of your liver enzymes is elevated ,alkaline phosphatase. This enzyme was normal 1 month ago, it can be elevated if you have gallstones or bile duct problems. If nothing like that is going on, it can be repeated later to see if its normalizing. Certain medications can also cause that. I will wait for your thyroid labs and will let you know.  Thanks

## 2025-03-19 DIAGNOSIS — D64.9 ANEMIA, UNSPECIFIED TYPE: ICD-10-CM

## 2025-03-19 DIAGNOSIS — R79.89 ABNORMAL CBC: ICD-10-CM

## 2025-03-19 RX ORDER — FERROUS SULFATE 325(65) MG
325 TABLET ORAL EVERY OTHER DAY
Qty: 45 TABLET | Refills: 0 | Status: SHIPPED | OUTPATIENT
Start: 2025-03-19

## 2025-03-25 ENCOUNTER — HOSPITAL ENCOUNTER (OUTPATIENT)
Age: 45
Setting detail: SPECIMEN
Discharge: HOME | End: 2025-03-28

## 2025-03-25 LAB — SENTARA SPECIMEN COLLECTION: NORMAL

## 2025-03-25 PROCEDURE — 99001 SPECIMEN HANDLING PT-LAB: CPT

## 2025-03-27 ENCOUNTER — PATIENT MESSAGE (OUTPATIENT)
Facility: CLINIC | Age: 45
End: 2025-03-27

## 2025-03-27 ENCOUNTER — TELEPHONE (OUTPATIENT)
Facility: CLINIC | Age: 45
End: 2025-03-27

## 2025-03-27 NOTE — TELEPHONE ENCOUNTER
Pt was seen vv yesterday - went to urgent care to get swabbed but has a questions about nasal drip - wanted to talk to nurse/provider in what she can take - please advise

## 2025-04-11 DIAGNOSIS — Z51.81 ENCOUNTER FOR MONITORING DIURETIC THERAPY: ICD-10-CM

## 2025-04-11 DIAGNOSIS — E87.6 HYPOKALEMIA: ICD-10-CM

## 2025-04-11 DIAGNOSIS — Z79.899 ENCOUNTER FOR MONITORING DIURETIC THERAPY: ICD-10-CM

## 2025-04-14 ENCOUNTER — TELEMEDICINE (OUTPATIENT)
Facility: CLINIC | Age: 45
End: 2025-04-14
Payer: MEDICAID

## 2025-04-14 DIAGNOSIS — R00.0 TACHYCARDIA: ICD-10-CM

## 2025-04-14 DIAGNOSIS — D50.9 IRON DEFICIENCY ANEMIA, UNSPECIFIED IRON DEFICIENCY ANEMIA TYPE: ICD-10-CM

## 2025-04-14 DIAGNOSIS — R79.89 LOW TSH LEVEL: ICD-10-CM

## 2025-04-14 PROCEDURE — 99214 OFFICE O/P EST MOD 30 MIN: CPT | Performed by: FAMILY MEDICINE

## 2025-04-14 RX ORDER — TOPIRAMATE 25 MG/1
TABLET, FILM COATED ORAL
Qty: 60 TABLET | Refills: 0 | Status: SHIPPED | OUTPATIENT
Start: 2025-04-14

## 2025-04-14 RX ORDER — POTASSIUM CHLORIDE 750 MG/1
10 TABLET, EXTENDED RELEASE ORAL DAILY
Qty: 90 TABLET | Refills: 0 | Status: SHIPPED | OUTPATIENT
Start: 2025-04-14

## 2025-04-14 RX ORDER — VENLAFAXINE HYDROCHLORIDE 75 MG/1
75 CAPSULE, EXTENDED RELEASE ORAL DAILY
COMMUNITY
Start: 2025-03-01

## 2025-04-14 ASSESSMENT — PATIENT HEALTH QUESTIONNAIRE - PHQ9
SUM OF ALL RESPONSES TO PHQ QUESTIONS 1-9: 0
1. LITTLE INTEREST OR PLEASURE IN DOING THINGS: NOT AT ALL
2. FEELING DOWN, DEPRESSED OR HOPELESS: NOT AT ALL
SUM OF ALL RESPONSES TO PHQ QUESTIONS 1-9: 0

## 2025-04-14 NOTE — PROGRESS NOTES
Livia Kraus (: 1980) is a 44 y.o. female here for evaluation of the following chief concern(s):  Chronic condition management     The services today are being conducted using telemedicine which Livia Kraus has consented to at the time of scheduling.    ASSESSMENT/PLAN:  1. Adult BMI 40.0-44.9 kg/sq m  -     topiramate (TOPAMAX) 25 MG tablet; Start one tablet at night.  IF tolerated after two weeks, can increase to one table twice a day.  IF tolerated after two weeks, can increase to two tablets at night and one in the day.  IF tolerated after two weeks, can increase to two tables twice a day., Disp-60 tablet, R-0Normal  2. Tachycardia  3. Iron deficiency anemia, unspecified iron deficiency anemia type  -     Occult Blood Stool Immunoassay; Future  -     Urinalysis with Microscopic; Future  4. Low TSH level    Livia appears medically stable.  She opts for a trial of Topiramate for weight loss support.  Pt can let me know if she would like a referral for second opinion w/ weight loss resources as she is still considering surgery.  Appreciate shared care w Cardiology for tachycardia, likely multifactorial related to anemia, ?hyperthyroidism (3/2025 TSH 0.07 in [facility] system).  Continue oral supplement and eval for possible sources of chronic blood loss.  Pt to talk w/ Hematology/Oncology about iron deficiency anemia during her next visit.  I advised that she clear any use of GLP-1 agonist w/ Oncology given potential for tumorigenesis.   Staff message to Dr. Bolivar regarding recs for low TSH.      Return in about 8 weeks (around 2025) for follow-up chronic conditions or sooner if needed- extended .    Livia Kraus agrees with plan as above and has no additional questions at this time.       SUBJECTIVE/OBJECTIVE:  Overall, pt is feeling \"good\".    She has been working at the gym, knees have been hurting w/ prolonged walking.  Pt is interested in GLP-1 agonist as she

## 2025-04-14 NOTE — PROGRESS NOTES
Patient wants see about getting something for weight loss.    Chief Complaint   Patient presents with    Discuss Medications       \"Have you been to the ER, urgent care clinic since your last visit?  Hospitalized since your last visit?\"    NO    “Have you seen or consulted any other health care providers outside our system since your last visit?”    NO      “Have you had a diabetic eye exam?”    NO

## 2025-04-24 ENCOUNTER — HOSPITAL ENCOUNTER (OUTPATIENT)
Age: 45
Discharge: HOME | End: 2025-04-26
Attending: FAMILY MEDICINE
Payer: MEDICAID

## 2025-04-24 ENCOUNTER — OFFICE VISIT (OUTPATIENT)
Facility: CLINIC | Age: 45
End: 2025-04-24
Payer: MEDICAID

## 2025-04-24 ENCOUNTER — TRANSCRIBE ORDERS (OUTPATIENT)
Age: 45
End: 2025-04-24

## 2025-04-24 VITALS
OXYGEN SATURATION: 95 % | SYSTOLIC BLOOD PRESSURE: 102 MMHG | DIASTOLIC BLOOD PRESSURE: 65 MMHG | HEIGHT: 65 IN | HEART RATE: 99 BPM | WEIGHT: 262.8 LBS | BODY MASS INDEX: 43.78 KG/M2 | TEMPERATURE: 97.5 F | RESPIRATION RATE: 22 BRPM
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
## 2025-04-24 DIAGNOSIS — R60.0 BILATERAL LEG EDEMA: ICD-10-CM

## 2025-04-24 DIAGNOSIS — R60.0 BILATERAL LEG EDEMA: Primary | ICD-10-CM

## 2025-04-24 LAB — ECHO BSA: 2.34 M2

## 2025-04-24 PROCEDURE — 93970 EXTREMITY STUDY: CPT

## 2025-04-24 PROCEDURE — 3078F DIAST BP <80 MM HG: CPT | Performed by: FAMILY MEDICINE

## 2025-04-24 PROCEDURE — 3074F SYST BP LT 130 MM HG: CPT | Performed by: FAMILY MEDICINE

## 2025-04-24 PROCEDURE — 99214 OFFICE O/P EST MOD 30 MIN: CPT | Performed by: FAMILY MEDICINE

## 2025-04-24 RX ORDER — ASPIRIN 81 MG/1
81 TABLET, CHEWABLE ORAL DAILY
Status: DISCONTINUED | OUTPATIENT
Start: 2025-04-24 | End: 2025-04-24

## 2025-04-24 RX ORDER — ASPIRIN 81 MG/1
81 TABLET, CHEWABLE ORAL ONCE
Status: COMPLETED | OUTPATIENT
Start: 2025-04-24 | End: 2025-04-24

## 2025-04-24 RX ADMIN — ASPIRIN 81 MG: 81 TABLET, CHEWABLE ORAL at 16:48
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
## 2025-04-28 ENCOUNTER — OFFICE VISIT (OUTPATIENT)
Age: 45
End: 2025-04-28
Payer: MEDICAID

## 2025-04-28 VITALS — HEIGHT: 65 IN | WEIGHT: 250.6 LBS | BODY MASS INDEX: 41.75 KG/M2

## 2025-04-28 DIAGNOSIS — M54.42 ACUTE LEFT-SIDED LOW BACK PAIN WITH LEFT-SIDED SCIATICA: ICD-10-CM

## 2025-04-28 DIAGNOSIS — M43.16 ANTEROLISTHESIS OF LUMBAR SPINE: ICD-10-CM

## 2025-04-28 DIAGNOSIS — M46.1 SACROILIITIS: ICD-10-CM

## 2025-04-28 DIAGNOSIS — M54.17 RADICULITIS, LUMBOSACRAL: ICD-10-CM

## 2025-04-28 DIAGNOSIS — M25.562 LEFT KNEE PAIN, UNSPECIFIED CHRONICITY: Primary | ICD-10-CM

## 2025-04-28 PROCEDURE — 99204 OFFICE O/P NEW MOD 45 MIN: CPT | Performed by: NURSE PRACTITIONER

## 2025-04-28 PROCEDURE — 72100 X-RAY EXAM L-S SPINE 2/3 VWS: CPT | Performed by: NURSE PRACTITIONER

## 2025-04-28 PROCEDURE — 73564 X-RAY EXAM KNEE 4 OR MORE: CPT | Performed by: NURSE PRACTITIONER

## 2025-04-28 RX ORDER — METHYLPREDNISOLONE 4 MG/1
TABLET ORAL
Qty: 1 KIT | Refills: 0 | Status: SHIPPED | OUTPATIENT
Start: 2025-04-28
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
## 2025-05-06 ENCOUNTER — HOSPITAL ENCOUNTER (OUTPATIENT)
Age: 45
Discharge: HOME | End: 2025-05-09
Payer: MEDICAID

## 2025-05-06 DIAGNOSIS — M54.42 ACUTE LEFT-SIDED LOW BACK PAIN WITH LEFT-SIDED SCIATICA: ICD-10-CM

## 2025-05-06 DIAGNOSIS — M54.17 RADICULITIS, LUMBOSACRAL: ICD-10-CM

## 2025-05-06 DIAGNOSIS — M43.16 ANTEROLISTHESIS OF LUMBAR SPINE: ICD-10-CM

## 2025-05-06 DIAGNOSIS — M46.1 SACROILIITIS: ICD-10-CM

## 2025-05-06 DIAGNOSIS — M25.562 LEFT KNEE PAIN, UNSPECIFIED CHRONICITY: ICD-10-CM

## 2025-05-06 PROCEDURE — 72148 MRI LUMBAR SPINE W/O DYE: CPT

## 2025-05-13 ENCOUNTER — OFFICE VISIT (OUTPATIENT)
Age: 45
End: 2025-05-13
Payer: MEDICAID

## 2025-05-13 VITALS — HEIGHT: 65 IN | BODY MASS INDEX: 42.55 KG/M2 | WEIGHT: 255.4 LBS

## 2025-05-13 DIAGNOSIS — M46.1 SACROILIITIS: ICD-10-CM

## 2025-05-13 DIAGNOSIS — M43.16 ANTEROLISTHESIS OF LUMBAR SPINE: ICD-10-CM

## 2025-05-13 DIAGNOSIS — M51.362 DEGENERATION OF INTERVERTEBRAL DISC OF LUMBAR REGION WITH DISCOGENIC BACK PAIN AND LOWER EXTREMITY PAIN: ICD-10-CM

## 2025-05-13 DIAGNOSIS — E05.90 HYPERTHYROIDISM: Primary | ICD-10-CM

## 2025-05-13 DIAGNOSIS — M54.17 RADICULITIS, LUMBOSACRAL: Primary | ICD-10-CM

## 2025-05-13 PROCEDURE — 99213 OFFICE O/P EST LOW 20 MIN: CPT | Performed by: NURSE PRACTITIONER
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
## 2025-05-15 RX ORDER — TOPIRAMATE 25 MG/1
TABLET, FILM COATED ORAL
Qty: 60 TABLET | Refills: 0 | Status: SHIPPED | OUTPATIENT
Start: 2025-05-15

## 2025-06-12 ENCOUNTER — OFFICE VISIT (OUTPATIENT)
Facility: CLINIC | Age: 45
End: 2025-06-12
Payer: MEDICAID

## 2025-06-12 ENCOUNTER — HOSPITAL ENCOUNTER (OUTPATIENT)
Age: 45
Setting detail: SPECIMEN
Discharge: HOME | End: 2025-06-15

## 2025-06-12 VITALS
SYSTOLIC BLOOD PRESSURE: 122 MMHG | HEART RATE: 102 BPM | WEIGHT: 253 LBS | OXYGEN SATURATION: 98 % | DIASTOLIC BLOOD PRESSURE: 79 MMHG | TEMPERATURE: 99.3 F | RESPIRATION RATE: 20 BRPM | HEIGHT: 65 IN | BODY MASS INDEX: 42.15 KG/M2

## 2025-06-12 DIAGNOSIS — J30.9 ALLERGIC RHINITIS, UNSPECIFIED SEASONALITY, UNSPECIFIED TRIGGER: ICD-10-CM

## 2025-06-12 DIAGNOSIS — D84.9 IMMUNOCOMPROMISED: ICD-10-CM

## 2025-06-12 DIAGNOSIS — J06.9 UPPER RESPIRATORY TRACT INFECTION, UNSPECIFIED TYPE: Primary | ICD-10-CM

## 2025-06-12 LAB — SENTARA SPECIMEN COLLECTION: NORMAL

## 2025-06-12 PROCEDURE — 99213 OFFICE O/P EST LOW 20 MIN: CPT | Performed by: FAMILY MEDICINE

## 2025-06-12 PROCEDURE — 3078F DIAST BP <80 MM HG: CPT | Performed by: FAMILY MEDICINE

## 2025-06-12 PROCEDURE — 3074F SYST BP LT 130 MM HG: CPT | Performed by: FAMILY MEDICINE

## 2025-06-12 RX ORDER — FLUTICASONE PROPIONATE 50 MCG
2 SPRAY, SUSPENSION (ML) NASAL DAILY PRN
Qty: 16 G | Refills: 0 | Status: SHIPPED | OUTPATIENT
Start: 2025-06-12

## 2025-06-12 RX ORDER — AZITHROMYCIN 250 MG/1
TABLET, FILM COATED ORAL
Qty: 6 TABLET | Refills: 0 | Status: SHIPPED | OUTPATIENT
Start: 2025-06-12 | End: 2025-06-22

## 2025-06-12 ASSESSMENT — PATIENT HEALTH QUESTIONNAIRE - PHQ9
2. FEELING DOWN, DEPRESSED OR HOPELESS: NOT AT ALL
SUM OF ALL RESPONSES TO PHQ QUESTIONS 1-9: 0
1. LITTLE INTEREST OR PLEASURE IN DOING THINGS: NOT AT ALL
SUM OF ALL RESPONSES TO PHQ QUESTIONS 1-9: 0

## 2025-06-12 NOTE — PROGRESS NOTES
Chief Complaint   Patient presents with    Sore Throat   Patient C/O slightly sore throat and sinus drainage, states she is having sinus pressure and feels like she needs nose spray    \"Have you been to the ER, urgent care clinic since your last visit?  Hospitalized since your last visit?\"    NO    “Have you seen or consulted any other health care providers outside our system since your last visit?”    NO      “Have you had a diabetic eye exam?”    NO             
Inhale 3 mLs into the lungs 3 times daily, Disp: 360 mL, Rfl: 0    TRELEGY ELLIPTA 100-62.5-25 MCG/ACT AEPB inhaler, Inhale 1 puff into the lungs daily, Disp: , Rfl:     leuprolide (LUPRON) 11.25 MG injection, Inject 11.25 mg into the muscle once, Disp: , Rfl:     montelukast (SINGULAIR) 10 MG tablet, Take 1 tablet by mouth daily, Disp: 90 tablet, Rfl: 1    ENBREL SURECLICK 50 MG/ML SOAJ, Inject 50 mg as directed once a week, Disp: , Rfl:     methotrexate (RHEUMATREX) 2.5 MG chemo tablet, , Disp: , Rfl:     glycopyrrolate (ROBINUL) 1 MG tablet, Take 1 tablet by mouth as needed, Disp: , Rfl:     exemestane (AROMASIN) 25 MG tablet, , Disp: , Rfl:     vitamin D 25 MCG (1000 UT) CAPS, Take by mouth daily, Disp: , Rfl:     folic acid (FOLVITE) 1 MG tablet, Take 1 tablet by mouth daily, Disp: , Rfl:     linaclotide (LINZESS) 290 MCG CAPS capsule, TAKE 1 CAPSULE BY MOUTH EVERY DAY, Disp: , Rfl:     ibuprofen (ADVIL;MOTRIN) 600 MG tablet, Take 1 tablet by mouth 4 times daily as needed for Pain (Patient not taking: Reported on 6/12/2025), Disp: 15 tablet, Rfl: 0    Allergies   Allergen Reactions    Amoxicillin-Pot Clavulanate Rash    Gadobenate Nausea And Vomiting     Nausea, Anxiety - received Multihance Gabobenate Dimeglutamine 20mL 2/13/18 for MRI  Other reaction(s): gi distress  Nausea, Anxiety - received Multihance Gabobenate Dimeglutamine 20mL 2/13/18 for MRI    Iodinated Contrast Media Other (See Comments)     NAUSEA, ANXIETY-- RECEIVED MULTIHANCE (GADOBENATE DIMEGLUTAMINE) 20ML 2/13/18 FOR MRI       /79   Pulse (!) 102   Temp 99.3 °F (37.4 °C) (Oral)   Resp 20   Ht 1.651 m (5' 5\")   Wt 114.8 kg (253 lb)   SpO2 98%   BMI 42.10 kg/m²     Physical Exam  Constitutional:       Appearance: She is ill-appearing (Mild). She is not toxic-appearing.   HENT:      Head:      Comments: Unable to visualize posterior pharynx.    Cardiovascular:      Rate and Rhythm: Regular rhythm. Tachycardia present.      Heart

## 2025-06-17 ENCOUNTER — TELEMEDICINE (OUTPATIENT)
Age: 45
End: 2025-06-17
Payer: MEDICAID

## 2025-06-17 DIAGNOSIS — R79.89 ABNORMAL THYROID BLOOD TEST: Primary | ICD-10-CM

## 2025-06-17 DIAGNOSIS — R79.89 ABNORMAL THYROID BLOOD TEST: ICD-10-CM

## 2025-06-17 PROCEDURE — 99213 OFFICE O/P EST LOW 20 MIN: CPT | Performed by: STUDENT IN AN ORGANIZED HEALTH CARE EDUCATION/TRAINING PROGRAM

## 2025-06-17 ASSESSMENT — ENCOUNTER SYMPTOMS
NAUSEA: 0
COUGH: 0
SHORTNESS OF BREATH: 0
TROUBLE SWALLOWING: 0
DIARRHEA: 0
EYE DISCHARGE: 0
RESPIRATORY NEGATIVE: 1
CONSTIPATION: 0
GASTROINTESTINAL NEGATIVE: 1
VOMITING: 0
EYE PAIN: 0
ABDOMINAL PAIN: 0
EYE REDNESS: 0
SORE THROAT: 0
EYES NEGATIVE: 1

## 2025-06-17 NOTE — PROGRESS NOTES
Have you been to the ER, urgent care clinic since your last visit?  Hospitalized since your last visit?   NO    Have you seen or consulted any other health care providers outside our system since your last visit?   YES - When: approximately 1  weeks ago.  Where and Why: PCP.      “Have you had a diabetic eye exam?”    NO     No diabetic eye exam on file         
(Non-Medical): No   Physical Activity: Inactive (12/28/2022)    Exercise Vital Sign     Days of Exercise per Week: 0 days     Minutes of Exercise per Session: 0 min   Stress: Not on file   Social Connections: Not on file   Intimate Partner Violence: Not At Risk (12/28/2022)    Humiliation, Afraid, Rape, and Kick questionnaire     Fear of Current or Ex-Partner: No     Emotionally Abused: No     Physically Abused: No     Sexually Abused: No   Housing Stability: Low Risk  (1/29/2025)    Housing Stability Vital Sign     Unable to Pay for Housing in the Last Year: No     Number of Times Moved in the Last Year: 0     Homeless in the Last Year: No         Allergies   Allergen Reactions    Amoxicillin-Pot Clavulanate Rash    Gadobenate Nausea And Vomiting     Nausea, Anxiety - received Multihance Gabobenate Dimeglutamine 20mL 2/13/18 for MRI  Other reaction(s): gi distress  Nausea, Anxiety - received Multihance Gabobenate Dimeglutamine 20mL 2/13/18 for MRI    Iodinated Contrast Media Other (See Comments)     NAUSEA, ANXIETY-- RECEIVED MULTIHANCE (GADOBENATE DIMEGLUTAMINE) 20ML 2/13/18 FOR MRI         Review of Systems:   Review of Systems   Constitutional: Negative.  Negative for activity change, appetite change and fatigue.   HENT: Negative.  Negative for sore throat and trouble swallowing.    Eyes: Negative.  Negative for pain, discharge, redness and visual disturbance.   Respiratory: Negative.  Negative for cough and shortness of breath.    Cardiovascular: Negative.  Negative for chest pain and palpitations.   Gastrointestinal: Negative.  Negative for abdominal pain, constipation, diarrhea, nausea and vomiting.   Endocrine: Positive for heat intolerance. Negative for cold intolerance, polydipsia and polyuria.   Genitourinary: Negative.  Negative for frequency.   Musculoskeletal: Negative.  Negative for arthralgias and myalgias.   Neurological: Negative.  Negative for tremors, weakness, light-headedness and headaches.

## 2025-06-30 ENCOUNTER — TELEPHONE (OUTPATIENT)
Age: 45
End: 2025-06-30

## 2025-07-17 ENCOUNTER — HOSPITAL ENCOUNTER (OUTPATIENT)
Facility: HOSPITAL | Age: 45
Setting detail: SPECIMEN
Discharge: HOME | End: 2025-07-20

## 2025-07-17 ENCOUNTER — OFFICE VISIT (OUTPATIENT)
Facility: CLINIC | Age: 45
End: 2025-07-17
Payer: MEDICAID

## 2025-07-17 VITALS
DIASTOLIC BLOOD PRESSURE: 76 MMHG | HEIGHT: 65 IN | SYSTOLIC BLOOD PRESSURE: 114 MMHG | BODY MASS INDEX: 41.42 KG/M2 | OXYGEN SATURATION: 96 % | HEART RATE: 100 BPM | WEIGHT: 248.6 LBS | TEMPERATURE: 97.2 F | RESPIRATION RATE: 17 BRPM

## 2025-07-17 DIAGNOSIS — J45.40 MODERATE PERSISTENT ASTHMA WITHOUT COMPLICATION: ICD-10-CM

## 2025-07-17 DIAGNOSIS — R74.8 ELEVATED ALKALINE PHOSPHATASE LEVEL: ICD-10-CM

## 2025-07-17 DIAGNOSIS — K21.9 GASTROESOPHAGEAL REFLUX DISEASE WITHOUT ESOPHAGITIS: ICD-10-CM

## 2025-07-17 DIAGNOSIS — R60.0 LEG EDEMA: ICD-10-CM

## 2025-07-17 DIAGNOSIS — R00.0 TACHYCARDIA: ICD-10-CM

## 2025-07-17 DIAGNOSIS — K58.8 OTHER IRRITABLE BOWEL SYNDROME: ICD-10-CM

## 2025-07-17 DIAGNOSIS — J30.9 ALLERGIC RHINITIS, UNSPECIFIED SEASONALITY, UNSPECIFIED TRIGGER: ICD-10-CM

## 2025-07-17 DIAGNOSIS — Z85.3 HX OF BREAST CANCER: ICD-10-CM

## 2025-07-17 DIAGNOSIS — E66.813 CLASS 3 SEVERE OBESITY DUE TO EXCESS CALORIES WITHOUT SERIOUS COMORBIDITY WITH BODY MASS INDEX (BMI) OF 40.0 TO 44.9 IN ADULT (HCC): ICD-10-CM

## 2025-07-17 DIAGNOSIS — E05.90 HYPERTHYROIDISM: ICD-10-CM

## 2025-07-17 DIAGNOSIS — Z11.4 SCREENING FOR HIV WITHOUT PRESENCE OF RISK FACTORS: ICD-10-CM

## 2025-07-17 DIAGNOSIS — G47.33 OSA ON CPAP: ICD-10-CM

## 2025-07-17 DIAGNOSIS — Z90.5 HISTORY OF PARTIAL NEPHRECTOMY: ICD-10-CM

## 2025-07-17 DIAGNOSIS — R73.03 PREDIABETES: ICD-10-CM

## 2025-07-17 DIAGNOSIS — E28.2 POLYCYSTIC OVARY SYNDROME: Primary | ICD-10-CM

## 2025-07-17 DIAGNOSIS — M06.9 RHEUMATOID ARTHRITIS, INVOLVING UNSPECIFIED SITE, UNSPECIFIED WHETHER RHEUMATOID FACTOR PRESENT (HCC): ICD-10-CM

## 2025-07-17 DIAGNOSIS — N95.1 PERIMENOPAUSAL: ICD-10-CM

## 2025-07-17 DIAGNOSIS — E78.2 MIXED HYPERLIPIDEMIA: ICD-10-CM

## 2025-07-17 PROBLEM — R06.83 LOUD SNORING: Status: RESOLVED | Noted: 2024-02-06 | Resolved: 2025-07-17

## 2025-07-17 PROBLEM — C50.912 BREAST CANCER, LEFT (HCC): Status: RESOLVED | Noted: 2024-08-01 | Resolved: 2025-07-17

## 2025-07-17 PROBLEM — J18.9 PNA (PNEUMONIA): Status: RESOLVED | Noted: 2023-01-15 | Resolved: 2025-07-17

## 2025-07-17 PROBLEM — J18.9 SEPSIS DUE TO PNEUMONIA (HCC): Status: RESOLVED | Noted: 2023-01-15 | Resolved: 2025-07-17

## 2025-07-17 PROBLEM — R09.89 SUSPECTED PULMONARY EMBOLISM: Status: RESOLVED | Noted: 2023-01-15 | Resolved: 2025-07-17

## 2025-07-17 PROBLEM — N28.89 RENAL MASS, LEFT: Status: RESOLVED | Noted: 2017-12-07 | Resolved: 2025-07-17

## 2025-07-17 PROBLEM — J45.901 ASTHMA EXACERBATION: Status: RESOLVED | Noted: 2023-01-15 | Resolved: 2025-07-17

## 2025-07-17 PROBLEM — I10 ESSENTIAL HYPERTENSION: Status: RESOLVED | Noted: 2024-01-08 | Resolved: 2025-07-17

## 2025-07-17 PROBLEM — E66.9 OBESITY (BMI 35.0-39.9 WITHOUT COMORBIDITY): Status: ACTIVE | Noted: 2020-04-09

## 2025-07-17 PROBLEM — A41.9 SEPSIS DUE TO PNEUMONIA (HCC): Status: RESOLVED | Noted: 2023-01-15 | Resolved: 2025-07-17

## 2025-07-17 PROBLEM — R06.02 SOB (SHORTNESS OF BREATH): Status: RESOLVED | Noted: 2023-01-15 | Resolved: 2025-07-17

## 2025-07-17 PROBLEM — C50.912 INVASIVE DUCTAL CARCINOMA OF LEFT BREAST (HCC): Status: RESOLVED | Noted: 2022-10-14 | Resolved: 2025-07-17

## 2025-07-17 LAB — HBA1C MFR BLD: 5.6 %

## 2025-07-17 PROCEDURE — 83036 HEMOGLOBIN GLYCOSYLATED A1C: CPT | Performed by: STUDENT IN AN ORGANIZED HEALTH CARE EDUCATION/TRAINING PROGRAM

## 2025-07-17 PROCEDURE — 99001 SPECIMEN HANDLING PT-LAB: CPT

## 2025-07-17 PROCEDURE — 99215 OFFICE O/P EST HI 40 MIN: CPT | Performed by: STUDENT IN AN ORGANIZED HEALTH CARE EDUCATION/TRAINING PROGRAM

## 2025-07-17 RX ORDER — METFORMIN HYDROCHLORIDE 750 MG/1
TABLET, EXTENDED RELEASE ORAL
Qty: 90 TABLET | Refills: 1 | Status: SHIPPED | OUTPATIENT
Start: 2025-07-17

## 2025-07-17 RX ORDER — ALBUTEROL SULFATE 90 UG/1
2 INHALANT RESPIRATORY (INHALATION) 4 TIMES DAILY PRN
Qty: 54 G | Refills: 0 | Status: SHIPPED | OUTPATIENT
Start: 2025-07-17

## 2025-07-17 RX ORDER — FLUTICASONE PROPIONATE 50 MCG
SPRAY, SUSPENSION (ML) NASAL
Qty: 48 G | OUTPATIENT
Start: 2025-07-17

## 2025-07-17 RX ORDER — FLUTICASONE PROPIONATE 50 MCG
2 SPRAY, SUSPENSION (ML) NASAL DAILY PRN
Qty: 16 G | Refills: 0 | Status: SHIPPED | OUTPATIENT
Start: 2025-07-17

## 2025-07-17 SDOH — ECONOMIC STABILITY: FOOD INSECURITY: WITHIN THE PAST 12 MONTHS, THE FOOD YOU BOUGHT JUST DIDN'T LAST AND YOU DIDN'T HAVE MONEY TO GET MORE.: NEVER TRUE

## 2025-07-17 SDOH — ECONOMIC STABILITY: FOOD INSECURITY: WITHIN THE PAST 12 MONTHS, YOU WORRIED THAT YOUR FOOD WOULD RUN OUT BEFORE YOU GOT MONEY TO BUY MORE.: NEVER TRUE

## 2025-07-17 ASSESSMENT — PATIENT HEALTH QUESTIONNAIRE - PHQ9
SUM OF ALL RESPONSES TO PHQ QUESTIONS 1-9: 0
1. LITTLE INTEREST OR PLEASURE IN DOING THINGS: NOT AT ALL
2. FEELING DOWN, DEPRESSED OR HOPELESS: NOT AT ALL

## 2025-07-17 NOTE — PROGRESS NOTES
Liviakirsten Kraus presents today for No chief complaint on file.        Is someone accompanying this pt? no    Is the patient using any DME equipment during OV? no    Depression Screenin/12/2025     4:26 PM 2025     9:08 AM 2025     3:43 PM 2025     8:18 AM 2025     9:50 AM 2024     8:52 AM 2024    11:47 AM   PHQ-9 Questionaire   Little interest or pleasure in doing things 0 0 0 0 0 0 0   Feeling down, depressed, or hopeless 0 0 0 0 0 0 0   PHQ-9 Total Score 0 0 0 0 0 0 0        LIANNA 7-Anxiety        No data to display                 Travel Screening:    Travel Screening     No screening recorded since 25 0000       Travel History   Travel since 25    No documented travel since 25          Health Maintenance reviewed and discussed and ordered per Provider.  Transportation Needs: No Transportation Needs (2025)    PRAPARE - Transportation     Lack of Transportation (Medical): No     Lack of Transportation (Non-Medical): No      Food Insecurity: No Food Insecurity (2025)    Hunger Vital Sign     Worried About Running Out of Food in the Last Year: Never true     Ran Out of Food in the Last Year: Never true     Financial Resource Strain: Medium Risk (2023)    Overall Financial Resource Strain (CARDIA)     Difficulty of Paying Living Expenses: Somewhat hard     Housing Stability: Low Risk  (2025)    Housing Stability Vital Sign     Unable to Pay for Housing in the Last Year: No     Number of Times Moved in the Last Year: 0     Homeless in the Last Year: No       Did you provide resources if patient requested them? None requested      Health Maintenance Due   Topic Date Due    Varicella vaccine (1 of 2 - 13+ 2-dose series) Never done    HIV screen  Never done    Diabetic retinal exam  Never done    Hepatitis B vaccine (1 of 3 - 19+ 3-dose series) Never done    Shingles vaccine (1 of 2) Never done    Pneumococcal 0-49 years Vaccine (1 of 2

## 2025-07-17 NOTE — PROGRESS NOTES
NEW to Provider     History of Present Illness    Chief Complaint   Patient presents with    New Patient     Est care       History of Present Illness  The patient is here to establish care with us. She was previously under the care of Dr. Casillas, who has changed practice, and is now under our care as a new to provider. Pt reports:     She is currently being evaluated by cardiology for Tachycardia for clearance to proceed with gastric sleeves surgery.  Already had echo and waiting for stress test.     She reports a history of PCOS and is currently taking metformin 750 mg for this condition. She has not been diagnosed with diabetes and believes her A1c has never exceeded 6.5. Record review  confirmed and A1c today at 5.6.     She has a history of asthma and uses Singulair 10 mg and Trelegy 100/62.5/25 daily, along with a rescue inhaler.  She also has a nebulizer with DuoNebs for episodes and uses Flonase for allergies. She reports no recent hospitalizations for asthma.    She is currently under the care of an endocrinologist for thyroid issues. She was previously diagnosed with hyperthyroidism and is awaiting further blood work to determine the current status. She is not currently on any medication for thyroid conditions.  TSH was low, but all other lab has been normal.     She experiences fluid retention and takes hydrochlorothiazide 12.5 mg daily and does not have HT. 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 She also takes potassium chloride as needed.    She has a history of breast cancer, diagnosed in 2022, which was treated with a partial lumpectomy, chemotherapy, and radiation. She is currently in remission. Additionally, she had a mass in her left kidney, which was removed via nephrectomy. The mass was cancerous, but she is now in remission.    She has a history of rheumatoid arthritis, which she reports is heavy on her

## 2025-07-18 LAB
CHOLESTEROL, TOTAL: 138 MG/DL (ref 110–200)
CHOLESTEROL/HDL RATIO: 4.6 (ref 0–5)
HDLC SERPL-MCNC: 30 MG/DL
HIV INTERPRETATION: NORMAL
HIV1/0/2 AB/AG: NON REACTIVE
LDL CHOLESTEROL: 83 MG/DL (ref 50–99)
LDL/HDL RATIO: 2.8
NON-HDL CHOLESTEROL: 108 MG/DL
SENTARA SPECIMEN COLLECTION: NORMAL
TRIGL SERPL-MCNC: 125 MG/DL (ref 40–149)
VLDLC SERPL CALC-MCNC: 25 MG/DL (ref 8–30)

## 2025-08-05 ENCOUNTER — TELEPHONE (OUTPATIENT)
Age: 45
End: 2025-08-05

## 2025-08-08 ENCOUNTER — OFFICE VISIT (OUTPATIENT)
Facility: CLINIC | Age: 45
End: 2025-08-08
Payer: MEDICAID

## 2025-08-08 VITALS
TEMPERATURE: 97.3 F | SYSTOLIC BLOOD PRESSURE: 123 MMHG | DIASTOLIC BLOOD PRESSURE: 82 MMHG | RESPIRATION RATE: 18 BRPM | HEIGHT: 65 IN | OXYGEN SATURATION: 97 % | WEIGHT: 257.4 LBS | HEART RATE: 93 BPM | BODY MASS INDEX: 42.88 KG/M2

## 2025-08-08 DIAGNOSIS — G47.33 OSA ON CPAP: ICD-10-CM

## 2025-08-08 DIAGNOSIS — Z01.818 PREOPERATIVE CLEARANCE: Primary | ICD-10-CM

## 2025-08-08 DIAGNOSIS — E78.2 MIXED HYPERLIPIDEMIA: ICD-10-CM

## 2025-08-08 DIAGNOSIS — E28.2 PCOS (POLYCYSTIC OVARIAN SYNDROME): ICD-10-CM

## 2025-08-08 PROCEDURE — 99214 OFFICE O/P EST MOD 30 MIN: CPT | Performed by: STUDENT IN AN ORGANIZED HEALTH CARE EDUCATION/TRAINING PROGRAM

## 2025-08-08 SDOH — ECONOMIC STABILITY: FOOD INSECURITY: WITHIN THE PAST 12 MONTHS, YOU WORRIED THAT YOUR FOOD WOULD RUN OUT BEFORE YOU GOT MONEY TO BUY MORE.: NEVER TRUE

## 2025-08-08 SDOH — ECONOMIC STABILITY: FOOD INSECURITY: WITHIN THE PAST 12 MONTHS, THE FOOD YOU BOUGHT JUST DIDN'T LAST AND YOU DIDN'T HAVE MONEY TO GET MORE.: NEVER TRUE

## 2025-08-08 ASSESSMENT — PATIENT HEALTH QUESTIONNAIRE - PHQ9
SUM OF ALL RESPONSES TO PHQ QUESTIONS 1-9: 0
SUM OF ALL RESPONSES TO PHQ QUESTIONS 1-9: 0
2. FEELING DOWN, DEPRESSED OR HOPELESS: NOT AT ALL
1. LITTLE INTEREST OR PLEASURE IN DOING THINGS: NOT AT ALL
SUM OF ALL RESPONSES TO PHQ QUESTIONS 1-9: 0
SUM OF ALL RESPONSES TO PHQ QUESTIONS 1-9: 0

## 2025-08-12 DIAGNOSIS — J30.9 ALLERGIC RHINITIS, UNSPECIFIED SEASONALITY, UNSPECIFIED TRIGGER: ICD-10-CM

## 2025-08-12 RX ORDER — FLUTICASONE PROPIONATE 50 MCG
SPRAY, SUSPENSION (ML) NASAL
Qty: 16 G | Refills: 0 | Status: SHIPPED | OUTPATIENT
Start: 2025-08-12

## 2025-08-26 ENCOUNTER — HOSPITAL ENCOUNTER (OUTPATIENT)
Facility: HOSPITAL | Age: 45
Discharge: HOME | End: 2025-08-29

## 2025-08-26 ENCOUNTER — CLINICAL DOCUMENTATION (OUTPATIENT)
Facility: HOSPITAL | Age: 45
End: 2025-08-26

## 2025-08-26 ENCOUNTER — OFFICE VISIT (OUTPATIENT)
Age: 45
End: 2025-08-26
Payer: MEDICAID

## 2025-08-26 VITALS
TEMPERATURE: 98 F | WEIGHT: 252 LBS | OXYGEN SATURATION: 98 % | BODY MASS INDEX: 41.99 KG/M2 | HEART RATE: 100 BPM | HEIGHT: 65 IN | SYSTOLIC BLOOD PRESSURE: 124 MMHG | RESPIRATION RATE: 18 BRPM | DIASTOLIC BLOOD PRESSURE: 78 MMHG

## 2025-08-26 DIAGNOSIS — Z71.89 ENCOUNTER FOR PRE-BARIATRIC SURGERY COUNSELING AND EDUCATION: ICD-10-CM

## 2025-08-26 DIAGNOSIS — E28.2 POLYCYSTIC OVARIAN SYNDROME: ICD-10-CM

## 2025-08-26 DIAGNOSIS — I10 ESSENTIAL HYPERTENSION: ICD-10-CM

## 2025-08-26 DIAGNOSIS — E11.9 DIABETES MELLITUS TYPE 2, NONINSULIN DEPENDENT (HCC): ICD-10-CM

## 2025-08-26 DIAGNOSIS — Z90.5 HISTORY OF PARTIAL NEPHRECTOMY: ICD-10-CM

## 2025-08-26 DIAGNOSIS — K21.9 GASTROESOPHAGEAL REFLUX DISEASE, UNSPECIFIED WHETHER ESOPHAGITIS PRESENT: ICD-10-CM

## 2025-08-26 DIAGNOSIS — E28.2 POLYCYSTIC OVARY SYNDROME: ICD-10-CM

## 2025-08-26 DIAGNOSIS — Z85.3 HISTORY OF BREAST CANCER: ICD-10-CM

## 2025-08-26 DIAGNOSIS — G47.33 OBSTRUCTIVE SLEEP APNEA (ADULT) (PEDIATRIC): ICD-10-CM

## 2025-08-26 DIAGNOSIS — E78.2 MIXED HYPERLIPIDEMIA: ICD-10-CM

## 2025-08-26 DIAGNOSIS — J45.40 MODERATE PERSISTENT ASTHMA WITHOUT COMPLICATION: ICD-10-CM

## 2025-08-26 DIAGNOSIS — E66.01 MORBID OBESITY (HCC): Primary | ICD-10-CM

## 2025-08-26 DIAGNOSIS — R79.89 ABNORMAL LIVER FUNCTION TESTS: ICD-10-CM

## 2025-08-26 DIAGNOSIS — M06.9 RHEUMATOID ARTHRITIS, INVOLVING UNSPECIFIED SITE, UNSPECIFIED WHETHER RHEUMATOID FACTOR PRESENT (HCC): ICD-10-CM

## 2025-08-26 PROCEDURE — 99214 OFFICE O/P EST MOD 30 MIN: CPT | Performed by: SURGERY

## 2025-08-26 PROCEDURE — 3044F HG A1C LEVEL LT 7.0%: CPT | Performed by: SURGERY

## 2025-08-26 PROCEDURE — 3074F SYST BP LT 130 MM HG: CPT | Performed by: SURGERY

## 2025-08-26 PROCEDURE — 3078F DIAST BP <80 MM HG: CPT | Performed by: SURGERY

## 2025-08-26 PROCEDURE — G2211 COMPLEX E/M VISIT ADD ON: HCPCS | Performed by: SURGERY

## 2025-08-26 ASSESSMENT — LIFESTYLE VARIABLES: HOW OFTEN DO YOU HAVE A DRINK CONTAINING ALCOHOL: NEVER

## 2025-08-27 PROBLEM — E11.9 DIABETES MELLITUS TYPE 2, NONINSULIN DEPENDENT (HCC): Status: ACTIVE | Noted: 2025-08-27

## 2025-08-27 PROBLEM — G47.33 OBSTRUCTIVE SLEEP APNEA (ADULT) (PEDIATRIC): Status: ACTIVE | Noted: 2025-08-27

## 2025-08-27 PROBLEM — E66.01 MORBID OBESITY (HCC): Status: ACTIVE | Noted: 2025-08-27

## 2025-08-27 PROBLEM — R79.89 ABNORMAL LIVER FUNCTION TESTS: Status: ACTIVE | Noted: 2025-08-27

## 2025-08-27 PROBLEM — E28.2 POLYCYSTIC OVARY SYNDROME: Status: ACTIVE | Noted: 2025-08-27

## 2025-08-27 PROBLEM — I10 ESSENTIAL HYPERTENSION: Status: ACTIVE | Noted: 2025-08-27

## 2025-08-27 PROBLEM — E28.2 POLYCYSTIC OVARIAN SYNDROME: Status: ACTIVE | Noted: 2025-08-27

## 2025-08-27 PROBLEM — Z85.3 HISTORY OF BREAST CANCER: Status: ACTIVE | Noted: 2025-08-27

## 2025-08-27 PROBLEM — Z71.89 ENCOUNTER FOR PRE-BARIATRIC SURGERY COUNSELING AND EDUCATION: Status: ACTIVE | Noted: 2025-08-27

## (undated) DEVICE — SYR 10ML CTRL LR LCK NSAF LF --

## (undated) DEVICE — SHEET,DRAPE,70X100,STERILE: Brand: MEDLINE

## (undated) DEVICE — SPECIMEN RETRIEVAL POUCH: Brand: ENDO CATCH GOLD

## (undated) DEVICE — INTENDED FOR TISSUE SEPARATION, AND OTHER PROCEDURES THAT REQUIRE A SHARP SURGICAL BLADE TO PUNCTURE OR CUT.: Brand: BARD-PARKER ® CARBON RIB-BACK BLADES

## (undated) DEVICE — SYRINGE MED 25GA 3ML L5/8IN SUBQ PLAS W/ DETACH NDL SFTY

## (undated) DEVICE — CANNULA NSL AD TBNG L14FT STD PVC O2 CRV CONN NONFLARED NSL

## (undated) DEVICE — FLOSEAL MATRIX IS INDICATED IN SURGICAL PROCEDURES (OTHER THAN IN OPHTHALMIC) AS AN ADJUNCT TO HEMOSTASIS WHEN CONTROL OF BLEEDING BY LIGATURE OR CONVENTIONALPROCEDURES IS INEFFECTIVE OR IMPRACTICAL.: Brand: FLOSEAL HEMOSTATIC MATRIX

## (undated) DEVICE — SUTURE VCRL SZ 2-0 L27IN ABSRB UD L26MM SH 1/2 CIR J417H

## (undated) DEVICE — COLUMN DRAPE

## (undated) DEVICE — DRAIN SURG 15FR L3/16IN DIA4.7MM SIL CHN RND HUBLESS FULL

## (undated) DEVICE — TIP COVER ACCESSORY

## (undated) DEVICE — CATHETER SUCT TR FL TIP 14FR W/ O CTRL

## (undated) DEVICE — CLIP LIG ABSRB HEM-LOK LG PUR --

## (undated) DEVICE — TRAY CATH 16F URIN MTR LTX -- CONVERT TO ITEM 363111

## (undated) DEVICE — STERILE POLYISOPRENE POWDER-FREE SURGICAL GLOVES: Brand: PROTEXIS

## (undated) DEVICE — COVER LT HNDL BLU PLAS

## (undated) DEVICE — UNDERPAD INCONT W23XL36IN STD BLU POLYPR BK FLUF SFT

## (undated) DEVICE — BLADELESS OBTURATOR: Brand: WECK VISTA

## (undated) DEVICE — DEVON™ KNEE AND BODY STRAP 60" X 3" (1.5 M X 7.6 CM): Brand: DEVON

## (undated) DEVICE — SOLUTION IRRIG 1000ML H2O STRL BLT

## (undated) DEVICE — SUTURE ABSORBABLE BRAIDED 2-0 CT-1 27 IN UD VICRYL J259H

## (undated) DEVICE — NEEDLE HYPO 25GA L1.5IN BVL ORIENTED ECLIPSE

## (undated) DEVICE — YANKAUER,SMOOTH HANDLE,HIGH CAPACITY: Brand: MEDLINE INDUSTRIES, INC.

## (undated) DEVICE — BASIN EMSIS 16OZ GRAPHITE PLAS KID SHP MOLD GRAD FOR ORAL

## (undated) DEVICE — SYRINGE MED 50ML LUERSLIP TIP

## (undated) DEVICE — Device

## (undated) DEVICE — VISUALIZATION SYSTEM: Brand: CLEARIFY

## (undated) DEVICE — DERMABOND SKIN ADH 0.7ML -- DERMABOND ADVANCED 12/BX

## (undated) DEVICE — KENDALL SCD EXPRESS SLEEVES, KNEE LENGTH, MEDIUM: Brand: KENDALL SCD

## (undated) DEVICE — FORCEPS BX L240CM JAW DIA2.4MM ORNG L CAP W/ NDL DISP RAD

## (undated) DEVICE — REM POLYHESIVE ADULT PATIENT RETURN ELECTRODE: Brand: VALLEYLAB

## (undated) DEVICE — SEALANT FIBRIN 4 CC FRZN PRE FILLED SYR TISSEEL

## (undated) DEVICE — LINER SUCT CANSTR 3000CC PLAS SFT PRE ASSEMB W/OUT TBNG W/

## (undated) DEVICE — CLIP SUT ENDOSCP F/2-0/3-0/4-0 -- LAPRA-TY

## (undated) DEVICE — SEAL UNIV 5-8MM DISP BX/10 -- DA VINCI XI - SNGL USE

## (undated) DEVICE — CARTRIDGE CLP LIG HEMLOK GRN --

## (undated) DEVICE — ENDOSCOPY PUMP TUBING/ CAP SET: Brand: ERBE

## (undated) DEVICE — ARM DRAPE

## (undated) DEVICE — ELECTRO LUBE IS A SINGLE PATIENT USE DEVICE THAT IS INTENDED TO BE USED ON ELECTROSURGICAL ELECTRODES TO REDUCE STICKING.: Brand: KEY SURGICAL ELECTRO LUBE

## (undated) DEVICE — APPLICATOR SEAL FLOSEAL 5MM+ --

## (undated) DEVICE — INSTRMT SET WND CLSR SUT PASS --

## (undated) DEVICE — TUBING IRRIG PRESSURIZED BG SET SPIK PRB + II

## (undated) DEVICE — BITE BLOCK ENDOSCP UNIV AD 6 TO 9.4 MM

## (undated) DEVICE — GAUZE,SPONGE,4"X4",16PLY,STRL,LF,10/TRAY: Brand: MEDLINE

## (undated) DEVICE — SPONGE HEMOSTAT CELLULS 4X8IN -- SURGICEL

## (undated) DEVICE — (D)TROCAR ENDO BLDELSS 10-12MM -- DISC BY MFR US ITEM 162087

## (undated) DEVICE — (D)CAP REDUC 1 SEAL ENDOPTH -- DISC W/NO SUB

## (undated) DEVICE — SUTURE MCRYL SZ 4-0 L18IN ABSRB UD L19MM PS-2 3/8 CIR PRIM Y496G

## (undated) DEVICE — BLADELESS OPTICAL TROCAR WITH FIXATION CANNULA: Brand: VERSAONE

## (undated) DEVICE — LIGHT HANDLE: Brand: DEVON

## (undated) DEVICE — GOWN,SIRUS,FABRNF,XL,20/CS: Brand: MEDLINE

## (undated) DEVICE — MEDI-VAC NON-CONDUCTIVE SUCTION TUBING: Brand: CARDINAL HEALTH

## (undated) DEVICE — SHEARS: Brand: ENDO SHEARS